# Patient Record
Sex: FEMALE | Race: WHITE | NOT HISPANIC OR LATINO | Employment: OTHER | URBAN - METROPOLITAN AREA
[De-identification: names, ages, dates, MRNs, and addresses within clinical notes are randomized per-mention and may not be internally consistent; named-entity substitution may affect disease eponyms.]

---

## 2017-01-13 ENCOUNTER — ANTI-COAG VISIT (OUTPATIENT)
Dept: CARDIOLOGY | Facility: CLINIC | Age: 82
End: 2017-01-13
Payer: MEDICARE

## 2017-01-13 DIAGNOSIS — Z79.01 LONG TERM CURRENT USE OF ANTICOAGULANT THERAPY: Primary | ICD-10-CM

## 2017-01-13 DIAGNOSIS — I48.20 ATRIAL FIBRILLATION, CHRONIC: ICD-10-CM

## 2017-01-13 LAB
CTP QC/QA: NORMAL
INR PPP: 3.1 (ref 2–3)

## 2017-01-13 PROCEDURE — 99211 OFF/OP EST MAY X REQ PHY/QHP: CPT | Mod: 25,S$GLB,,

## 2017-01-13 PROCEDURE — 85610 PROTHROMBIN TIME: CPT | Mod: QW,S$GLB,,

## 2017-01-13 NOTE — PROGRESS NOTES
Patient came to the clinic today and was experiencing SOB. Her sitter states it comes on with anxiety of being out of her house and nothing to be concerned about. This change in health shouldn't affect her warfarin. Her INR is slightly elevated today without any changes. I am maintaining her dose since she took her medication already today. If her INR remains elevated then I would consider reducing her weekly dose. I advised her and her sitter to contact us with any changes or problems.

## 2017-01-23 ENCOUNTER — ANTI-COAG VISIT (OUTPATIENT)
Dept: CARDIOLOGY | Facility: CLINIC | Age: 82
End: 2017-01-23
Payer: MEDICARE

## 2017-01-23 DIAGNOSIS — Z79.01 LONG TERM CURRENT USE OF ANTICOAGULANT THERAPY: Primary | ICD-10-CM

## 2017-01-23 DIAGNOSIS — I48.20 ATRIAL FIBRILLATION, CHRONIC: ICD-10-CM

## 2017-01-23 DIAGNOSIS — Z79.01 LONG-TERM (CURRENT) USE OF ANTICOAGULANTS: ICD-10-CM

## 2017-01-23 LAB — INR PPP: 2.6 (ref 2–3)

## 2017-01-23 PROCEDURE — 99211 OFF/OP EST MAY X REQ PHY/QHP: CPT | Mod: S$GLB,,, | Performed by: INTERNAL MEDICINE

## 2017-01-23 PROCEDURE — 85610 PROTHROMBIN TIME: CPT | Mod: QW,S$GLB,,

## 2017-01-23 NOTE — PROGRESS NOTES
Patient has minimal bruising. Her INR reduced nicely and now back within range. She will continue this dose until follow-up. I advised her and her caretaker to contact us with any changes or problems.

## 2017-02-05 DIAGNOSIS — F01.50 VASCULAR DEMENTIA WITHOUT BEHAVIORAL DISTURBANCE: Chronic | ICD-10-CM

## 2017-02-05 RX ORDER — DIGOXIN 125 UG/1
TABLET ORAL
Qty: 90 TABLET | Refills: 11 | Status: SHIPPED | OUTPATIENT
Start: 2017-02-05 | End: 2018-04-29 | Stop reason: SDUPTHER

## 2017-02-07 ENCOUNTER — LAB VISIT (OUTPATIENT)
Dept: LAB | Facility: HOSPITAL | Age: 82
End: 2017-02-07
Attending: INTERNAL MEDICINE
Payer: MEDICARE

## 2017-02-07 ENCOUNTER — OFFICE VISIT (OUTPATIENT)
Dept: CARDIOLOGY | Facility: CLINIC | Age: 82
End: 2017-02-07
Payer: MEDICARE

## 2017-02-07 VITALS
SYSTOLIC BLOOD PRESSURE: 80 MMHG | HEIGHT: 63 IN | WEIGHT: 155.63 LBS | BODY MASS INDEX: 27.57 KG/M2 | OXYGEN SATURATION: 97 % | DIASTOLIC BLOOD PRESSURE: 56 MMHG | HEART RATE: 66 BPM

## 2017-02-07 DIAGNOSIS — Z79.01 LONG TERM CURRENT USE OF ANTICOAGULANT THERAPY: ICD-10-CM

## 2017-02-07 DIAGNOSIS — F01.50 MIXED ALZHEIMER'S AND VASCULAR DEMENTIA: ICD-10-CM

## 2017-02-07 DIAGNOSIS — R53.1 GENERALIZED WEAKNESS: ICD-10-CM

## 2017-02-07 DIAGNOSIS — G30.9 MIXED ALZHEIMER'S AND VASCULAR DEMENTIA: ICD-10-CM

## 2017-02-07 DIAGNOSIS — I50.42 CHRONIC COMBINED SYSTOLIC AND DIASTOLIC CHF, NYHA CLASS 3: Primary | Chronic | ICD-10-CM

## 2017-02-07 DIAGNOSIS — I42.0 DILATED CARDIOMYOPATHY: ICD-10-CM

## 2017-02-07 DIAGNOSIS — I50.42 CHRONIC COMBINED SYSTOLIC AND DIASTOLIC CHF, NYHA CLASS 3: Chronic | ICD-10-CM

## 2017-02-07 DIAGNOSIS — I48.20 ATRIAL FIBRILLATION, CHRONIC: Chronic | ICD-10-CM

## 2017-02-07 DIAGNOSIS — F02.80 MIXED ALZHEIMER'S AND VASCULAR DEMENTIA: ICD-10-CM

## 2017-02-07 DIAGNOSIS — N18.30 CKD (CHRONIC KIDNEY DISEASE), STAGE III: ICD-10-CM

## 2017-02-07 LAB
ALBUMIN SERPL BCP-MCNC: 3.6 G/DL
ALP SERPL-CCNC: 56 U/L
ALT SERPL W/O P-5'-P-CCNC: 19 U/L
ANION GAP SERPL CALC-SCNC: 11 MMOL/L
AST SERPL-CCNC: 27 U/L
BASOPHILS # BLD AUTO: 0.02 K/UL
BASOPHILS NFR BLD: 0.2 %
BILIRUB SERPL-MCNC: 0.6 MG/DL
BUN SERPL-MCNC: 17 MG/DL
CALCIUM SERPL-MCNC: 9.6 MG/DL
CHLORIDE SERPL-SCNC: 108 MMOL/L
CHOLEST/HDLC SERPL: 4.9 {RATIO}
CO2 SERPL-SCNC: 23 MMOL/L
CREAT SERPL-MCNC: 1.2 MG/DL
DIFFERENTIAL METHOD: ABNORMAL
EOSINOPHIL # BLD AUTO: 0.1 K/UL
EOSINOPHIL NFR BLD: 1.1 %
ERYTHROCYTE [DISTWIDTH] IN BLOOD BY AUTOMATED COUNT: 13.8 %
EST. GFR  (AFRICAN AMERICAN): 49.7 ML/MIN/1.73 M^2
EST. GFR  (NON AFRICAN AMERICAN): 43.1 ML/MIN/1.73 M^2
GLUCOSE SERPL-MCNC: 97 MG/DL
HCT VFR BLD AUTO: 41.3 %
HDL/CHOLESTEROL RATIO: 20.6 %
HDLC SERPL-MCNC: 204 MG/DL
HDLC SERPL-MCNC: 42 MG/DL
HGB BLD-MCNC: 13.4 G/DL
LDLC SERPL CALC-MCNC: 102.8 MG/DL
LYMPHOCYTES # BLD AUTO: 2.5 K/UL
LYMPHOCYTES NFR BLD: 26.3 %
MCH RBC QN AUTO: 30.2 PG
MCHC RBC AUTO-ENTMCNC: 32.4 %
MCV RBC AUTO: 93 FL
MONOCYTES # BLD AUTO: 0.9 K/UL
MONOCYTES NFR BLD: 9.6 %
NEUTROPHILS # BLD AUTO: 5.9 K/UL
NEUTROPHILS NFR BLD: 62.6 %
NONHDLC SERPL-MCNC: 162 MG/DL
PLATELET # BLD AUTO: 411 K/UL
PMV BLD AUTO: 10.9 FL
POTASSIUM SERPL-SCNC: 4.5 MMOL/L
PROT SERPL-MCNC: 6.7 G/DL
RBC # BLD AUTO: 4.43 M/UL
SODIUM SERPL-SCNC: 142 MMOL/L
TRIGL SERPL-MCNC: 296 MG/DL
WBC # BLD AUTO: 9.38 K/UL

## 2017-02-07 PROCEDURE — 99999 PR PBB SHADOW E&M-EST. PATIENT-LVL III: CPT | Mod: PBBFAC,,, | Performed by: INTERNAL MEDICINE

## 2017-02-07 PROCEDURE — 3078F DIAST BP <80 MM HG: CPT | Mod: S$GLB,,, | Performed by: INTERNAL MEDICINE

## 2017-02-07 PROCEDURE — 80053 COMPREHEN METABOLIC PANEL: CPT

## 2017-02-07 PROCEDURE — 85025 COMPLETE CBC W/AUTO DIFF WBC: CPT

## 2017-02-07 PROCEDURE — 3074F SYST BP LT 130 MM HG: CPT | Mod: S$GLB,,, | Performed by: INTERNAL MEDICINE

## 2017-02-07 PROCEDURE — 1157F ADVNC CARE PLAN IN RCRD: CPT | Mod: S$GLB,,, | Performed by: INTERNAL MEDICINE

## 2017-02-07 PROCEDURE — 1160F RVW MEDS BY RX/DR IN RCRD: CPT | Mod: S$GLB,,, | Performed by: INTERNAL MEDICINE

## 2017-02-07 PROCEDURE — 1126F AMNT PAIN NOTED NONE PRSNT: CPT | Mod: S$GLB,,, | Performed by: INTERNAL MEDICINE

## 2017-02-07 PROCEDURE — 36415 COLL VENOUS BLD VENIPUNCTURE: CPT

## 2017-02-07 PROCEDURE — 99214 OFFICE O/P EST MOD 30 MIN: CPT | Mod: S$GLB,,, | Performed by: INTERNAL MEDICINE

## 2017-02-07 PROCEDURE — 99499 UNLISTED E&M SERVICE: CPT | Mod: S$PBB,,, | Performed by: INTERNAL MEDICINE

## 2017-02-07 PROCEDURE — 1159F MED LIST DOCD IN RCRD: CPT | Mod: S$GLB,,, | Performed by: INTERNAL MEDICINE

## 2017-02-07 PROCEDURE — 80061 LIPID PANEL: CPT

## 2017-02-07 RX ORDER — LOSARTAN POTASSIUM 25 MG/1
25 TABLET ORAL DAILY
Qty: 90 TABLET | Refills: 3 | Status: SHIPPED | OUTPATIENT
Start: 2017-02-07 | End: 2018-04-01 | Stop reason: SDUPTHER

## 2017-02-07 NOTE — MR AVS SNAPSHOT
[facility]  [address]  [facility] [address]  Phone: [phone]                  Cat Garcia   2017 10:30 AM   Office Visit    Description:  Female : 1935   Provider:  Sampson Kay MD   Department:  [facility]           Reason for Visit     Cardiomyopathy           Diagnoses this Visit        Comments    Chronic combined systolic and diastolic CHF, NYHA class 3                To Do List           Future Appointments        Provider Department Dept Phone    2017 12:00 PM MD [facility] [phone]    2017 10:45 AM Leandra Deng, PharmD [place] [facility] Cou[facility]in [phone]      Goals (5 Years of Data)     None      Follow-Up and Disposition     Return in about 1 year (around 2018).       These Medications        Disp Refills Start End    losartan (COZAAR) 25 MG tablet 90 tablet 3 2017     Take 1 tablet (25 mg total) by mouth once daily. - Oral    Pharmacy: Location [id number] - [place] - [address] AT [facility] #: [phone]         [facility] On Call     [facility] On Call Nurse Care Line -  Assistance  Registered nurses in the [facility] On Call Center provide clinical advisement, health education, appointment booking, and other advisory services.  Call for this free service at [phone].             Medications           Message regarding Medications     Verify the changes and/or additions to your medication regime listed below are the same as discussed with your clinician today.  If any of these changes or additions are incorrect, please notify your healthcare provider.        CHANGE how you are taking these medications     Start Taking Instead of    losartan (COZAAR) 25 MG tablet losartan (COZAAR) 50 MG tablet    Dosage:  Take 1 tablet (25 mg total) by mouth once daily. Dosage:  Take 1 tablet (50 mg total) by mouth once daily.    Reason for  "Change:  Reorder       STOP taking these medications     letrozole (FEMARA) 2.5 mg Tab Take 2.5 mg by mouth once daily.           Verify that the below list of medications is an accurate representation of the medications you are currently taking.  If none reported, the list may be blank. If incorrect, please contact your healthcare provider. Carry this list with you in case of emergency.           Current Medications     antiox#10-om3-dha-epa-lut-zeax (I-CAPS) 280-10-2 mg Cap Take by mouth 2 (two) times daily.    ascorbic acid (VITAMIN C) 1000 MG tablet Take 1,000 mg by mouth once daily.    atorvastatin (LIPITOR) 10 MG tablet TAKE 1 TABLET BY MOUTH DAILY    brimonidine 0.15 % OPTH DROP (ALPHAGAN) 0.15 % ophthalmic solution 3 (three) times daily.     calcium citrate-vitamin D3 315-200 mg (CITRACAL+D) 315-200 mg-unit per tablet Take 1 tablet by mouth 2 (two) times daily.    DIGOX 125 mcg tablet TAKE 1 TABLET BY MOUTH ONCE DAILY    donepezil (ARICEPT) 10 MG tablet Take 1 tablet (10 mg total) by mouth every evening.    econazole nitrate 1 % cream Apply topically once daily.    fish oil-omega-3 fatty acids 300-1,000 mg capsule Take 2 g by mouth once daily.    latanoprost 0.005 % ophthalmic solution once daily.     losartan (COZAAR) 25 MG tablet Take 1 tablet (25 mg total) by mouth once daily.    metoprolol succinate (TOPROL-XL) 25 MG 24 hr tablet Take 1 tablet (25 mg total) by mouth once daily.    miconazole (MICOTIN) 2 % cream Apply topically 2 (two) times daily.    warfarin (COUMADIN) 2.5 MG tablet Take 2 tablets (5mg.) by mouth every day, except 3 tablets (7.5mg) on Sunday, and Wednesday,  Or as directed by Coumadin Clinic.           Clinical Reference Information           Your Vitals Were     BP Pulse Height Weight SpO2 BMI    80/56 (BP Location: Left arm, Patient Position: Sitting, BP Method: Manual) 66 5' 3.25" (1.607 m) 70.6 kg (155 lb 10.3 oz) 97% 27.35 kg/m2      Blood Pressure          Most Recent Value    " Right Arm BP - Sitting  98/60    Left Arm BP - Sitting  80/56    BP  (!)  80/56      Allergies as of 2/7/2017     No Known Allergies      Immunizations Administered on Date of Encounter - 2/7/2017     None      Orders Placed During Today's Visit     Future Labs/Procedures Expected by Expires    CBC auto differential  2/7/2017 (Approximate) 2/7/2018    Comprehensive metabolic panel  2/7/2017 (Approximate) 2/7/2018    Lipid panel  2/7/2017 (Approximate) 2/7/2018      Language Assistance Services     ATTENTION: Language assistance services are available, free of charge. Please call [phone].      ATENCIÓN: Si habla español, tiene a gage disposición servicios gratuitos de asistencia lingüística. Llame al [phone].     CHÚ Ý: N?u b?n nói Ti?ng Vi?t, có các d?ch v? h? tr? ngôn ng? mi?n phí dành cho b?n. G?i s? [phone].         [facility] complies with applicable Federal civil rights laws and does not discriminate on the basis of race, color, national origin, age, disability, or sex.

## 2017-02-07 NOTE — PROGRESS NOTES
Subjective:    Patient ID:  Cat Garcia is a 81 y.o. female who presents for follow-up of non ischemic cardiomyopathy and chronic atrial fibrillation    HPI  The patient is a 81 year old female followed with chronic AF and non ischemic DCM [EF 15%]. She is now large home confined with a sitter in the mornings and goes out to dinner with friends every Friday evening. She has  increasing memory loss. At the start of the visit she was hyperventilating which settled down as the visit progressed . SPO2 was 97%.              Past Medical History   Diagnosis Date    Atrial fibrillation     Breast cancer      XRT    Chronic bronchitis     CKD (chronic kidney disease), stage III     Dementia      Current Outpatient Prescriptions   Medication Sig    antiox#10-om3-dha-epa-lut-zeax (I-CAPS) 280-10-2 mg Cap Take by mouth 2 (two) times daily.    ascorbic acid (VITAMIN C) 1000 MG tablet Take 1,000 mg by mouth once daily.    atorvastatin (LIPITOR) 10 MG tablet TAKE 1 TABLET BY MOUTH DAILY    brimonidine 0.15 % OPTH DROP (ALPHAGAN) 0.15 % ophthalmic solution 3 (three) times daily.     calcium citrate-vitamin D3 315-200 mg (CITRACAL+D) 315-200 mg-unit per tablet Take 1 tablet by mouth 2 (two) times daily.    DIGOX 125 mcg tablet TAKE 1 TABLET BY MOUTH ONCE DAILY    donepezil (ARICEPT) 10 MG tablet Take 1 tablet (10 mg total) by mouth every evening.    econazole nitrate 1 % cream Apply topically once daily.    fish oil-omega-3 fatty acids 300-1,000 mg capsule Take 2 g by mouth once daily.    latanoprost 0.005 % ophthalmic solution once daily.     losartan (COZAAR) 25 MG tablet Take 1 tablet (25 mg total) by mouth once daily.    metoprolol succinate (TOPROL-XL) 25 MG 24 hr tablet Take 1 tablet (25 mg total) by mouth once daily.    miconazole (MICOTIN) 2 % cream Apply topically 2 (two) times daily.    warfarin (COUMADIN) 2.5 MG tablet Take 2 tablets (5mg.) by mouth every day, except 3 tablets (7.5mg) on Sunday,  "and Wednesday,  Or as directed by Coumadin Clinic.     No current facility-administered medications for this visit.      Review of Systems   Constitution: Negative for decreased appetite, diaphoresis, fever, weakness, malaise/fatigue, weight gain and weight loss.   HENT: Negative for congestion, ear discharge, ear pain, headaches and nosebleeds.    Eyes: Negative for blurred vision, double vision and visual disturbance.   Cardiovascular: Positive for dyspnea on exertion. Negative for chest pain, claudication, cyanosis, irregular heartbeat, leg swelling, near-syncope, orthopnea, palpitations, paroxysmal nocturnal dyspnea and syncope.   Respiratory: Positive for shortness of breath. Negative for cough, hemoptysis, sleep disturbances due to breathing, snoring, sputum production and wheezing.    Endocrine: Negative for polydipsia, polyphagia and polyuria.   Hematologic/Lymphatic: Negative for adenopathy and bleeding problem. Does not bruise/bleed easily.   Skin: Negative for color change, nail changes, poor wound healing and rash.   Musculoskeletal: Negative for muscle cramps and muscle weakness.   Gastrointestinal: Negative for abdominal pain, anorexia, change in bowel habit, hematochezia, nausea and vomiting.   Genitourinary: Negative for dysuria, frequency and hematuria.   Neurological: Negative for brief paralysis, difficulty with concentration, excessive daytime sleepiness, dizziness, focal weakness, light-headedness, seizures and vertigo.   Psychiatric/Behavioral: Positive for memory loss. Negative for altered mental status and depression.   Allergic/Immunologic: Negative for persistent infections.        Objective:  Visit Vitals    BP (!) 80/56 (BP Location: Left arm, Patient Position: Sitting, BP Method: Manual)    Pulse 66    Ht 5' 3.25" (1.607 m)    Wt 70.6 kg (155 lb 10.3 oz)    SpO2 97%    BMI 27.35 kg/m2       Physical Exam   Constitutional: She is oriented to person, place, and time. She appears " well-developed and well-nourished.   HENT:   Head: Normocephalic.   Right Ear: External ear normal.   Left Ear: External ear normal.   Nose: Nose normal.   Inspection of lips, teeth and gums normal   Eyes: Conjunctivae and EOM are normal. Pupils are equal, round, and reactive to light. No scleral icterus.   Neck: Normal range of motion. No JVD present. No tracheal deviation present. No thyromegaly present.   Cardiovascular: Normal rate, regular rhythm, normal heart sounds and intact distal pulses.  Exam reveals no gallop and no friction rub.    No murmur heard.  Pulmonary/Chest: Effort normal and breath sounds normal. No respiratory distress. She has no wheezes. She has no rales. She exhibits no tenderness.   Abdominal: Soft. Bowel sounds are normal. She exhibits no distension. There is no hepatosplenomegaly. There is no tenderness. There is no guarding.   Musculoskeletal: Normal range of motion. She exhibits no edema or tenderness.   Lymphadenopathy:   Palpation of lymph nodes of neck and groin normal   Neurological: She is oriented to person, place, and time. No cranial nerve deficit. She exhibits normal muscle tone. Coordination normal.   Skin: Skin is warm and dry. No rash noted. No erythema. No pallor.   Palpation of skin normal   Psychiatric: Her behavior is normal. Judgment and thought content normal. Her mood appears anxious.         Assessment:       1. Chronic combined systolic and diastolic CHF, NYHA class 3    2. Atrial fibrillation, chronic    3. Dilated cardiomyopathy    4. CKD (chronic kidney disease), stage III    5. Long term current use of anticoagulant therapy    6. Generalized weakness    7. Mixed Alzheimer's and vascular dementia         Plan:       Cat MOCK was seen today for cardiomyopathy.    Diagnoses and all orders for this visit:    Chronic combined systolic and diastolic CHF, NYHA class 3  -     losartan (COZAAR) 25 MG tablet; Take 1 tablet (25 mg total) by mouth once daily.  -     CBC  auto differential; Future; Expected date: 2/7/17  -     Comprehensive metabolic panel; Future; Expected date: 2/7/17  -     Lipid panel; Future; Expected date: 2/7/17  -     Basic metabolic panel; Future; Expected date: 8/9/17    Atrial fibrillation, chronic    Dilated cardiomyopathy    CKD (chronic kidney disease), stage III  -     Basic metabolic panel; Future; Expected date: 8/9/17    Long term current use of anticoagulant therapy  -     CBC auto differential; Future; Expected date: 8/9/17    Generalized weakness    Mixed Alzheimer's and vascular dementia

## 2017-02-16 ENCOUNTER — OFFICE VISIT (OUTPATIENT)
Dept: INTERNAL MEDICINE | Facility: CLINIC | Age: 82
End: 2017-02-16
Payer: MEDICARE

## 2017-02-16 VITALS
HEART RATE: 68 BPM | HEIGHT: 61 IN | SYSTOLIC BLOOD PRESSURE: 95 MMHG | DIASTOLIC BLOOD PRESSURE: 62 MMHG | BODY MASS INDEX: 28.7 KG/M2 | WEIGHT: 152 LBS

## 2017-02-16 DIAGNOSIS — L60.0 INGROWN TOENAIL: Primary | ICD-10-CM

## 2017-02-16 DIAGNOSIS — L60.2 ONYCHOGRYPHOSIS: ICD-10-CM

## 2017-02-16 DIAGNOSIS — Z71.89 ADVANCED DIRECTIVES, COUNSELING/DISCUSSION: ICD-10-CM

## 2017-02-16 PROCEDURE — 1159F MED LIST DOCD IN RCRD: CPT | Mod: S$GLB,,, | Performed by: INTERNAL MEDICINE

## 2017-02-16 PROCEDURE — 1126F AMNT PAIN NOTED NONE PRSNT: CPT | Mod: S$GLB,,, | Performed by: INTERNAL MEDICINE

## 2017-02-16 PROCEDURE — 3078F DIAST BP <80 MM HG: CPT | Mod: S$GLB,,, | Performed by: INTERNAL MEDICINE

## 2017-02-16 PROCEDURE — 1160F RVW MEDS BY RX/DR IN RCRD: CPT | Mod: S$GLB,,, | Performed by: INTERNAL MEDICINE

## 2017-02-16 PROCEDURE — 99213 OFFICE O/P EST LOW 20 MIN: CPT | Mod: S$GLB,,, | Performed by: INTERNAL MEDICINE

## 2017-02-16 PROCEDURE — 99999 PR PBB SHADOW E&M-EST. PATIENT-LVL III: CPT | Mod: PBBFAC,,, | Performed by: INTERNAL MEDICINE

## 2017-02-16 PROCEDURE — 3074F SYST BP LT 130 MM HG: CPT | Mod: S$GLB,,, | Performed by: INTERNAL MEDICINE

## 2017-02-16 PROCEDURE — 1157F ADVNC CARE PLAN IN RCRD: CPT | Mod: S$GLB,,, | Performed by: INTERNAL MEDICINE

## 2017-02-16 PROCEDURE — 99499 UNLISTED E&M SERVICE: CPT | Mod: S$PBB,,, | Performed by: INTERNAL MEDICINE

## 2017-02-16 NOTE — MR AVS SNAPSHOT
[facility]  [address]  [facility] [address]  Phone: [phone]  Fax: [phone]                  Cat Garcia   2017 12:00 PM   Office Visit    Description:  Female : 1935   Provider:  Alfie Oconnell MD   Department:  [facility]           Reason for Visit     Follow-up           Diagnoses this Visit        Comments    Ingrown toenail    -  Primary     Onychogryphosis                To Do List           Future Appointments        Provider Department Dept Phone    2017 10:45 AM Leandra Deng, PharmD [place] - Coumadin [phone]      Goals (5 Years of Data)     None      Follow-Up and Disposition     Return in about 1 year (around 2018).      [facility] On Call     [facility] On Call Nurse Care Line - / Assistance  Registered nurses in the [facility] On Call Center provide clinical advisement, health education, appointment booking, and other advisory services.  Call for this free service at [phone].             Medications           Message regarding Medications     Verify the changes and/or additions to your medication regime listed below are the same as discussed with your clinician today.  If any of these changes or additions are incorrect, please notify your healthcare provider.             Verify that the below list of medications is an accurate representation of the medications you are currently taking.  If none reported, the list may be blank. If incorrect, please contact your healthcare provider. Carry this list with you in case of emergency.           Current Medications     antiox#10-om3-dha-epa-lut-zeax (I-CAPS) 280-10-2 mg Cap Take by mouth 2 (two) times daily.    ascorbic acid (VITAMIN C) 1000 MG tablet Take 1,000 mg by mouth once daily.    atorvastatin (LIPITOR) 10 MG tablet TAKE 1 TABLET BY MOUTH DAILY    brimonidine 0.15 % OPTH DROP (ALPHAGAN) 0.15 % ophthalmic solution 3 (three) times daily.     calcium  "citrate-vitamin D3 315-200 mg (CITRACAL+D) 315-200 mg-unit per tablet Take 1 tablet by mouth 2 (two) times daily.    DIGOX 125 mcg tablet TAKE 1 TABLET BY MOUTH ONCE DAILY    donepezil (ARICEPT) 10 MG tablet Take 1 tablet (10 mg total) by mouth every evening.    econazole nitrate 1 % cream Apply topically once daily.    fish oil-omega-3 fatty acids 300-1,000 mg capsule Take 2 g by mouth once daily.    latanoprost 0.005 % ophthalmic solution once daily.     losartan (COZAAR) 25 MG tablet Take 1 tablet (25 mg total) by mouth once daily.    metoprolol succinate (TOPROL-XL) 25 MG 24 hr tablet Take 1 tablet (25 mg total) by mouth once daily.    miconazole (MICOTIN) 2 % cream Apply topically 2 (two) times daily.    warfarin (COUMADIN) 2.5 MG tablet Take 2 tablets (5mg.) by mouth every day, except 3 tablets (7.5mg) on Sunday, and Wednesday,  Or as directed by Coumadin Clinic.           Clinical Reference Information           Your Vitals Were     BP Pulse Height Weight BMI    95/62 68 5' 1" (1.549 m) 68.9 kg (152 lb) 28.72 kg/m2      Blood Pressure          Most Recent Value    BP  95/62      Allergies as of 2/16/2017     No Known Allergies      Immunizations Administered on Date of Encounter - 2/16/2017     None      Orders Placed During Today's Visit      Normal Orders This Visit    Ambulatory Referral to Podiatry       Language Assistance Services     ATTENTION: Language assistance services are available, free of charge. Please call [phone].      ATENCIÓN: Si pablo vinesdemetris, tiene a gage disposición servicios gratuitos de asistencia lingüística. Llame al [phone].     [facility] Ý: N?u b?n nói Ti?ng Vi?t, có các d?ch v? h? tr? ngôn ng? mi?n phí dành cho b?n. G?i s? [phone].         [facility] complies with applicable Federal civil rights laws and does not discriminate on the basis of race, color, national origin, age, disability, or sex.        "

## 2017-02-16 NOTE — PROGRESS NOTES
Subjective:       Patient ID: Cat Garcia is a 81 y.o. female.    Chief Complaint: Follow-up    HPI Comments: Here for f/u.    Difficult to trim nails and some nail fungus, occ pain from the elongated nails.    Now has home PCA, no longer driving.    Still deconditioned even with a round of PT a few mos ago.    Review of Systems   Constitutional: Positive for fatigue. Negative for activity change, appetite change, fever and unexpected weight change.   Eyes: Negative for visual disturbance.   Respiratory: Positive for shortness of breath (stable). Negative for chest tightness and wheezing.    Cardiovascular: Negative for chest pain, palpitations and leg swelling.   Gastrointestinal: Negative for abdominal distention and abdominal pain.   Endocrine: Negative for cold intolerance, heat intolerance, polydipsia and polyuria.   Genitourinary: Negative for difficulty urinating.   Musculoskeletal: Positive for gait problem.   Skin: Negative for rash.   Neurological: Negative for tremors and syncope.        Chronic imbalance    No falls   Hematological: Negative for adenopathy. Does not bruise/bleed easily.   Psychiatric/Behavioral: Negative for dysphoric mood.       Objective:      Physical Exam   Constitutional: She is oriented to person, place, and time. She appears well-developed and well-nourished. No distress.   HENT:   Head: Normocephalic and atraumatic.   Mouth/Throat: No oropharyngeal exudate.   Eyes: Conjunctivae are normal. Pupils are equal, round, and reactive to light. No scleral icterus.   Neck: Normal range of motion. Neck supple. No thyromegaly present.   Cardiovascular: Normal rate and normal heart sounds.    Irregularly irregular       Pulmonary/Chest: Effort normal and breath sounds normal. She has no rales.   Abdominal: Soft. Bowel sounds are normal. She exhibits no distension. There is no tenderness.   Musculoskeletal: She exhibits no edema.   Lymphadenopathy:     She has no cervical adenopathy.    Neurological: She is alert and oriented to person, place, and time. She exhibits normal muscle tone.   Skin: She is not diaphoretic.   onychomycotic and onychogryphotic toenails. No paronychia   Psychiatric: She has a normal mood and affect. Her behavior is normal.       Assessment:       1. Ingrown toenail    2. Onychogryphosis        Plan:       Cat MOCK was seen today for follow-up.    Diagnoses and all orders for this visit:    Ingrown toenail  -     Ambulatory Referral to Podiatry  Onychogryphosis  -     Ambulatory Referral to Podiatry    Low BP, btr on lower dose losartan.    afib asymptomatic    Advanced planning -- son HCP and she prefers full code.    Health Maintenance       Date Due Completion Date    TETANUS VACCINE 4/22/1955 ---    DEXA SCAN 4/22/1977 ---    Lipid Panel 2/7/2022 2/7/2017      utd flu.    Return in about 1 year (around 2/16/2018).

## 2017-02-17 RX ORDER — WARFARIN 2.5 MG/1
TABLET ORAL
Qty: 210 TABLET | Refills: 2
Start: 2017-02-17 | End: 2018-05-18 | Stop reason: SDUPTHER

## 2017-02-20 ENCOUNTER — ANTI-COAG VISIT (OUTPATIENT)
Dept: CARDIOLOGY | Facility: CLINIC | Age: 82
End: 2017-02-20
Payer: MEDICARE

## 2017-02-20 DIAGNOSIS — I48.20 ATRIAL FIBRILLATION, CHRONIC: ICD-10-CM

## 2017-02-20 DIAGNOSIS — Z79.01 LONG TERM CURRENT USE OF ANTICOAGULANT THERAPY: Primary | ICD-10-CM

## 2017-02-20 LAB — INR PPP: 2.7 (ref 2–3)

## 2017-02-20 PROCEDURE — 85610 PROTHROMBIN TIME: CPT | Mod: QW,S$GLB,,

## 2017-02-20 PROCEDURE — 99211 OFF/OP EST MAY X REQ PHY/QHP: CPT | Mod: 25,S$GLB,,

## 2017-02-20 NOTE — PROGRESS NOTES
Patient had a recent change in health with CHF and placed on losartan (COZAAR) 25 MG tablet. Patient is stable on this dose. She will continue this dose until follow-up. I advised her and her sitter to contact us with any changes or problems.

## 2017-02-23 ENCOUNTER — OFFICE VISIT (OUTPATIENT)
Dept: PODIATRY | Facility: CLINIC | Age: 82
End: 2017-02-23
Payer: MEDICARE

## 2017-02-23 VITALS
DIASTOLIC BLOOD PRESSURE: 68 MMHG | BODY MASS INDEX: 28.7 KG/M2 | SYSTOLIC BLOOD PRESSURE: 119 MMHG | WEIGHT: 152 LBS | HEART RATE: 81 BPM | HEIGHT: 61 IN

## 2017-02-23 DIAGNOSIS — B35.1 DERMATOPHYTOSIS OF NAIL: ICD-10-CM

## 2017-02-23 DIAGNOSIS — M79.671 BILATERAL FOOT PAIN: Primary | ICD-10-CM

## 2017-02-23 DIAGNOSIS — M79.672 BILATERAL FOOT PAIN: Primary | ICD-10-CM

## 2017-02-23 PROCEDURE — 1159F MED LIST DOCD IN RCRD: CPT | Mod: S$GLB,,, | Performed by: PODIATRIST

## 2017-02-23 PROCEDURE — 3078F DIAST BP <80 MM HG: CPT | Mod: S$GLB,,, | Performed by: PODIATRIST

## 2017-02-23 PROCEDURE — 3074F SYST BP LT 130 MM HG: CPT | Mod: S$GLB,,, | Performed by: PODIATRIST

## 2017-02-23 PROCEDURE — 99204 OFFICE O/P NEW MOD 45 MIN: CPT | Mod: 25,S$GLB,, | Performed by: PODIATRIST

## 2017-02-23 PROCEDURE — 1160F RVW MEDS BY RX/DR IN RCRD: CPT | Mod: S$GLB,,, | Performed by: PODIATRIST

## 2017-02-23 PROCEDURE — 11721 DEBRIDE NAIL 6 OR MORE: CPT | Mod: Q9,S$GLB,, | Performed by: PODIATRIST

## 2017-02-23 PROCEDURE — 1126F AMNT PAIN NOTED NONE PRSNT: CPT | Mod: S$GLB,,, | Performed by: PODIATRIST

## 2017-02-23 PROCEDURE — 99999 PR PBB SHADOW E&M-EST. PATIENT-LVL III: CPT | Mod: PBBFAC,,, | Performed by: PODIATRIST

## 2017-02-23 PROCEDURE — 1157F ADVNC CARE PLAN IN RCRD: CPT | Mod: S$GLB,,, | Performed by: PODIATRIST

## 2017-02-23 NOTE — LETTER
February 23, 2017      Alfie Oconnell MD  [address]  [facility] [address]           [facility] [facility]  [address] [facility] [place] [address]  Phone: [phone]          Patient: Cat Garcia   MR Number: [id number]   YOB: 1935   Date of Visit: 2/23/2017       Dear Dr. Alfie Oconnell:    Thank you for referring Cat Garcia to me for evaluation. Attached you will find relevant portions of my assessment and plan of care.    If you have questions, please do not hesitate to call me. I look forward to following Cat Garcia along with you.    Sincerely,    Evelia Tidwell, DARBY    Enclosure  CC:  No Recipients    If you would like to receive this communication electronically, please contact [email or web address] or [phone] to request more information on [facility] Link access.    For providers and/or their staff who would like to refer a patient to [facility], please contact us through our one-stop-shop provider referral line, [facility] , at [phone].    If you feel you have received this communication in error or would no longer like to receive these types of communications, please e-mail [email or web address]

## 2017-02-23 NOTE — PROCEDURES
Routine Foot Care  Date/Time: 2/23/2017 11:18 AM  Performed by: BOUBACAR SMALLS  Authorized by: BOUBACAR SMALLS     Consent Done?:  Yes (Verbal)    Nail Care Type:  Debride  Location(s): All  (Left 1st Toe, Left 3rd Toe, Left 2nd Toe, Left 4th Toe, Left 5th Toe, Right 1st Toe, Right 2nd Toe, Right 3rd Toe, Right 4th Toe and Right 5th Toe)  Patient tolerance:  Patient tolerated the procedure well with no immediate complications

## 2017-02-23 NOTE — PROGRESS NOTES
CC:    toenail fungus    HPI:   Cat Garcia is a 81 y.o. female who presents to clinic with complaints of painful thick fungal toenails.  Patient states nails have been abnormal since years and gradually worsening over time. Pt has tried no treatment yet.  Unable to reach and care for her toenails.       Past Medical History   Diagnosis Date    Atrial fibrillation     Breast cancer      XRT    Chronic bronchitis     CKD (chronic kidney disease), stage III     Dementia            Current Outpatient Prescriptions on File Prior to Visit   Medication Sig Dispense Refill    antiox#10-om3-dha-epa-lut-zeax (I-CAPS) 280-10-2 mg Cap Take by mouth 2 (two) times daily.      ascorbic acid (VITAMIN C) 1000 MG tablet Take 1,000 mg by mouth once daily.      atorvastatin (LIPITOR) 10 MG tablet TAKE 1 TABLET BY MOUTH DAILY 90 tablet 11    brimonidine 0.15 % OPTH DROP (ALPHAGAN) 0.15 % ophthalmic solution 3 (three) times daily.       calcium citrate-vitamin D3 315-200 mg (CITRACAL+D) 315-200 mg-unit per tablet Take 1 tablet by mouth 2 (two) times daily.      DIGOX 125 mcg tablet TAKE 1 TABLET BY MOUTH ONCE DAILY 90 tablet 11    donepezil (ARICEPT) 10 MG tablet Take 1 tablet (10 mg total) by mouth every evening. 90 tablet 3    econazole nitrate 1 % cream Apply topically once daily. 30 g 1    fish oil-omega-3 fatty acids 300-1,000 mg capsule Take 2 g by mouth once daily.      latanoprost 0.005 % ophthalmic solution once daily.       losartan (COZAAR) 25 MG tablet Take 1 tablet (25 mg total) by mouth once daily. 90 tablet 3    metoprolol succinate (TOPROL-XL) 25 MG 24 hr tablet Take 1 tablet (25 mg total) by mouth once daily. 90 tablet 3    miconazole (MICOTIN) 2 % cream Apply topically 2 (two) times daily. 113 g 0    warfarin (COUMADIN) 2.5 MG tablet Take 2 tablets (5mg.) by mouth every day, except 3 tablets (7.5mg) on Sunday, and Thursday, Or as directed by [facility]. 210 tablet 2     No current  "facility-administered medications on file prior to visit.           ALLG:  Review of patient's allergies indicates:  No Known Allergies          ROS:    General ROS: negative for - chills, fatigue or fever  Cardiovascular ROS: no chest pain or dyspnea on exertion  Musculoskeletal ROS: negative for - joint pain or joint stiffness   Skin: Negative for rash, Positive for nail or hair changes.  no itching.       EXAM:   Vitals:    02/23/17 1052   BP: 119/68   Pulse: 81   Weight: 68.9 kg (152 lb)   Height: 5' 1" (1.549 m)        General:  alert    Vasc:  Pedal pulses present 1+;  +edema  Neuro Motor:  Light touch and Sharp/dull sensation:  intact to light touch  Derm: onychomycosis of the toenails and dystrophic nails, bilateral hallux toenails thickened with subungual debris .    No presence of pigment involving the periungual skin.   Msk:  4/5 muscle strength.   Right foot: hammertoe   Left foot: hammertoe      ASSESSMENT / PLAN:     I counseled the patient on her conditions, their implications and medical management.       Bilateral foot pain  -     Foot Care    Dermatophytosis of nail  -     Foot Care       Return in about 4 months (around 6/23/2017) for foot care, or sooner if concerned.      "

## 2017-02-23 NOTE — MR AVS SNAPSHOT
[facility]   [facility]  [place] [address]  Phone: [phone]                  Cat Garcia   2017 9:45 AM   Office Visit    Description:  Female : 1935   Provider:  Evelia Tidwell DPM   Department:  [facility]           Reason for Visit     Ingrown Toenail                To Do List           Future Appointments        Provider Department Dept Phone    3/27/2017 10:45 AM [facility]ILANADAMAD[facility]N [facility] [phone]    6/15/2017 10:00 AM Evelia Tidwell DPM [facility] [phone]      Goals (5 Years of Data)     None      [facility] On Call     [facility] On Call Nurse Care Line -  Assistance  Registered nurses in the [facility] On Call Center provide clinical advisement, health education, appointment booking, and other advisory services.  Call for this free service at [phone].             Medications           Message regarding Medications     Verify the changes and/or additions to your medication regime listed below are the same as discussed with your clinician today.  If any of these changes or additions are incorrect, please notify your healthcare provider.             Verify that the below list of medications is an accurate representation of the medications you are currently taking.  If none reported, the list may be blank. If incorrect, please contact your healthcare provider. Carry this list with you in case of emergency.           Current Medications     antiox#10-om3-dha-epa-lut-zeax (I-CAPS) 280-10-2 mg Cap Take by mouth 2 (two) times daily.    ascorbic acid (VITAMIN C) 1000 MG tablet Take 1,000 mg by mouth once daily.    atorvastatin (LIPITOR) 10 MG tablet TAKE 1 TABLET BY MOUTH DAILY    brimonidine 0.15 % OPTH DROP (ALPHAGAN) 0.15 % ophthalmic solution 3 (three) times daily.     calcium citrate-vitamin D3 315-200 mg (CITRACAL+D) 315-200 mg-unit per tablet Take 1 tablet by mouth 2 (two) times daily.    DIGOX 125 mcg tablet TAKE 1  "TABLET BY MOUTH ONCE DAILY    donepezil (ARICEPT) 10 MG tablet Take 1 tablet (10 mg total) by mouth every evening.    econazole nitrate 1 % cream Apply topically once daily.    fish oil-omega-3 fatty acids 300-1,000 mg capsule Take 2 g by mouth once daily.    latanoprost 0.005 % ophthalmic solution once daily.     losartan (COZAAR) 25 MG tablet Take 1 tablet (25 mg total) by mouth once daily.    metoprolol succinate (TOPROL-XL) 25 MG 24 hr tablet Take 1 tablet (25 mg total) by mouth once daily.    miconazole (MICOTIN) 2 % cream Apply topically 2 (two) times daily.    warfarin (COUMADIN) 2.5 MG tablet Take 2 tablets (5mg.) by mouth every day, except 3 tablets (7.5mg) on Sunday, and Thursday, Or as directed by [facility].           Clinical Reference Information           Your Vitals Were     BP Pulse Height Weight BMI    119/68 81 5' 1" (1.549 m) 68.9 kg (152 lb) 28.72 kg/m2      Blood Pressure          Most Recent Value    BP  119/68      Allergies as of 2/23/2017     No Known Allergies      Immunizations Administered on Date of Encounter - 2/23/2017     None      Language Assistance Services     ATTENTION: Language assistance services are available, free of charge. Please call [phone].      ATENCIÓN: Si pablo shaver, tiene a gage disposición servicios gratuitos de asistencia lingüística. Llame al [phone].     IZABELA Ý: N?u b?n nói Ti?ng Vi?t, có các d?ch v? h? tr? ngôn ng? mi?n phí dành cho b?n. G?i s? [phone].         [facility] complies with applicable Federal civil rights laws and does not discriminate on the basis of race, color, national origin, age, disability, or sex.        "

## 2017-03-27 ENCOUNTER — ANTI-COAG VISIT (OUTPATIENT)
Dept: CARDIOLOGY | Facility: CLINIC | Age: 82
End: 2017-03-27
Payer: MEDICARE

## 2017-03-27 DIAGNOSIS — Z79.01 LONG TERM CURRENT USE OF ANTICOAGULANT THERAPY: Primary | ICD-10-CM

## 2017-03-27 DIAGNOSIS — I48.20 ATRIAL FIBRILLATION, CHRONIC: ICD-10-CM

## 2017-03-27 LAB — INR PPP: 1.9 (ref 2–3)

## 2017-03-27 PROCEDURE — 85610 PROTHROMBIN TIME: CPT | Mod: QW,S$GLB,,

## 2017-03-27 PROCEDURE — 99211 OFF/OP EST MAY X REQ PHY/QHP: CPT | Mod: 25,S$GLB,,

## 2017-03-27 NOTE — PROGRESS NOTES
INR slightly subtherapeutic.  Patient missed a dose of warfarin 3/22; this change in dose is likely cause of low INR.  She denies other changes to affect INR.  Continue warfarin dose and repeat INR 4-5 weeks.  Patient and sitter advised to contact clinic with any changes, questions, or concerns.

## 2017-05-01 ENCOUNTER — ANTI-COAG VISIT (OUTPATIENT)
Dept: CARDIOLOGY | Facility: CLINIC | Age: 82
End: 2017-05-01
Payer: MEDICARE

## 2017-05-01 DIAGNOSIS — Z79.01 LONG TERM (CURRENT) USE OF ANTICOAGULANTS: Primary | ICD-10-CM

## 2017-05-01 LAB — INR PPP: 1.9 (ref 2–3)

## 2017-05-01 PROCEDURE — 85610 PROTHROMBIN TIME: CPT | Mod: QW,S$GLB,,

## 2017-05-01 PROCEDURE — 99211 OFF/OP EST MAY X REQ PHY/QHP: CPT | Mod: 25,S$GLB,,

## 2017-05-01 NOTE — PROGRESS NOTES
INR remains slightly subtherapeutic.  Patient's sitter reports possible increased intake of Ensure.  They deny other changes to affect INR.  Given patient's age, will not adjust warfarin dose.  Reviewed importance of consistent vit k intake for INR stability and stressed Ensure once/day.  Repeat INR 4 weeks.  Patient and sitter advised to contact clinic with any changes, questions, or concerns.

## 2017-05-29 ENCOUNTER — ANTI-COAG VISIT (OUTPATIENT)
Dept: CARDIOLOGY | Facility: CLINIC | Age: 82
End: 2017-05-29
Payer: MEDICARE

## 2017-05-29 DIAGNOSIS — I48.20 ATRIAL FIBRILLATION, CHRONIC: ICD-10-CM

## 2017-05-29 DIAGNOSIS — Z79.01 LONG TERM CURRENT USE OF ANTICOAGULANT THERAPY: Primary | ICD-10-CM

## 2017-05-29 LAB — INR PPP: 1.7 (ref 2–3)

## 2017-05-29 PROCEDURE — 85610 PROTHROMBIN TIME: CPT | Mod: QW,S$GLB,, | Performed by: INTERNAL MEDICINE

## 2017-05-29 NOTE — PROGRESS NOTES
Patient subtherapeutic and trending down from 1.9 to 1.7. Patient has already taken medication for the day. Reviewed patients diet and medications with nurse but were unable to establish a clear source for this downward trend. Nurse reports keeping count of the ensure in the fridge and believes she is consistent with x1/day. Will increase Tuesday regimen and follow up closely in two weeks.

## 2017-06-12 ENCOUNTER — ANTI-COAG VISIT (OUTPATIENT)
Dept: CARDIOLOGY | Facility: CLINIC | Age: 82
End: 2017-06-12
Payer: MEDICARE

## 2017-06-12 DIAGNOSIS — Z79.01 LONG TERM CURRENT USE OF ANTICOAGULANT THERAPY: Primary | ICD-10-CM

## 2017-06-12 DIAGNOSIS — I48.20 ATRIAL FIBRILLATION, CHRONIC: ICD-10-CM

## 2017-06-12 LAB — INR PPP: 1.5 (ref 2–3)

## 2017-06-12 PROCEDURE — 85610 PROTHROMBIN TIME: CPT | Mod: QW,S$GLB,,

## 2017-06-12 PROCEDURE — 99211 OFF/OP EST MAY X REQ PHY/QHP: CPT | Mod: 25,S$GLB,,

## 2017-06-12 NOTE — PROGRESS NOTES
INR continued to reduce despite an increase in her weekly warfarin dose. Her sitter denies missed doses or changes to warrant this INR. Her Ensure intake has remained the same. She has a few cocktails during lunch on Friday and there's no change with this. She will be monitored closer due to the reduction in INR. I am increasing her weekly dose until follow-up. I advised her and her sitter to contact us with any changes or problems.

## 2017-06-14 ENCOUNTER — OFFICE VISIT (OUTPATIENT)
Dept: NEUROLOGY | Facility: CLINIC | Age: 82
End: 2017-06-14
Payer: MEDICARE

## 2017-06-14 VITALS
DIASTOLIC BLOOD PRESSURE: 65 MMHG | BODY MASS INDEX: 28.89 KG/M2 | WEIGHT: 153 LBS | HEART RATE: 59 BPM | SYSTOLIC BLOOD PRESSURE: 112 MMHG | HEIGHT: 61 IN

## 2017-06-14 DIAGNOSIS — I48.20 ATRIAL FIBRILLATION, CHRONIC: Chronic | ICD-10-CM

## 2017-06-14 PROCEDURE — 99215 OFFICE O/P EST HI 40 MIN: CPT | Mod: S$GLB,,, | Performed by: PSYCHIATRY & NEUROLOGY

## 2017-06-14 PROCEDURE — 1159F MED LIST DOCD IN RCRD: CPT | Mod: S$GLB,,, | Performed by: PSYCHIATRY & NEUROLOGY

## 2017-06-14 PROCEDURE — 99499 UNLISTED E&M SERVICE: CPT | Mod: S$PBB,,, | Performed by: PSYCHIATRY & NEUROLOGY

## 2017-06-14 PROCEDURE — 1126F AMNT PAIN NOTED NONE PRSNT: CPT | Mod: S$GLB,,, | Performed by: PSYCHIATRY & NEUROLOGY

## 2017-06-14 PROCEDURE — 99999 PR PBB SHADOW E&M-EST. PATIENT-LVL III: CPT | Mod: PBBFAC,,, | Performed by: PSYCHIATRY & NEUROLOGY

## 2017-06-14 RX ORDER — DONEPEZIL HYDROCHLORIDE 10 MG/1
10 TABLET, FILM COATED ORAL NIGHTLY
Qty: 90 TABLET | Refills: 3 | Status: ON HOLD | OUTPATIENT
Start: 2017-06-14 | End: 2020-01-08 | Stop reason: SDUPTHER

## 2017-06-14 NOTE — PROGRESS NOTES
"Name: Cat Garcia  MRN: [id number]   CSN: [id number]      Date: 06/14/2017      Chief Complaint / Interval History:   - here with son and caretaker (Ely)  - has daily assistance, family on the weekend  - uses pill case with alarm and voice  - has walker at home, but she doesn't use it - usually uses her furniture and will soemtimes walk on her own  - no falls or near falls  - normal bowels, normal bladder    From my notes in May 2016:  Message from the email:  "I'm a little worried the carbidopa/levodopa is causing some side effects, but not improving my mother's  walking.  She is sleepier - normally she sits with me while I'm taking care of her chores on a Sunday morning.  But she excused herself saying she was tired, then fell asleep.  Also, she mentioned several bad dreams, saying she's not sure what's real.  Anyway, I'm just going to hold the carbidopa/levodopa until I hear from you because of these side effects plus not seeing any improvement in her walking.  "    History of Present Illness (HPI):  79 yo here for memory loss and weakness.  Here with her son    In Fall 2013, was still working as a .  Coworkers noted more issues with confusion on the job. Called the son.  In Dec 2015, he stopped by to say hello - has SOB and was found in rapid afib (200s), admitted to CCU and found EF 15%.  Had been getting around "decently" since then.  Heart failure is pretty well compensated now - no JVD, sleeps on 2 pillows, no pitting edema.      Has also seen Dr. Genaro Golden - previous docs thought she had asthma/COPD, but his spirometry testing and oxygenation was normal.  But still has significant issues with walking - gets out of breath after only 10 steps.    Still lives alone, though her son sees her weekly or more.  Helps out with bills and shopping.  Still driving and "hanging on" per him.      Nonmotor/Premotor ROS:  Hyposmia (HENT)?No  RBD/sleep issues (Constitutional)?No  Depression/anxiety " (Psychiatric)?No  Fatigue (Constitutional)?Yes  Constipation (GI)?No  Urinary issues ()?No  Sexual dysfunction ()?N/A  Orthostasis (Cardiovascular)?No  Leg swelling (Cardiovascular)? No  Falls (Musculoskeletal)?No  Cognitive impairment (Neurologic)?Yes  Psychoses (Psychiatric)?No  Pain/Paresthesia (Neurologic)?No  Visual changes (Eyes)?No  Moles / skin changes (Skin)?No  Stridor / SOB (Pulm)?No  Bruising (Heme)?No    Past Medical History: The patient  has a past medical history of Atrial fibrillation; Breast cancer; Chronic bronchitis; CKD (chronic kidney disease), stage III; and Dementia.    Social History: The patient  reports that she quit smoking about 35 years ago. Her smoking use included Cigarettes. She smoked 0.25 packs per day. She has never used smokeless tobacco. She reports that she does not drink alcohol or use drugs.    Family History: Their family history includes Heart disease in her father.    Allergies: Review of patient's allergies indicates no known allergies.     Meds:   Current Outpatient Prescriptions on File Prior to Visit   Medication Sig Dispense Refill    antiox#10-om3-dha-epa-lut-zeax (I-CAPS) 280-10-2 mg Cap Take by mouth 2 (two) times daily.      ascorbic acid (VITAMIN C) 1000 MG tablet Take 1,000 mg by mouth once daily.      atorvastatin (LIPITOR) 10 MG tablet TAKE 1 TABLET BY MOUTH DAILY 90 tablet 11    brimonidine 0.15 % OPTH DROP (ALPHAGAN) 0.15 % ophthalmic solution 3 (three) times daily.       calcium citrate-vitamin D3 315-200 mg (CITRACAL+D) 315-200 mg-unit per tablet Take 1 tablet by mouth 2 (two) times daily.      DIGOX 125 mcg tablet TAKE 1 TABLET BY MOUTH ONCE DAILY 90 tablet 11    donepezil (ARICEPT) 10 MG tablet Take 1 tablet (10 mg total) by mouth every evening. 90 tablet 3    fish oil-omega-3 fatty acids 300-1,000 mg capsule Take 2 g by mouth once daily.      latanoprost 0.005 % ophthalmic solution once daily.       losartan (COZAAR) 25 MG tablet Take 1  "tablet (25 mg total) by mouth once daily. 90 tablet 3    metoprolol succinate (TOPROL-XL) 25 MG 24 hr tablet Take 1 tablet (25 mg total) by mouth once daily. 90 tablet 3    miconazole (MICOTIN) 2 % cream Apply topically 2 (two) times daily. 113 g 0    warfarin (COUMADIN) 2.5 MG tablet Take 2 tablets (5mg.) by mouth every day, except 3 tablets (7.5mg) on Sunday, and Thursday, Or as directed by [facility] Clinic. 210 tablet 2    econazole nitrate 1 % cream Apply topically once daily. 30 g 1     No current facility-administered medications on file prior to visit.        Exam:  /65   Pulse (!) 59   Ht 5' 1" (1.549 m)   Wt 69.4 kg (153 lb)   BMI 28.91 kg/m²     * Specialized movement exam  No hypophonic speech.    No facial masking.   Minimal UE B cogwheel rigidity.  stable   No bradykinesia.   No tremor with rest, posture, kinesis, or intention.    No other dystonia, chorea, athetosis, myoclonus, or tics.   No motor impersistence.   Normal-based gait.   + shortened stride length - a bit more apraxic than before   ++ abnormal arm swing.     + postural instability.      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 Did not respond to ldopa trial.  2) Strokes - old  3) Memory loss.  More combo of attention and amnesia - also a bit bradyphrenic as can be seen in subcortical.    Griselda continues fo    Medical " Decision Making:  - continue aricept for memory and gait  - stable overall - will keep other meds the same.    Roman Key MD, MPH  Division of Movement and Memory Disorders  [facility]  [phone]

## 2017-06-15 ENCOUNTER — OFFICE VISIT (OUTPATIENT)
Dept: PODIATRY | Facility: CLINIC | Age: 82
End: 2017-06-15
Payer: MEDICARE

## 2017-06-15 VITALS
DIASTOLIC BLOOD PRESSURE: 65 MMHG | BODY MASS INDEX: 28.89 KG/M2 | HEIGHT: 61 IN | WEIGHT: 153 LBS | HEART RATE: 60 BPM | SYSTOLIC BLOOD PRESSURE: 112 MMHG

## 2017-06-15 DIAGNOSIS — G30.9 MIXED ALZHEIMER'S AND VASCULAR DEMENTIA: ICD-10-CM

## 2017-06-15 DIAGNOSIS — F02.80 MIXED ALZHEIMER'S AND VASCULAR DEMENTIA: ICD-10-CM

## 2017-06-15 DIAGNOSIS — M79.671 BILATERAL FOOT PAIN: Primary | ICD-10-CM

## 2017-06-15 DIAGNOSIS — M79.672 BILATERAL FOOT PAIN: Primary | ICD-10-CM

## 2017-06-15 DIAGNOSIS — B35.1 DERMATOPHYTOSIS OF NAIL: ICD-10-CM

## 2017-06-15 DIAGNOSIS — F01.50 MIXED ALZHEIMER'S AND VASCULAR DEMENTIA: ICD-10-CM

## 2017-06-15 PROCEDURE — 99499 UNLISTED E&M SERVICE: CPT | Mod: S$GLB,,, | Performed by: PODIATRIST

## 2017-06-15 PROCEDURE — 99499 UNLISTED E&M SERVICE: CPT | Mod: S$PBB,,, | Performed by: PODIATRIST

## 2017-06-15 PROCEDURE — 11721 DEBRIDE NAIL 6 OR MORE: CPT | Mod: Q9,S$GLB,, | Performed by: PODIATRIST

## 2017-06-15 PROCEDURE — 99999 PR PBB SHADOW E&M-EST. PATIENT-LVL III: CPT | Mod: PBBFAC,,, | Performed by: PODIATRIST

## 2017-06-15 NOTE — PROGRESS NOTES
CC:    toenail    HPI:   Cat Garcia is a 82 y.o. female who presents to clinic with complaints of painful thick toenails.  Patient states nails have been abnormal since years and gradually worsening over time. Pt has tried no treatment yet.  Unable to reach and care for her toenails.   history of Vascular dementia.  She is accompanied by a caregiver.     PCP:  Alfie Oconnell MD  Last PCP visit:   2/16/17        Past Medical History:   Diagnosis Date    Atrial fibrillation     Breast cancer     XRT    Chronic bronchitis     CKD (chronic kidney disease), stage III     Dementia            Current Outpatient Prescriptions on File Prior to Visit   Medication Sig Dispense Refill    antiox#10-om3-dha-epa-lut-zeax (I-CAPS) 280-10-2 mg Cap Take by mouth 2 (two) times daily.      ascorbic acid (VITAMIN C) 1000 MG tablet Take 1,000 mg by mouth once daily.      atorvastatin (LIPITOR) 10 MG tablet TAKE 1 TABLET BY MOUTH DAILY 90 tablet 11    brimonidine 0.15 % OPTH DROP (ALPHAGAN) 0.15 % ophthalmic solution 3 (three) times daily.       calcium citrate-vitamin D3 315-200 mg (CITRACAL+D) 315-200 mg-unit per tablet Take 1 tablet by mouth 2 (two) times daily.      DIGOX 125 mcg tablet TAKE 1 TABLET BY MOUTH ONCE DAILY 90 tablet 11    donepezil (ARICEPT) 10 MG tablet Take 1 tablet (10 mg total) by mouth every evening. 90 tablet 3    econazole nitrate 1 % cream Apply topically once daily. 30 g 1    fish oil-omega-3 fatty acids 300-1,000 mg capsule Take 2 g by mouth once daily.      latanoprost 0.005 % ophthalmic solution once daily.       losartan (COZAAR) 25 MG tablet Take 1 tablet (25 mg total) by mouth once daily. 90 tablet 3    metoprolol succinate (TOPROL-XL) 25 MG 24 hr tablet Take 1 tablet (25 mg total) by mouth once daily. 90 tablet 3    miconazole (MICOTIN) 2 % cream Apply topically 2 (two) times daily. 113 g 0    warfarin (COUMADIN) 2.5 MG tablet Take 2 tablets (5mg.) by mouth every day, except 3  "tablets (7.5mg) on Sunday, and Thursday, Or as directed by Coumadin Clinic. 210 tablet 2     No current facility-administered medications on file prior to visit.           ALLG:  Review of patient's allergies indicates:  No Known Allergies          ROS:    General ROS: negative for - chills, fatigue or fever  Cardiovascular ROS: no chest pain or dyspnea on exertion  Musculoskeletal ROS: negative for - joint pain or joint stiffness   Skin: Negative for rash, Positive for nail or hair changes.  no itching.       EXAM:   Vitals:    06/15/17 1008   BP: 112/65   Pulse: 60   Weight: 69.4 kg (153 lb)   Height: 5' 1" (1.549 m)        General:  alert    Vasc:  Pedal pulses present 1+;  +edema  Neuro Motor:  Light touch and Sharp/dull sensation:  intact to light touch  Derm: onychomycosis of the toenails and dystrophic nails, bilateral hallux toenails thickened with subungual debris .    No presence of pigment involving the periungual skin.   Msk:  4/5 muscle strength.   Right foot: hammertoe   Left foot: hammertoe        ASSESSMENT / PLAN:     I counseled the patient on her conditions, their implications and medical management.       Bilateral foot pain    Mixed Alzheimer's and vascular dementia    Dermatophytosis of nail    Other orders  -     Foot Care     Routine Foot Care  Date/Time: 6/15/2017 10:47 AM  Performed by: BOUBACAR SMALLS  Authorized by: BOUBACAR SMALLS     Consent Done?:  Yes (Verbal)    Nail Care Type:  Debride  Location(s): All  (Left 1st Toe, Left 3rd Toe, Left 2nd Toe, Left 4th Toe, Left 5th Toe, Right 1st Toe, Right 2nd Toe, Right 3rd Toe, Right 4th Toe and Right 5th Toe)  Patient tolerance:  Patient tolerated the procedure well with no immediate complications     With patient's permission, the toenails mentioned above were aggressively reduced and debrided using a nail nipper, removing all offending nail and debris. The patient will continue to monitor the areas daily, inspect the feet, wear protective shoe " gear when ambulatory, and moisturizer to maintain skin integrity.        Return in about 4 months (around 10/15/2017) for foot care, or sooner if concerned.

## 2017-06-20 ENCOUNTER — ANTI-COAG VISIT (OUTPATIENT)
Dept: CARDIOLOGY | Facility: CLINIC | Age: 82
End: 2017-06-20
Payer: MEDICARE

## 2017-06-20 DIAGNOSIS — Z79.01 LONG TERM CURRENT USE OF ANTICOAGULANT THERAPY: Primary | ICD-10-CM

## 2017-06-20 DIAGNOSIS — I48.20 ATRIAL FIBRILLATION, CHRONIC: ICD-10-CM

## 2017-06-20 LAB — INR PPP: 1.5 (ref 2–3)

## 2017-06-20 PROCEDURE — 85610 PROTHROMBIN TIME: CPT | Mod: QW,S$GLB,,

## 2017-06-20 PROCEDURE — 99211 OFF/OP EST MAY X REQ PHY/QHP: CPT | Mod: 25,S$GLB,,

## 2017-06-20 NOTE — PROGRESS NOTES
INR remains low, likely due to increased Ensure.  Patient's sitter reports 2-3 Ensure daily over past week.  Patient will continue with Ensure 2/day; advised to be consistent.  Increase warfarin dose and repeat INR next week.  I left a message for her son to confirm warfarin dosing from past week and requested a call back if she received a different dose than what is in the calendar.  Patient and sitter advised to contact clinic with any changes, questions, or concerns.

## 2017-06-26 RX ORDER — ATORVASTATIN CALCIUM 10 MG/1
TABLET, FILM COATED ORAL
Qty: 90 TABLET | Refills: 11 | Status: SHIPPED | OUTPATIENT
Start: 2017-06-26 | End: 2018-05-18 | Stop reason: SDUPTHER

## 2017-06-27 ENCOUNTER — ANTI-COAG VISIT (OUTPATIENT)
Dept: CARDIOLOGY | Facility: CLINIC | Age: 82
End: 2017-06-27
Payer: MEDICARE

## 2017-06-27 DIAGNOSIS — I48.20 ATRIAL FIBRILLATION, CHRONIC: ICD-10-CM

## 2017-06-27 DIAGNOSIS — Z79.01 LONG TERM CURRENT USE OF ANTICOAGULANT THERAPY: Primary | ICD-10-CM

## 2017-06-27 LAB — INR PPP: 1.8 (ref 2–3)

## 2017-06-27 PROCEDURE — 99211 OFF/OP EST MAY X REQ PHY/QHP: CPT | Mod: 25,S$GLB,,

## 2017-06-27 PROCEDURE — 85610 PROTHROMBIN TIME: CPT | Mod: QW,S$GLB,,

## 2017-06-27 NOTE — PROGRESS NOTES
INR remains subtherapeutic but improved with recent warfarin dose adjustment.  Patient's caregiver, Ely, states possibly increased Ensure over the weekend but reports consistent 2/day during the week.  She is aware of importance of consistent vit k intake.  Continue warfarin dose and repeat INR 2 weeks.  Patient and Ely advised to contact clinic with any changes, questions, or concerns.

## 2017-07-11 ENCOUNTER — ANTI-COAG VISIT (OUTPATIENT)
Dept: CARDIOLOGY | Facility: CLINIC | Age: 82
End: 2017-07-11
Payer: MEDICARE

## 2017-07-11 DIAGNOSIS — I48.20 ATRIAL FIBRILLATION, CHRONIC: ICD-10-CM

## 2017-07-11 DIAGNOSIS — Z79.01 LONG TERM CURRENT USE OF ANTICOAGULANT THERAPY: Primary | ICD-10-CM

## 2017-07-11 LAB — INR PPP: 2 (ref 2–3)

## 2017-07-11 PROCEDURE — 85610 PROTHROMBIN TIME: CPT | Mod: QW,S$GLB,,

## 2017-07-11 PROCEDURE — 99211 OFF/OP EST MAY X REQ PHY/QHP: CPT | Mod: 25,S$GLB,,

## 2017-07-11 NOTE — PROGRESS NOTES
Today was follow up for subtherapeutic INR.  INR therapeutic today.  Patient and Ely amezcua, deny changes to diet, medications, or health that would affect INR.  Patient will continue weekly warfarin regimen at this time.  Repeat INR ~ 3 weeks to assess stability.  Patient and Ely advised to contact clinic with any changes, questions, or concerns.

## 2017-08-01 ENCOUNTER — ANTI-COAG VISIT (OUTPATIENT)
Dept: CARDIOLOGY | Facility: CLINIC | Age: 82
End: 2017-08-01
Payer: MEDICARE

## 2017-08-01 DIAGNOSIS — I48.20 ATRIAL FIBRILLATION, CHRONIC: ICD-10-CM

## 2017-08-01 DIAGNOSIS — Z79.01 LONG TERM CURRENT USE OF ANTICOAGULANT THERAPY: Primary | ICD-10-CM

## 2017-08-01 LAB — INR PPP: 1.9 (ref 2–3)

## 2017-08-01 PROCEDURE — 85610 PROTHROMBIN TIME: CPT | Mod: QW,S$GLB,,

## 2017-08-01 PROCEDURE — 99211 OFF/OP EST MAY X REQ PHY/QHP: CPT | Mod: 25,S$GLB,,

## 2017-08-01 NOTE — PROGRESS NOTES
INR slightly subtherapeutic with no overt complications. Patient reports possible variation of Ensure on weekends. Otherwise no other changes in diet/Vit K intake. Will maintain current warfarin dose with no changes and follow-up in clinic ~3 weeks. Patient advised to contact clinic with any changes, questions, or concerns.

## 2017-08-25 ENCOUNTER — ANTI-COAG VISIT (OUTPATIENT)
Dept: CARDIOLOGY | Facility: CLINIC | Age: 82
End: 2017-08-25
Payer: MEDICARE

## 2017-08-25 DIAGNOSIS — I48.20 ATRIAL FIBRILLATION, CHRONIC: ICD-10-CM

## 2017-08-25 DIAGNOSIS — Z79.01 LONG TERM CURRENT USE OF ANTICOAGULANT THERAPY: Primary | ICD-10-CM

## 2017-08-25 LAB — INR PPP: 2.5 (ref 2–3)

## 2017-08-25 PROCEDURE — 85610 PROTHROMBIN TIME: CPT | Mod: QW,S$GLB,,

## 2017-08-25 NOTE — PROGRESS NOTES
INR therapeutic today with no overt complications, previously subtherapeutic at follow-up to clinic ~3.5 weeks ago. Patient denies any signs/symptoms of bleeding or bruising. Patient denies any changes in diet/vitamin K intake or medications. Caretaker and patient could not recall warfarin regimen from memory saying that her son takes care of meds based off of card received from the clinic, otherwise they deny any issues with missed or doubled warfarin doses. Caretaker expressed concern over adding avocado to diet, counseled them that it should be ok so long as consistency is maintained, and they verbalized understanding. Will maintain weekly warfarin regimen with no changes, with plans to follow-up in ~4 weeks to monitor and reassess therapy. Patient advised to contact clinic with any changes, questions, or concerns.

## 2017-09-06 ENCOUNTER — OFFICE VISIT (OUTPATIENT)
Dept: INTERNAL MEDICINE | Facility: CLINIC | Age: 82
End: 2017-09-06
Payer: MEDICARE

## 2017-09-06 DIAGNOSIS — F02.80 MIXED ALZHEIMER'S AND VASCULAR DEMENTIA: ICD-10-CM

## 2017-09-06 DIAGNOSIS — I70.0 AORTIC ATHEROSCLEROSIS: ICD-10-CM

## 2017-09-06 DIAGNOSIS — G30.9 MIXED ALZHEIMER'S AND VASCULAR DEMENTIA: ICD-10-CM

## 2017-09-06 DIAGNOSIS — I10 ESSENTIAL HYPERTENSION: Chronic | ICD-10-CM

## 2017-09-06 DIAGNOSIS — I50.42 CHRONIC COMBINED SYSTOLIC AND DIASTOLIC CHF, NYHA CLASS 3: Chronic | ICD-10-CM

## 2017-09-06 DIAGNOSIS — R53.81 DEBILITY: ICD-10-CM

## 2017-09-06 DIAGNOSIS — D75.839 THROMBOCYTOSIS: ICD-10-CM

## 2017-09-06 DIAGNOSIS — I42.0 DILATED CARDIOMYOPATHY: ICD-10-CM

## 2017-09-06 DIAGNOSIS — Z00.00 ENCOUNTER FOR PREVENTIVE HEALTH EXAMINATION: Primary | ICD-10-CM

## 2017-09-06 DIAGNOSIS — F01.50 MIXED ALZHEIMER'S AND VASCULAR DEMENTIA: ICD-10-CM

## 2017-09-06 DIAGNOSIS — L98.9 SKIN LESION: ICD-10-CM

## 2017-09-06 DIAGNOSIS — N18.30 CKD (CHRONIC KIDNEY DISEASE) STAGE 3, GFR 30-59 ML/MIN: Chronic | ICD-10-CM

## 2017-09-06 DIAGNOSIS — G21.4 VASCULAR PARKINSONISM: ICD-10-CM

## 2017-09-06 DIAGNOSIS — E78.5 DYSLIPIDEMIA: Chronic | ICD-10-CM

## 2017-09-06 DIAGNOSIS — Z79.01 LONG TERM CURRENT USE OF ANTICOAGULANT THERAPY: ICD-10-CM

## 2017-09-06 DIAGNOSIS — I48.20 ATRIAL FIBRILLATION, CHRONIC: Chronic | ICD-10-CM

## 2017-09-06 PROCEDURE — 99397 PER PM REEVAL EST PAT 65+ YR: CPT | Mod: S$GLB,,, | Performed by: NURSE PRACTITIONER

## 2017-09-06 PROCEDURE — 99999 PR PBB SHADOW E&M-EST. PATIENT-LVL V: CPT | Mod: PBBFAC,,, | Performed by: NURSE PRACTITIONER

## 2017-09-06 PROCEDURE — 99499 UNLISTED E&M SERVICE: CPT | Mod: S$PBB,,, | Performed by: NURSE PRACTITIONER

## 2017-09-06 NOTE — PATIENT INSTRUCTIONS
Counseling and Referral of Other Preventative  (Italic type indicates deductible and co-insurance are waived)    Patient Name: Cat Garcia  Today's Date: 9/6/2017      SERVICE LIMITATIONS RECOMMENDATION    Vaccines    · Pneumococcal (once after 65)    · Influenza (annually)    · Hepatitis B (if medium/high risk)    · Prevnar 13      Hepatitis B medium/high risk factors:       - End-stage renal disease       - Hemophiliacs who received Factor VII or         IX concentrates       - Clients of institutions for the mentally             retarded       - Persons who live in the same house as          a HepB carrier       - Homosexual men       - Illicit injectable drug abusers     Pneumococcal: Done, no repeat necessary     Influenza: Due sometime this flu season     Hepatitis B: N/A     Prevnar 13: Done, no repeat necessary    Mammogram (biennial age 50-74)  Annually (age 40 or over)  Hx of breast cancer, discuss with PCP and son    Pap (up to age 70 and after 70 if unknown history or abnormal study last 10 years)    N/A     The USPSTF recommends against screening for cervical cancer in women older than age 65 years who have had adequate prior screening and are not otherwise at high risk for cervical cancer.      Colorectal cancer screening (to age 75)    · Fecal occult blood test (annual)  · Flexible sigmoidoscopy (5y)  · Screening colonoscopy (10y)  · Barium enema   N/A    Diabetes self-management training (no USPSTF recommendations)  Requires referral by treating physician for patient with diabetes or renal disease. 10 hours of initial DSMT sessions of no less than 30 minutes each in a continuous 12-month period. 2 hours of follow-up DSMT in subsequent years.  N/A    Bone mass measurements (age 65 & older, biennial)  Requires diagnosis related to osteoporosis or estrogen deficiency. Biennial benefit unless patient has history of long-term glucocorticoid  Recommended to patient, discuss with PCP    Glaucoma  screening (no USPSTF recommendation)  Diabetes mellitus, family history   , age 50 or over    American, age 65 or over  Yearly eye exams recommended    Medical nutrition therapy for diabetes or renal disease (no recommended schedule)  Requires referral by treating physician for patient with diabetes or renal disease or kidney transplant within the past 3 years.  Can be provided in same year as diabetes self-management training (DSMT), and CMS recommends medical nutrition therapy take place after DSMT. Up to 3 hours for initial year and 2 hours in subsequent years.  N/A    Cardiovascular screening blood tests (every 5 years)  · Fasting lipid panel  Order as a panel if possible  Done this year, repeat every year    Diabetes screening tests (at least every 3 years, Medicare covers annually or at 6-month intervals for prediabetic patients)  · Fasting blood sugar (FBS) or glucose tolerance test (GTT)  Patient must be diagnosed with one of the following:       - Hypertension       - Dyslipidemia       - Obesity (BMI 30kg/m2)       - Previous elevated impaired FBS or GTT       ... or any two of the following:       - Overweight (BMI 25 but <30)       - Family history of diabetes       - Age 65 or older       - History of gestational diabetes or birth of baby weighing more than 9 pounds  Done this year, repeat every year    HIV screening (annually for increased risk patients)  · HIV-1 and HIV-2 by EIA, or ИВАН, rapid antibody test or oral mucosa transudate  Patients must be at increased risk for HIV infection per USPSTF guidelines or pregnant. Tests covered annually for patient at increased risk or as requested by the patient. Pregnant patients may receive up to 3 tests during pregnancy.  Risks discussed, screening is not recommended    Smoking cessation counseling (up to 8 sessions per year)  Patients must be asymptomatic of tobacco-related conditions to receive as a preventative service.   Non-smoker    Subsequent annual wellness visit  At least 12 months since last AWV  Return in one year     The following information is provided to all patients.  This information is to help you find resources for any of the problems found today that may be affecting your health:                Living healthy guide: www.[facility].[place].[facility]      Understanding Diabetes: www.diabetes.org      Eating healthy: www.cdc.gov/healthyweight      CDC home safety checklist: www.cdc.gov/steadi/patient.html      Agency on Aging: www.goea.[place].[facility]      Alcoholics anonymous (AA): www.aa.org      Physical Activity: www.radha.nih.gov/ua5fdnb      Tobacco use: www.quitwithusla.org

## 2017-09-07 VITALS
HEART RATE: 62 BPM | WEIGHT: 149.5 LBS | HEIGHT: 61 IN | BODY MASS INDEX: 28.22 KG/M2 | SYSTOLIC BLOOD PRESSURE: 98 MMHG | DIASTOLIC BLOOD PRESSURE: 64 MMHG

## 2017-09-07 PROBLEM — D75.839 THROMBOCYTOSIS: Status: ACTIVE | Noted: 2017-09-07

## 2017-09-07 PROBLEM — I70.0 AORTIC ATHEROSCLEROSIS: Status: ACTIVE | Noted: 2017-09-07

## 2017-09-07 NOTE — PROGRESS NOTES
"Cat Garcia presented for a  Medicare AWV and comprehensive Health Risk Assessment today. The following components were reviewed and updated:    · Medical history  · Family History  · Social history  · Allergies and Current Medications  · Health Risk Assessment  · Health Maintenance  · Care Team     ** See Completed Assessments for Annual Wellness Visit within the encounter summary.**       The following assessments were completed:  · Living Situation  · CAGE  · Depression Screening  · Timed Get Up and Go  · Whisper Test  · Cognitive Function Screening  · Nutrition Screening  · ADL Screening  · PAQ Screening    Vitals:    09/06/17 0941   BP: 98/64   BP Location: Right arm   Patient Position: Sitting   Pulse: 62   Weight: 67.8 kg (149 lb 7.6 oz)   Height: 5' 1" (1.549 m)     Body mass index is 28.24 kg/m².  Physical Exam   Constitutional: She is oriented to person, place, and time. She appears well-developed and well-nourished. No distress.   To visit in wheelchair with a caretaker   HENT:   Head: Normocephalic and atraumatic.   Eyes: No scleral icterus.   Neck: Normal range of motion. Neck supple.   Cardiovascular: Normal rate, normal heart sounds and intact distal pulses.  An irregularly irregular rhythm present.   Pulmonary/Chest: Effort normal and breath sounds normal. No respiratory distress.   Abdominal: She exhibits no distension.   Musculoskeletal: She exhibits no edema.   Neurological: She is alert and oriented to person, place, and time.   Memory loss noted   Skin: Skin is warm and dry. She is not diaphoretic.   Skin lesion noted for forehead         Diagnoses and health risks identified today and associated recommendations/orders:    1. Encounter for preventive health examination  Assessments completed  Preventative health recommendations reviewed  Cognitive screening and get up and go screening not performed d/t hx of dementia and debility.  Using wheel chair at today's visit.     2. Mixed " Alzheimer's and vascular dementia  Stable.   Controlled with current medical therapy  Followed by neurology and PCP.     3. Vascular parkinsonism  Stable.   Followed by neurology and PCP.     4. Atrial fibrillation, chronic  Stable.   Controlled with current medical therapy  Followed by cardiology.     5. Dilated cardiomyopathy  Stable.   Controlled with current medical therapy  Followed by cardiology.     6. Chronic combined systolic and diastolic CHF, NYHA class 3  Stable.   Controlled with current medical therapy  Followed by cardiology.     7. Thrombocytosis  Stable. Last platelet count was 411  Followed by PCP.     8. Aortic atherosclerosis  Stable. Seen on cxr from 08/04/14  Controlled with current medical therapy  Followed by PCP and cardiology.   9. Dyslipidemia  Stable.   Controlled with current medical therapy  Followed by PCP and cardiology.     10. Essential hypertension  Stable.   Controlled with current medical therapy  Followed by PCP.     11. CKD (chronic kidney disease) stage 3, GFR 30-59 ml/min  Stable.   Followed by PCP.     12. Long term current use of anticoagulant therapy  Stable.   Followed by coumadin clinic.     13. Debility  Stable.   Has a walker for use at home  Followed by PCP.     14. Skin lesion  - Ambulatory Referral to Dermatology      Provided Cat MOCK with a 5-10 year written screening schedule and personal prevention plan. Recommendations were developed using the USPSTF age appropriate recommendations. Education, counseling, and referrals were provided as needed. After Visit Summary printed and given to patient which includes a list of additional screenings\tests needed.    Return in about 5 months (around 2/6/2018) for an annual visit with your primary care provider or sooner if problems arise. .    Ioana Neves NP

## 2017-09-26 ENCOUNTER — ANTI-COAG VISIT (OUTPATIENT)
Dept: CARDIOLOGY | Facility: CLINIC | Age: 82
End: 2017-09-26
Payer: MEDICARE

## 2017-09-26 DIAGNOSIS — I48.20 ATRIAL FIBRILLATION, CHRONIC: ICD-10-CM

## 2017-09-26 DIAGNOSIS — Z79.01 LONG TERM CURRENT USE OF ANTICOAGULANT THERAPY: Primary | ICD-10-CM

## 2017-09-26 LAB — INR PPP: 2.2 (ref 2–3)

## 2017-09-26 PROCEDURE — 85610 PROTHROMBIN TIME: CPT | Mod: QW,S$GLB,,

## 2017-09-26 NOTE — PROGRESS NOTES
INR good. No changes in medications, health, or diet. No signs or symptoms of bleeding. INR stable. Maintain current dose and repeat INR in 5 weeks.

## 2017-10-02 ENCOUNTER — PATIENT MESSAGE (OUTPATIENT)
Dept: INTERNAL MEDICINE | Facility: CLINIC | Age: 82
End: 2017-10-02

## 2017-10-03 ENCOUNTER — TELEPHONE (OUTPATIENT)
Dept: INTERNAL MEDICINE | Facility: CLINIC | Age: 82
End: 2017-10-03

## 2017-10-03 ENCOUNTER — TELEPHONE (OUTPATIENT)
Dept: DERMATOLOGY | Facility: CLINIC | Age: 82
End: 2017-10-03

## 2017-10-03 NOTE — TELEPHONE ENCOUNTER
Spoke with Bria pt appt was moved up.      ----- Message from Kenn Tejeda MA sent at 10/3/2017  3:46 PM CDT -----  Ree SAMPSON Staff Cc: Tena Rubio RN  Caller: Bria Oconnell office (Today,  9:58 AM)         KIMBERLY-pt- Bria is calling to speak with the nurse pt needs to be seen asap for the lesion on her forehead pts son is concerned it has cancer in it. Can you please call Bria at ext [phone] Pt has an appt on 11-18

## 2017-10-03 NOTE — TELEPHONE ENCOUNTER
Attempted to contact pt's son no success, left voice message. Pt as able to get an earlier appt scheduled with dermatology. October 16, 2017 @ 0940 am.

## 2017-10-03 NOTE — TELEPHONE ENCOUNTER
----- Message from Ree Pisano sent at 10/3/2017  9:58 AM CDT -----  Contact: Bria Oconnell office   KIMBERLY-pt- Bria is calling to speak with the nurse pt needs to be seen asap for the lesion on her forehead pts son is concerned it has cancer in it. Can you please call Bria at ext [phone] Pt has an appt on 11-18 JC

## 2017-10-03 NOTE — TELEPHONE ENCOUNTER
I was able to speak to the patients son, Dr. Ruiz and reschedule her an appointment with Dr. Merino on 10/16 at 9:40am. He was very pleased with this.

## 2017-10-16 ENCOUNTER — INITIAL CONSULT (OUTPATIENT)
Dept: DERMATOLOGY | Facility: CLINIC | Age: 82
End: 2017-10-16
Payer: MEDICARE

## 2017-10-16 DIAGNOSIS — D48.5 NEOPLASM OF UNCERTAIN BEHAVIOR OF SKIN: Primary | ICD-10-CM

## 2017-10-16 PROCEDURE — 99999 PR PBB SHADOW E&M-EST. PATIENT-LVL III: CPT | Mod: PBBFAC,,, | Performed by: DERMATOLOGY

## 2017-10-16 PROCEDURE — 88305 TISSUE EXAM BY PATHOLOGIST: CPT | Performed by: PATHOLOGY

## 2017-10-16 PROCEDURE — 99499 UNLISTED E&M SERVICE: CPT | Mod: S$GLB,,, | Performed by: DERMATOLOGY

## 2017-10-16 PROCEDURE — 11100 PR BIOPSY OF SKIN LESION: CPT | Mod: S$GLB,,, | Performed by: DERMATOLOGY

## 2017-10-16 NOTE — PROGRESS NOTES
Subjective:       Patient ID:  Cat Garcia is a 82 y.o. female who presents for   Chief Complaint   Patient presents with    Lesion     on forehead x 6 mo tender no tx     Lesion  - Initial  Affected locations: forehead  Duration: 6 months  Signs / symptoms: tender  Relieving factors/Treatments tried: nothing    Denies personal h/o skin cancer    Past Medical History:   Diagnosis Date    Atrial fibrillation     with rapid ventricular rate    Breast cancer     XRT    Chronic bronchitis     CKD (chronic kidney disease), stage III     Dementia      Review of Systems   Constitutional: Negative for fever, chills and fatigue.   Hematologic/Lymphatic: Bruises/bleeds easily (on Coumadin).        Objective:    Physical Exam   Constitutional: She appears well-developed and well-nourished. No distress.   Neurological: She is alert and oriented to person, place, and time. She is not disoriented.   Psychiatric: She has a normal mood and affect.   Skin:   Areas Examined (abnormalities noted in diagram):   Head / Face Inspection Performed  Neck Inspection Performed                  Diagram Legend     Erythematous scaling macule/papule c/w actinic keratosis       Vascular papule c/w angioma      Pigmented verrucoid papule/plaque c/w seborrheic keratosis      Yellow umbilicated papule c/w sebaceous hyperplasia      Irregularly shaped tan macule c/w lentigo     1-2 mm smooth white papules consistent with Milia      Movable subcutaneous cyst with punctum c/w epidermal inclusion cyst      Subcutaneous movable cyst c/w pilar cyst      Firm pink to brown papule c/w dermatofibroma      Pedunculated fleshy papule(s) c/w skin tag(s)      Evenly pigmented macule c/w junctional nevus     Mildly variegated pigmented, slightly irregular-bordered macule c/w mildly atypical nevus      Flesh colored to evenly pigmented papule c/w intradermal nevus       Pink pearly papule/plaque c/w basal cell carcinoma      Erythematous  hyperkeratotic cursted plaque c/w SCC      Surgical scar with no sign of skin cancer recurrence      Open and closed comedones      Inflammatory papules and pustules      Verrucoid papule consistent consistent with wart     Erythematous eczematous patches and plaques     Dystrophic onycholytic nail with subungual debris c/w onychomycosis     Umbilicated papule    Erythematous-base heme-crusted tan verrucoid plaque consistent with inflamed seborrheic keratosis     Erythematous Silvery Scaling Plaque c/w Psoriasis     See annotation      Assessment / Plan:      Pathology Orders:      Normal Orders This Visit    Tissue Specimen To Pathology, Dermatology     Questions:    Directional Terms:  Other(comment)    Clinical information:  r/o BCC vs SCC vs other    Specific Site:  L forehead        Neoplasm of uncertain behavior of skin  -     Tissue Specimen To Pathology, Dermatology  Shave biopsy procedure note:    Shave biopsy performed after verbal consent including risk of infection, scar, recurrence, need for additional treatment of site. Area prepped with alcohol, anesthetized with approximately 1.0cc of 1% lidocaine with epinephrine. Lesional tissue shaved with razor blade. Hemostasis achieved with application of aluminum chloride followed by hyfrecation. No complications. Dressing applied. Wound care explained.           Return for pending Pathology.

## 2017-10-16 NOTE — LETTER
October 16, 2017      Ioana Neves, NP  [address]  [place] [address]           [facility]  [address]  [place] [address]  Phone: [phone]  Fax: [phone]          Patient: Cat Garcia   MR Number: [id number]   YOB: 1935   Date of Visit: 10/16/2017       Dear Ioana Neves:    Thank you for referring Cat Garcia to me for evaluation. Attached you will find relevant portions of my assessment and plan of care.    If you have questions, please do not hesitate to call me. I look forward to following Cat Garcia along with you.    Sincerely,    Ester Merino MD    Enclosure  CC:  No Recipients    If you would like to receive this communication electronically, please contact [email or web address] or [phone] to request more information on [employer] Link access.    For providers and/or their staff who would like to refer a patient to [facility], please contact us through our one-stop-shop provider referral line, [facility], at [phone].    If you feel you have received this communication in error or would no longer like to receive these types of communications, please e-mail [email or web address]

## 2017-11-07 ENCOUNTER — INITIAL CONSULT (OUTPATIENT)
Dept: DERMATOLOGY | Facility: CLINIC | Age: 82
End: 2017-11-07
Payer: MEDICARE

## 2017-11-07 ENCOUNTER — ANTI-COAG VISIT (OUTPATIENT)
Dept: CARDIOLOGY | Facility: CLINIC | Age: 82
End: 2017-11-07
Payer: MEDICARE

## 2017-11-07 VITALS
HEART RATE: 61 BPM | DIASTOLIC BLOOD PRESSURE: 56 MMHG | BODY MASS INDEX: 28.13 KG/M2 | HEIGHT: 61 IN | WEIGHT: 149 LBS | SYSTOLIC BLOOD PRESSURE: 109 MMHG

## 2017-11-07 DIAGNOSIS — C44.329 SQUAMOUS CELL CARCINOMA OF FOREHEAD: Primary | ICD-10-CM

## 2017-11-07 DIAGNOSIS — I48.20 ATRIAL FIBRILLATION, CHRONIC: ICD-10-CM

## 2017-11-07 DIAGNOSIS — Z79.01 LONG TERM CURRENT USE OF ANTICOAGULANT THERAPY: Primary | ICD-10-CM

## 2017-11-07 LAB — INR PPP: 1.9 (ref 2–3)

## 2017-11-07 PROCEDURE — 99999 PR PBB SHADOW E&M-EST. PATIENT-LVL III: CPT | Mod: PBBFAC,,, | Performed by: DERMATOLOGY

## 2017-11-07 PROCEDURE — 85610 PROTHROMBIN TIME: CPT | Mod: QW,S$GLB,,

## 2017-11-07 PROCEDURE — 99213 OFFICE O/P EST LOW 20 MIN: CPT | Mod: S$GLB,,, | Performed by: DERMATOLOGY

## 2017-11-07 PROCEDURE — 99211 OFF/OP EST MAY X REQ PHY/QHP: CPT | Mod: 25,S$GLB,,

## 2017-11-07 NOTE — PROGRESS NOTES
REFERRING MD:  Ester Merino MD    CHIEF COMPLAINT:  New patient being consulted for Mohs' surgery evaluation.    HISTORY OF PRESENT ILLNESS:  82 y.o. female presents with a 4 month(s) history of growth on the L forehead. Pt is on Coumadin and today's INR is 1.9.    Negative for scabbing.  Negative for crusting.  Negative for bleeding.  Negative for itching.    Biopsy consistent with squamous cell carcinoma.     No prior treatment.    Pacemaker: No  Defibrillator: No  Artificial joints: No  Artificial heart valves: No    PAST MEDICAL HISTORY:  Past Medical History:   Diagnosis Date    Atrial fibrillation     with rapid ventricular rate    Breast cancer     XRT    Chronic bronchitis     CKD (chronic kidney disease), stage III     Dementia     Squamous cell carcinoma        PAST SURGICAL HISTORY:  Past Surgical History:   Procedure Laterality Date    BREAST SURGERY  2011    right        SOCIAL HISTORY:  Dependencies:  former smoker    PERTINENT MEDICATIONS:  See medications list.  Coumadin and fish oil    ALLERGIES:  Patient has no known allergies.    ROS:  Skin: See HPI  Constitutional: No fatigue, fever, malaise, weight loss, or night sweats.  Cardiovascular: No chest pain, palpitations, or edema.  Respiratory: No coughing, wheezing, SOB, or sputum production.    Physical Exam   HENT:   Head:             General: Mood and affect normal. Alert and orient X3. Normal appearance.  Eyelids:  no suspicious lesions  Head/Face: Left mid forehead with a 6 x 12 mm pink pearly papule located 4 cm superiorly from the left lateral canthus and 2.5 cm medially.   Lips/Teeth/Gums:  no suspicious lesions     IMPRESSION:  Biopsy proven invasive squamous cell carcinoma, L forehead, path# [id number].    PLAN:  The diagnosis and the pathology report were discussed in detail with the patient. Treatment options were reviewed, including Mohs Micrographic Surgery, radiation, topical therapy, and standard excision.  After careful  review of patient's history and physical exam, and after discussion of treatment options, the decision was made to perform Mohs micrographic surgery.    Scheduled patient for Mohs Micrographic Surgery. Risks, benefits, and alternatives of Mohs' surgery discussed with the patient. Discussed repair options including complex closure, skin flap, skin graft and second intention healing with the patient. Pre-operative instructions provided to the patient. Okay to stay on Coumadin. Stop fish oil a week prior to procedure.

## 2017-11-07 NOTE — PROGRESS NOTES
INR a tad low but acceptable. Patient may have only gotten 5mg on a 7.5mg day last week. Additionally, she has had some avocado this week. Caretaker advised to limit avocado to only 1 per week. No other changes. Dose is normally very stable. Continue maintenance dose and Recheck INR in 4 weeks

## 2017-11-21 ENCOUNTER — PROCEDURE VISIT (OUTPATIENT)
Dept: DERMATOLOGY | Facility: CLINIC | Age: 82
End: 2017-11-21
Payer: MEDICARE

## 2017-11-21 VITALS
HEART RATE: 54 BPM | HEIGHT: 61 IN | DIASTOLIC BLOOD PRESSURE: 65 MMHG | WEIGHT: 149 LBS | SYSTOLIC BLOOD PRESSURE: 101 MMHG | BODY MASS INDEX: 28.13 KG/M2

## 2017-11-21 DIAGNOSIS — C44.329 SQUAMOUS CELL CARCINOMA OF FOREHEAD: Primary | ICD-10-CM

## 2017-11-21 PROCEDURE — 17311 MOHS 1 STAGE H/N/HF/G: CPT | Mod: S$GLB,,, | Performed by: DERMATOLOGY

## 2017-11-21 PROCEDURE — 17312 MOHS ADDL STAGE: CPT | Mod: S$GLB,,, | Performed by: DERMATOLOGY

## 2017-11-21 PROCEDURE — 13132 CMPLX RPR F/C/C/M/N/AX/G/H/F: CPT | Mod: 51,S$GLB,, | Performed by: DERMATOLOGY

## 2017-11-21 PROCEDURE — 99499 UNLISTED E&M SERVICE: CPT | Mod: S$GLB,,, | Performed by: DERMATOLOGY

## 2017-11-21 RX ORDER — OXYCODONE AND ACETAMINOPHEN 5; 325 MG/1; MG/1
1 TABLET ORAL
Qty: 20 TABLET | Refills: 0 | Status: SHIPPED | OUTPATIENT
Start: 2017-11-21 | End: 2018-01-26

## 2017-11-21 RX ORDER — CEPHALEXIN 500 MG/1
500 CAPSULE ORAL 3 TIMES DAILY
Qty: 21 CAPSULE | Refills: 0 | Status: SHIPPED | OUTPATIENT
Start: 2017-11-21 | End: 2017-11-28

## 2017-11-21 NOTE — PROGRESS NOTES
PROCEDURE: Mohs' Micrographic Surgery    INDICATION: Location in non-mask areas of face where maximum conservation of tumor-free tissue is needed. Biopsy-proven skin cancer of cosmetically and functionally important areas, including head, neck, genital, hand, foot, or areas known for having difficulty in healing, such as the lower anterior legs. Tumor with ill-defined borders.    REFERRING MD: Ester Merino MD    CASE NUMBER:     ANESTHETIC: 5 cc 0.5% Lidocaine with Epi 1:200,000 mixed 1:1 with 0.5% Bupivacaine    SURGICAL PREP: Hibiclens    SURGEON: Mian Cardoso MD    ASSISTANTS: Jaja Tidwell PA-C, Taniya Crouch MA and Natividad Mclean, Surg Tech    PREOPERATIVE DIAGNOSIS: squamous cell carcinoma    POSTOPERATIVE DIAGNOSIS: squamous cell carcinoma    PATHOLOGIC DIAGNOSIS: squamous cell carcinoma- invasive, superficial; superficial BCC    HISTOLOGY OF SPECIMENS IN FIRST STAGE:   Tumor Type: Tumor seen. Superficial squamous cell carcinoma: Proliferation of squamous cells exhibiting atypia and infiltrating within the superficial papillary dermis.  Depth of Invasion: epidermis and dermis  Perineural Invasion: No    HISTOLOGY OF SPECIMENS IN SUBSEQUENT STAGES:  Tumor Type: Tumor seen. Superficial basal cell carcinoma: Foci of basaloid cells with peripheral palisading and focal retraction artifact arising along the dermoepidermal junction and extending into the papillary dermis.   Depth of Invasion: epidermis and dermis  Perineural Invasion: No    STAGES OF MOHS' SURGERY PERFORMED: 3    TUMOR-FREE PLANE ACHIEVED: Yes    HEMOSTASIS: electrocoagulation     SPECIMENS: 4 (2 in stage A, 1 in stage B and 1 in stage C)    LOCATION: left forehead. Patient verified location.    INITIAL LESION SIZE: 0.8 x 0.8 cm    FINAL DEFECT SIZE: 1.3 x 1.5 cm    WOUND REPAIR/DISPOSITION: The patient tolerated Mohs' Micrographic Surgery for a squamous cell carcinoma very well. When the tumor was completely removed, a repair of the surgical  "defect was undertaken.      PROCEDURE: Complex Linear Repair    INDICATION: Status post Mohs' Micrographic Surgery for squamous cell carcinoma.    CASE NUMBER:     SURGEON: Mian Cardoso MD    ASSISTANTS: Jaja Tidwell PA-C and Natividad Mclean Surg Tech    ANESTHETIC: 5 cc 1% Lidocaine with Epinephrine 1:100,000    SURGICAL PREP: Hibiclens    LOCATION: left forehead    DEFECT SIZE: 1.3 x 1.5 cm    WOUND REPAIR/DISPOSITION:  After the patient's carcinoma had been completely removed with Mohs' Micrographic Surgery, a repair of the surgical defect was undertaken. The patient was returned to the operating suite where the area of left forehead was prepped, draped, and anesthetized in the usual sterile fashion. The wound was widely undermined in all directions. Then, electrocoagulation was used to obtain meticulous hemostasis. 5-0 Vicryl buried vertical mattress sutures were placed into the subcutaneous and dermal plane to close the wound and leyda the cutaneous wound edge. Bilateral dog ears were identified and were removed by a standard Burow's triangle technique. The cutaneous wound edges were closed using interrupted 5-0 Prolene suture.    The patient tolerated the procedure well.    The area was cleaned and dressed appropriately and the patient was given wound care instructions, as well as appointment for follow-up evaluation. Patient was placed on Percocet 5 prn postop pain and Keflex 500 mg TID x 7 days.    LENGTH OF REPAIR: 4.3 cm    Vitals:    11/21/17 0746 11/21/17 1127   BP: 95/65 101/65   BP Location: Left arm Left arm   Patient Position: Sitting Sitting   BP Method: Small (Automatic) Small (Automatic)   Pulse: 79 (!) 54   Weight: 67.6 kg (149 lb)    Height: 5' 1" (1.549 m)          "

## 2017-11-28 ENCOUNTER — OFFICE VISIT (OUTPATIENT)
Dept: DERMATOLOGY | Facility: CLINIC | Age: 82
End: 2017-11-28
Payer: MEDICARE

## 2017-11-28 DIAGNOSIS — Z09 POSTOP CHECK: Primary | ICD-10-CM

## 2017-11-28 PROCEDURE — 99999 PR PBB SHADOW E&M-EST. PATIENT-LVL III: CPT | Mod: PBBFAC,,, | Performed by: DERMATOLOGY

## 2017-11-28 PROCEDURE — 99024 POSTOP FOLLOW-UP VISIT: CPT | Mod: S$GLB,,, | Performed by: DERMATOLOGY

## 2017-11-28 NOTE — PROGRESS NOTES
82 y.o. female patient is here for suture removal following Mohs' surgery.    Patient reports no problems.    WOUND PE:  The L forehead sutures intact. Wound healing well. Good skin edges. No signs or symptoms of infection.    IMPRESSION:  Healing operative site from Mohs' surgery, BCC L forehead s/p Mohs with CLC, postop day #7.    PLAN:  Sutures removed today. Steri-strips applied.  Continue wound care.  Keep moist with Aquaphor.  Call if any concern arises.    RTC:  In 3-6 months with Ester Merino MD for skin check or sooner if new concern arises.

## 2017-12-05 ENCOUNTER — ANTI-COAG VISIT (OUTPATIENT)
Dept: CARDIOLOGY | Facility: CLINIC | Age: 82
End: 2017-12-05
Payer: MEDICARE

## 2017-12-05 DIAGNOSIS — I48.20 ATRIAL FIBRILLATION, CHRONIC: ICD-10-CM

## 2017-12-05 DIAGNOSIS — Z79.01 LONG TERM CURRENT USE OF ANTICOAGULANT THERAPY: Primary | ICD-10-CM

## 2017-12-05 LAB — INR PPP: 1.9 (ref 2–3)

## 2017-12-05 PROCEDURE — 85610 PROTHROMBIN TIME: CPT | Mod: QW,S$GLB,, | Performed by: PHARMACIST

## 2017-12-05 PROCEDURE — 99211 OFF/OP EST MAY X REQ PHY/QHP: CPT | Mod: 25,S$GLB,, | Performed by: PHARMACIST

## 2017-12-05 NOTE — PROGRESS NOTES
Patient reports no bleeding or bruising, no new medications and no diet changes.  The pt and her caregiver confirmed that no doses of warfarin have been missed and her diet is stable (she normally avoids greens).  As her last INR was also low, I am gently increasing her current weekly dose, without a boost.  I recorded this on her yellow card and also called and LMFCB with her son -  Joseph.  I reminded the patient to call with any problems, changes or questions before the next visit.

## 2017-12-26 ENCOUNTER — ANTI-COAG VISIT (OUTPATIENT)
Dept: CARDIOLOGY | Facility: CLINIC | Age: 82
End: 2017-12-26
Payer: MEDICARE

## 2017-12-26 DIAGNOSIS — Z79.01 LONG TERM CURRENT USE OF ANTICOAGULANT THERAPY: Primary | ICD-10-CM

## 2017-12-26 DIAGNOSIS — I48.20 ATRIAL FIBRILLATION, CHRONIC: ICD-10-CM

## 2017-12-26 LAB — INR PPP: 3.4 (ref 2–3)

## 2017-12-26 PROCEDURE — 99211 OFF/OP EST MAY X REQ PHY/QHP: CPT | Mod: 25,S$GLB,,

## 2017-12-26 PROCEDURE — 85610 PROTHROMBIN TIME: CPT | Mod: QW,S$GLB,,

## 2017-12-26 NOTE — PROGRESS NOTES
Quick follow-up for low INR and increased dose 12/5. INR elevated today. Patient with bruise on arm from use. Denies any bleeding or other changes. Will hold coumadin today and decrease weekly dose back to previous weekly dose and follow-up in 2 weeks. Advised her to call with any changes or concerns. Left message for patient's son to call back to go over new dosing instructions.

## 2017-12-27 NOTE — PROGRESS NOTES
Pt initially seen by Larissa GORDON. I have reviewed her initial findings and agree with her assessment.  Care plan made together. If INR drops too low on new dose, then will need to consider changing her tablet strength for more precise dose adjustment.

## 2018-01-12 ENCOUNTER — ANTI-COAG VISIT (OUTPATIENT)
Dept: CARDIOLOGY | Facility: CLINIC | Age: 83
End: 2018-01-12
Payer: MEDICARE

## 2018-01-12 DIAGNOSIS — I48.20 ATRIAL FIBRILLATION, CHRONIC: ICD-10-CM

## 2018-01-12 DIAGNOSIS — Z79.01 LONG TERM CURRENT USE OF ANTICOAGULANT THERAPY: Primary | ICD-10-CM

## 2018-01-12 LAB — INR PPP: 2.5 (ref 2–3)

## 2018-01-12 PROCEDURE — 85610 PROTHROMBIN TIME: CPT | Mod: QW,S$GLB,,

## 2018-01-12 NOTE — PROGRESS NOTES
Patient here for close monitoring due to recent dose change.     Patient seen by Renée GORDON. I have reviewed her initial findings and agree with her assessment.  Care plan made together.

## 2018-01-12 NOTE — PROGRESS NOTES
Quick follow up from high INR 12/26. INR in normal range today. No bleeding, bruising or other changes. Maintain weekly dose until follow up in 3 weeks. Advised patient to call with any concerns or questions.

## 2018-01-17 ENCOUNTER — PATIENT MESSAGE (OUTPATIENT)
Dept: INTERNAL MEDICINE | Facility: CLINIC | Age: 83
End: 2018-01-17

## 2018-01-26 ENCOUNTER — IMMUNIZATION (OUTPATIENT)
Dept: INTERNAL MEDICINE | Facility: CLINIC | Age: 83
End: 2018-01-26
Payer: MEDICARE

## 2018-01-26 ENCOUNTER — LAB VISIT (OUTPATIENT)
Dept: LAB | Facility: HOSPITAL | Age: 83
End: 2018-01-26
Attending: INTERNAL MEDICINE
Payer: MEDICARE

## 2018-01-26 ENCOUNTER — OFFICE VISIT (OUTPATIENT)
Dept: INTERNAL MEDICINE | Facility: CLINIC | Age: 83
End: 2018-01-26
Payer: MEDICARE

## 2018-01-26 ENCOUNTER — PATIENT MESSAGE (OUTPATIENT)
Dept: INTERNAL MEDICINE | Facility: CLINIC | Age: 83
End: 2018-01-26

## 2018-01-26 VITALS
OXYGEN SATURATION: 99 % | BODY MASS INDEX: 27.22 KG/M2 | HEART RATE: 81 BPM | SYSTOLIC BLOOD PRESSURE: 90 MMHG | HEIGHT: 61 IN | WEIGHT: 144.19 LBS | DIASTOLIC BLOOD PRESSURE: 58 MMHG

## 2018-01-26 DIAGNOSIS — R63.0 LOSS OF APPETITE: ICD-10-CM

## 2018-01-26 DIAGNOSIS — R22.0 MASS OF MANDIBLE: Primary | ICD-10-CM

## 2018-01-26 DIAGNOSIS — Z79.01 LONG TERM CURRENT USE OF ANTICOAGULANT THERAPY: ICD-10-CM

## 2018-01-26 DIAGNOSIS — I50.42 CHRONIC COMBINED SYSTOLIC AND DIASTOLIC CHF, NYHA CLASS 3: Chronic | ICD-10-CM

## 2018-01-26 DIAGNOSIS — N18.30 CKD (CHRONIC KIDNEY DISEASE), STAGE III: ICD-10-CM

## 2018-01-26 DIAGNOSIS — R63.4 WEIGHT LOSS: ICD-10-CM

## 2018-01-26 LAB
ALBUMIN SERPL BCP-MCNC: 3.8 G/DL
ALP SERPL-CCNC: 67 U/L
ALT SERPL W/O P-5'-P-CCNC: 17 U/L
ANION GAP SERPL CALC-SCNC: 9 MMOL/L
ANION GAP SERPL CALC-SCNC: 9 MMOL/L
AST SERPL-CCNC: 19 U/L
BASOPHILS # BLD AUTO: 0.03 K/UL
BASOPHILS # BLD AUTO: 0.03 K/UL
BASOPHILS NFR BLD: 0.3 %
BASOPHILS NFR BLD: 0.3 %
BILIRUB SERPL-MCNC: 0.4 MG/DL
BUN SERPL-MCNC: 20 MG/DL
BUN SERPL-MCNC: 20 MG/DL
CALCIUM SERPL-MCNC: 9.8 MG/DL
CALCIUM SERPL-MCNC: 9.8 MG/DL
CHLORIDE SERPL-SCNC: 105 MMOL/L
CHLORIDE SERPL-SCNC: 105 MMOL/L
CO2 SERPL-SCNC: 28 MMOL/L
CO2 SERPL-SCNC: 28 MMOL/L
CREAT SERPL-MCNC: 1 MG/DL
CREAT SERPL-MCNC: 1 MG/DL
DIFFERENTIAL METHOD: ABNORMAL
DIFFERENTIAL METHOD: ABNORMAL
EOSINOPHIL # BLD AUTO: 0.1 K/UL
EOSINOPHIL # BLD AUTO: 0.1 K/UL
EOSINOPHIL NFR BLD: 1.1 %
EOSINOPHIL NFR BLD: 1.1 %
ERYTHROCYTE [DISTWIDTH] IN BLOOD BY AUTOMATED COUNT: 13.3 %
ERYTHROCYTE [DISTWIDTH] IN BLOOD BY AUTOMATED COUNT: 13.3 %
EST. GFR  (AFRICAN AMERICAN): >60 ML/MIN/1.73 M^2
EST. GFR  (AFRICAN AMERICAN): >60 ML/MIN/1.73 M^2
EST. GFR  (NON AFRICAN AMERICAN): 53 ML/MIN/1.73 M^2
EST. GFR  (NON AFRICAN AMERICAN): 53 ML/MIN/1.73 M^2
GLUCOSE SERPL-MCNC: 135 MG/DL
GLUCOSE SERPL-MCNC: 135 MG/DL
HCT VFR BLD AUTO: 39.9 %
HCT VFR BLD AUTO: 39.9 %
HGB BLD-MCNC: 13 G/DL
HGB BLD-MCNC: 13 G/DL
LYMPHOCYTES # BLD AUTO: 2.2 K/UL
LYMPHOCYTES # BLD AUTO: 2.2 K/UL
LYMPHOCYTES NFR BLD: 19.1 %
LYMPHOCYTES NFR BLD: 19.1 %
MCH RBC QN AUTO: 30.9 PG
MCH RBC QN AUTO: 30.9 PG
MCHC RBC AUTO-ENTMCNC: 32.6 G/DL
MCHC RBC AUTO-ENTMCNC: 32.6 G/DL
MCV RBC AUTO: 95 FL
MCV RBC AUTO: 95 FL
MONOCYTES # BLD AUTO: 0.9 K/UL
MONOCYTES # BLD AUTO: 0.9 K/UL
MONOCYTES NFR BLD: 8.3 %
MONOCYTES NFR BLD: 8.3 %
NEUTROPHILS # BLD AUTO: 8.1 K/UL
NEUTROPHILS # BLD AUTO: 8.1 K/UL
NEUTROPHILS NFR BLD: 70.8 %
NEUTROPHILS NFR BLD: 70.8 %
PLATELET # BLD AUTO: 541 K/UL
PLATELET # BLD AUTO: 541 K/UL
PMV BLD AUTO: 11.3 FL
PMV BLD AUTO: 11.3 FL
POTASSIUM SERPL-SCNC: 4.3 MMOL/L
POTASSIUM SERPL-SCNC: 4.3 MMOL/L
PROT SERPL-MCNC: 7 G/DL
RBC # BLD AUTO: 4.21 M/UL
RBC # BLD AUTO: 4.21 M/UL
SODIUM SERPL-SCNC: 142 MMOL/L
SODIUM SERPL-SCNC: 142 MMOL/L
TSH SERPL DL<=0.005 MIU/L-ACNC: 1.92 UIU/ML
WBC # BLD AUTO: 11.38 K/UL
WBC # BLD AUTO: 11.38 K/UL

## 2018-01-26 PROCEDURE — 99999 PR PBB SHADOW E&M-EST. PATIENT-LVL III: CPT | Mod: PBBFAC,,, | Performed by: INTERNAL MEDICINE

## 2018-01-26 PROCEDURE — 85025 COMPLETE CBC W/AUTO DIFF WBC: CPT

## 2018-01-26 PROCEDURE — 36415 COLL VENOUS BLD VENIPUNCTURE: CPT

## 2018-01-26 PROCEDURE — 99214 OFFICE O/P EST MOD 30 MIN: CPT | Mod: S$GLB,,, | Performed by: INTERNAL MEDICINE

## 2018-01-26 PROCEDURE — 90662 IIV NO PRSV INCREASED AG IM: CPT | Mod: S$GLB,,, | Performed by: INTERNAL MEDICINE

## 2018-01-26 PROCEDURE — G0008 ADMIN INFLUENZA VIRUS VAC: HCPCS | Mod: S$GLB,,, | Performed by: INTERNAL MEDICINE

## 2018-01-26 PROCEDURE — 84443 ASSAY THYROID STIM HORMONE: CPT

## 2018-01-26 PROCEDURE — 80053 COMPREHEN METABOLIC PANEL: CPT

## 2018-01-26 RX ORDER — METOPROLOL SUCCINATE 50 MG/1
50 TABLET, EXTENDED RELEASE ORAL DAILY
COMMUNITY
Start: 2017-12-24 | End: 2018-05-18 | Stop reason: SDUPTHER

## 2018-01-26 NOTE — PROGRESS NOTES
Subjective:       Patient ID: Cat Garcia is a 82 y.o. female.    Chief Complaint: Follow-up; decrease appetite; and Mass (neck)    1 mon decreased appetite    L sided lump has been noticed in last 3-4 days. No hx of the same, no f/ns. No recent dental issues. No other lumps/bumps.  No diff swallowing.    Uses nutritional suppl but still has wt loss.    Mild worsenin in dementia.      Review of Systems   Constitutional: Positive for appetite change, fatigue and unexpected weight change. Negative for activity change and fever.   Eyes: Negative for visual disturbance.   Respiratory: Positive for shortness of breath (stable). Negative for chest tightness and wheezing.    Cardiovascular: Negative for chest pain, palpitations and leg swelling.   Gastrointestinal: Negative for abdominal distention and abdominal pain.   Endocrine: Negative for cold intolerance, heat intolerance, polydipsia and polyuria.   Genitourinary: Negative for difficulty urinating.   Musculoskeletal: Positive for gait problem.   Skin: Negative for rash.   Neurological: Negative for tremors and syncope.        Chronic imbalance    No falls   Hematological: Negative for adenopathy. Does not bruise/bleed easily.   Psychiatric/Behavioral: Negative for dysphoric mood.       Objective:      Physical Exam   Constitutional: She appears well-developed and well-nourished. No distress.   HENT:   Head: Normocephalic and atraumatic.   Mouth/Throat: No oropharyngeal exudate.   2-3 well circumscribed painful to touch mass along L mandible, no redness, no warmth. No parotid area mass.  No obvious abscess in the mouth   Eyes: Conjunctivae are normal. Pupils are equal, round, and reactive to light. No scleral icterus.   Neck: Normal range of motion. Neck supple. No thyromegaly present.   Cardiovascular: Normal rate and normal heart sounds.    Irregularly irregular       Pulmonary/Chest: Effort normal and breath sounds normal. She has no rales.   Abdominal: Soft.  Bowel sounds are normal. She exhibits no distension. There is no tenderness.   Musculoskeletal: She exhibits no edema.   Lymphadenopathy:     She has no cervical adenopathy.   Neurological: She is alert. She exhibits normal muscle tone.   Skin: She is not diaphoretic.   Psychiatric: She has a normal mood and affect. Her behavior is normal.       Assessment:       1. Mass of mandible    2. Weight loss    3. Loss of appetite        Plan:       Cat MOCK was seen today for follow-up, decrease appetite and mass.    Diagnoses and all orders for this visit:    Mass of mandible  -     CT Soft Tissue Neck With Contrast; Future  ddx includes submandibular gland stone, tumor, less likely abscess    Weight loss  -     TSH; Future  -     CBC auto differential; Future  -     Comprehensive metabolic panel; Future    Loss of appetite  -     TSH; Future  -     CBC auto differential; Future  -     Comprehensive metabolic panel; Future        Health Maintenance       Date Due Completion Date    TETANUS VACCINE 04/22/1955 ---    DEXA SCAN 04/22/1977 ---    Influenza Vaccine 08/01/2017 10/20/2016    Lipid Panel 02/07/2022 2/7/2017          Follow-up in about 1 month (around 2/26/2018).

## 2018-01-29 ENCOUNTER — TELEPHONE (OUTPATIENT)
Dept: INTERNAL MEDICINE | Facility: CLINIC | Age: 83
End: 2018-01-29

## 2018-01-29 NOTE — TELEPHONE ENCOUNTER
Hi, please call the patient -- her blood work showed elevated platelet count likely from inflammation in her body. The inflammation may be from the mass near her jaw on the left side.  electrolytes, kidney, liver and thyroid function were all normal. Other blood counts were normal.      We will let her know the CT scan results when she has the scan later this week.  Let me know if patient has any questions.  Thank you, Alfie Oconnell

## 2018-02-01 ENCOUNTER — TELEPHONE (OUTPATIENT)
Dept: INTERNAL MEDICINE | Facility: CLINIC | Age: 83
End: 2018-02-01

## 2018-02-01 ENCOUNTER — ANTI-COAG VISIT (OUTPATIENT)
Dept: CARDIOLOGY | Facility: CLINIC | Age: 83
End: 2018-02-01

## 2018-02-01 ENCOUNTER — HOSPITAL ENCOUNTER (OUTPATIENT)
Dept: RADIOLOGY | Facility: HOSPITAL | Age: 83
Discharge: HOME | End: 2018-02-01
Attending: INTERNAL MEDICINE
Payer: MEDICARE

## 2018-02-01 DIAGNOSIS — K11.8 SUBMANDIBULAR GLAND MASS: Primary | ICD-10-CM

## 2018-02-01 DIAGNOSIS — I48.20 ATRIAL FIBRILLATION, CHRONIC: ICD-10-CM

## 2018-02-01 DIAGNOSIS — R22.0 MASS OF MANDIBLE: ICD-10-CM

## 2018-02-01 DIAGNOSIS — Z79.01 LONG TERM CURRENT USE OF ANTICOAGULANT THERAPY: ICD-10-CM

## 2018-02-01 PROCEDURE — 70491 CT SOFT TISSUE NECK W/DYE: CPT | Mod: 26,,, | Performed by: RADIOLOGY

## 2018-02-01 PROCEDURE — 25500020 PHARM REV CODE 255: Performed by: INTERNAL MEDICINE

## 2018-02-01 PROCEDURE — 70491 CT SOFT TISSUE NECK W/DYE: CPT | Mod: TC

## 2018-02-01 RX ORDER — AMOXICILLIN AND CLAVULANATE POTASSIUM 875; 125 MG/1; MG/1
1 TABLET, FILM COATED ORAL 2 TIMES DAILY
Qty: 14 TABLET | Refills: 0 | Status: SHIPPED | OUTPATIENT
Start: 2018-02-01 | End: 2018-02-08

## 2018-02-01 RX ADMIN — IOHEXOL 75 ML: 350 INJECTION, SOLUTION INTRAVENOUS at 09:02

## 2018-02-01 NOTE — TELEPHONE ENCOUNTER
Hi, please call son Dr Ruiz and set her up to see ENT.  Thank you, Alfie Oconnell    Discussed with pt and son about submandibular mass, either infection like impacted stone vs tumor. Start augmentin, warm compresses, lemon gtts, and see ENT. She sounds fine and denies any current pain.

## 2018-02-02 NOTE — PROGRESS NOTES
Son was given lab result, verified correct dose of coumaidn, reported no changes in medicines/diet/appetite, no bleeding/bruising/swelling, Son was advised of coumadin instructions and redraw date, appointment booked

## 2018-02-06 ENCOUNTER — OFFICE VISIT (OUTPATIENT)
Dept: OTOLARYNGOLOGY | Facility: CLINIC | Age: 83
End: 2018-02-06
Payer: MEDICARE

## 2018-02-06 VITALS
BODY MASS INDEX: 26.12 KG/M2 | HEART RATE: 60 BPM | WEIGHT: 138.25 LBS | DIASTOLIC BLOOD PRESSURE: 57 MMHG | SYSTOLIC BLOOD PRESSURE: 90 MMHG

## 2018-02-06 DIAGNOSIS — K11.20 SIALOADENITIS OF SUBMANDIBULAR GLAND: Primary | ICD-10-CM

## 2018-02-06 PROCEDURE — 99999 PR PBB SHADOW E&M-EST. PATIENT-LVL III: CPT | Mod: PBBFAC,,, | Performed by: OTOLARYNGOLOGY

## 2018-02-06 PROCEDURE — 1159F MED LIST DOCD IN RCRD: CPT | Mod: S$GLB,,, | Performed by: OTOLARYNGOLOGY

## 2018-02-06 PROCEDURE — 3008F BODY MASS INDEX DOCD: CPT | Mod: S$GLB,,, | Performed by: OTOLARYNGOLOGY

## 2018-02-06 PROCEDURE — 1125F AMNT PAIN NOTED PAIN PRSNT: CPT | Mod: S$GLB,,, | Performed by: OTOLARYNGOLOGY

## 2018-02-06 PROCEDURE — 99204 OFFICE O/P NEW MOD 45 MIN: CPT | Mod: S$GLB,,, | Performed by: OTOLARYNGOLOGY

## 2018-02-06 NOTE — LETTER
February 6, 2018      Alfie Oconnell MD  [address]  [facility] [address]           [facility]  [address]  [facility] [address]  Phone: [phone]  Fax: [phone]          Patient: Cat Garcia   MR Number: [id number]   YOB: 1935   Date of Visit: 2/6/2018       Dear Dr. Alfie Oconnell:    Thank you for referring Cat Garcia to me for evaluation. Attached you will find relevant portions of my assessment and plan of care.    If you have questions, please do not hesitate to call me. I look forward to following Cat Garcia along with you.    Sincerely,    Destiney Man MD    Enclosure  CC:  No Recipients    If you would like to receive this communication electronically, please contact [email or web address] or [phone] to request more information on [employer] Link access.    For providers and/or their staff who would like to refer a patient to [facility], please contact us through our one-stop-shop provider referral line, [facility], at [phone].    If you feel you have received this communication in error or would no longer like to receive these types of communications, please e-mail [email or web address]

## 2018-02-06 NOTE — PROGRESS NOTES
Chief Complaint   Patient presents with    Consult     submandibular gland mass         82 y.o. female presents with several week history of left submandibular swelling and pain. She was started on Augmentin a few days ago. She reports a decrease in appetite,  but no difficulty with eating or swallowing. No tooth pain or fevers.      Review of Systems     Constitutional: Negative for fatigue and unexpected weight change.   HENT: per HPI.  Eyes: Negative for visual disturbance.   Respiratory: Negative for shortness of breath, hemoptysis  Cardiovascular: Negative for chest pain and palpitations.   Genitourinary: Negative for dysuria and difficulty urinating.   Musculoskeletal: Negative for decreased ROM, back pain.   Skin: Negative for rash, sunburn, itching.   Neurological: Negative for dizziness and seizures.   Hematological: Negative for adenopathy. Does not bruise/bleed easily.   Psychiatric/Behavioral: Negative for agitation. The patient is not nervous/anxious.   Endocrine: Negative for rapid weight loss/weight gain, heat/cold intolerance.     Past Medical History:   Diagnosis Date    Atrial fibrillation     with rapid ventricular rate    Breast cancer     XRT    Chronic bronchitis     CKD (chronic kidney disease), stage III     Dementia     Squamous cell carcinoma        Past Surgical History:   Procedure Laterality Date    BREAST SURGERY  2011    right       family history includes Heart disease in her father.    Pt  reports that she quit smoking about 36 years ago. Her smoking use included Cigarettes. She smoked 0.25 packs per day. She has never used smokeless tobacco. She reports that she does not drink alcohol or use drugs.    Review of patient's allergies indicates:  No Known Allergies     Physical Exam    Vitals:    02/06/18 0925   BP: (!) 90/57   Pulse: 60     Body mass index is 26.12 kg/m².    Physical Exam   Constitutional: She is oriented to person, place, and time. She appears well-developed  and well-nourished. No distress.   HENT:   Head: Normocephalic and atraumatic.       Right Ear: Hearing, tympanic membrane, external ear and ear canal normal. Tympanic membrane mobility is normal. No middle ear effusion. No decreased hearing is noted.   Left Ear: Hearing, tympanic membrane, external ear and ear canal normal. Tympanic membrane mobility is normal.  No middle ear effusion. No decreased hearing is noted.   Nose: Nose normal.   Mouth/Throat: Uvula is midline, oropharynx is clear and moist and mucous membranes are normal. No oral lesions.       Eyes: Conjunctivae, EOM and lids are normal. Pupils are equal, round, and reactive to light. Right eye exhibits no discharge. Left eye exhibits no discharge.   Neck: Trachea normal, normal range of motion and phonation normal. Neck supple. No tracheal tenderness present. No tracheal deviation, no edema and no erythema present. No thyroid mass and no thyromegaly present.   Cardiovascular: Normal heart sounds.    Pulmonary/Chest: Breath sounds normal. No stridor.   Abdominal: Soft.   Lymphadenopathy:     She has no cervical adenopathy.   Neurological: She is alert and oriented to person, place, and time.   Skin: Skin is warm and dry. No rash noted. She is not diaphoretic. No erythema. No pallor.   Psychiatric: She has a normal mood and affect.   Nursing note and vitals reviewed.    Studies reviewed:  CT neck  2/1/18:  Impression         Large heterogeneous mass within the left submandibular gland measuring axial 3.2 X 2.3 with associated enlarged adjacent lymph nodes.    Right maxillary sinus mucoid retention cyst.           Assessment     1. Sialoadenitis of submandibular gland          Plan  In summary, Ms. Garcia is an 82 year old female with left submandibular swelling and sialoadenitis. Recommend finishing current antibiotic course, increased hydration, sialogogues, warm compresses, and massage as tolerated. No evidence of stone on CT or exam today. Return to  clinic if symptoms fail to improve or worsen. All questions were answered and she is in agreement with the plan.

## 2018-02-27 ENCOUNTER — ANTI-COAG VISIT (OUTPATIENT)
Dept: CARDIOLOGY | Facility: CLINIC | Age: 83
End: 2018-02-27
Payer: MEDICARE

## 2018-02-27 DIAGNOSIS — I48.20 ATRIAL FIBRILLATION, CHRONIC: ICD-10-CM

## 2018-02-27 DIAGNOSIS — Z79.01 LONG TERM CURRENT USE OF ANTICOAGULANT THERAPY: Primary | ICD-10-CM

## 2018-02-27 LAB — INR PPP: 2 (ref 2–3)

## 2018-02-27 PROCEDURE — 85610 PROTHROMBIN TIME: CPT | Mod: QW,S$GLB,, | Performed by: PHARMACIST

## 2018-03-08 ENCOUNTER — OFFICE VISIT (OUTPATIENT)
Dept: INTERNAL MEDICINE | Facility: CLINIC | Age: 83
End: 2018-03-08
Payer: MEDICARE

## 2018-03-08 ENCOUNTER — LAB VISIT (OUTPATIENT)
Dept: LAB | Facility: HOSPITAL | Age: 83
End: 2018-03-08
Attending: INTERNAL MEDICINE
Payer: MEDICARE

## 2018-03-08 VITALS
SYSTOLIC BLOOD PRESSURE: 122 MMHG | WEIGHT: 141.75 LBS | HEIGHT: 61 IN | DIASTOLIC BLOOD PRESSURE: 78 MMHG | HEART RATE: 58 BPM | OXYGEN SATURATION: 92 % | BODY MASS INDEX: 26.76 KG/M2

## 2018-03-08 DIAGNOSIS — D75.839 THROMBOCYTOSIS: ICD-10-CM

## 2018-03-08 DIAGNOSIS — M89.9 DISORDER OF BONE AND ARTICULAR CARTILAGE: ICD-10-CM

## 2018-03-08 DIAGNOSIS — Z78.0 MENOPAUSE: ICD-10-CM

## 2018-03-08 DIAGNOSIS — M94.9 DISORDER OF BONE AND ARTICULAR CARTILAGE: ICD-10-CM

## 2018-03-08 DIAGNOSIS — R53.81 DEBILITY: ICD-10-CM

## 2018-03-08 DIAGNOSIS — D75.839 THROMBOCYTOSIS: Primary | ICD-10-CM

## 2018-03-08 LAB
BASOPHILS # BLD AUTO: 0.03 K/UL
BASOPHILS NFR BLD: 0.3 %
DIFFERENTIAL METHOD: ABNORMAL
EOSINOPHIL # BLD AUTO: 0.2 K/UL
EOSINOPHIL NFR BLD: 1.9 %
ERYTHROCYTE [DISTWIDTH] IN BLOOD BY AUTOMATED COUNT: 13.7 %
HCT VFR BLD AUTO: 40.8 %
HGB BLD-MCNC: 13.2 G/DL
LYMPHOCYTES # BLD AUTO: 2.1 K/UL
LYMPHOCYTES NFR BLD: 22.7 %
MCH RBC QN AUTO: 30.6 PG
MCHC RBC AUTO-ENTMCNC: 32.4 G/DL
MCV RBC AUTO: 95 FL
MONOCYTES # BLD AUTO: 0.8 K/UL
MONOCYTES NFR BLD: 8.4 %
NEUTROPHILS # BLD AUTO: 6 K/UL
NEUTROPHILS NFR BLD: 66.5 %
PLATELET # BLD AUTO: 519 K/UL
PMV BLD AUTO: 10.7 FL
RBC # BLD AUTO: 4.31 M/UL
WBC # BLD AUTO: 9.07 K/UL

## 2018-03-08 PROCEDURE — 3078F DIAST BP <80 MM HG: CPT | Mod: S$GLB,,, | Performed by: INTERNAL MEDICINE

## 2018-03-08 PROCEDURE — 99999 PR PBB SHADOW E&M-EST. PATIENT-LVL III: CPT | Mod: PBBFAC,,, | Performed by: INTERNAL MEDICINE

## 2018-03-08 PROCEDURE — 36415 COLL VENOUS BLD VENIPUNCTURE: CPT

## 2018-03-08 PROCEDURE — 99214 OFFICE O/P EST MOD 30 MIN: CPT | Mod: S$GLB,,, | Performed by: INTERNAL MEDICINE

## 2018-03-08 PROCEDURE — 3074F SYST BP LT 130 MM HG: CPT | Mod: S$GLB,,, | Performed by: INTERNAL MEDICINE

## 2018-03-08 PROCEDURE — 99499 UNLISTED E&M SERVICE: CPT | Mod: S$GLB,,, | Performed by: INTERNAL MEDICINE

## 2018-03-08 PROCEDURE — 85025 COMPLETE CBC W/AUTO DIFF WBC: CPT

## 2018-03-08 NOTE — PATIENT INSTRUCTIONS
Jayden Coppola, I tried calling.    The salivary gland is better, I do not think your mom needs any further tests for now for that unless the pain returns.    Her weight loss has not worsened. I agree with the ensure. I hope some more physical activity will help improve her appetite.    We are checking her cbc since the last platelet count was high.    She is due for a bone density test that we are scheduling as well.    Let me know if you have any questions.  Alfie Oconnell

## 2018-03-08 NOTE — PROGRESS NOTES
Subjective:       Patient ID: Cat Garcia is a 82 y.o. female.    Chief Complaint: Follow-up    Patient is here for followup for chronic conditions.    Still bump L salivary gland, not bothering her like it was. No f/c/ns.    Appetite is still low. Aide giving her ensure.    [id number], son Dr Coppola.      Review of Systems   Constitutional: Positive for fatigue. Negative for activity change, appetite change, fever and unexpected weight change.   Eyes: Negative for visual disturbance.   Respiratory: Positive for shortness of breath (stable). Negative for chest tightness and wheezing.    Cardiovascular: Negative for chest pain, palpitations and leg swelling.   Gastrointestinal: Negative for abdominal distention and abdominal pain.   Endocrine: Negative for cold intolerance, heat intolerance, polydipsia and polyuria.   Genitourinary: Negative for difficulty urinating.   Musculoskeletal: Positive for gait problem.   Skin: Negative for rash.   Neurological: Negative for tremors and syncope.        Chronic imbalance    No falls   Hematological: Negative for adenopathy. Does not bruise/bleed easily.   Psychiatric/Behavioral: Negative for dysphoric mood.       Objective:      Physical Exam   Constitutional: She is oriented to person, place, and time. She appears well-developed and well-nourished. No distress.   HENT:   Head: Normocephalic and atraumatic.   Mouth/Throat: No oropharyngeal exudate.   Eyes: Conjunctivae are normal. Pupils are equal, round, and reactive to light. No scleral icterus.   Neck: Normal range of motion. Neck supple. No thyromegaly present.   Still nodular R submandibular gland, not tender, it is mobile. No redness or warmth. Decrease in size, now ~2cm   Cardiovascular: Normal rate and normal heart sounds.    Irregularly irregular       Pulmonary/Chest: Effort normal and breath sounds normal. She has no rales.   Abdominal: Soft. Bowel sounds are normal. She exhibits no distension. There is no  tenderness.   Musculoskeletal: She exhibits no edema.   Lymphadenopathy:     She has no cervical adenopathy.   Neurological: She is alert and oriented to person, place, and time. She exhibits normal muscle tone.   Skin: She is not diaphoretic.   Psychiatric: She has a normal mood and affect. Her behavior is normal.       Assessment:       1. Thrombocytosis    2. Menopause    3. Disorder of bone and articular cartilage        Plan:            Cat MOCK was seen today for follow-up.    Diagnoses and all orders for this visit:    Thrombocytosis  -     CBC auto differential; Future  Likely was aria from sialoadenitis, which is much better.      Patient Instructions   Jayden Coppola, I tried calling.    The salivary gland is better, I do not think your mom needs any further tests for now for that unless the pain returns.    Her weight loss has not worsened. I agree with the ensure. I hope some more physical activity will help improve her appetite.    We are checking her cbc since the last platelet count was high.    She is due for a bone density test that we are scheduling as well.    Let me know if you have any questions.  Alfie Oconnell      CHF symptoms stable, euvolemic.    afib good rate control and on coumadin.    Menopause  -     DXA Bone Density Spine And Hip; Future  Disorder of bone and articular cartilage  -     DXA Bone Density Spine And Hip; Future        Health Maintenance       Date Due Completion Date    TETANUS VACCINE 04/22/1955 ---    DEXA SCAN 04/22/1977 ---    Lipid Panel 02/07/2022 2/7/2017          Follow-up in about 6 months (around 9/8/2018).

## 2018-03-12 ENCOUNTER — APPOINTMENT (OUTPATIENT)
Dept: RADIOLOGY | Facility: CLINIC | Age: 83
End: 2018-03-12
Attending: INTERNAL MEDICINE
Payer: MEDICARE

## 2018-03-12 DIAGNOSIS — M89.9 DISORDER OF BONE AND ARTICULAR CARTILAGE: ICD-10-CM

## 2018-03-12 DIAGNOSIS — M94.9 DISORDER OF BONE AND ARTICULAR CARTILAGE: ICD-10-CM

## 2018-03-12 DIAGNOSIS — Z78.0 MENOPAUSE: ICD-10-CM

## 2018-03-12 PROCEDURE — 77080 DXA BONE DENSITY AXIAL: CPT | Mod: 26,,, | Performed by: INTERNAL MEDICINE

## 2018-03-12 PROCEDURE — 77080 DXA BONE DENSITY AXIAL: CPT | Mod: TC,PO

## 2018-03-19 NOTE — PROGRESS NOTES
Patient, Cat Garcia (MRN #[id number]), presented with a recent Ejection Fraction less than 45% consistent with the definition of cardiomyopathy (ICD10- I42.8).    EF   Date Value Ref Range Status   07/02/2014 15       The patient's cardiomyopathy was monitored, evaluated, addressed and/or treated. This addendum to the medical record is made on 03/19/2018.

## 2018-03-22 ENCOUNTER — TELEPHONE (OUTPATIENT)
Dept: INTERNAL MEDICINE | Facility: CLINIC | Age: 83
End: 2018-03-22

## 2018-03-22 DIAGNOSIS — R79.89 ELEVATED PLATELET COUNT: Primary | ICD-10-CM

## 2018-03-22 RX ORDER — VIT C/E/ZN/COPPR/LUTEIN/ZEAXAN 250MG-90MG
1000 CAPSULE ORAL DAILY
Refills: 0 | COMMUNITY
Start: 2018-03-22 | End: 2018-05-18

## 2018-04-01 DIAGNOSIS — I50.42 CHRONIC COMBINED SYSTOLIC AND DIASTOLIC CHF, NYHA CLASS 3: Chronic | ICD-10-CM

## 2018-04-03 ENCOUNTER — ANTI-COAG VISIT (OUTPATIENT)
Dept: CARDIOLOGY | Facility: CLINIC | Age: 83
End: 2018-04-03
Payer: MEDICARE

## 2018-04-03 DIAGNOSIS — Z79.01 LONG TERM CURRENT USE OF ANTICOAGULANT THERAPY: Primary | ICD-10-CM

## 2018-04-03 DIAGNOSIS — I48.20 ATRIAL FIBRILLATION, CHRONIC: ICD-10-CM

## 2018-04-03 LAB — INR PPP: 2.5 (ref 2–3)

## 2018-04-03 PROCEDURE — 99211 OFF/OP EST MAY X REQ PHY/QHP: CPT | Mod: 25,S$GLB,, | Performed by: PHARMACIST

## 2018-04-03 PROCEDURE — 85610 PROTHROMBIN TIME: CPT | Mod: QW,S$GLB,, | Performed by: PHARMACIST

## 2018-04-03 RX ORDER — LOSARTAN POTASSIUM 25 MG/1
TABLET ORAL
Qty: 90 TABLET | Refills: 0 | Status: SHIPPED | OUTPATIENT
Start: 2018-04-03 | End: 2018-05-18 | Stop reason: SDUPTHER

## 2018-04-03 NOTE — PROGRESS NOTES
Patient reports no bleeding or bruising and no new medications.  She reports that she has been having a decreased appetite lately, so she has compensated with increasing her Ensure to three times daily.  I reminded the patient to call with any problems, changes or questions before the next visit.  I have reviewed the student's initial findings and agree with their assessment.  I have personally spoken with and assessed the patient in clinic to devise care plan.

## 2018-04-04 ENCOUNTER — INITIAL CONSULT (OUTPATIENT)
Dept: HEMATOLOGY/ONCOLOGY | Facility: CLINIC | Age: 83
End: 2018-04-04
Payer: MEDICARE

## 2018-04-04 ENCOUNTER — LAB VISIT (OUTPATIENT)
Dept: LAB | Facility: HOSPITAL | Age: 83
End: 2018-04-04
Attending: INTERNAL MEDICINE
Payer: MEDICARE

## 2018-04-04 VITALS
HEART RATE: 62 BPM | SYSTOLIC BLOOD PRESSURE: 80 MMHG | TEMPERATURE: 98 F | DIASTOLIC BLOOD PRESSURE: 48 MMHG | HEIGHT: 64 IN

## 2018-04-04 DIAGNOSIS — R53.1 GENERALIZED WEAKNESS: ICD-10-CM

## 2018-04-04 DIAGNOSIS — D75.839 THROMBOCYTOSIS: Primary | ICD-10-CM

## 2018-04-04 DIAGNOSIS — I48.20 ATRIAL FIBRILLATION, CHRONIC: Chronic | ICD-10-CM

## 2018-04-04 DIAGNOSIS — D75.839 THROMBOCYTOSIS: ICD-10-CM

## 2018-04-04 DIAGNOSIS — Z79.01 LONG TERM CURRENT USE OF ANTICOAGULANT THERAPY: ICD-10-CM

## 2018-04-04 DIAGNOSIS — G21.4 VASCULAR PARKINSONISM: ICD-10-CM

## 2018-04-04 LAB
ALBUMIN SERPL BCP-MCNC: 3.5 G/DL
ALP SERPL-CCNC: 53 U/L
ALT SERPL W/O P-5'-P-CCNC: 16 U/L
ANION GAP SERPL CALC-SCNC: 8 MMOL/L
AST SERPL-CCNC: 22 U/L
BASOPHILS # BLD AUTO: 0.05 K/UL
BASOPHILS NFR BLD: 0.6 %
BILIRUB SERPL-MCNC: 0.5 MG/DL
BUN SERPL-MCNC: 14 MG/DL
CALCIUM SERPL-MCNC: 9.7 MG/DL
CHLORIDE SERPL-SCNC: 109 MMOL/L
CO2 SERPL-SCNC: 26 MMOL/L
CREAT SERPL-MCNC: 1.1 MG/DL
DIFFERENTIAL METHOD: ABNORMAL
EOSINOPHIL # BLD AUTO: 0.2 K/UL
EOSINOPHIL NFR BLD: 2.2 %
ERYTHROCYTE [DISTWIDTH] IN BLOOD BY AUTOMATED COUNT: 12.9 %
ERYTHROCYTE [SEDIMENTATION RATE] IN BLOOD BY WESTERGREN METHOD: 8 MM/HR
EST. GFR  (AFRICAN AMERICAN): 54.8 ML/MIN/1.73 M^2
EST. GFR  (NON AFRICAN AMERICAN): 47.5 ML/MIN/1.73 M^2
FERRITIN SERPL-MCNC: 107 NG/ML
GLUCOSE SERPL-MCNC: 115 MG/DL
HCT VFR BLD AUTO: 41.6 %
HGB BLD-MCNC: 13.1 G/DL
IMM GRANULOCYTES # BLD AUTO: 0.03 K/UL
IMM GRANULOCYTES NFR BLD AUTO: 0.4 %
IRON SERPL-MCNC: 72 UG/DL
LDH SERPL L TO P-CCNC: 180 U/L
LYMPHOCYTES # BLD AUTO: 2.1 K/UL
LYMPHOCYTES NFR BLD: 24.7 %
MCH RBC QN AUTO: 30.4 PG
MCHC RBC AUTO-ENTMCNC: 31.5 G/DL
MCV RBC AUTO: 97 FL
MONOCYTES # BLD AUTO: 0.7 K/UL
MONOCYTES NFR BLD: 8.2 %
NEUTROPHILS # BLD AUTO: 5.3 K/UL
NEUTROPHILS NFR BLD: 63.9 %
NRBC BLD-RTO: 0 /100 WBC
PLATELET # BLD AUTO: 533 K/UL
PMV BLD AUTO: 10.5 FL
POTASSIUM SERPL-SCNC: 4.4 MMOL/L
PROT SERPL-MCNC: 6.3 G/DL
RBC # BLD AUTO: 4.31 M/UL
SATURATED IRON: 23 %
SODIUM SERPL-SCNC: 143 MMOL/L
TOTAL IRON BINDING CAPACITY: 317 UG/DL
TRANSFERRIN SERPL-MCNC: 214 MG/DL
WBC # BLD AUTO: 8.3 K/UL

## 2018-04-04 PROCEDURE — 3078F DIAST BP <80 MM HG: CPT | Mod: CPTII,S$GLB,, | Performed by: INTERNAL MEDICINE

## 2018-04-04 PROCEDURE — 85651 RBC SED RATE NONAUTOMATED: CPT

## 2018-04-04 PROCEDURE — 81219 CALR GENE COM VARIANTS: CPT

## 2018-04-04 PROCEDURE — 99999 PR PBB SHADOW E&M-EST. PATIENT-LVL III: CPT | Mod: PBBFAC,,, | Performed by: INTERNAL MEDICINE

## 2018-04-04 PROCEDURE — 85025 COMPLETE CBC W/AUTO DIFF WBC: CPT

## 2018-04-04 PROCEDURE — 83540 ASSAY OF IRON: CPT

## 2018-04-04 PROCEDURE — 82728 ASSAY OF FERRITIN: CPT

## 2018-04-04 PROCEDURE — 80053 COMPREHEN METABOLIC PANEL: CPT

## 2018-04-04 PROCEDURE — 83615 LACTATE (LD) (LDH) ENZYME: CPT

## 2018-04-04 PROCEDURE — 81270 JAK2 GENE: CPT

## 2018-04-04 PROCEDURE — 3074F SYST BP LT 130 MM HG: CPT | Mod: CPTII,S$GLB,, | Performed by: INTERNAL MEDICINE

## 2018-04-04 PROCEDURE — 99205 OFFICE O/P NEW HI 60 MIN: CPT | Mod: S$GLB,,, | Performed by: INTERNAL MEDICINE

## 2018-04-04 NOTE — LETTER
April 9, 2018      Alfie Oconnell MD  [address]  [facility] [address]           [facility]  [address]  [facility] [address]  Phone: [phone]          Patient: Cat Garcia   MR Number: [id number]   YOB: 1935   Date of Visit: 4/4/2018       Dear Dr. Alfie Oconnell:    Thank you for referring Cat Garcia to me for evaluation. Attached you will find relevant portions of my assessment and plan of care.    If you have questions, please do not hesitate to call me. I look forward to following Cat Garcia along with you.    Sincerely,    Renée Gannon MD    Enclosure  CC:  No Recipients    If you would like to receive this communication electronically, please contact [email or web address] or [phone] to request more information on [employer] Link access.    For providers and/or their staff who would like to refer a patient to [facility], please contact us through our one-stop-shop provider referral line, [facility] , at [phone].    If you feel you have received this communication in error or would no longer like to receive these types of communications, please e-mail [email or web address]

## 2018-04-04 NOTE — PROGRESS NOTES
Hematology and Medical Oncology   New Patient Consult     04/04/2018  Referred by:  Dr. Alfie Oconnell    Primary Oncologic Diagnosis: Thrombocytosis    History of Present Ilness:   Cat Quintero) is a pleasant 82 y.o.female with a past medical history of a.fib, breast cancer, CKD stage III, and dementia who presents today to discuss her elevated platelet count. She was unsure of why she was in the hematology office.    On chart review Mrs. Garcia's platelets were in the mid 300's range from 6514-6502. In February 2017 her platelets crossed the 400k ismael and have been slowly rising to > 500k in January 2018.    She specifically denies blurry vision, palmar erythema, or hepatosplenomegaly known side effects of elevated platlet count. While she expressers no complaints today on further questioning she has a poor appetite, but is drinking adequate liquids.      PAST MEDICAL HISTORY:   Past Medical History:   Diagnosis Date    Atrial fibrillation     with rapid ventricular rate    Breast cancer     XRT    Chronic bronchitis     CKD (chronic kidney disease), stage III     Dementia     Squamous cell carcinoma        PAST SURGICAL HISTORY:   Past Surgical History:   Procedure Laterality Date    BREAST SURGERY  2011    right       PAST SOCIAL HISTORY:   reports that she quit smoking about 36 years ago. Her smoking use included Cigarettes. She smoked 0.25 packs per day. She has never used smokeless tobacco. She reports that she does not drink alcohol or use drugs.    FAMILY HISTORY:  Family History   Problem Relation Age of Onset    Heart disease Father     Melanoma Neg Hx        CURRENT MEDICATIONS:   Current Outpatient Prescriptions   Medication Sig    antiox#10-om3-dha-epa-lut-zeax (I-CAPS) 280-10-2 mg Cap Take by mouth 2 (two) times daily.    ascorbic acid (VITAMIN C) 1000 MG tablet Take 1,000 mg by mouth once daily.    atorvastatin (LIPITOR) 10 MG tablet TAKE 1 TABLET BY MOUTH DAILY     brimonidine 0.15 % OPTH DROP (ALPHAGAN) 0.15 % ophthalmic solution 3 (three) times daily.     calcium citrate-vitamin D3 315-200 mg (CITRACAL+D) 315-200 mg-unit per tablet Take 1 tablet by mouth 2 (two) times daily.    cholecalciferol, vitamin D3, 1,000 unit capsule Take 1 capsule (1,000 Units total) by mouth once daily.    DIGOX 125 mcg tablet TAKE 1 TABLET BY MOUTH ONCE DAILY    donepezil (ARICEPT) 10 MG tablet Take 1 tablet (10 mg total) by mouth every evening.    fish oil-omega-3 fatty acids 300-1,000 mg capsule Take 2 g by mouth once daily.    latanoprost 0.005 % ophthalmic solution once daily.     losartan (COZAAR) 25 MG tablet TAKE 1 TABLET(25 MG) BY MOUTH EVERY DAY    metoprolol succinate (TOPROL-XL) 50 MG 24 hr tablet Take 50 mg by mouth once daily.     warfarin (COUMADIN) 2.5 MG tablet Take 2 tablets (5mg.) by mouth every day, except 3 tablets (7.5mg) on Sunday, and Thursday, Or as directed by Coumadin Clinic.     No current facility-administered medications for this visit.      ALLERGIES:   Review of patient's allergies indicates:  No Known Allergies      Review of Systems:     Review of Systems   Constitutional: Positive for appetite change and fatigue. Negative for chills, diaphoresis, fever and unexpected weight change.   HENT:   Negative for hearing loss, mouth sores, nosebleeds, sore throat, trouble swallowing and voice change.    Eyes: Negative for eye problems and icterus.   Respiratory: Negative for chest tightness, cough, hemoptysis, shortness of breath and wheezing.    Cardiovascular: Negative for chest pain, leg swelling and palpitations.   Gastrointestinal: Negative for abdominal distention, abdominal pain, blood in stool, diarrhea, nausea and vomiting.   Endocrine: Negative for hot flashes.   Genitourinary: Negative for bladder incontinence, difficulty urinating, dysuria and hematuria.    Musculoskeletal: Negative for arthralgias, back pain, flank pain, gait problem, myalgias, neck  pain and neck stiffness.   Skin: Negative for itching, rash and wound.   Neurological: Negative for dizziness, extremity weakness, gait problem, headaches, numbness, seizures and speech difficulty.   Hematological: Negative for adenopathy. Does not bruise/bleed easily.   Psychiatric/Behavioral: Negative for confusion, depression and sleep disturbance. The patient is not nervous/anxious.         Physical Exam:     Vitals:    04/04/18 1103   BP: (!) 80/48   Pulse: 62   Temp: 97.9 °F (36.6 °C)       Physical Exam   Constitutional: She is oriented to person, place, and time. She appears well-developed and well-nourished. No distress.   HENT:   Head: Normocephalic and atraumatic.   Mouth/Throat: Oropharynx is clear and moist. No oropharyngeal exudate.   Eyes: Conjunctivae and EOM are normal. Pupils are equal, round, and reactive to light.   Neck: Normal range of motion. Neck supple. No JVD present. No tracheal deviation present. No thyromegaly present.   Cardiovascular: Normal rate and normal heart sounds.  Exam reveals no friction rub.    No murmur heard.  Irregular rhythm    Pulmonary/Chest: Effort normal and breath sounds normal. No stridor. No respiratory distress. She has no wheezes. She has no rales. She exhibits no tenderness.   Abdominal: Soft. Bowel sounds are normal. She exhibits no distension. There is no tenderness. There is no rebound and no guarding.   Musculoskeletal: Normal range of motion. She exhibits no edema, tenderness or deformity.   Lymphadenopathy:     She has no axillary adenopathy.   Neurological: She is alert and oriented to person, place, and time. She displays normal reflexes. No cranial nerve deficit or sensory deficit. She exhibits normal muscle tone. Coordination normal.   Skin: Skin is warm and dry. Capillary refill takes less than 2 seconds. No rash noted. She is not diaphoretic. No erythema. No pallor.   Psychiatric: She has a normal mood and affect. Her behavior is normal. Judgment and  thought content normal.       ECOG Performance Status: (foot note - ECOG PS provided by Eastern Cooperative Oncology Group) 2 - Symptomatic, <50% confined to bed    Labs:   Lab Results   Component Value Date    WBC 8.30 04/04/2018    HGB 13.1 04/04/2018    HCT 41.6 04/04/2018     (H) 04/04/2018    CHOL 204 (H) 02/07/2017    TRIG 296 (H) 02/07/2017    HDL 42 02/07/2017    ALT 16 04/04/2018    AST 22 04/04/2018     04/04/2018    K 4.4 04/04/2018     04/04/2018    CREATININE 1.1 04/04/2018    BUN 14 04/04/2018    CO2 26 04/04/2018    TSH 1.922 01/26/2018    INR 2.5 04/03/2018     Iron: 72  TIBC: 317  Transferrin: 214  Ferritin: 107  LDH: 180  Sed Rate:8    Imaging: Previous imaging has been reviewed     Assessment and Plan:     Ms. Garcia is a pleasant 82 year old female who presents to clinic today to discuss her elevated platelet count    Thrombocytosis  --suspicious for Essential Thrombocytosis  --I have ordered Todd-2, CALR, MPL for diagnostic purposes  --We discussed the fact that 90% of ET patients have one of the above mutations  --If this is proven ET, Given that her age >60 and significant symptoms if mutation positive I will treat with hydrea 500mg with frequent labs. With q2 week labs and 1 month follow up with me    Hypotension  --Appears to be close to baseline given     60 minutes were spent face to face with the patient and her  Care giver to discuss the lab value, treatment options. I have provided the patient with an opportunity to ask questions and have all questions answered to his satisfaction.       Awaiting lab results to determine if follow up is necessary, but knows to call in the interim if symptoms change or should a problem arise.        Renée Gannon MD  Hematology and Medical Oncology  Bone Marrow Transplant  [facility]

## 2018-04-10 LAB
MPNR  FINAL DIAGNOSIS: NORMAL
MPNR  SPECIMEN TYPE: NORMAL
MPNR RESULT: NORMAL

## 2018-04-29 DIAGNOSIS — F01.50 VASCULAR DEMENTIA WITHOUT BEHAVIORAL DISTURBANCE: Chronic | ICD-10-CM

## 2018-04-29 RX ORDER — DIGOXIN 125 UG/1
TABLET ORAL
Qty: 90 TABLET | Refills: 0 | Status: SHIPPED | OUTPATIENT
Start: 2018-04-29 | End: 2018-05-18 | Stop reason: SDUPTHER

## 2018-05-08 ENCOUNTER — ANTI-COAG VISIT (OUTPATIENT)
Dept: CARDIOLOGY | Facility: CLINIC | Age: 83
End: 2018-05-08
Payer: MEDICARE

## 2018-05-08 DIAGNOSIS — Z79.01 LONG TERM CURRENT USE OF ANTICOAGULANT THERAPY: Primary | ICD-10-CM

## 2018-05-08 DIAGNOSIS — I48.20 ATRIAL FIBRILLATION, CHRONIC: ICD-10-CM

## 2018-05-08 LAB — INR PPP: 2.2 (ref 2–3)

## 2018-05-08 PROCEDURE — 85610 PROTHROMBIN TIME: CPT | Mod: QW,S$GLB,, | Performed by: INTERNAL MEDICINE

## 2018-05-08 PROCEDURE — 99211 OFF/OP EST MAY X REQ PHY/QHP: CPT | Mod: 25,S$GLB,ICN, | Performed by: PHARMACIST

## 2018-05-08 NOTE — PROGRESS NOTES
INR within normal range today. Patient with bruises on body from use, denies any bleeding or changes. Patient is stable on this dose. She will continue this dose until follow-up in 5 weeks. I advised her and her sitter to contact us with any changes or problems.

## 2018-05-08 NOTE — PROGRESS NOTES
Pt seen by Larissa GORDON. I have reviewed her documentation and agree with her assessment and plan.

## 2018-05-18 ENCOUNTER — OFFICE VISIT (OUTPATIENT)
Dept: CARDIOLOGY | Facility: CLINIC | Age: 83
End: 2018-05-18
Payer: MEDICARE

## 2018-05-18 VITALS
DIASTOLIC BLOOD PRESSURE: 56 MMHG | SYSTOLIC BLOOD PRESSURE: 108 MMHG | HEART RATE: 58 BPM | WEIGHT: 137.81 LBS | BODY MASS INDEX: 24.42 KG/M2 | HEIGHT: 63 IN

## 2018-05-18 DIAGNOSIS — Z79.01 LONG TERM CURRENT USE OF ANTICOAGULANT THERAPY: ICD-10-CM

## 2018-05-18 DIAGNOSIS — I48.20 ATRIAL FIBRILLATION, CHRONIC: ICD-10-CM

## 2018-05-18 DIAGNOSIS — N18.30 CKD (CHRONIC KIDNEY DISEASE), STAGE III: ICD-10-CM

## 2018-05-18 DIAGNOSIS — I10 ESSENTIAL HYPERTENSION: ICD-10-CM

## 2018-05-18 DIAGNOSIS — F01.50 VASCULAR DEMENTIA WITHOUT BEHAVIORAL DISTURBANCE: Chronic | ICD-10-CM

## 2018-05-18 DIAGNOSIS — I50.42 CHRONIC COMBINED SYSTOLIC AND DIASTOLIC CHF, NYHA CLASS 3: ICD-10-CM

## 2018-05-18 DIAGNOSIS — I42.0 DILATED CARDIOMYOPATHY: Primary | ICD-10-CM

## 2018-05-18 PROCEDURE — 3078F DIAST BP <80 MM HG: CPT | Mod: CPTII,S$GLB,, | Performed by: INTERNAL MEDICINE

## 2018-05-18 PROCEDURE — 99999 PR PBB SHADOW E&M-EST. PATIENT-LVL III: CPT | Mod: PBBFAC,,, | Performed by: INTERNAL MEDICINE

## 2018-05-18 PROCEDURE — 3074F SYST BP LT 130 MM HG: CPT | Mod: CPTII,S$GLB,, | Performed by: INTERNAL MEDICINE

## 2018-05-18 PROCEDURE — 99214 OFFICE O/P EST MOD 30 MIN: CPT | Mod: S$GLB,,, | Performed by: INTERNAL MEDICINE

## 2018-05-18 RX ORDER — WARFARIN 2.5 MG/1
TABLET ORAL
Qty: 210 TABLET | Refills: 2 | Status: ON HOLD
Start: 2018-05-18 | End: 2020-01-08 | Stop reason: SDUPTHER

## 2018-05-18 RX ORDER — LOSARTAN POTASSIUM 25 MG/1
TABLET ORAL
Qty: 90 TABLET | Refills: 0 | Status: ON HOLD | OUTPATIENT
Start: 2018-05-18 | End: 2020-01-07

## 2018-05-18 RX ORDER — ATORVASTATIN CALCIUM 10 MG/1
10 TABLET, FILM COATED ORAL DAILY
Qty: 90 TABLET | Refills: 11 | Status: SHIPPED | OUTPATIENT
Start: 2018-05-18 | End: 2018-10-01

## 2018-05-18 RX ORDER — METOPROLOL SUCCINATE 50 MG/1
50 TABLET, EXTENDED RELEASE ORAL DAILY
Qty: 90 TABLET | Refills: 3 | Status: SHIPPED | OUTPATIENT
Start: 2018-05-18 | End: 2018-09-27

## 2018-05-18 RX ORDER — DIGOXIN 125 MCG
0.12 TABLET ORAL DAILY
Qty: 90 TABLET | Refills: 0 | Status: ON HOLD | OUTPATIENT
Start: 2018-05-18 | End: 2020-01-08 | Stop reason: SDUPTHER

## 2018-05-18 NOTE — PROGRESS NOTES
Subjective:    Patient ID:  Cat Garcia is a 83 y.o. female who presents for follow-up of non ischemic cardiomyopathy and atrial fibrillation    HPI     The patient is a 83 year old female followed with chronic AF and non ischemic DCM [EF 15%].  Lab Results   Component Value Date     04/04/2018    K 4.4 04/04/2018     04/04/2018    CO2 26 04/04/2018    BUN 14 04/04/2018    CREATININE 1.1 04/04/2018     (H) 04/04/2018    MG 1.9 12/28/2013    AST 22 04/04/2018    ALT 16 04/04/2018    ALBUMIN 3.5 04/04/2018    PROT 6.3 04/04/2018    BILITOT 0.5 04/04/2018    WBC 8.30 04/04/2018    HGB 13.1 04/04/2018    HCT 41.6 04/04/2018    MCV 97 04/04/2018     (H) 04/04/2018    INR 2.2 05/08/2018    INR 2.2 (H) 02/01/2018    INR 2.5 11/14/2016    TSH 1.922 01/26/2018         Lab Results   Component Value Date    CHOL 204 (H) 02/07/2017    HDL 42 02/07/2017    TRIG 296 (H) 02/07/2017       Lab Results   Component Value Date    LDLCALC 102.8 02/07/2017       Past Medical History:   Diagnosis Date    Atrial fibrillation     with rapid ventricular rate    Breast cancer     XRT    Chronic bronchitis     CKD (chronic kidney disease), stage III     Dementia     Squamous cell carcinoma        Current Outpatient Prescriptions:     antiox#10-om3-dha-epa-lut-zeax (I-CAPS) 280-10-2 mg Cap, Take by mouth 2 (two) times daily., Disp: , Rfl:     ascorbic acid (VITAMIN C) 1000 MG tablet, Take 1,000 mg by mouth once daily., Disp: , Rfl:     atorvastatin (LIPITOR) 10 MG tablet, TAKE 1 TABLET BY MOUTH DAILY, Disp: 90 tablet, Rfl: 11    brimonidine 0.15 % OPTH DROP (ALPHAGAN) 0.15 % ophthalmic solution, 3 (three) times daily. , Disp: , Rfl:     calcium citrate-vitamin D3 315-200 mg (CITRACAL+D) 315-200 mg-unit per tablet, Take 1 tablet by mouth 2 (two) times daily., Disp: , Rfl:     cholecalciferol, vitamin D3, 1,000 unit capsule, Take 1 capsule (1,000 Units total) by mouth once daily., Disp: , Rfl: 0     DIGOX 125 mcg tablet, TAKE 1 TABLET BY MOUTH EVERY DAY, Disp: 90 tablet, Rfl: 0    donepezil (ARICEPT) 10 MG tablet, Take 1 tablet (10 mg total) by mouth every evening., Disp: 90 tablet, Rfl: 3    fish oil-omega-3 fatty acids 300-1,000 mg capsule, Take 2 g by mouth once daily., Disp: , Rfl:     latanoprost 0.005 % ophthalmic solution, once daily. , Disp: , Rfl:     losartan (COZAAR) 25 MG tablet, TAKE 1 TABLET(25 MG) BY MOUTH EVERY DAY, Disp: 90 tablet, Rfl: 0    metoprolol succinate (TOPROL-XL) 50 MG 24 hr tablet, Take 50 mg by mouth once daily. , Disp: , Rfl:     warfarin (COUMADIN) 2.5 MG tablet, Take 2 tablets (5mg.) by mouth every day, except 3 tablets (7.5mg) on Sunday, and Thursday, Or as directed by [facility]., Disp: 210 tablet, Rfl: 2        Review of Systems   Constitution: Positive for decreased appetite and weight loss. Negative for diaphoresis, fever, weakness, malaise/fatigue and weight gain.   HENT: Negative for congestion, ear discharge, ear pain and nosebleeds.    Eyes: Negative for blurred vision, double vision and visual disturbance.   Cardiovascular: Positive for dyspnea on exertion. Negative for chest pain, claudication, cyanosis, irregular heartbeat, leg swelling, near-syncope, orthopnea, palpitations, paroxysmal nocturnal dyspnea and syncope.   Respiratory: Negative for cough, hemoptysis, shortness of breath, sleep disturbances due to breathing, snoring, sputum production and wheezing.    Endocrine: Negative for polydipsia, polyphagia and polyuria.   Hematologic/Lymphatic: Negative for adenopathy and bleeding problem. Does not bruise/bleed easily.   Skin: Negative for color change, nail changes, poor wound healing and rash.   Musculoskeletal: Negative for muscle cramps and muscle weakness.   Gastrointestinal: Negative for abdominal pain, anorexia, change in bowel habit, hematochezia, nausea and vomiting.   Genitourinary: Negative for dysuria, frequency and hematuria.    Neurological: Negative for brief paralysis, difficulty with concentration, excessive daytime sleepiness, dizziness, focal weakness, headaches, light-headedness, seizures and vertigo.   Psychiatric/Behavioral: Negative for altered mental status and depression.   Allergic/Immunologic: Negative for persistent infections.        Objective:There were no vitals taken for this visit.          Physical Exam   Cardiovascular: An irregularly irregular rhythm present.   Pulses:       Dorsalis pedis pulses are 0 on the right side, and 0 on the left side.        Posterior tibial pulses are 0 on the right side, and 0 on the left side.         Assessment:       No diagnosis found.     Plan:       There are no diagnoses linked to this encounter.

## 2018-06-12 ENCOUNTER — ANTI-COAG VISIT (OUTPATIENT)
Dept: CARDIOLOGY | Facility: CLINIC | Age: 83
End: 2018-06-12
Payer: MEDICARE

## 2018-06-12 DIAGNOSIS — Z79.01 LONG TERM CURRENT USE OF ANTICOAGULANT THERAPY: Primary | ICD-10-CM

## 2018-06-12 DIAGNOSIS — I48.20 ATRIAL FIBRILLATION, CHRONIC: ICD-10-CM

## 2018-06-12 LAB — INR PPP: 3.3 (ref 2–3)

## 2018-06-12 PROCEDURE — 99211 OFF/OP EST MAY X REQ PHY/QHP: CPT | Mod: 25,S$GLB,,

## 2018-06-12 PROCEDURE — 85610 PROTHROMBIN TIME: CPT | Mod: QW,S$GLB,,

## 2018-06-12 NOTE — PROGRESS NOTES
INR elevated today for no apparent reason. Patient has bruises from use, denies any bleeding or changes. She already took her dose today so she will eat a serving of greens. Will hold coumadin dose tomorrow and then resume until follow-up next week. Advised her to call with any changes or concerns.

## 2018-06-19 ENCOUNTER — ANTI-COAG VISIT (OUTPATIENT)
Dept: CARDIOLOGY | Facility: CLINIC | Age: 83
End: 2018-06-19
Payer: MEDICARE

## 2018-06-19 DIAGNOSIS — Z79.01 LONG TERM CURRENT USE OF ANTICOAGULANT THERAPY: Primary | ICD-10-CM

## 2018-06-19 DIAGNOSIS — I48.20 ATRIAL FIBRILLATION, CHRONIC: ICD-10-CM

## 2018-06-19 LAB — INR PPP: 2.6 (ref 2–3)

## 2018-06-19 PROCEDURE — 85610 PROTHROMBIN TIME: CPT | Mod: QW,S$GLB,, | Performed by: INTERNAL MEDICINE

## 2018-06-19 NOTE — PROGRESS NOTES
Quick follow-up for elevated INR 6/12. INR within normal range today. Patient with bruises from use, denies any bleeding or changes. Will decrease weekly dose to be closer to the dose she received this past week with her hold and follow-up in 2 weeks. Advised her and her sitter to call with any changes or concerns.

## 2018-06-20 NOTE — PROGRESS NOTES
Initial recommendation was to follow up next week but patient refused. Pt seen by Larissa GORDON. I have reviewed her initial findings and agree with her assessment and plan

## 2018-06-25 NOTE — PROGRESS NOTES
Son -Abdon called requesting to speak with pharmD, questioning dose given at last lab drawn -6/19 since INR was withing the range, Son's call back # [phone]

## 2018-07-03 ENCOUNTER — ANTI-COAG VISIT (OUTPATIENT)
Dept: CARDIOLOGY | Facility: CLINIC | Age: 83
End: 2018-07-03
Payer: MEDICARE

## 2018-07-03 DIAGNOSIS — Z79.01 LONG TERM CURRENT USE OF ANTICOAGULANT THERAPY: Primary | ICD-10-CM

## 2018-07-03 DIAGNOSIS — I48.20 ATRIAL FIBRILLATION, CHRONIC: ICD-10-CM

## 2018-07-03 LAB — INR PPP: 2.2 (ref 2–3)

## 2018-07-03 PROCEDURE — 85610 PROTHROMBIN TIME: CPT | Mod: QW,S$GLB,,

## 2018-07-03 NOTE — PROGRESS NOTES
Quick follow-up for decreased dose 5/19. INR within normal range today. Patient with bruises from use, denies any bleeding or changes. Will maintain weekly dose until follow-up in 3 weeks. Advised her to call with any changes or concerns.

## 2018-07-03 NOTE — PROGRESS NOTES
Patient seen by Larissa GORDON. I have reviewed her initial findings and agree with her assessment.

## 2018-07-24 ENCOUNTER — ANTI-COAG VISIT (OUTPATIENT)
Dept: CARDIOLOGY | Facility: CLINIC | Age: 83
End: 2018-07-24
Payer: MEDICARE

## 2018-07-24 DIAGNOSIS — I48.20 ATRIAL FIBRILLATION, CHRONIC: ICD-10-CM

## 2018-07-24 DIAGNOSIS — Z79.01 LONG TERM CURRENT USE OF ANTICOAGULANT THERAPY: Primary | ICD-10-CM

## 2018-07-24 LAB — INR PPP: 3.1 (ref 2–3)

## 2018-07-24 PROCEDURE — 85610 PROTHROMBIN TIME: CPT | Mod: QW,S$GLB,, | Performed by: INTERNAL MEDICINE

## 2018-07-24 NOTE — PROGRESS NOTES
INR slightly elevated today for no apparent reason. Patient has bruises on body from use, denies any bleeding or changes. Will give reduced dose today and then resume weekly dose until follow-up in 2 weeks. Advised her and her sitter to call with any changes or concerns. ELDER SLOAN assisted with dosing.

## 2018-08-07 ENCOUNTER — ANTI-COAG VISIT (OUTPATIENT)
Dept: CARDIOLOGY | Facility: CLINIC | Age: 83
End: 2018-08-07
Payer: MEDICARE

## 2018-08-07 DIAGNOSIS — Z79.01 LONG TERM CURRENT USE OF ANTICOAGULANT THERAPY: Primary | ICD-10-CM

## 2018-08-07 DIAGNOSIS — I48.20 ATRIAL FIBRILLATION, CHRONIC: ICD-10-CM

## 2018-08-07 LAB — INR PPP: 2.1 (ref 2–3)

## 2018-08-07 NOTE — PROGRESS NOTES
Quick follow-up for slightly elevated INR 7/24. INR within normal range today. Patient with bruises from use, denies any bleeding or changes. Will maintain weekly dose until follow-up in 3 weeks. Advised her and her sitter to call with any changes or concerns.

## 2018-08-28 ENCOUNTER — ANTI-COAG VISIT (OUTPATIENT)
Dept: CARDIOLOGY | Facility: CLINIC | Age: 83
End: 2018-08-28
Payer: MEDICARE

## 2018-08-28 DIAGNOSIS — I48.20 ATRIAL FIBRILLATION, CHRONIC: ICD-10-CM

## 2018-08-28 DIAGNOSIS — Z79.01 LONG TERM CURRENT USE OF ANTICOAGULANT THERAPY: Primary | ICD-10-CM

## 2018-08-28 LAB — INR PPP: 1.9 (ref 2–3)

## 2018-08-28 PROCEDURE — 99211 OFF/OP EST MAY X REQ PHY/QHP: CPT | Mod: 25,S$GLB,ICN, | Performed by: INTERNAL MEDICINE

## 2018-08-28 PROCEDURE — 85610 PROTHROMBIN TIME: CPT | Mod: QW,S$GLB,, | Performed by: INTERNAL MEDICINE

## 2018-08-28 NOTE — PROGRESS NOTES
INR slightly low today for no apparent reason. Patient has bruises from use, denies any bleeding or changes. Will maintain weekly dose (no boost, she is elderly) and follow-up in 2 weeks. Advised her to call with any changes or concerns.

## 2018-09-11 ENCOUNTER — ANTI-COAG VISIT (OUTPATIENT)
Dept: CARDIOLOGY | Facility: CLINIC | Age: 83
End: 2018-09-11
Payer: MEDICARE

## 2018-09-11 DIAGNOSIS — I48.20 ATRIAL FIBRILLATION, CHRONIC: ICD-10-CM

## 2018-09-11 DIAGNOSIS — Z79.01 LONG TERM CURRENT USE OF ANTICOAGULANT THERAPY: Primary | ICD-10-CM

## 2018-09-11 LAB — INR PPP: 1.7 (ref 2–3)

## 2018-09-11 PROCEDURE — 99211 OFF/OP EST MAY X REQ PHY/QHP: CPT | Mod: S$PBB,25,, | Performed by: INTERNAL MEDICINE

## 2018-09-11 PROCEDURE — 85610 PROTHROMBIN TIME: CPT | Mod: PBBFAC,PO

## 2018-09-11 NOTE — PROGRESS NOTES
Quick follow-up for slightly low INR 8/28. INR still low today. Patient missed a dose 9/6, reminded her to please call with missed doses. She has bruises from use, denies any bleeding or other changes. Will boost dose today and then resume weekly dose until follow-up in 2 weeks. Advised her to call with any changes or concerns. GAUTAM Mccann D assisted with dosing.

## 2018-09-25 ENCOUNTER — ANTI-COAG VISIT (OUTPATIENT)
Dept: CARDIOLOGY | Facility: CLINIC | Age: 83
End: 2018-09-25
Payer: MEDICARE

## 2018-09-25 DIAGNOSIS — I48.20 ATRIAL FIBRILLATION, CHRONIC: ICD-10-CM

## 2018-09-25 DIAGNOSIS — Z79.01 LONG TERM CURRENT USE OF ANTICOAGULANT THERAPY: Primary | ICD-10-CM

## 2018-09-25 LAB — INR PPP: 2.3 (ref 2–3)

## 2018-09-25 PROCEDURE — 85610 PROTHROMBIN TIME: CPT | Mod: PBBFAC,PO

## 2018-09-25 PROCEDURE — 99211 OFF/OP EST MAY X REQ PHY/QHP: CPT | Mod: S$PBB,25,,

## 2018-09-25 NOTE — PROGRESS NOTES
Quick follow-up for low INR 9/11. INR within normal range today. Patient with bruises on body from use. Denies any bleeding or changes. Will maintain weekly dose until follow-up in 3 weeks. Advised her to call with any changes or concerns.

## 2018-09-27 ENCOUNTER — LAB VISIT (OUTPATIENT)
Dept: LAB | Facility: HOSPITAL | Age: 83
End: 2018-09-27
Attending: INTERNAL MEDICINE
Payer: MEDICARE

## 2018-09-27 ENCOUNTER — OFFICE VISIT (OUTPATIENT)
Dept: INTERNAL MEDICINE | Facility: CLINIC | Age: 83
End: 2018-09-27
Payer: MEDICARE

## 2018-09-27 VITALS
WEIGHT: 133.38 LBS | HEIGHT: 64 IN | SYSTOLIC BLOOD PRESSURE: 90 MMHG | OXYGEN SATURATION: 99 % | BODY MASS INDEX: 22.77 KG/M2 | DIASTOLIC BLOOD PRESSURE: 60 MMHG | HEART RATE: 48 BPM

## 2018-09-27 DIAGNOSIS — D75.839 THROMBOCYTOSIS: ICD-10-CM

## 2018-09-27 DIAGNOSIS — I70.0 AORTIC ATHEROSCLEROSIS: ICD-10-CM

## 2018-09-27 DIAGNOSIS — I48.20 ATRIAL FIBRILLATION, CHRONIC: Chronic | ICD-10-CM

## 2018-09-27 DIAGNOSIS — R63.4 WEIGHT LOSS: ICD-10-CM

## 2018-09-27 DIAGNOSIS — R63.4 WEIGHT LOSS: Primary | ICD-10-CM

## 2018-09-27 LAB
ANION GAP SERPL CALC-SCNC: 7 MMOL/L
BASOPHILS # BLD AUTO: 0.03 K/UL
BASOPHILS NFR BLD: 0.3 %
BUN SERPL-MCNC: 17 MG/DL
CALCIUM SERPL-MCNC: 9.9 MG/DL
CHLORIDE SERPL-SCNC: 111 MMOL/L
CO2 SERPL-SCNC: 26 MMOL/L
CREAT SERPL-MCNC: 1 MG/DL
DIFFERENTIAL METHOD: ABNORMAL
EOSINOPHIL # BLD AUTO: 0.2 K/UL
EOSINOPHIL NFR BLD: 1.8 %
ERYTHROCYTE [DISTWIDTH] IN BLOOD BY AUTOMATED COUNT: 13.4 %
EST. GFR  (AFRICAN AMERICAN): >60 ML/MIN/1.73 M^2
EST. GFR  (NON AFRICAN AMERICAN): 53 ML/MIN/1.73 M^2
GLUCOSE SERPL-MCNC: 99 MG/DL
HCT VFR BLD AUTO: 40.8 %
HGB BLD-MCNC: 13.4 G/DL
LYMPHOCYTES # BLD AUTO: 2.4 K/UL
LYMPHOCYTES NFR BLD: 25.1 %
MCH RBC QN AUTO: 31.3 PG
MCHC RBC AUTO-ENTMCNC: 32.8 G/DL
MCV RBC AUTO: 95 FL
MONOCYTES # BLD AUTO: 0.8 K/UL
MONOCYTES NFR BLD: 8 %
NEUTROPHILS # BLD AUTO: 6.3 K/UL
NEUTROPHILS NFR BLD: 64.6 %
PLATELET # BLD AUTO: 574 K/UL
PMV BLD AUTO: 10.6 FL
POTASSIUM SERPL-SCNC: 5.3 MMOL/L
RBC # BLD AUTO: 4.28 M/UL
SODIUM SERPL-SCNC: 144 MMOL/L
TSH SERPL DL<=0.005 MIU/L-ACNC: 2.07 UIU/ML
WBC # BLD AUTO: 9.7 K/UL

## 2018-09-27 PROCEDURE — 3078F DIAST BP <80 MM HG: CPT | Mod: CPTII,,, | Performed by: INTERNAL MEDICINE

## 2018-09-27 PROCEDURE — 1101F PT FALLS ASSESS-DOCD LE1/YR: CPT | Mod: CPTII,,, | Performed by: INTERNAL MEDICINE

## 2018-09-27 PROCEDURE — 99999 PR PBB SHADOW E&M-EST. PATIENT-LVL III: CPT | Mod: PBBFAC,,, | Performed by: INTERNAL MEDICINE

## 2018-09-27 PROCEDURE — 99214 OFFICE O/P EST MOD 30 MIN: CPT | Mod: S$PBB,,, | Performed by: INTERNAL MEDICINE

## 2018-09-27 PROCEDURE — 99213 OFFICE O/P EST LOW 20 MIN: CPT | Mod: PBBFAC | Performed by: INTERNAL MEDICINE

## 2018-09-27 PROCEDURE — 85025 COMPLETE CBC W/AUTO DIFF WBC: CPT

## 2018-09-27 PROCEDURE — 80048 BASIC METABOLIC PNL TOTAL CA: CPT

## 2018-09-27 PROCEDURE — 84443 ASSAY THYROID STIM HORMONE: CPT

## 2018-09-27 PROCEDURE — 3074F SYST BP LT 130 MM HG: CPT | Mod: CPTII,,, | Performed by: INTERNAL MEDICINE

## 2018-09-27 PROCEDURE — 99499 UNLISTED E&M SERVICE: CPT | Mod: S$GLB,,, | Performed by: INTERNAL MEDICINE

## 2018-09-27 PROCEDURE — 36415 COLL VENOUS BLD VENIPUNCTURE: CPT

## 2018-09-27 RX ORDER — METOPROLOL SUCCINATE 25 MG/1
25 TABLET, EXTENDED RELEASE ORAL DAILY
Qty: 90 TABLET | Refills: 11 | Status: SHIPPED | OUTPATIENT
Start: 2018-09-27 | End: 2019-12-23

## 2018-09-27 NOTE — PROGRESS NOTES
Subjective:       Patient ID: Cat Garcia is a 83 y.o. female.    Chief Complaint: Annual Exam    Patient is here for followup for chronic conditions.    Not eating well, poor appetite, wt has continued to drop.    Not drinking 3 ensures per day, loses appetite. Even not drinking all her morning coffee. Will drink v8 juice. Will eat some cottage cheese w/fruit.    Her and caregiver deny that she feels depressed.    No f/c/ns.    Denies any syncope or pre-syncope or new dyspnea.    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      Review of Systems   Constitutional: Positive for appetite change, fatigue and unexpected weight change. Negative for activity change and fever.   HENT: Negative for trouble swallowing.    Eyes: Negative for visual disturbance.   Respiratory: Positive for shortness of breath (stable). Negative for chest tightness and wheezing.    Cardiovascular: Negative for chest pain, palpitations and leg swelling.   Gastrointestinal: Negative for abdominal distention, abdominal pain and constipation.   Endocrine: Negative for cold intolerance, heat intolerance, polydipsia and polyuria.   Genitourinary: Negative for difficulty urinating.   Musculoskeletal: Positive for gait problem.   Skin: Negative for rash.   Neurological: Negative for tremors and syncope.        Chronic imbalance    No falls   Hematological: Negative for adenopathy. Does not bruise/bleed easily.   Psychiatric/Behavioral: Negative for dysphoric mood.       Objective:      Physical Exam   Constitutional: She appears well-developed and well-nourished. No distress.   HENT:   Head: Normocephalic and atraumatic.   Mouth/Throat: No oropharyngeal exudate.   Eyes: Conjunctivae are normal. Pupils are equal, round, and reactive to light. No scleral icterus.   Neck: Normal range of motion. Neck supple. No thyromegaly present.   No neck mass palpated today   Cardiovascular: Normal heart sounds.   Irregularly irregular, slow       Pulmonary/Chest:  Effort normal and breath sounds normal. She has no rales.   Abdominal: Soft. Bowel sounds are normal. She exhibits no distension. There is no tenderness.   Musculoskeletal: She exhibits no edema.   Lymphadenopathy:     She has no cervical adenopathy.   Neurological: She is alert. She exhibits normal muscle tone.   Oriented to place, to me (her doctor), not date   Skin: She is not diaphoretic.   Psychiatric: She has a normal mood and affect. Her behavior is normal.       Assessment:       1. Weight loss    2. Thrombocytosis    3. Atrial fibrillation, chronic        Plan:       Cat MOCK was seen today for annual exam.    Diagnoses and all orders for this visit:    Weight loss  -     CBC auto differential; Future  -     TSH; Future  Not sure why continued wt loss, dementia could be related, continue to encourage eating from aide.    Thrombocytosis  Will touch base with hematologist    Atrial fibrillation, chronic  -     metoprolol succinate (TOPROL-XL) 25 MG 24 hr tablet; Take 1 tablet (25 mg total) by mouth once daily.  HR too low.  Will ask son to check her HR to ensure improved. At this time bradycardia seems asymptomatic.  -     Basic metabolic panel; Future    Aortic athero -- on statin    Health Maintenance       Date Due Completion Date    TETANUS VACCINE 04/22/1953 ---    Influenza Vaccine 08/01/2018 1/26/2018    DEXA SCAN 03/12/2021 3/12/2018    Lipid Panel 02/07/2022 2/7/2017      Flu vax please      Follow-up in about 6 months (around 3/27/2019).    Future Appointments   Date Time Provider Department Center   10/16/2018 10:00 AM [facility]

## 2018-09-27 NOTE — Clinical Note
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 She is on coumadin for afib, do you recommend Aspirin as well?I hope you are doing well,Alfie

## 2018-10-01 RX ORDER — ATORVASTATIN CALCIUM 10 MG/1
TABLET, FILM COATED ORAL
Qty: 90 TABLET | Refills: 11 | Status: ON HOLD | OUTPATIENT
Start: 2018-10-01 | End: 2020-01-07

## 2018-10-04 ENCOUNTER — TELEPHONE (OUTPATIENT)
Dept: INTERNAL MEDICINE | Facility: CLINIC | Age: 83
End: 2018-10-04

## 2018-10-04 NOTE — TELEPHONE ENCOUNTER
"Hi, please call her son Dr. Ruiz --  Her blood work showed a still high platelet count. I recommend that they call the hematology office, Dr. Renée Gannon to set up a followup appointment.    The kidney function was stable and thyroid level stable as well.    Also, I sent the following message to him on her [facility] account --    Let me know if Dr Ruiz has any questions.  Thank you, Alfie Oconnell      "Hi Dr. Ruiz, I saw your mother today.   I know that she has continued to lose weight. I do not have a clear cause, other than worsening dementia taking away her appetite. It seems like her aide is really working to encourage her to eat and to take the ensures as well.   I have recheck her platelet count.   Her heart rate was low, 48, I have lowered her toprol from 50mg to 25, I sent in the new prescription. Will you please check her heart rate in a few days and email me the number.   Today we gave her the flu vaccine.   I hope you are doing well.   Let me know if you have any questions.   Alfie Oconnell "    "

## 2018-10-05 NOTE — TELEPHONE ENCOUNTER
Spoke to  and advised. Spelled the hematologist's name for him, he will call to make the appointment.

## 2018-10-09 ENCOUNTER — OFFICE VISIT (OUTPATIENT)
Dept: HEMATOLOGY/ONCOLOGY | Facility: CLINIC | Age: 83
End: 2018-10-09
Payer: MEDICARE

## 2018-10-09 VITALS
RESPIRATION RATE: 20 BRPM | HEART RATE: 37 BPM | BODY MASS INDEX: 23.01 KG/M2 | WEIGHT: 134.06 LBS | TEMPERATURE: 98 F | SYSTOLIC BLOOD PRESSURE: 110 MMHG | DIASTOLIC BLOOD PRESSURE: 57 MMHG

## 2018-10-09 DIAGNOSIS — D47.3 ESSENTIAL THROMBOCYTOSIS: Primary | ICD-10-CM

## 2018-10-09 DIAGNOSIS — I50.42 CHRONIC COMBINED SYSTOLIC AND DIASTOLIC CHF, NYHA CLASS 3: Chronic | ICD-10-CM

## 2018-10-09 DIAGNOSIS — G30.9 MIXED ALZHEIMER'S AND VASCULAR DEMENTIA: ICD-10-CM

## 2018-10-09 DIAGNOSIS — F02.80 MIXED ALZHEIMER'S AND VASCULAR DEMENTIA: ICD-10-CM

## 2018-10-09 DIAGNOSIS — I70.0 AORTIC ATHEROSCLEROSIS: ICD-10-CM

## 2018-10-09 DIAGNOSIS — D47.3 ESSENTIAL THROMBOCYTHEMIA: ICD-10-CM

## 2018-10-09 DIAGNOSIS — F01.50 MIXED ALZHEIMER'S AND VASCULAR DEMENTIA: ICD-10-CM

## 2018-10-09 PROCEDURE — 99999 PR PBB SHADOW E&M-EST. PATIENT-LVL III: CPT | Mod: PBBFAC,,, | Performed by: INTERNAL MEDICINE

## 2018-10-09 PROCEDURE — 3074F SYST BP LT 130 MM HG: CPT | Mod: CPTII,,, | Performed by: INTERNAL MEDICINE

## 2018-10-09 PROCEDURE — 99215 OFFICE O/P EST HI 40 MIN: CPT | Mod: S$PBB,,, | Performed by: INTERNAL MEDICINE

## 2018-10-09 PROCEDURE — 99499 UNLISTED E&M SERVICE: CPT | Mod: S$GLB,,, | Performed by: INTERNAL MEDICINE

## 2018-10-09 PROCEDURE — 99213 OFFICE O/P EST LOW 20 MIN: CPT | Mod: PBBFAC | Performed by: INTERNAL MEDICINE

## 2018-10-09 PROCEDURE — 1101F PT FALLS ASSESS-DOCD LE1/YR: CPT | Mod: CPTII,,, | Performed by: INTERNAL MEDICINE

## 2018-10-09 PROCEDURE — 3078F DIAST BP <80 MM HG: CPT | Mod: CPTII,,, | Performed by: INTERNAL MEDICINE

## 2018-10-09 RX ORDER — HYDROXYUREA 500 MG/1
500 CAPSULE ORAL DAILY
Qty: 30 CAPSULE | Refills: 6 | Status: SHIPPED | OUTPATIENT
Start: 2018-10-09 | End: 2019-03-06 | Stop reason: SDUPTHER

## 2018-10-09 NOTE — PROGRESS NOTES
Hematology and Medical Oncology   Follow Up     10/09/2018    Primary Oncologic Diagnosis: Essential Thrombocytosis    History of Present Ilness:   Cat Quintero) is a pleasant 83 y.o.female with a past medical history of a.fib, breast cancer, CKD stage III, and dementia who presents today to discuss her elevated platelet count. She was unsure of why she was in the hematology office.    On chart review Mrs. Garcia's platelets were in the mid 300's range from 3470-2358. In February 2017 her platelets crossed the 400k ismael and have been slowly rising to > 500k in January 2018.    She specifically denies blurry vision, palmar erythema, or hepatosplenomegaly known side effects of elevated platlet count. While she expressers no complaints today on further questioning she has a poor appetite, but is drinking adequate liquids.    Interval History: Ms. Garcia has no specific complaints. She is doing well overall.      PAST MEDICAL HISTORY:   Past Medical History:   Diagnosis Date    Atrial fibrillation     with rapid ventricular rate    Breast cancer     XRT    Chronic bronchitis     CKD (chronic kidney disease), stage III     Dementia     Squamous cell carcinoma        PAST SURGICAL HISTORY:   Past Surgical History:   Procedure Laterality Date    BREAST SURGERY  2011    right    CATHETERIZATION, HEART, LEFT Left 12/18/2013    Performed by Elliot Alston MD at [facility] CATH [facility]       PAST SOCIAL HISTORY:   reports that she quit smoking about 36 years ago. Her smoking use included cigarettes. She smoked 0.25 packs per day. she has never used smokeless tobacco. She reports that she does not drink alcohol or use drugs.    FAMILY HISTORY:  Family History   Problem Relation Age of Onset    Heart disease Father     Melanoma Neg Hx        CURRENT MEDICATIONS:   Current Outpatient Medications   Medication Sig    antiox#10-om3-dha-epa-lut-zeax (I-CAPS) 280-10-2 mg Cap Take by mouth 2 (two) times daily.     ascorbic acid (VITAMIN C) 1000 MG tablet Take 1,000 mg by mouth once daily.    atorvastatin (LIPITOR) 10 MG tablet TAKE 1 TABLET BY MOUTH DAILY    brimonidine 0.15 % OPTH DROP (ALPHAGAN) 0.15 % ophthalmic solution 3 (three) times daily.     calcium carb/vit D3/minerals (CALCIUM-VITAMIN D ORAL) Take by mouth once daily. 1000mg Vitamin D     digoxin (DIGOX) 125 mcg tablet Take 1 tablet (0.125 mg total) by mouth once daily.    donepezil (ARICEPT) 10 MG tablet Take 1 tablet (10 mg total) by mouth every evening.    fish oil-omega-3 fatty acids 300-1,000 mg capsule Take 2 g by mouth once daily.    influenza (FLUZONE HIGH-DOSE) 180 mcg/0.5 mL vaccine Inject 0.5 mLs into the muscle.    latanoprost 0.005 % ophthalmic solution once daily.     losartan (COZAAR) 25 MG tablet TAKE 1 TABLET(25 MG) BY MOUTH EVERY DAY    metoprolol succinate (TOPROL-XL) 25 MG 24 hr tablet Take 1 tablet (25 mg total) by mouth once daily.    warfarin (COUMADIN) 2.5 MG tablet Take 2 tablets (5mg.) by mouth every day, except 3 tablets (7.5mg) on Sunday, and Thursday, Or as directed by [facility].     No current facility-administered medications for this visit.      ALLERGIES:   Review of patient's allergies indicates:  No Known Allergies      Review of Systems:     Review of Systems   Constitutional: Positive for appetite change and fatigue. Negative for chills, diaphoresis, fever and unexpected weight change.   HENT:   Negative for hearing loss, mouth sores, nosebleeds, sore throat, trouble swallowing and voice change.    Eyes: Negative for eye problems and icterus.   Respiratory: Negative for chest tightness, cough, hemoptysis, shortness of breath and wheezing.    Cardiovascular: Negative for chest pain, leg swelling and palpitations.   Gastrointestinal: Negative for abdominal distention, abdominal pain, blood in stool, diarrhea, nausea and vomiting.   Endocrine: Negative for hot flashes.   Genitourinary: Negative for bladder  incontinence, difficulty urinating, dysuria and hematuria.    Musculoskeletal: Negative for arthralgias, back pain, flank pain, gait problem, myalgias, neck pain and neck stiffness.   Skin: Negative for itching, rash and wound.   Neurological: Negative for dizziness, extremity weakness, gait problem, headaches, numbness, seizures and speech difficulty.   Hematological: Negative for adenopathy. Does not bruise/bleed easily.   Psychiatric/Behavioral: Negative for confusion, depression and sleep disturbance. The patient is not nervous/anxious.         Physical Exam:     Vitals:    10/09/18 0811   BP: (!) 110/57   Pulse: (!) 37   Resp: 20   Temp: 97.5 °F (36.4 °C)       Physical Exam   Constitutional: She is oriented to person, place, and time. She appears well-developed and well-nourished. No distress.   HENT:   Head: Normocephalic and atraumatic.   Mouth/Throat: Oropharynx is clear and moist. No oropharyngeal exudate.   Eyes: Conjunctivae and EOM are normal. Pupils are equal, round, and reactive to light.   Neck: Normal range of motion. Neck supple. No JVD present. No tracheal deviation present. No thyromegaly present.   Cardiovascular: Normal rate and normal heart sounds. Exam reveals no friction rub.   No murmur heard.  Irregular rhythm    Pulmonary/Chest: Effort normal and breath sounds normal. No stridor. No respiratory distress. She has no wheezes. She has no rales. She exhibits no tenderness.   Abdominal: Soft. Bowel sounds are normal. She exhibits no distension. There is no tenderness. There is no rebound and no guarding.   Musculoskeletal: Normal range of motion. She exhibits no edema, tenderness or deformity.   Lymphadenopathy:     She has no axillary adenopathy.   Neurological: She is alert and oriented to person, place, and time. She displays normal reflexes. No cranial nerve deficit or sensory deficit. She exhibits normal muscle tone. Coordination normal.   Skin: Skin is warm and dry. Capillary refill  takes less than 2 seconds. No rash noted. She is not diaphoretic. No erythema. No pallor.   Psychiatric: She has a normal mood and affect. Her behavior is normal. Judgment and thought content normal.       ECOG Performance Status: (foot note - ECOG PS provided by Eastern Cooperative Oncology Group) 2 - Symptomatic, <50% confined to bed    Labs:   Lab Results   Component Value Date    WBC 9.70 09/27/2018    HGB 13.4 09/27/2018    HCT 40.8 09/27/2018     (H) 09/27/2018    CHOL 204 (H) 02/07/2017    TRIG 296 (H) 02/07/2017    HDL 42 02/07/2017    ALT 16 04/04/2018    AST 22 04/04/2018     09/27/2018    K 5.3 (H) 09/27/2018     (H) 09/27/2018    CREATININE 1.0 09/27/2018    BUN 17 09/27/2018    CO2 26 09/27/2018    TSH 2.070 09/27/2018    INR 2.3 09/25/2018     Iron: 72  TIBC: 317  Transferrin: 214  Ferritin: 107  LDH: 180  Sed Rate:8    Imaging: Previous imaging has been reviewed     Assessment and Plan:     Ms. Garcia is a pleasant 83 year old female who presents to clinic today to discuss her elevated platelet count    Essential Thrombocytosis  --CALR exon 9 detected  --CALR mutation is identified in approximately   49-88% of JAK2 and MPL-wild type essential thrombocythemia   --We discussed the fact that 90% of ET patients have one of the above mutations  --Given that her age >60 and significant symptoms if mutation positive I will treat with hydrea 500mg with frequent labs. With q2 week labs and 2 month follow up with me    Hypotension  --Appears to be close to baseline given     30 minutes were spent face to face with the patient and her  Care giver to discuss the lab value, treatment options. I have provided the patient with an opportunity to ask questions and have all questions answered to her satisfaction.       She will follow up with me in 2 months, but knows to call in the interim if symptoms change or should a problem arise.        Renée Gannon MD  Hematology and Medical  Oncology  B[facility] Transplant  [facility]

## 2018-10-16 ENCOUNTER — ANTI-COAG VISIT (OUTPATIENT)
Dept: CARDIOLOGY | Facility: CLINIC | Age: 83
End: 2018-10-16
Payer: MEDICARE

## 2018-10-16 DIAGNOSIS — I48.20 ATRIAL FIBRILLATION, CHRONIC: ICD-10-CM

## 2018-10-16 DIAGNOSIS — Z79.01 LONG TERM CURRENT USE OF ANTICOAGULANT THERAPY: Primary | ICD-10-CM

## 2018-10-16 LAB — INR PPP: 2.1 (ref 2–3)

## 2018-10-16 PROCEDURE — 99211 OFF/OP EST MAY X REQ PHY/QHP: CPT | Mod: S$PBB,25,,

## 2018-10-16 PROCEDURE — 85610 PROTHROMBIN TIME: CPT | Mod: PBBFAC,PO

## 2018-10-22 ENCOUNTER — LAB VISIT (OUTPATIENT)
Dept: LAB | Facility: HOSPITAL | Age: 83
End: 2018-10-22
Attending: INTERNAL MEDICINE
Payer: MEDICARE

## 2018-10-22 DIAGNOSIS — D47.3 ESSENTIAL THROMBOCYTOSIS: ICD-10-CM

## 2018-10-22 LAB
BASOPHILS # BLD AUTO: 0.05 K/UL
BASOPHILS NFR BLD: 0.6 %
DIFFERENTIAL METHOD: ABNORMAL
EOSINOPHIL # BLD AUTO: 0.1 K/UL
EOSINOPHIL NFR BLD: 1 %
ERYTHROCYTE [DISTWIDTH] IN BLOOD BY AUTOMATED COUNT: 13.5 %
HCT VFR BLD AUTO: 42.6 %
HGB BLD-MCNC: 13.6 G/DL
IMM GRANULOCYTES # BLD AUTO: 0.02 K/UL
IMM GRANULOCYTES NFR BLD AUTO: 0.2 %
LYMPHOCYTES # BLD AUTO: 2.2 K/UL
LYMPHOCYTES NFR BLD: 24.9 %
MCH RBC QN AUTO: 31.3 PG
MCHC RBC AUTO-ENTMCNC: 31.9 G/DL
MCV RBC AUTO: 98 FL
MONOCYTES # BLD AUTO: 0.7 K/UL
MONOCYTES NFR BLD: 7.6 %
NEUTROPHILS # BLD AUTO: 5.7 K/UL
NEUTROPHILS NFR BLD: 65.7 %
NRBC BLD-RTO: 0 /100 WBC
PLATELET # BLD AUTO: 564 K/UL
PMV BLD AUTO: 11.2 FL
RBC # BLD AUTO: 4.34 M/UL
WBC # BLD AUTO: 8.69 K/UL

## 2018-10-22 PROCEDURE — 85025 COMPLETE CBC W/AUTO DIFF WBC: CPT

## 2018-10-22 PROCEDURE — 36415 COLL VENOUS BLD VENIPUNCTURE: CPT | Mod: PO

## 2018-11-13 ENCOUNTER — ANTI-COAG VISIT (OUTPATIENT)
Dept: CARDIOLOGY | Facility: CLINIC | Age: 83
End: 2018-11-13
Payer: MEDICARE

## 2018-11-13 DIAGNOSIS — Z79.01 LONG TERM CURRENT USE OF ANTICOAGULANT THERAPY: Primary | ICD-10-CM

## 2018-11-13 DIAGNOSIS — I48.20 ATRIAL FIBRILLATION, CHRONIC: ICD-10-CM

## 2018-11-13 LAB — INR PPP: 2.4 (ref 2–3)

## 2018-11-13 PROCEDURE — 85610 PROTHROMBIN TIME: CPT | Mod: QW,S$GLB,, | Performed by: INTERNAL MEDICINE

## 2018-11-13 PROCEDURE — 99211 OFF/OP EST MAY X REQ PHY/QHP: CPT | Mod: 25,S$GLB,, | Performed by: INTERNAL MEDICINE

## 2018-11-19 ENCOUNTER — LAB VISIT (OUTPATIENT)
Dept: LAB | Facility: HOSPITAL | Age: 83
End: 2018-11-19
Attending: INTERNAL MEDICINE
Payer: MEDICARE

## 2018-11-19 DIAGNOSIS — D47.3 ESSENTIAL THROMBOCYTOSIS: ICD-10-CM

## 2018-11-19 LAB
BASOPHILS # BLD AUTO: 0.06 K/UL
BASOPHILS NFR BLD: 0.6 %
DIFFERENTIAL METHOD: ABNORMAL
EOSINOPHIL # BLD AUTO: 0.1 K/UL
EOSINOPHIL NFR BLD: 0.8 %
ERYTHROCYTE [DISTWIDTH] IN BLOOD BY AUTOMATED COUNT: 15.4 %
HCT VFR BLD AUTO: 42.2 %
HGB BLD-MCNC: 13.9 G/DL
IMM GRANULOCYTES # BLD AUTO: 0.02 K/UL
IMM GRANULOCYTES NFR BLD AUTO: 0.2 %
LYMPHOCYTES # BLD AUTO: 2.1 K/UL
LYMPHOCYTES NFR BLD: 21.7 %
MCH RBC QN AUTO: 31.9 PG
MCHC RBC AUTO-ENTMCNC: 32.9 G/DL
MCV RBC AUTO: 97 FL
MONOCYTES # BLD AUTO: 0.8 K/UL
MONOCYTES NFR BLD: 8.2 %
NEUTROPHILS # BLD AUTO: 6.7 K/UL
NEUTROPHILS NFR BLD: 68.5 %
NRBC BLD-RTO: 0 /100 WBC
PLATELET # BLD AUTO: 569 K/UL
PMV BLD AUTO: 10.8 FL
RBC # BLD AUTO: 4.36 M/UL
WBC # BLD AUTO: 9.78 K/UL

## 2018-11-19 PROCEDURE — 85025 COMPLETE CBC W/AUTO DIFF WBC: CPT

## 2018-11-19 PROCEDURE — 36415 COLL VENOUS BLD VENIPUNCTURE: CPT | Mod: PO

## 2018-12-03 ENCOUNTER — LAB VISIT (OUTPATIENT)
Dept: LAB | Facility: HOSPITAL | Age: 83
End: 2018-12-03
Attending: INTERNAL MEDICINE
Payer: MEDICARE

## 2018-12-03 DIAGNOSIS — D47.3 ESSENTIAL THROMBOCYTOSIS: ICD-10-CM

## 2018-12-03 LAB
BASOPHILS # BLD AUTO: 0.04 K/UL
BASOPHILS NFR BLD: 0.5 %
DIFFERENTIAL METHOD: ABNORMAL
EOSINOPHIL # BLD AUTO: 0.1 K/UL
EOSINOPHIL NFR BLD: 0.8 %
ERYTHROCYTE [DISTWIDTH] IN BLOOD BY AUTOMATED COUNT: 15.7 %
HCT VFR BLD AUTO: 40.9 %
HGB BLD-MCNC: 13.4 G/DL
IMM GRANULOCYTES # BLD AUTO: 0.02 K/UL
IMM GRANULOCYTES NFR BLD AUTO: 0.2 %
LYMPHOCYTES # BLD AUTO: 2 K/UL
LYMPHOCYTES NFR BLD: 24.8 %
MCH RBC QN AUTO: 32.1 PG
MCHC RBC AUTO-ENTMCNC: 32.8 G/DL
MCV RBC AUTO: 98 FL
MONOCYTES # BLD AUTO: 0.7 K/UL
MONOCYTES NFR BLD: 8.7 %
NEUTROPHILS # BLD AUTO: 5.4 K/UL
NEUTROPHILS NFR BLD: 65 %
NRBC BLD-RTO: 0 /100 WBC
PLATELET # BLD AUTO: 533 K/UL
PMV BLD AUTO: 10.7 FL
RBC # BLD AUTO: 4.17 M/UL
WBC # BLD AUTO: 8.24 K/UL

## 2018-12-03 PROCEDURE — 85025 COMPLETE CBC W/AUTO DIFF WBC: CPT

## 2018-12-03 PROCEDURE — 36415 COLL VENOUS BLD VENIPUNCTURE: CPT | Mod: PO

## 2018-12-10 ENCOUNTER — ANTI-COAG VISIT (OUTPATIENT)
Dept: CARDIOLOGY | Facility: CLINIC | Age: 83
End: 2018-12-10

## 2018-12-10 ENCOUNTER — LAB VISIT (OUTPATIENT)
Dept: LAB | Facility: HOSPITAL | Age: 83
End: 2018-12-10
Attending: INTERNAL MEDICINE
Payer: MEDICARE

## 2018-12-10 ENCOUNTER — OFFICE VISIT (OUTPATIENT)
Dept: HEMATOLOGY/ONCOLOGY | Facility: CLINIC | Age: 83
End: 2018-12-10
Payer: MEDICARE

## 2018-12-10 VITALS
HEART RATE: 48 BPM | WEIGHT: 146.63 LBS | BODY MASS INDEX: 27.68 KG/M2 | TEMPERATURE: 98 F | DIASTOLIC BLOOD PRESSURE: 52 MMHG | SYSTOLIC BLOOD PRESSURE: 108 MMHG | HEIGHT: 61 IN

## 2018-12-10 DIAGNOSIS — I48.20 ATRIAL FIBRILLATION, CHRONIC: ICD-10-CM

## 2018-12-10 DIAGNOSIS — Z79.01 LONG TERM CURRENT USE OF ANTICOAGULANT THERAPY: ICD-10-CM

## 2018-12-10 DIAGNOSIS — G30.9 MIXED ALZHEIMER'S AND VASCULAR DEMENTIA: ICD-10-CM

## 2018-12-10 DIAGNOSIS — D47.3 ESSENTIAL THROMBOCYTOSIS: ICD-10-CM

## 2018-12-10 DIAGNOSIS — R53.1 GENERALIZED WEAKNESS: ICD-10-CM

## 2018-12-10 DIAGNOSIS — D47.3 ESSENTIAL THROMBOCYTHEMIA: Primary | ICD-10-CM

## 2018-12-10 DIAGNOSIS — I70.0 AORTIC ATHEROSCLEROSIS: ICD-10-CM

## 2018-12-10 DIAGNOSIS — F01.50 MIXED ALZHEIMER'S AND VASCULAR DEMENTIA: ICD-10-CM

## 2018-12-10 DIAGNOSIS — F02.80 MIXED ALZHEIMER'S AND VASCULAR DEMENTIA: ICD-10-CM

## 2018-12-10 LAB
BASOPHILS # BLD AUTO: 0.05 K/UL
BASOPHILS NFR BLD: 0.6 %
DIFFERENTIAL METHOD: ABNORMAL
EOSINOPHIL # BLD AUTO: 0.1 K/UL
EOSINOPHIL NFR BLD: 1.2 %
ERYTHROCYTE [DISTWIDTH] IN BLOOD BY AUTOMATED COUNT: 15.7 %
HCT VFR BLD AUTO: 39.9 %
HGB BLD-MCNC: 12.6 G/DL
IMM GRANULOCYTES # BLD AUTO: 0.02 K/UL
IMM GRANULOCYTES NFR BLD AUTO: 0.3 %
INR PPP: 1.8
LYMPHOCYTES # BLD AUTO: 2.2 K/UL
LYMPHOCYTES NFR BLD: 28.2 %
MCH RBC QN AUTO: 31.8 PG
MCHC RBC AUTO-ENTMCNC: 31.6 G/DL
MCV RBC AUTO: 101 FL
MONOCYTES # BLD AUTO: 0.7 K/UL
MONOCYTES NFR BLD: 9 %
NEUTROPHILS # BLD AUTO: 4.7 K/UL
NEUTROPHILS NFR BLD: 60.7 %
NRBC BLD-RTO: 0 /100 WBC
PLATELET # BLD AUTO: 499 K/UL
PMV BLD AUTO: 10.5 FL
PROTHROMBIN TIME: 17.4 SEC
RBC # BLD AUTO: 3.96 M/UL
WBC # BLD AUTO: 7.7 K/UL

## 2018-12-10 PROCEDURE — 85025 COMPLETE CBC W/AUTO DIFF WBC: CPT

## 2018-12-10 PROCEDURE — 3078F DIAST BP <80 MM HG: CPT | Mod: CPTII,S$GLB,, | Performed by: INTERNAL MEDICINE

## 2018-12-10 PROCEDURE — 36415 COLL VENOUS BLD VENIPUNCTURE: CPT

## 2018-12-10 PROCEDURE — 99215 OFFICE O/P EST HI 40 MIN: CPT | Mod: S$GLB,,, | Performed by: INTERNAL MEDICINE

## 2018-12-10 PROCEDURE — 3074F SYST BP LT 130 MM HG: CPT | Mod: CPTII,S$GLB,, | Performed by: INTERNAL MEDICINE

## 2018-12-10 PROCEDURE — 99999 PR PBB SHADOW E&M-EST. PATIENT-LVL III: CPT | Mod: PBBFAC,,, | Performed by: INTERNAL MEDICINE

## 2018-12-10 PROCEDURE — 85610 PROTHROMBIN TIME: CPT

## 2018-12-10 PROCEDURE — 1101F PT FALLS ASSESS-DOCD LE1/YR: CPT | Mod: CPTII,S$GLB,, | Performed by: INTERNAL MEDICINE

## 2018-12-10 NOTE — PROGRESS NOTES
Hematology and Medical Oncology   Follow Up     12/10/2018    Primary Oncologic Diagnosis: Essential Thrombocytosis    History of Present Ilness:   Cat Quintero) is a pleasant 83 y.o.female with a past medical history of a.fib, breast cancer, CKD stage III, and dementia who presents today to discuss her elevated platelet count. She was unsure of why she was in the hematology office.    On chart review Mrs. Garcia's platelets were in the mid 300's range from 6301-3517. In February 2017 her platelets crossed the 400k ismael and have been slowly rising to > 500k in January 2018.    She specifically denies blurry vision, palmar erythema, or hepatosplenomegaly known side effects of elevated platlet count. While she expressers no complaints today on further questioning she has a poor appetite, but is drinking adequate liquids.    Interval History: Ms. Garcia has no specific complaints. She is doing well overall and taking the hydrea BID without issue. She is pleased to hear that her platelet count is falling.      PAST MEDICAL HISTORY:   Past Medical History:   Diagnosis Date    Atrial fibrillation     with rapid ventricular rate    Breast cancer     XRT    Chronic bronchitis     CKD (chronic kidney disease), stage III     Dementia     Squamous cell carcinoma        PAST SURGICAL HISTORY:   Past Surgical History:   Procedure Laterality Date    BREAST SURGERY  2011    right    CATHETERIZATION, HEART, LEFT Left 12/18/2013    Performed by Elliot Alston MD at [facility] CATH LAB       PAST SOCIAL HISTORY:   reports that she quit smoking about 36 years ago. Her smoking use included cigarettes. She smoked 0.25 packs per day. she has never used smokeless tobacco. She reports that she does not drink alcohol or use drugs.    FAMILY HISTORY:  Family History   Problem Relation Age of Onset    Heart disease Father     Melanoma Neg Hx        CURRENT MEDICATIONS:   Current Outpatient Medications   Medication Sig     antiox#10-om3-dha-epa-lut-zeax (I-CAPS) 280-10-2 mg Cap Take by mouth 2 (two) times daily.    ascorbic acid (VITAMIN C) 1000 MG tablet Take 1,000 mg by mouth once daily.    atorvastatin (LIPITOR) 10 MG tablet TAKE 1 TABLET BY MOUTH DAILY    brimonidine 0.15 % OPTH DROP (ALPHAGAN) 0.15 % ophthalmic solution 3 (three) times daily.     calcium carb/vit D3/minerals (CALCIUM-VITAMIN D ORAL) Take by mouth once daily. 1000mg Vitamin D     digoxin (DIGOX) 125 mcg tablet Take 1 tablet (0.125 mg total) by mouth once daily.    donepezil (ARICEPT) 10 MG tablet Take 1 tablet (10 mg total) by mouth every evening.    fish oil-omega-3 fatty acids 300-1,000 mg capsule Take 2 g by mouth once daily.    hydroxyurea (HYDREA) 500 mg Cap Take 1 capsule (500 mg total) by mouth once daily.    influenza (FLUZONE HIGH-DOSE) 180 mcg/0.5 mL vaccine Inject 0.5 mLs into the muscle.    latanoprost 0.005 % ophthalmic solution once daily.     losartan (COZAAR) 25 MG tablet TAKE 1 TABLET(25 MG) BY MOUTH EVERY DAY    metoprolol succinate (TOPROL-XL) 25 MG 24 hr tablet Take 1 tablet (25 mg total) by mouth once daily.    warfarin (COUMADIN) 2.5 MG tablet Take 2 tablets (5mg.) by mouth every day, except 3 tablets (7.5mg) on Sunday, and Thursday, Or as directed by Coumadin Clinic.     No current facility-administered medications for this visit.      ALLERGIES:   Review of patient's allergies indicates:  No Known Allergies      Review of Systems:     Review of Systems   Constitutional: Positive for appetite change and fatigue. Negative for chills, diaphoresis, fever and unexpected weight change.   HENT:   Negative for hearing loss, mouth sores, nosebleeds, sore throat, trouble swallowing and voice change.    Eyes: Negative for eye problems and icterus.   Respiratory: Negative for chest tightness, cough, hemoptysis, shortness of breath and wheezing.    Cardiovascular: Negative for chest pain, leg swelling and palpitations.   Gastrointestinal:  Negative for abdominal distention, abdominal pain, blood in stool, diarrhea, nausea and vomiting.   Endocrine: Negative for hot flashes.   Genitourinary: Negative for bladder incontinence, difficulty urinating, dysuria and hematuria.    Musculoskeletal: Negative for arthralgias, back pain, flank pain, gait problem, myalgias, neck pain and neck stiffness.   Skin: Negative for itching, rash and wound.   Neurological: Negative for dizziness, extremity weakness, gait problem, headaches, numbness, seizures and speech difficulty.   Hematological: Negative for adenopathy. Does not bruise/bleed easily.   Psychiatric/Behavioral: Negative for confusion, depression and sleep disturbance. The patient is not nervous/anxious.         Physical Exam:     Vitals:    12/10/18 0843   BP: (!) 108/52   Pulse: (!) 48   Temp: 97.5 °F (36.4 °C)       Physical Exam   Constitutional: She is oriented to person, place, and time. She appears well-developed and well-nourished. No distress.   HENT:   Head: Normocephalic and atraumatic.   Mouth/Throat: Oropharynx is clear and moist. No oropharyngeal exudate.   Eyes: Conjunctivae and EOM are normal. Pupils are equal, round, and reactive to light.   Neck: Normal range of motion. Neck supple. No JVD present. No tracheal deviation present. No thyromegaly present.   Cardiovascular: Normal rate and normal heart sounds. Exam reveals no friction rub.   No murmur heard.  Irregular rhythm    Pulmonary/Chest: Effort normal and breath sounds normal. No stridor. No respiratory distress. She has no wheezes. She has no rales. She exhibits no tenderness.   Abdominal: Soft. Bowel sounds are normal. She exhibits no distension. There is no tenderness. There is no rebound and no guarding.   Musculoskeletal: Normal range of motion. She exhibits no edema, tenderness or deformity.   Lymphadenopathy:     She has no axillary adenopathy.   Neurological: She is alert and oriented to person, place, and time. She displays  normal reflexes. No cranial nerve deficit or sensory deficit. She exhibits normal muscle tone. Coordination normal.   Skin: Skin is warm and dry. Capillary refill takes less than 2 seconds. No rash noted. She is not diaphoretic. No erythema. No pallor.   Psychiatric: She has a normal mood and affect. Her behavior is normal. Judgment and thought content normal.       ECOG Performance Status: (foot note - ECOG PS provided by Eastern Cooperative Oncology Group) 2 - Symptomatic, <50% confined to bed    Labs:   Lab Results   Component Value Date    WBC 7.70 12/10/2018    HGB 12.6 12/10/2018    HCT 39.9 12/10/2018     (H) 12/10/2018    CHOL 204 (H) 02/07/2017    TRIG 296 (H) 02/07/2017    HDL 42 02/07/2017    ALT 16 04/04/2018    AST 22 04/04/2018     09/27/2018    K 5.3 (H) 09/27/2018     (H) 09/27/2018    CREATININE 1.0 09/27/2018    BUN 17 09/27/2018    CO2 26 09/27/2018    TSH 2.070 09/27/2018    INR 1.8 (H) 12/10/2018     Iron: 72  TIBC: 317  Transferrin: 214  Ferritin: 107  LDH: 180  Sed Rate:8    Imaging: Previous imaging has been reviewed     Assessment and Plan:     Ms. Garcia is a pleasant 83 year old female who presents to clinic today to discuss her elevated platelet count    Essential Thrombocytosis  --CALR exon 9 detected  --CALR mutation is identified in approximately   49-88% of JAK2 and MPL-wild type essential thrombocythemia   --We discussed the fact that 90% of ET patients have one of the above mutations  --Given that her age >60 and significant symptoms if mutation positive I will treat with hydrea 500mg with frequent labs.  --Responding nicely to hydrea, will continue on Hydrea 500mg    Hypotension  --Appears to be close to baseline given     30 minutes were spent face to face with the patient and her  Care giver to discuss the lab value, treatment options. I have provided the patient with an opportunity to ask questions and have all questions answered to her satisfaction.        She will follow up with me in 2 months, but knows to call in the interim if symptoms change or should a problem arise.        Renée Gannon MD  Hematology and Medical Oncology  Bone Marrow Transplant  [facility]

## 2018-12-21 NOTE — PROGRESS NOTES
Patient missed 12/21/18 appointment and rescheduled for 12/24/18 at [facility].

## 2018-12-26 ENCOUNTER — ANTI-COAG VISIT (OUTPATIENT)
Dept: CARDIOLOGY | Facility: CLINIC | Age: 83
End: 2018-12-26

## 2018-12-26 ENCOUNTER — LAB VISIT (OUTPATIENT)
Dept: LAB | Facility: HOSPITAL | Age: 83
End: 2018-12-26
Attending: INTERNAL MEDICINE
Payer: MEDICARE

## 2018-12-26 DIAGNOSIS — Z79.01 LONG TERM CURRENT USE OF ANTICOAGULANT THERAPY: ICD-10-CM

## 2018-12-26 DIAGNOSIS — I48.20 ATRIAL FIBRILLATION, CHRONIC: ICD-10-CM

## 2018-12-26 LAB
INR PPP: 1.5
PROTHROMBIN TIME: 14.6 SEC

## 2018-12-26 PROCEDURE — 85610 PROTHROMBIN TIME: CPT

## 2018-12-26 PROCEDURE — 36415 COLL VENOUS BLD VENIPUNCTURE: CPT | Mod: PO

## 2018-12-27 NOTE — PROGRESS NOTES
INR drawn yesterday remains low. Patient's son reports a missed dose a couple days ago but no other changes. Last INR was low without explanation so will boost today and increase weekly dose.

## 2019-01-02 NOTE — PROGRESS NOTES
Son called to reschedule 1/08 [facility] -coumadin clinic appointment to [place] [facility], it was rescheduled to 1/04/19

## 2019-01-04 ENCOUNTER — ANTI-COAG VISIT (OUTPATIENT)
Dept: CARDIOLOGY | Facility: CLINIC | Age: 84
End: 2019-01-04
Payer: MEDICARE

## 2019-01-04 DIAGNOSIS — I48.20 ATRIAL FIBRILLATION, CHRONIC: ICD-10-CM

## 2019-01-04 DIAGNOSIS — Z79.01 LONG TERM CURRENT USE OF ANTICOAGULANT THERAPY: Primary | ICD-10-CM

## 2019-01-04 LAB — INR PPP: 2.3 (ref 2–3)

## 2019-01-04 PROCEDURE — 99211 PR OFFICE/OUTPT VISIT, EST, LEVL I: ICD-10-PCS | Mod: 25,S$GLB,,

## 2019-01-04 PROCEDURE — 99211 OFF/OP EST MAY X REQ PHY/QHP: CPT | Mod: 25,S$GLB,,

## 2019-01-04 PROCEDURE — 85610 POCT INR: ICD-10-PCS | Mod: QW,S$GLB,,

## 2019-01-04 PROCEDURE — 85610 PROTHROMBIN TIME: CPT | Mod: QW,S$GLB,,

## 2019-01-04 NOTE — PROGRESS NOTES
INR good despite missing a 7.5mg dose 12/29. No other changes. No signs or symptoms of bleeding. Dose was recently increased but I fear that if she continues on higher dose her INR will increase too much. Additionally, prior to the last 2 INRs, in which 1 was after confirmed missed dose, she was stable on a slightly lower dose. So based on weekly trend of dose and history, will decrease back to previous stable dose. Continue close monitoring.

## 2019-01-06 DIAGNOSIS — I50.42 CHRONIC COMBINED SYSTOLIC AND DIASTOLIC CHF, NYHA CLASS 3: Chronic | ICD-10-CM

## 2019-01-07 RX ORDER — LOSARTAN POTASSIUM 25 MG/1
TABLET ORAL
Qty: 90 TABLET | Refills: 0 | Status: SHIPPED | OUTPATIENT
Start: 2019-01-07 | End: 2019-12-30 | Stop reason: SDUPTHER

## 2019-01-15 ENCOUNTER — LAB VISIT (OUTPATIENT)
Dept: LAB | Facility: HOSPITAL | Age: 84
End: 2019-01-15
Attending: INTERNAL MEDICINE
Payer: MEDICARE

## 2019-01-15 ENCOUNTER — ANTI-COAG VISIT (OUTPATIENT)
Dept: CARDIOLOGY | Facility: CLINIC | Age: 84
End: 2019-01-15

## 2019-01-15 DIAGNOSIS — I48.20 ATRIAL FIBRILLATION, CHRONIC: ICD-10-CM

## 2019-01-15 DIAGNOSIS — Z79.01 LONG TERM CURRENT USE OF ANTICOAGULANT THERAPY: ICD-10-CM

## 2019-01-15 LAB
INR PPP: 2.2
PROTHROMBIN TIME: 21.6 SEC

## 2019-01-15 PROCEDURE — 36415 COLL VENOUS BLD VENIPUNCTURE: CPT | Mod: PO

## 2019-01-15 PROCEDURE — 85610 PROTHROMBIN TIME: CPT

## 2019-02-22 ENCOUNTER — LAB VISIT (OUTPATIENT)
Dept: LAB | Facility: HOSPITAL | Age: 84
End: 2019-02-22
Attending: INTERNAL MEDICINE
Payer: MEDICARE

## 2019-02-22 DIAGNOSIS — Z79.01 LONG TERM CURRENT USE OF ANTICOAGULANT THERAPY: ICD-10-CM

## 2019-02-22 DIAGNOSIS — I48.20 ATRIAL FIBRILLATION, CHRONIC: ICD-10-CM

## 2019-02-22 LAB
INR PPP: 3.1
PROTHROMBIN TIME: 30.5 SEC

## 2019-02-22 PROCEDURE — 36415 COLL VENOUS BLD VENIPUNCTURE: CPT | Mod: PO

## 2019-02-22 PROCEDURE — 85610 PROTHROMBIN TIME: CPT

## 2019-02-25 ENCOUNTER — ANTI-COAG VISIT (OUTPATIENT)
Dept: CARDIOLOGY | Facility: CLINIC | Age: 84
End: 2019-02-25

## 2019-02-25 DIAGNOSIS — I48.20 ATRIAL FIBRILLATION, CHRONIC: ICD-10-CM

## 2019-02-25 DIAGNOSIS — Z79.01 LONG TERM CURRENT USE OF ANTICOAGULANT THERAPY: ICD-10-CM

## 2019-03-04 ENCOUNTER — LAB VISIT (OUTPATIENT)
Dept: LAB | Facility: HOSPITAL | Age: 84
End: 2019-03-04
Attending: INTERNAL MEDICINE
Payer: MEDICARE

## 2019-03-04 ENCOUNTER — OFFICE VISIT (OUTPATIENT)
Dept: HEMATOLOGY/ONCOLOGY | Facility: CLINIC | Age: 84
End: 2019-03-04
Payer: MEDICARE

## 2019-03-04 VITALS
SYSTOLIC BLOOD PRESSURE: 102 MMHG | HEIGHT: 61 IN | DIASTOLIC BLOOD PRESSURE: 58 MMHG | WEIGHT: 127.88 LBS | TEMPERATURE: 98 F | BODY MASS INDEX: 24.15 KG/M2 | HEART RATE: 89 BPM

## 2019-03-04 DIAGNOSIS — I48.20 ATRIAL FIBRILLATION, CHRONIC: ICD-10-CM

## 2019-03-04 DIAGNOSIS — D47.3 ESSENTIAL THROMBOCYTHEMIA: Primary | ICD-10-CM

## 2019-03-04 DIAGNOSIS — D47.3 ESSENTIAL THROMBOCYTOSIS: ICD-10-CM

## 2019-03-04 DIAGNOSIS — Z79.01 LONG TERM CURRENT USE OF ANTICOAGULANT THERAPY: ICD-10-CM

## 2019-03-04 LAB
INR PPP: 1.5
PROTHROMBIN TIME: 14.6 SEC

## 2019-03-04 PROCEDURE — 99999 PR PBB SHADOW E&M-EST. PATIENT-LVL III: ICD-10-PCS | Mod: PBBFAC,,, | Performed by: INTERNAL MEDICINE

## 2019-03-04 PROCEDURE — 1101F PR PT FALLS ASSESS DOC 0-1 FALLS W/OUT INJ PAST YR: ICD-10-PCS | Mod: CPTII,S$GLB,, | Performed by: INTERNAL MEDICINE

## 2019-03-04 PROCEDURE — 3078F DIAST BP <80 MM HG: CPT | Mod: CPTII,S$GLB,, | Performed by: INTERNAL MEDICINE

## 2019-03-04 PROCEDURE — 99999 PR PBB SHADOW E&M-EST. PATIENT-LVL III: CPT | Mod: PBBFAC,,, | Performed by: INTERNAL MEDICINE

## 2019-03-04 PROCEDURE — 36415 COLL VENOUS BLD VENIPUNCTURE: CPT

## 2019-03-04 PROCEDURE — 3074F PR MOST RECENT SYSTOLIC BLOOD PRESSURE < 130 MM HG: ICD-10-PCS | Mod: CPTII,S$GLB,, | Performed by: INTERNAL MEDICINE

## 2019-03-04 PROCEDURE — 99215 OFFICE O/P EST HI 40 MIN: CPT | Mod: S$GLB,,, | Performed by: INTERNAL MEDICINE

## 2019-03-04 PROCEDURE — 1101F PT FALLS ASSESS-DOCD LE1/YR: CPT | Mod: CPTII,S$GLB,, | Performed by: INTERNAL MEDICINE

## 2019-03-04 PROCEDURE — 3074F SYST BP LT 130 MM HG: CPT | Mod: CPTII,S$GLB,, | Performed by: INTERNAL MEDICINE

## 2019-03-04 PROCEDURE — 85610 PROTHROMBIN TIME: CPT

## 2019-03-04 PROCEDURE — 3078F PR MOST RECENT DIASTOLIC BLOOD PRESSURE < 80 MM HG: ICD-10-PCS | Mod: CPTII,S$GLB,, | Performed by: INTERNAL MEDICINE

## 2019-03-04 PROCEDURE — 99499 UNLISTED E&M SERVICE: CPT | Mod: S$GLB,,, | Performed by: INTERNAL MEDICINE

## 2019-03-04 PROCEDURE — 99499 RISK ADDL DX/OHS AUDIT: ICD-10-PCS | Mod: S$GLB,,, | Performed by: INTERNAL MEDICINE

## 2019-03-04 PROCEDURE — 99215 PR OFFICE/OUTPT VISIT, EST, LEVL V, 40-54 MIN: ICD-10-PCS | Mod: S$GLB,,, | Performed by: INTERNAL MEDICINE

## 2019-03-04 NOTE — PROGRESS NOTES
Hematology and Medical Oncology   Follow Up     03/04/2019    Primary Oncologic Diagnosis: Essential Thrombocytosis    History of Present Ilness:   Cat Quintero) is a pleasant 83 y.o.female with a past medical history of a.fib, breast cancer, CKD stage III, and dementia who presents today to discuss her elevated platelet count. She was unsure of why she was in the hematology office.    On chart review Mrs. Garcia's platelets were in the mid 300's range from 2349-8676. In February 2017 her platelets crossed the 400k ismael and have been slowly rising to > 500k in January 2018.    She specifically denies blurry vision, palmar erythema, or hepatosplenomegaly known side effects of elevated platlet count. While she expressers no complaints today on further questioning she has a poor appetite, but is drinking adequate liquids.    Interval History: Ms. Garcia has no specific complaints. She is doing well overall and taking the hydrea BID without issue [per report, but she doesn't explicitly know].       PAST MEDICAL HISTORY:   Past Medical History:   Diagnosis Date    Atrial fibrillation     with rapid ventricular rate    Breast cancer     XRT    Chronic bronchitis     CKD (chronic kidney disease), stage III     Dementia     Squamous cell carcinoma        PAST SURGICAL HISTORY:   Past Surgical History:   Procedure Laterality Date    BREAST SURGERY  2011    right    CATHETERIZATION, HEART, LEFT Left 12/18/2013    Performed by Elliot Alston MD at [facility] CATH LAB       PAST SOCIAL HISTORY:   reports that she quit smoking about 37 years ago. Her smoking use included cigarettes. She smoked 0.25 packs per day. she has never used smokeless tobacco. She reports that she does not drink alcohol or use drugs.    FAMILY HISTORY:  Family History   Problem Relation Age of Onset    Heart disease Father     Melanoma Neg Hx        CURRENT MEDICATIONS:   Current Outpatient Medications   Medication Sig     antiox#10-om3-dha-epa-lut-zeax (I-CAPS) 280-10-2 mg Cap Take by mouth 2 (two) times daily.    ascorbic acid (VITAMIN C) 1000 MG tablet Take 1,000 mg by mouth once daily.    atorvastatin (LIPITOR) 10 MG tablet TAKE 1 TABLET BY MOUTH DAILY    brimonidine 0.15 % OPTH DROP (ALPHAGAN) 0.15 % ophthalmic solution 3 (three) times daily.     calcium carb/vit D3/minerals (CALCIUM-VITAMIN D ORAL) Take by mouth once daily. 1000mg Vitamin D     digoxin (DIGOX) 125 mcg tablet Take 1 tablet (0.125 mg total) by mouth once daily.    donepezil (ARICEPT) 10 MG tablet Take 1 tablet (10 mg total) by mouth every evening.    fish oil-omega-3 fatty acids 300-1,000 mg capsule Take 2 g by mouth once daily.    hydroxyurea (HYDREA) 500 mg Cap Take 1 capsule (500 mg total) by mouth once daily.    influenza (FLUZONE HIGH-DOSE) 180 mcg/0.5 mL vaccine Inject 0.5 mLs into the muscle.    latanoprost 0.005 % ophthalmic solution once daily.     losartan (COZAAR) 25 MG tablet TAKE 1 TABLET(25 MG) BY MOUTH EVERY DAY    losartan (COZAAR) 25 MG tablet TAKE 1 TABLET(25 MG) BY MOUTH EVERY DAY    metoprolol succinate (TOPROL-XL) 25 MG 24 hr tablet Take 1 tablet (25 mg total) by mouth once daily.    warfarin (COUMADIN) 2.5 MG tablet Take 2 tablets (5mg.) by mouth every day, except 3 tablets (7.5mg) on Sunday, and Thursday, Or as directed by Coumadin Clinic.     No current facility-administered medications for this visit.      ALLERGIES:   Review of patient's allergies indicates:  No Known Allergies      Review of Systems:     Review of Systems   Constitutional: Positive for appetite change and fatigue. Negative for chills, diaphoresis, fever and unexpected weight change.   HENT:   Negative for hearing loss, mouth sores, nosebleeds, sore throat, trouble swallowing and voice change.    Eyes: Negative for eye problems and icterus.   Respiratory: Negative for chest tightness, cough, hemoptysis, shortness of breath and wheezing.    Cardiovascular:  Negative for chest pain, leg swelling and palpitations.   Gastrointestinal: Negative for abdominal distention, abdominal pain, blood in stool, diarrhea, nausea and vomiting.   Endocrine: Negative for hot flashes.   Genitourinary: Negative for bladder incontinence, difficulty urinating, dysuria and hematuria.    Musculoskeletal: Negative for arthralgias, back pain, flank pain, gait problem, myalgias, neck pain and neck stiffness.   Skin: Negative for itching, rash and wound.   Neurological: Negative for dizziness, extremity weakness, gait problem, headaches, numbness, seizures and speech difficulty.   Hematological: Negative for adenopathy. Does not bruise/bleed easily.   Psychiatric/Behavioral: Negative for confusion, depression and sleep disturbance. The patient is not nervous/anxious.         Physical Exam:     Vitals:    03/04/19 0928   BP: (!) 102/58   Pulse: 89   Temp: 97.8 °F (36.6 °C)       Physical Exam   Constitutional: She is oriented to person, place, and time. She appears well-developed and well-nourished. No distress.   HENT:   Head: Normocephalic and atraumatic.   Mouth/Throat: Oropharynx is clear and moist. No oropharyngeal exudate.   Eyes: Conjunctivae and EOM are normal. Pupils are equal, round, and reactive to light.   Neck: Normal range of motion. Neck supple. No JVD present. No tracheal deviation present. No thyromegaly present.   Cardiovascular: Normal rate and normal heart sounds. Exam reveals no friction rub.   No murmur heard.  Irregular rhythm    Pulmonary/Chest: Effort normal and breath sounds normal. No stridor. No respiratory distress. She has no wheezes. She has no rales. She exhibits no tenderness.   Abdominal: Soft. Bowel sounds are normal. She exhibits no distension. There is no tenderness. There is no rebound and no guarding.   Musculoskeletal: Normal range of motion. She exhibits no edema, tenderness or deformity.   Lymphadenopathy:     She has no axillary adenopathy.    Neurological: She is alert and oriented to person, place, and time. She displays normal reflexes. No cranial nerve deficit or sensory deficit. She exhibits normal muscle tone. Coordination normal.   Skin: Skin is warm and dry. Capillary refill takes less than 2 seconds. No rash noted. She is not diaphoretic. No erythema. No pallor.   Psychiatric: She has a normal mood and affect. Her behavior is normal. Judgment and thought content normal.       ECOG Performance Status: (foot note - ECOG PS provided by Eastern Cooperative Oncology Group) 2 - Symptomatic, <50% confined to bed    Labs:   Lab Results   Component Value Date    WBC 10.41 03/04/2019    HGB 14.0 03/04/2019    HCT 42.5 03/04/2019     (H) 03/04/2019    CHOL 204 (H) 02/07/2017    TRIG 296 (H) 02/07/2017    HDL 42 02/07/2017    ALT 16 04/04/2018    AST 22 04/04/2018     09/27/2018    K 5.3 (H) 09/27/2018     (H) 09/27/2018    CREATININE 1.0 09/27/2018    BUN 17 09/27/2018    CO2 26 09/27/2018    TSH 2.070 09/27/2018    INR 1.5 (H) 03/04/2019       Imaging: Previous imaging has been reviewed     Assessment and Plan:     Ms. Garcia is a pleasant 83 year old female who presents to clinic today to discuss her elevated platelet count    Essential Thrombocytosis  --CALR exon 9 detected  --CALR mutation is identified in approximately   49-88% of JAK2 and MPL-wild type essential thrombocythemia   --We discussed the fact that 90% of ET patients have one of the above mutations  --Given that her age >60 and significant symptoms if mutation positive I will treat with hydrea 500mg with frequent labs.  --There has been an up tick in the platelet count and due to dementia the patient doesn't know if she is taking the Hydrea 500mg    Hypotension  --Appears to be close to baseline given     30 minutes were spent face to face with the patient and her  Care giver to discuss the lab value, treatment options. I have provided the patient with an opportunity to  ask questions and have all questions answered to her satisfaction.       She will follow up with me in 2 months, but knows to call in the interim if symptoms change or should a problem arise.        Renée Gannon MD  Hematology and Medical Oncology  Bone Marrow Transplant  [facility]

## 2019-03-06 RX ORDER — HYDROXYUREA 500 MG/1
500 CAPSULE ORAL DAILY
Qty: 30 CAPSULE | Refills: 6 | Status: SHIPPED | OUTPATIENT
Start: 2019-03-06 | End: 2019-05-13 | Stop reason: SDUPTHER

## 2019-03-07 ENCOUNTER — OFFICE VISIT (OUTPATIENT)
Dept: INTERNAL MEDICINE | Facility: CLINIC | Age: 84
End: 2019-03-07
Payer: MEDICARE

## 2019-03-07 VITALS
HEIGHT: 61 IN | SYSTOLIC BLOOD PRESSURE: 84 MMHG | WEIGHT: 122.81 LBS | DIASTOLIC BLOOD PRESSURE: 60 MMHG | BODY MASS INDEX: 23.19 KG/M2 | OXYGEN SATURATION: 99 % | HEART RATE: 63 BPM

## 2019-03-07 DIAGNOSIS — N18.30 CKD (CHRONIC KIDNEY DISEASE), STAGE III: Chronic | ICD-10-CM

## 2019-03-07 DIAGNOSIS — F02.80 MIXED ALZHEIMER'S AND VASCULAR DEMENTIA: ICD-10-CM

## 2019-03-07 DIAGNOSIS — F01.50 MIXED ALZHEIMER'S AND VASCULAR DEMENTIA: ICD-10-CM

## 2019-03-07 DIAGNOSIS — I48.20 ATRIAL FIBRILLATION, CHRONIC: Chronic | ICD-10-CM

## 2019-03-07 DIAGNOSIS — I50.42 CHRONIC COMBINED SYSTOLIC AND DIASTOLIC CHF, NYHA CLASS 3: Chronic | ICD-10-CM

## 2019-03-07 DIAGNOSIS — G30.9 MIXED ALZHEIMER'S AND VASCULAR DEMENTIA: ICD-10-CM

## 2019-03-07 DIAGNOSIS — I70.0 AORTIC ATHEROSCLEROSIS: Primary | ICD-10-CM

## 2019-03-07 DIAGNOSIS — G21.4 VASCULAR PARKINSONISM: ICD-10-CM

## 2019-03-07 PROCEDURE — 1101F PT FALLS ASSESS-DOCD LE1/YR: CPT | Mod: CPTII,S$GLB,, | Performed by: INTERNAL MEDICINE

## 2019-03-07 PROCEDURE — 99999 PR PBB SHADOW E&M-EST. PATIENT-LVL III: CPT | Mod: PBBFAC,,, | Performed by: INTERNAL MEDICINE

## 2019-03-07 PROCEDURE — 99499 UNLISTED E&M SERVICE: CPT | Mod: S$GLB,,, | Performed by: INTERNAL MEDICINE

## 2019-03-07 PROCEDURE — 99499 RISK ADDL DX/OHS AUDIT: ICD-10-PCS | Mod: S$GLB,,, | Performed by: INTERNAL MEDICINE

## 2019-03-07 PROCEDURE — 3074F SYST BP LT 130 MM HG: CPT | Mod: CPTII,S$GLB,, | Performed by: INTERNAL MEDICINE

## 2019-03-07 PROCEDURE — 99214 PR OFFICE/OUTPT VISIT, EST, LEVL IV, 30-39 MIN: ICD-10-PCS | Mod: S$GLB,,, | Performed by: INTERNAL MEDICINE

## 2019-03-07 PROCEDURE — 3078F DIAST BP <80 MM HG: CPT | Mod: CPTII,S$GLB,, | Performed by: INTERNAL MEDICINE

## 2019-03-07 PROCEDURE — 99214 OFFICE O/P EST MOD 30 MIN: CPT | Mod: S$GLB,,, | Performed by: INTERNAL MEDICINE

## 2019-03-07 PROCEDURE — 1101F PR PT FALLS ASSESS DOC 0-1 FALLS W/OUT INJ PAST YR: ICD-10-PCS | Mod: CPTII,S$GLB,, | Performed by: INTERNAL MEDICINE

## 2019-03-07 PROCEDURE — 99999 PR PBB SHADOW E&M-EST. PATIENT-LVL III: ICD-10-PCS | Mod: PBBFAC,,, | Performed by: INTERNAL MEDICINE

## 2019-03-07 PROCEDURE — 3074F PR MOST RECENT SYSTOLIC BLOOD PRESSURE < 130 MM HG: ICD-10-PCS | Mod: CPTII,S$GLB,, | Performed by: INTERNAL MEDICINE

## 2019-03-07 PROCEDURE — 3078F PR MOST RECENT DIASTOLIC BLOOD PRESSURE < 80 MM HG: ICD-10-PCS | Mod: CPTII,S$GLB,, | Performed by: INTERNAL MEDICINE

## 2019-03-07 NOTE — PROGRESS NOTES
Subjective:       Patient ID: Cat Garcia is a 83 y.o. female.    Chief Complaint: Follow-up    Patient is here for followup for chronic conditions.    Poor appetite, only bits of food. She does use boost though a few times per day.    No falls. Furniture walks, goes slow. Has walker but does not use it.    Taking hydrea and other meds as rxed.    Denies feeling down or depressed.      Review of Systems   Constitutional: Positive for appetite change, fatigue and unexpected weight change. Negative for activity change and fever.   HENT: Negative for trouble swallowing.    Eyes: Negative for visual disturbance.   Respiratory: Positive for shortness of breath (stable). Negative for chest tightness and wheezing.    Cardiovascular: Negative for chest pain, palpitations and leg swelling.   Gastrointestinal: Negative for abdominal distention, abdominal pain and constipation.   Endocrine: Negative for cold intolerance, heat intolerance, polydipsia and polyuria.   Genitourinary: Negative for difficulty urinating.   Musculoskeletal: Positive for gait problem.   Skin: Negative for rash.   Neurological: Negative for tremors and syncope.        Chronic imbalance    No falls   Hematological: Negative for adenopathy. Does not bruise/bleed easily.   Psychiatric/Behavioral: Negative for dysphoric mood.       Objective:      Physical Exam   Constitutional: She appears well-developed and well-nourished. No distress.   HENT:   Head: Normocephalic and atraumatic.   Mouth/Throat: No oropharyngeal exudate.   Eyes: Conjunctivae are normal. Pupils are equal, round, and reactive to light. No scleral icterus.   Neck: Normal range of motion. Neck supple. No thyromegaly present.   No neck mass palpated today   Cardiovascular: Normal rate and normal heart sounds.   Irregularly irregular       Pulmonary/Chest: Effort normal and breath sounds normal. She has no rales.   Abdominal: Soft. Bowel sounds are normal. She exhibits no distension.  There is no tenderness.   Musculoskeletal: She exhibits no edema.   Lymphadenopathy:     She has no cervical adenopathy.   Neurological: She is alert. She exhibits normal muscle tone.   Oriented to place, to me (her doctor), not date   Skin: She is not diaphoretic.   Psychiatric: She has a normal mood and affect. Her behavior is normal.       Assessment:       1. Aortic atherosclerosis    2. Atrial fibrillation, chronic    3. Chronic combined systolic and diastolic CHF, NYHA class 3    4. CKD (chronic kidney disease), stage III    5. Mixed Alzheimer's and vascular dementia    6. Vascular parkinsonism        Plan:       Here for f/u.    Cat MOCK was seen today for follow-up.    Diagnoses and all orders for this visit:    Aortic atherosclerosis  On statin    Atrial fibrillation, chronic  On coumadin, normal rate    Chronic combined systolic and diastolic CHF, NYHA class 3  Has been stable w/o volume overload issues    CKD (chronic kidney disease), stage III  Next time recheck, has been steady    Mixed Alzheimer's and vascular dementia  Now causing decreased appetite and wt loss.  Son HCP, 2 yrs ago full code, will discuss feeding with son/next time. I emailed her son.    Vascular parkinsonism        Health Maintenance       Date Due Completion Date    TETANUS VACCINE 04/22/1953 ---    DEXA SCAN 03/12/2021 3/12/2018    Lipid Panel 02/07/2022 2/7/2017          Follow-up in about 6 months (around 9/7/2019).    Future Appointments   Date Time Provider Department Center   3/15/2019  9:45 AM [facility]

## 2019-03-07 NOTE — PROGRESS NOTES
Ely/Caregiver called today 3/07/19 to inform us that patient missed taking (Coumadin 5mg  )2/22/19 and on 3/3/19 (7.5mg) Please advise.

## 2019-03-07 NOTE — PROGRESS NOTES
INR was drawn 3/4 outside of clinic and was low. Caregiver reports missed doses per note. Boost dose 3/7 and keep visit as planned 3/15

## 2019-03-07 NOTE — PROGRESS NOTES
Caregiver advised to have patient take (Warfarin 10mg) then resume. Ensure/ She do not take anymore, she now drink (Boost/3 times daily). Caregiver verbaliz understanding.

## 2019-03-11 NOTE — PROGRESS NOTES
Caregiver called 03/11/19 to inform us that patient missed taking (WArfarin 7.5mg) on 03/10/19. Please advise.

## 2019-03-15 ENCOUNTER — ANTI-COAG VISIT (OUTPATIENT)
Dept: CARDIOLOGY | Facility: CLINIC | Age: 84
End: 2019-03-15
Payer: MEDICARE

## 2019-03-15 DIAGNOSIS — I48.20 ATRIAL FIBRILLATION, CHRONIC: ICD-10-CM

## 2019-03-15 DIAGNOSIS — Z79.01 LONG TERM CURRENT USE OF ANTICOAGULANT THERAPY: Primary | ICD-10-CM

## 2019-03-15 LAB — INR PPP: 2.1 (ref 2–3)

## 2019-03-15 PROCEDURE — 85610 POCT INR: ICD-10-PCS | Mod: QW,S$GLB,, | Performed by: INTERNAL MEDICINE

## 2019-03-15 PROCEDURE — 99211 PR OFFICE/OUTPT VISIT, EST, LEVL I: ICD-10-PCS | Mod: 25,S$GLB,, | Performed by: INTERNAL MEDICINE

## 2019-03-15 PROCEDURE — 99211 OFF/OP EST MAY X REQ PHY/QHP: CPT | Mod: 25,S$GLB,, | Performed by: INTERNAL MEDICINE

## 2019-03-15 PROCEDURE — 85610 PROTHROMBIN TIME: CPT | Mod: QW,S$GLB,, | Performed by: INTERNAL MEDICINE

## 2019-03-15 NOTE — PROGRESS NOTES
INR good. Patient has missed doses on Sundays. Caregiver reports this should improve. We have bolused a few doses due to missed doses to make up for them. INR ok today. She is drinking Boost now instead of Ensure. Goal is to have 3/day but mostly has only been having 2/day. No other changes. No signs or symptoms of bleeding. We will resume maintenance dose. Recheck INR in 2-3 weeks

## 2019-04-02 ENCOUNTER — ANTI-COAG VISIT (OUTPATIENT)
Dept: CARDIOLOGY | Facility: CLINIC | Age: 84
End: 2019-04-02
Payer: MEDICARE

## 2019-04-02 DIAGNOSIS — I48.20 ATRIAL FIBRILLATION, CHRONIC: ICD-10-CM

## 2019-04-02 DIAGNOSIS — Z79.01 LONG TERM CURRENT USE OF ANTICOAGULANT THERAPY: Primary | ICD-10-CM

## 2019-04-02 LAB — INR PPP: 2.7 (ref 2–3)

## 2019-04-02 PROCEDURE — 85610 PROTHROMBIN TIME: CPT | Mod: QW,S$GLB,,

## 2019-04-02 PROCEDURE — 99211 PR OFFICE/OUTPT VISIT, EST, LEVL I: ICD-10-PCS | Mod: 25,S$GLB,, | Performed by: INTERNAL MEDICINE

## 2019-04-02 PROCEDURE — 99211 OFF/OP EST MAY X REQ PHY/QHP: CPT | Mod: 25,S$GLB,, | Performed by: INTERNAL MEDICINE

## 2019-04-02 PROCEDURE — 85610 POCT INR: ICD-10-PCS | Mod: QW,S$GLB,,

## 2019-04-02 NOTE — PROGRESS NOTES
INR good. Patient on maintenance dose. No new changes. Caregiver reports always a struggle to drink her Boosts as planned. Encouraged patient to keep up with her Boost drinks and reminded they need to be consistent. No signs or symptoms of bleeding. Maintain dose. Recheck INR in 4 weeks

## 2019-04-08 NOTE — PROGRESS NOTES
Ely ([facility]) called to report that the Patient missed her coumadin dose 4/06/19, wants to know what to take now, next INR is due 4/30, call back # [phone]

## 2019-04-08 NOTE — PROGRESS NOTES
Ely advised to have patient take (Warfarin) 7.5 mg daily except Wed/Fri/Sat) Drink (Boost 2-3) time daily. Ely verbalized understanding.

## 2019-04-08 NOTE — PROGRESS NOTES
Left message for Ely (care giver) to call back, Needs to be advised, No answer at Son's ext. #, need to be advised

## 2019-04-22 DIAGNOSIS — D47.3 ESSENTIAL THROMBOCYTHEMIA: Primary | ICD-10-CM

## 2019-04-22 NOTE — PROGRESS NOTES
Take 7.5mg tonight (4/22) then resume current dose. keep INR as planned 4/30. Will plan a new dose next week to allow for missed doses on the weekend. This has been a recurrent issue

## 2019-04-22 NOTE — PROGRESS NOTES
Caregiver Ely called to report that the Patient missed her coumadin dose this past Saturday, wants to know if she should increase today, Ely's call back # [phone]

## 2019-04-30 ENCOUNTER — ANTI-COAG VISIT (OUTPATIENT)
Dept: CARDIOLOGY | Facility: CLINIC | Age: 84
End: 2019-04-30
Payer: MEDICARE

## 2019-04-30 DIAGNOSIS — I48.20 ATRIAL FIBRILLATION, CHRONIC: ICD-10-CM

## 2019-04-30 DIAGNOSIS — Z79.01 LONG TERM CURRENT USE OF ANTICOAGULANT THERAPY: Primary | ICD-10-CM

## 2019-04-30 LAB — INR PPP: 1.6 (ref 2–3)

## 2019-04-30 PROCEDURE — 85610 PROTHROMBIN TIME: CPT | Mod: QW,S$GLB,, | Performed by: INTERNAL MEDICINE

## 2019-04-30 PROCEDURE — 93793 PR ANTICOAGULANT MGMT FOR PT TAKING WARFARIN: ICD-10-PCS | Mod: S$GLB,,,

## 2019-04-30 PROCEDURE — 93793 ANTICOAG MGMT PT WARFARIN: CPT | Mod: S$GLB,,,

## 2019-04-30 PROCEDURE — 85610 POCT INR: ICD-10-PCS | Mod: QW,S$GLB,, | Performed by: INTERNAL MEDICINE

## 2019-04-30 NOTE — PROGRESS NOTES
INR very low today. Caregiver denies any new changes in diet. Denies any missed doses in the past week. She reports son is planning to have weekend assistance, so missing doses on weekends should improve. I suspect today's INR is low from missing multiple doses, even though not recent, since nothing else has changed. We will boost dose today then resume maintenance plan. She has labs 5/9 so will link in an INR for close follow up

## 2019-05-09 ENCOUNTER — ANTI-COAG VISIT (OUTPATIENT)
Dept: CARDIOLOGY | Facility: CLINIC | Age: 84
End: 2019-05-09
Payer: MEDICARE

## 2019-05-09 ENCOUNTER — LAB VISIT (OUTPATIENT)
Dept: LAB | Facility: HOSPITAL | Age: 84
End: 2019-05-09
Payer: MEDICARE

## 2019-05-09 ENCOUNTER — OFFICE VISIT (OUTPATIENT)
Dept: HEMATOLOGY/ONCOLOGY | Facility: CLINIC | Age: 84
End: 2019-05-09
Payer: MEDICARE

## 2019-05-09 VITALS
RESPIRATION RATE: 16 BRPM | OXYGEN SATURATION: 100 % | HEART RATE: 55 BPM | HEIGHT: 61 IN | TEMPERATURE: 98 F | BODY MASS INDEX: 24.6 KG/M2 | WEIGHT: 130.31 LBS | SYSTOLIC BLOOD PRESSURE: 100 MMHG | DIASTOLIC BLOOD PRESSURE: 49 MMHG

## 2019-05-09 DIAGNOSIS — F01.50 MIXED ALZHEIMER'S AND VASCULAR DEMENTIA: ICD-10-CM

## 2019-05-09 DIAGNOSIS — D47.3 ESSENTIAL THROMBOCYTOSIS: ICD-10-CM

## 2019-05-09 DIAGNOSIS — Z79.01 LONG TERM CURRENT USE OF ANTICOAGULANT THERAPY: ICD-10-CM

## 2019-05-09 DIAGNOSIS — D47.3 ESSENTIAL THROMBOCYTHEMIA: Primary | ICD-10-CM

## 2019-05-09 DIAGNOSIS — I48.20 ATRIAL FIBRILLATION, CHRONIC: ICD-10-CM

## 2019-05-09 DIAGNOSIS — F02.80 MIXED ALZHEIMER'S AND VASCULAR DEMENTIA: ICD-10-CM

## 2019-05-09 DIAGNOSIS — N18.30 CKD (CHRONIC KIDNEY DISEASE), STAGE III: Chronic | ICD-10-CM

## 2019-05-09 DIAGNOSIS — D47.3 ESSENTIAL THROMBOCYTHEMIA: ICD-10-CM

## 2019-05-09 DIAGNOSIS — I48.20 ATRIAL FIBRILLATION, CHRONIC: Chronic | ICD-10-CM

## 2019-05-09 DIAGNOSIS — G30.9 MIXED ALZHEIMER'S AND VASCULAR DEMENTIA: ICD-10-CM

## 2019-05-09 LAB
ALBUMIN SERPL BCP-MCNC: 3.4 G/DL (ref 3.5–5.2)
ALP SERPL-CCNC: 59 U/L (ref 55–135)
ALT SERPL W/O P-5'-P-CCNC: 16 U/L (ref 10–44)
ANION GAP SERPL CALC-SCNC: 9 MMOL/L (ref 8–16)
AST SERPL-CCNC: 19 U/L (ref 10–40)
BASOPHILS # BLD AUTO: 0.06 K/UL (ref 0–0.2)
BASOPHILS NFR BLD: 0.6 % (ref 0–1.9)
BILIRUB SERPL-MCNC: 0.5 MG/DL (ref 0.1–1)
BUN SERPL-MCNC: 20 MG/DL (ref 8–23)
CALCIUM SERPL-MCNC: 10 MG/DL (ref 8.7–10.5)
CHLORIDE SERPL-SCNC: 108 MMOL/L (ref 95–110)
CO2 SERPL-SCNC: 27 MMOL/L (ref 23–29)
CREAT SERPL-MCNC: 0.8 MG/DL (ref 0.5–1.4)
DIFFERENTIAL METHOD: ABNORMAL
EOSINOPHIL # BLD AUTO: 0.1 K/UL (ref 0–0.5)
EOSINOPHIL NFR BLD: 1.4 % (ref 0–8)
ERYTHROCYTE [DISTWIDTH] IN BLOOD BY AUTOMATED COUNT: 12.7 % (ref 11.5–14.5)
EST. GFR  (AFRICAN AMERICAN): >60 ML/MIN/1.73 M^2
EST. GFR  (NON AFRICAN AMERICAN): >60 ML/MIN/1.73 M^2
GLUCOSE SERPL-MCNC: 109 MG/DL (ref 70–110)
HCT VFR BLD AUTO: 39.7 % (ref 37–48.5)
HGB BLD-MCNC: 12.8 G/DL (ref 12–16)
IMM GRANULOCYTES # BLD AUTO: 0.05 K/UL (ref 0–0.04)
IMM GRANULOCYTES NFR BLD AUTO: 0.5 % (ref 0–0.5)
INR PPP: 1.9 (ref 0.8–1.2)
LYMPHOCYTES # BLD AUTO: 1.9 K/UL (ref 1–4.8)
LYMPHOCYTES NFR BLD: 20.5 % (ref 18–48)
MCH RBC QN AUTO: 34.2 PG (ref 27–31)
MCHC RBC AUTO-ENTMCNC: 32.2 G/DL (ref 32–36)
MCV RBC AUTO: 106 FL (ref 82–98)
MONOCYTES # BLD AUTO: 0.8 K/UL (ref 0.3–1)
MONOCYTES NFR BLD: 8.3 % (ref 4–15)
NEUTROPHILS # BLD AUTO: 6.4 K/UL (ref 1.8–7.7)
NEUTROPHILS NFR BLD: 68.7 % (ref 38–73)
NRBC BLD-RTO: 0 /100 WBC
PLATELET # BLD AUTO: 605 K/UL (ref 150–350)
PMV BLD AUTO: 11.2 FL (ref 9.2–12.9)
POTASSIUM SERPL-SCNC: 4.3 MMOL/L (ref 3.5–5.1)
PROT SERPL-MCNC: 6.2 G/DL (ref 6–8.4)
PROTHROMBIN TIME: 18.8 SEC (ref 9–12.5)
RBC # BLD AUTO: 3.74 M/UL (ref 4–5.4)
SODIUM SERPL-SCNC: 144 MMOL/L (ref 136–145)
WBC # BLD AUTO: 9.37 K/UL (ref 3.9–12.7)

## 2019-05-09 PROCEDURE — 85025 COMPLETE CBC W/AUTO DIFF WBC: CPT

## 2019-05-09 PROCEDURE — 1101F PR PT FALLS ASSESS DOC 0-1 FALLS W/OUT INJ PAST YR: ICD-10-PCS | Mod: CPTII,S$GLB,, | Performed by: INTERNAL MEDICINE

## 2019-05-09 PROCEDURE — 3078F PR MOST RECENT DIASTOLIC BLOOD PRESSURE < 80 MM HG: ICD-10-PCS | Mod: CPTII,S$GLB,, | Performed by: INTERNAL MEDICINE

## 2019-05-09 PROCEDURE — 36415 COLL VENOUS BLD VENIPUNCTURE: CPT

## 2019-05-09 PROCEDURE — 93793 ANTICOAG MGMT PT WARFARIN: CPT | Mod: S$GLB,,,

## 2019-05-09 PROCEDURE — 3074F PR MOST RECENT SYSTOLIC BLOOD PRESSURE < 130 MM HG: ICD-10-PCS | Mod: CPTII,S$GLB,, | Performed by: INTERNAL MEDICINE

## 2019-05-09 PROCEDURE — 99215 PR OFFICE/OUTPT VISIT, EST, LEVL V, 40-54 MIN: ICD-10-PCS | Mod: S$GLB,,, | Performed by: INTERNAL MEDICINE

## 2019-05-09 PROCEDURE — 99499 UNLISTED E&M SERVICE: CPT | Mod: S$GLB,,, | Performed by: INTERNAL MEDICINE

## 2019-05-09 PROCEDURE — 99499 RISK ADDL DX/OHS AUDIT: ICD-10-PCS | Mod: S$GLB,,, | Performed by: INTERNAL MEDICINE

## 2019-05-09 PROCEDURE — 3078F DIAST BP <80 MM HG: CPT | Mod: CPTII,S$GLB,, | Performed by: INTERNAL MEDICINE

## 2019-05-09 PROCEDURE — 85610 PROTHROMBIN TIME: CPT

## 2019-05-09 PROCEDURE — 99999 PR PBB SHADOW E&M-EST. PATIENT-LVL IV: ICD-10-PCS | Mod: PBBFAC,,, | Performed by: INTERNAL MEDICINE

## 2019-05-09 PROCEDURE — 99215 OFFICE O/P EST HI 40 MIN: CPT | Mod: S$GLB,,, | Performed by: INTERNAL MEDICINE

## 2019-05-09 PROCEDURE — 93793 PR ANTICOAGULANT MGMT FOR PT TAKING WARFARIN: ICD-10-PCS | Mod: S$GLB,,,

## 2019-05-09 PROCEDURE — 99999 PR PBB SHADOW E&M-EST. PATIENT-LVL IV: CPT | Mod: PBBFAC,,, | Performed by: INTERNAL MEDICINE

## 2019-05-09 PROCEDURE — 1101F PT FALLS ASSESS-DOCD LE1/YR: CPT | Mod: CPTII,S$GLB,, | Performed by: INTERNAL MEDICINE

## 2019-05-09 PROCEDURE — 80053 COMPREHEN METABOLIC PANEL: CPT

## 2019-05-09 PROCEDURE — 3074F SYST BP LT 130 MM HG: CPT | Mod: CPTII,S$GLB,, | Performed by: INTERNAL MEDICINE

## 2019-05-09 NOTE — PROGRESS NOTES
Hematology and Medical Oncology   Follow Up     05/09/2019    Primary Oncologic Diagnosis: Essential Thrombocytosis    History of Present Ilness:   Cat Quintero) is a pleasant 84 y.o.female with a past medical history of a.fib, breast cancer, CKD stage III, and dementia who presents today to discuss her elevated platelet count. She was unsure of why she was in the hematology office.    On chart review Mrs. Garcia's platelets were in the mid 300's range from 3721-9117. In February 2017 her platelets crossed the 400k ismael and have been slowly rising to > 500k in January 2018.    She specifically denies blurry vision, palmar erythema, or hepatosplenomegaly known side effects of elevated platlet count. While she expressers no complaints today on further questioning she has a poor appetite, but is drinking adequate liquids.    Interval History: Ms. Garcia has no specific complaints. She is doing well overall and taking the hydrea BID without issue [per report of care giver, but the patient doesn't explicitly know]. She has little memory recall, but is able to answer simple yes/no questions.       PAST MEDICAL HISTORY:   Past Medical History:   Diagnosis Date    Atrial fibrillation     with rapid ventricular rate    Breast cancer     XRT    Chronic bronchitis     CKD (chronic kidney disease), stage III     Dementia     Squamous cell carcinoma        PAST SURGICAL HISTORY:   Past Surgical History:   Procedure Laterality Date    BREAST SURGERY  2011    right    CATHETERIZATION, HEART, LEFT Left 12/18/2013    Performed by Elliot Alston MD at [facility]       PAST SOCIAL HISTORY:   reports that she quit smoking about 37 years ago. Her smoking use included cigarettes. She smoked 0.25 packs per day. She has never used smokeless tobacco. She reports that she does not drink alcohol or use drugs.    FAMILY HISTORY:  Family History   Problem Relation Age of Onset    Heart disease Father     Melanoma  Neg Hx        CURRENT MEDICATIONS:   Current Outpatient Medications   Medication Sig    antiox#10-om3-dha-epa-lut-zeax (I-CAPS) 280-10-2 mg Cap Take by mouth 2 (two) times daily.    ascorbic acid (VITAMIN C) 1000 MG tablet Take 1,000 mg by mouth once daily.    atorvastatin (LIPITOR) 10 MG tablet TAKE 1 TABLET BY MOUTH DAILY    brimonidine 0.15 % OPTH DROP (ALPHAGAN) 0.15 % ophthalmic solution 3 (three) times daily.     calcium carb/vit D3/minerals (CALCIUM-VITAMIN D ORAL) Take by mouth once daily. 1000mg Vitamin D     digoxin (DIGOX) 125 mcg tablet Take 1 tablet (0.125 mg total) by mouth once daily.    donepezil (ARICEPT) 10 MG tablet Take 1 tablet (10 mg total) by mouth every evening.    fish oil-omega-3 fatty acids 300-1,000 mg capsule Take 2 g by mouth once daily.    hydroxyurea (HYDREA) 500 mg Cap Take 1 capsule (500 mg total) by mouth once daily.    influenza (FLUZONE HIGH-DOSE) 180 mcg/0.5 mL vaccine Inject 0.5 mLs into the muscle.    latanoprost 0.005 % ophthalmic solution once daily.     losartan (COZAAR) 25 MG tablet TAKE 1 TABLET(25 MG) BY MOUTH EVERY DAY    losartan (COZAAR) 25 MG tablet TAKE 1 TABLET(25 MG) BY MOUTH EVERY DAY    metoprolol succinate (TOPROL-XL) 25 MG 24 hr tablet Take 1 tablet (25 mg total) by mouth once daily.    warfarin (COUMADIN) 2.5 MG tablet Take 2 tablets (5mg.) by mouth every day, except 3 tablets (7.5mg) on Sunday, and Thursday, Or as directed by [facility].     No current facility-administered medications for this visit.      ALLERGIES:   Review of patient's allergies indicates:  No Known Allergies      Review of Systems:     Review of Systems   Constitutional: Positive for appetite change and fatigue. Negative for chills, diaphoresis, fever and unexpected weight change.   HENT:   Negative for hearing loss, mouth sores, nosebleeds, sore throat, trouble swallowing and voice change.    Eyes: Negative for eye problems and icterus.   Respiratory: Negative for  chest tightness, cough, hemoptysis, shortness of breath and wheezing.    Cardiovascular: Negative for chest pain, leg swelling and palpitations.   Gastrointestinal: Negative for abdominal distention, abdominal pain, blood in stool, diarrhea, nausea and vomiting.   Endocrine: Negative for hot flashes.   Genitourinary: Negative for bladder incontinence, difficulty urinating, dysuria and hematuria.    Musculoskeletal: Negative for arthralgias, back pain, flank pain, gait problem, myalgias, neck pain and neck stiffness.   Skin: Negative for itching, rash and wound.   Neurological: Negative for dizziness, extremity weakness, gait problem, headaches, numbness, seizures and speech difficulty.   Hematological: Negative for adenopathy. Does not bruise/bleed easily.   Psychiatric/Behavioral: Negative for confusion, depression and sleep disturbance. The patient is not nervous/anxious.         Physical Exam:     Vitals:    05/09/19 1011   BP: (!) 100/49   Pulse: (!) 55   Resp: 16   Temp: 98.1 °F (36.7 °C)       Physical Exam   Constitutional: She is oriented to person, place, and time. She appears well-developed and well-nourished. No distress.   HENT:   Head: Normocephalic and atraumatic.   Mouth/Throat: Oropharynx is clear and moist. No oropharyngeal exudate.   Eyes: Pupils are equal, round, and reactive to light. Conjunctivae and EOM are normal.   Neck: Normal range of motion. Neck supple. No JVD present. No tracheal deviation present. No thyromegaly present.   Cardiovascular: Normal rate and normal heart sounds. Exam reveals no friction rub.   No murmur heard.  Irregular rhythm    Pulmonary/Chest: Effort normal and breath sounds normal. No stridor. No respiratory distress. She has no wheezes. She has no rales. She exhibits no tenderness.   Abdominal: Soft. Bowel sounds are normal. She exhibits no distension. There is no tenderness. There is no rebound and no guarding.   Musculoskeletal: Normal range of motion. She exhibits  no edema, tenderness or deformity.   Lymphadenopathy:     She has no axillary adenopathy.   Neurological: She is alert and oriented to person, place, and time. She displays normal reflexes. No cranial nerve deficit or sensory deficit. She exhibits normal muscle tone. Coordination normal.   Skin: Skin is warm and dry. Capillary refill takes less than 2 seconds. No rash noted. She is not diaphoretic. No erythema. No pallor.   Psychiatric: She has a normal mood and affect. Her behavior is normal. Judgment and thought content normal.       ECOG Performance Status: (foot note - ECOG PS provided by Eastern Cooperative Oncology Group) 2 - Symptomatic, <50% confined to bed    Labs:   Lab Results   Component Value Date    WBC 9.37 05/09/2019    HGB 12.8 05/09/2019    HCT 39.7 05/09/2019     (H) 05/09/2019    CHOL 204 (H) 02/07/2017    TRIG 296 (H) 02/07/2017    HDL 42 02/07/2017    ALT 16 05/09/2019    AST 19 05/09/2019     05/09/2019    K 4.3 05/09/2019     05/09/2019    CREATININE 0.8 05/09/2019    BUN 20 05/09/2019    CO2 27 05/09/2019    TSH 2.070 09/27/2018    INR 1.9 (H) 05/09/2019       Imaging: Previous imaging has been reviewed     Assessment and Plan:     Ms. Garcia is a pleasant 84 year old female who presents to clinic today to discuss her elevated platelet count    Essential Thrombocytosis  --CALR exon 9 detected  --CALR mutation is identified in approximately   49-88% of JAK2 and MPL-wild type essential thrombocythemia   --We discussed the fact that 90% of ET patients have one of the above mutations  --Given that her age >60 and significant symptoms if mutation positive I will treat with hydrea 500mg with frequent labs.  --There has been an up tick in the platelet count and due to dementia the patient doesn't know if she is taking the Hydrea 500mg daily or 1000mg. I will reach out to her son.    Hypotension  --Appears to be close to baseline given     30 minutes were spent face to face with  the patient and her  Care giver to discuss the lab value, treatment options. I have provided the patient with an opportunity to ask questions and have all questions answered to her satisfaction.       She will follow up with me in 2 months, but knows to call in the interim if symptoms change or should a problem arise.        Renée Gannon MD  Hematology and Medical Oncology  [facility]  [facility]

## 2019-05-13 ENCOUNTER — PATIENT MESSAGE (OUTPATIENT)
Dept: HEMATOLOGY/ONCOLOGY | Facility: CLINIC | Age: 84
End: 2019-05-13

## 2019-05-13 RX ORDER — HYDROXYUREA 500 MG/1
1000 CAPSULE ORAL DAILY
Qty: 60 CAPSULE | Refills: 6 | Status: SHIPPED | OUTPATIENT
Start: 2019-05-13 | End: 2019-12-07 | Stop reason: SDUPTHER

## 2019-05-20 ENCOUNTER — LAB VISIT (OUTPATIENT)
Dept: LAB | Facility: HOSPITAL | Age: 84
End: 2019-05-20
Attending: INTERNAL MEDICINE
Payer: MEDICARE

## 2019-05-20 DIAGNOSIS — Z79.01 LONG TERM CURRENT USE OF ANTICOAGULANT THERAPY: ICD-10-CM

## 2019-05-20 DIAGNOSIS — I48.20 ATRIAL FIBRILLATION, CHRONIC: ICD-10-CM

## 2019-05-20 LAB
INR PPP: 1.7 (ref 0.8–1.2)
PROTHROMBIN TIME: 17 SEC (ref 9–12.5)

## 2019-05-20 PROCEDURE — 85610 PROTHROMBIN TIME: CPT

## 2019-05-20 PROCEDURE — 36415 COLL VENOUS BLD VENIPUNCTURE: CPT | Mod: PO

## 2019-05-20 NOTE — PROGRESS NOTES
Care taker (Ely Dhillon) called to report that the Patient missed her coumadin dose Saturday -5/18

## 2019-05-21 ENCOUNTER — ANTI-COAG VISIT (OUTPATIENT)
Dept: CARDIOLOGY | Facility: CLINIC | Age: 84
End: 2019-05-21
Payer: MEDICARE

## 2019-05-21 DIAGNOSIS — I48.20 ATRIAL FIBRILLATION, CHRONIC: ICD-10-CM

## 2019-05-21 DIAGNOSIS — Z79.01 LONG TERM CURRENT USE OF ANTICOAGULANT THERAPY: ICD-10-CM

## 2019-05-21 PROCEDURE — 93793 PR ANTICOAGULANT MGMT FOR PT TAKING WARFARIN: ICD-10-PCS | Mod: S$GLB,,,

## 2019-05-21 PROCEDURE — 93793 ANTICOAG MGMT PT WARFARIN: CPT | Mod: S$GLB,,,

## 2019-05-21 NOTE — PROGRESS NOTES
INR low due to missed dose over the weekend. This has been a recurrent issue. I will adjust dose to plan no coumadin on the weekends and higher dosing during the week. The weekly dose totals her maintenance dose in the past.     Update: spoke with patients son who reports new caregiver service will now be seeing patient on weekends and night times. He does not expect to have problems with her missing doses on the weekends. Therefore, we will boost dose today and resume current dose plan. Recheck INR in 2 weeks

## 2019-06-03 ENCOUNTER — ANTI-COAG VISIT (OUTPATIENT)
Dept: CARDIOLOGY | Facility: CLINIC | Age: 84
End: 2019-06-03
Payer: MEDICARE

## 2019-06-03 DIAGNOSIS — I48.20 ATRIAL FIBRILLATION, CHRONIC: ICD-10-CM

## 2019-06-03 DIAGNOSIS — Z79.01 LONG TERM CURRENT USE OF ANTICOAGULANT THERAPY: Primary | ICD-10-CM

## 2019-06-03 LAB — INR PPP: 1.2 (ref 2–3)

## 2019-06-03 PROCEDURE — 85610 PROTHROMBIN TIME: CPT | Mod: QW,S$GLB,, | Performed by: INTERNAL MEDICINE

## 2019-06-03 PROCEDURE — 85610 POCT INR: ICD-10-PCS | Mod: QW,S$GLB,, | Performed by: INTERNAL MEDICINE

## 2019-06-03 PROCEDURE — 93793 ANTICOAG MGMT PT WARFARIN: CPT | Mod: S$GLB,,,

## 2019-06-03 PROCEDURE — 93793 PR ANTICOAGULANT MGMT FOR PT TAKING WARFARIN: ICD-10-PCS | Mod: S$GLB,,,

## 2019-06-03 NOTE — PROGRESS NOTES
INR below goal range today. Patient recently missed two doses. Will boost and resume and repeat INR in 2 weeks. Caregiver states patient will now also have weekend visits which will hopefully avoid missed doses. I advised her to contact us with any changes or problems.

## 2019-06-13 ENCOUNTER — TELEPHONE (OUTPATIENT)
Dept: CARDIOLOGY | Facility: CLINIC | Age: 84
End: 2019-06-13

## 2019-06-13 DIAGNOSIS — R00.2 PALPITATIONS: Primary | ICD-10-CM

## 2019-06-14 ENCOUNTER — OFFICE VISIT (OUTPATIENT)
Dept: CARDIOLOGY | Facility: CLINIC | Age: 84
End: 2019-06-14
Payer: MEDICARE

## 2019-06-14 VITALS
SYSTOLIC BLOOD PRESSURE: 112 MMHG | HEIGHT: 63 IN | BODY MASS INDEX: 23.28 KG/M2 | WEIGHT: 131.38 LBS | DIASTOLIC BLOOD PRESSURE: 52 MMHG | HEART RATE: 76 BPM

## 2019-06-14 DIAGNOSIS — Z79.01 LONG TERM CURRENT USE OF ANTICOAGULANT: ICD-10-CM

## 2019-06-14 DIAGNOSIS — E78.5 DYSLIPIDEMIA: Chronic | ICD-10-CM

## 2019-06-14 DIAGNOSIS — F01.50 MIXED ALZHEIMER'S AND VASCULAR DEMENTIA: ICD-10-CM

## 2019-06-14 DIAGNOSIS — G30.9 MIXED ALZHEIMER'S AND VASCULAR DEMENTIA: ICD-10-CM

## 2019-06-14 DIAGNOSIS — I42.0 DILATED CARDIOMYOPATHY: Primary | ICD-10-CM

## 2019-06-14 DIAGNOSIS — I48.20 ATRIAL FIBRILLATION, CHRONIC: Chronic | ICD-10-CM

## 2019-06-14 DIAGNOSIS — F02.80 MIXED ALZHEIMER'S AND VASCULAR DEMENTIA: ICD-10-CM

## 2019-06-14 DIAGNOSIS — I10 ESSENTIAL HYPERTENSION: Chronic | ICD-10-CM

## 2019-06-14 DIAGNOSIS — Z91.81 AT HIGH RISK FOR FALLS: ICD-10-CM

## 2019-06-14 DIAGNOSIS — G21.4 VASCULAR PARKINSONISM: ICD-10-CM

## 2019-06-14 DIAGNOSIS — I70.0 AORTIC ATHEROSCLEROSIS: ICD-10-CM

## 2019-06-14 PROCEDURE — 99214 PR OFFICE/OUTPT VISIT, EST, LEVL IV, 30-39 MIN: ICD-10-PCS | Mod: S$GLB,,, | Performed by: INTERNAL MEDICINE

## 2019-06-14 PROCEDURE — 99999 PR PBB SHADOW E&M-EST. PATIENT-LVL III: CPT | Mod: PBBFAC,,, | Performed by: INTERNAL MEDICINE

## 2019-06-14 PROCEDURE — 3078F DIAST BP <80 MM HG: CPT | Mod: CPTII,S$GLB,, | Performed by: INTERNAL MEDICINE

## 2019-06-14 PROCEDURE — 3074F PR MOST RECENT SYSTOLIC BLOOD PRESSURE < 130 MM HG: ICD-10-PCS | Mod: CPTII,S$GLB,, | Performed by: INTERNAL MEDICINE

## 2019-06-14 PROCEDURE — 99999 PR PBB SHADOW E&M-EST. PATIENT-LVL III: ICD-10-PCS | Mod: PBBFAC,,, | Performed by: INTERNAL MEDICINE

## 2019-06-14 PROCEDURE — 1101F PR PT FALLS ASSESS DOC 0-1 FALLS W/OUT INJ PAST YR: ICD-10-PCS | Mod: CPTII,S$GLB,, | Performed by: INTERNAL MEDICINE

## 2019-06-14 PROCEDURE — 99214 OFFICE O/P EST MOD 30 MIN: CPT | Mod: S$GLB,,, | Performed by: INTERNAL MEDICINE

## 2019-06-14 PROCEDURE — 99499 RISK ADDL DX/OHS AUDIT: ICD-10-PCS | Mod: S$GLB,,, | Performed by: INTERNAL MEDICINE

## 2019-06-14 PROCEDURE — 3078F PR MOST RECENT DIASTOLIC BLOOD PRESSURE < 80 MM HG: ICD-10-PCS | Mod: CPTII,S$GLB,, | Performed by: INTERNAL MEDICINE

## 2019-06-14 PROCEDURE — 3074F SYST BP LT 130 MM HG: CPT | Mod: CPTII,S$GLB,, | Performed by: INTERNAL MEDICINE

## 2019-06-14 PROCEDURE — 99499 UNLISTED E&M SERVICE: CPT | Mod: S$GLB,,, | Performed by: INTERNAL MEDICINE

## 2019-06-14 PROCEDURE — 1101F PT FALLS ASSESS-DOCD LE1/YR: CPT | Mod: CPTII,S$GLB,, | Performed by: INTERNAL MEDICINE

## 2019-06-14 NOTE — PROGRESS NOTES
Subjective:    Patient ID:  Cat Garcia is a 84 y.o. female who presents for follow-up of chronic atrial fibrillation and HFpEF    HPI   The patient is a 84 year of female followed with non ischemic cardiomyopathy and chronic atrial fibrillation. She is confusional at times and has memory loss. She has very  limited activity. She denies SOB and has no edema  CONCLUSIONS     1 - Severely depressed left ventricular systolic function (EF 15-20%).     2 - Biatrial enlargement.     3 - Moderately to severely depressed right ventricular systolic function .     4 - Mild mitral regurgitation.     5 - Trivial tricuspid regurgitation.     6 - Trivial pericardial effusion.         This document has been electronically    SIGNED BY: Bria Bermudez MD On: 07/02/2014 15:41  Lab Results   Component Value Date     05/09/2019    K 4.3 05/09/2019     05/09/2019    CO2 27 05/09/2019    BUN 20 05/09/2019    CREATININE 0.8 05/09/2019     05/09/2019    MG 1.9 12/28/2013    AST 19 05/09/2019    ALT 16 05/09/2019    ALBUMIN 3.4 (L) 05/09/2019    PROT 6.2 05/09/2019    BILITOT 0.5 05/09/2019    WBC 9.37 05/09/2019    HGB 12.8 05/09/2019    HCT 39.7 05/09/2019     (H) 05/09/2019     (H) 05/09/2019    INR 1.2 06/03/2019    INR 1.7 (H) 05/20/2019    INR 2.5 11/14/2016    TSH 2.070 09/27/2018         Lab Results   Component Value Date    CHOL 204 (H) 02/07/2017    HDL 42 02/07/2017    TRIG 296 (H) 02/07/2017       Lab Results   Component Value Date    LDLCALC 102.8 02/07/2017       Past Medical History:   Diagnosis Date    Atrial fibrillation     with rapid ventricular rate    Breast cancer     XRT    Chronic bronchitis     CKD (chronic kidney disease), stage III     Dementia     Squamous cell carcinoma        Current Outpatient Medications:     antiox#10-om3-dha-epa-lut-zeax (I-CAPS) 280-10-2 mg Cap, Take by mouth 2 (two) times daily., Disp: , Rfl:     ascorbic acid (VITAMIN C) 1000 MG tablet,  Take 1,000 mg by mouth once daily., Disp: , Rfl:     atorvastatin (LIPITOR) 10 MG tablet, TAKE 1 TABLET BY MOUTH DAILY, Disp: 90 tablet, Rfl: 11    brimonidine 0.15 % OPTH DROP (ALPHAGAN) 0.15 % ophthalmic solution, 3 (three) times daily. , Disp: , Rfl:     calcium carb/vit D3/minerals (CALCIUM-VITAMIN D ORAL), Take by mouth once daily. 1000mg Vitamin D , Disp: , Rfl:     digoxin (DIGOX) 125 mcg tablet, Take 1 tablet (0.125 mg total) by mouth once daily., Disp: 90 tablet, Rfl: 0    donepezil (ARICEPT) 10 MG tablet, Take 1 tablet (10 mg total) by mouth every evening., Disp: 90 tablet, Rfl: 3    fish oil-omega-3 fatty acids 300-1,000 mg capsule, Take 2 g by mouth once daily., Disp: , Rfl:     hydroxyurea (HYDREA) 500 mg Cap, Take 2 capsules (1,000 mg total) by mouth once daily., Disp: 60 capsule, Rfl: 6    latanoprost 0.005 % ophthalmic solution, once daily. , Disp: , Rfl:     losartan (COZAAR) 25 MG tablet, TAKE 1 TABLET(25 MG) BY MOUTH EVERY DAY, Disp: 90 tablet, Rfl: 0    metoprolol succinate (TOPROL-XL) 25 MG 24 hr tablet, Take 1 tablet (25 mg total) by mouth once daily., Disp: 90 tablet, Rfl: 11    warfarin (COUMADIN) 2.5 MG tablet, Take 2 tablets (5mg.) by mouth every day, except 3 tablets (7.5mg) on Sunday, and Thursday, Or as directed by [facility]., Disp: 210 tablet, Rfl: 2    influenza (FLUZONE HIGH-DOSE) 180 mcg/0.5 mL vaccine, Inject 0.5 mLs into the muscle., Disp: 0.5 mL, Rfl: 0    losartan (COZAAR) 25 MG tablet, TAKE 1 TABLET(25 MG) BY MOUTH EVERY DAY, Disp: 90 tablet, Rfl: 0          Review of Systems   Constitution: Negative for decreased appetite, diaphoresis, fever, malaise/fatigue, weight gain and weight loss.   HENT: Negative for congestion, ear discharge, ear pain and nosebleeds.    Eyes: Negative for blurred vision, double vision and visual disturbance.   Cardiovascular: Negative for chest pain, claudication, cyanosis, dyspnea on exertion, irregular heartbeat, leg swelling,  "near-syncope, orthopnea, palpitations, paroxysmal nocturnal dyspnea and syncope.   Respiratory: Negative for cough, hemoptysis, shortness of breath, sleep disturbances due to breathing, snoring, sputum production and wheezing.    Endocrine: Negative for polydipsia, polyphagia and polyuria.   Hematologic/Lymphatic: Negative for adenopathy and bleeding problem. Does not bruise/bleed easily.   Skin: Negative for color change, nail changes, poor wound healing and rash.   Musculoskeletal: Negative for muscle cramps and muscle weakness.   Gastrointestinal: Negative for abdominal pain, anorexia, change in bowel habit, hematochezia, nausea and vomiting.   Genitourinary: Negative for dysuria, frequency and hematuria.   Neurological: Positive for disturbances in coordination. Negative for brief paralysis, difficulty with concentration, excessive daytime sleepiness, dizziness, focal weakness, headaches, light-headedness, seizures, vertigo and weakness.   Psychiatric/Behavioral: Positive for memory loss. Negative for altered mental status and depression.   Allergic/Immunologic: Negative for persistent infections.        Objective:BP (!) 112/52 (BP Location: Left arm, Patient Position: Sitting, BP Method: Large (Automatic))   Pulse 76   Ht 5' 3.25" (1.607 m)   Wt 59.6 kg (131 lb 6.3 oz)   BMI 23.09 kg/m²             Physical Exam   Constitutional: She is oriented to person, place, and time. She appears well-developed and well-nourished.   HENT:   Head: Normocephalic.   Right Ear: External ear normal.   Left Ear: External ear normal.   Nose: Nose normal.   Inspection of lips, teeth and gums normal   Eyes: Pupils are equal, round, and reactive to light. Conjunctivae and EOM are normal. No scleral icterus.   Neck: Normal range of motion. No JVD present. No tracheal deviation present. No thyromegaly present.   Cardiovascular: Normal rate, regular rhythm, normal heart sounds and intact distal pulses. Exam reveals no gallop and no " friction rub.   No murmur heard.  Pulmonary/Chest: Effort normal and breath sounds normal. No respiratory distress. She has no wheezes. She has no rales. She exhibits no tenderness.   Abdominal: Soft. Bowel sounds are normal. She exhibits no distension. There is no hepatosplenomegaly. There is no tenderness. There is no guarding.   Musculoskeletal: Normal range of motion. She exhibits no edema or tenderness.   Lymphadenopathy:   Palpation of lymph nodes of neck and groin normal   Neurological: She is oriented to person, place, and time. No cranial nerve deficit. She exhibits normal muscle tone. Coordination normal.   Skin: Skin is warm and dry. No rash noted. No erythema. No pallor.   Palpation of skin normal   Psychiatric: She has a normal mood and affect. Her behavior is normal. Judgment and thought content normal.         Assessment:       1. Dilated cardiomyopathy    2. Atrial fibrillation, chronic    3. Aortic atherosclerosis    4. Dyslipidemia    5. Essential hypertension    6. Mixed Alzheimer's and vascular dementia    7. Vascular parkinsonism    8. At high risk for falls    9. Long term current use of anticoagulant         Plan:       Cat MOCK was seen today for cardiomyopathy.    Diagnoses and all orders for this visit:    Dilated cardiomyopathy  -     Basic metabolic panel; Future; Expected date: 06/13/2020    Atrial fibrillation, chronic    Aortic atherosclerosis    Dyslipidemia    Essential hypertension  -     Basic metabolic panel; Future; Expected date: 06/13/2020    Mixed Alzheimer's and vascular dementia    Vascular parkinsonism    At high risk for falls  Fall precautions discussed  Long term current use of anticoagulant  -     CBC auto differential; Future; Expected date: 06/13/2020

## 2019-06-17 ENCOUNTER — ANTI-COAG VISIT (OUTPATIENT)
Dept: CARDIOLOGY | Facility: CLINIC | Age: 84
End: 2019-06-17
Payer: MEDICARE

## 2019-06-17 DIAGNOSIS — I48.20 ATRIAL FIBRILLATION, CHRONIC: ICD-10-CM

## 2019-06-17 DIAGNOSIS — Z79.01 LONG TERM CURRENT USE OF ANTICOAGULANT THERAPY: Primary | ICD-10-CM

## 2019-06-17 LAB — INR PPP: 3.2 (ref 2–3)

## 2019-06-17 PROCEDURE — 85610 PROTHROMBIN TIME: CPT | Mod: QW,S$GLB,, | Performed by: INTERNAL MEDICINE

## 2019-06-17 PROCEDURE — 85610 POCT INR: ICD-10-PCS | Mod: QW,S$GLB,, | Performed by: INTERNAL MEDICINE

## 2019-06-17 PROCEDURE — 93793 PR ANTICOAGULANT MGMT FOR PT TAKING WARFARIN: ICD-10-PCS | Mod: S$GLB,,,

## 2019-06-17 PROCEDURE — 93793 ANTICOAG MGMT PT WARFARIN: CPT | Mod: S$GLB,,,

## 2019-06-17 NOTE — PROGRESS NOTES
Quick follow-up for low INR. INR now slightly above goal. Patient and caretaker deny any changes to account for high level. She has already taken her dose today - will decrease weekly dose slightly and f/u INR in 2 weeks. Patient/caregiver advised to call with any changes/concerns.

## 2019-07-01 ENCOUNTER — ANTI-COAG VISIT (OUTPATIENT)
Dept: CARDIOLOGY | Facility: CLINIC | Age: 84
End: 2019-07-01
Payer: MEDICARE

## 2019-07-01 DIAGNOSIS — Z79.01 LONG TERM CURRENT USE OF ANTICOAGULANT THERAPY: Primary | ICD-10-CM

## 2019-07-01 DIAGNOSIS — I48.20 ATRIAL FIBRILLATION, CHRONIC: ICD-10-CM

## 2019-07-01 LAB — INR PPP: 1.5 (ref 2–3)

## 2019-07-01 PROCEDURE — 93793 PR ANTICOAGULANT MGMT FOR PT TAKING WARFARIN: ICD-10-PCS | Mod: S$GLB,,, | Performed by: PHARMACIST

## 2019-07-01 PROCEDURE — 93793 ANTICOAG MGMT PT WARFARIN: CPT | Mod: S$GLB,,, | Performed by: PHARMACIST

## 2019-07-01 PROCEDURE — 85610 PROTHROMBIN TIME: CPT | Mod: QW,S$GLB,, | Performed by: INTERNAL MEDICINE

## 2019-07-01 PROCEDURE — 85610 POCT INR: ICD-10-PCS | Mod: QW,S$GLB,, | Performed by: INTERNAL MEDICINE

## 2019-07-01 NOTE — PROGRESS NOTES
Patient  Reports having missed her coumadin doses on Saturday and Sunday.  Otherwise, Patient denies any changes in diet, medications, or health that would effect warfarin therapy.  INR not at goal. Medications, chart, and patient findings reviewed. See calendar for adjustments to dose and follow up plan.

## 2019-07-16 ENCOUNTER — ANTI-COAG VISIT (OUTPATIENT)
Dept: CARDIOLOGY | Facility: CLINIC | Age: 84
End: 2019-07-16
Payer: MEDICARE

## 2019-07-16 DIAGNOSIS — Z79.01 LONG TERM CURRENT USE OF ANTICOAGULANT THERAPY: Primary | ICD-10-CM

## 2019-07-16 DIAGNOSIS — I48.20 ATRIAL FIBRILLATION, CHRONIC: ICD-10-CM

## 2019-07-16 LAB — INR PPP: 2 (ref 2–3)

## 2019-07-16 PROCEDURE — 85610 POCT INR: ICD-10-PCS | Mod: QW,S$GLB,, | Performed by: INTERNAL MEDICINE

## 2019-07-16 PROCEDURE — 85610 PROTHROMBIN TIME: CPT | Mod: QW,S$GLB,, | Performed by: INTERNAL MEDICINE

## 2019-07-16 PROCEDURE — 93793 PR ANTICOAGULANT MGMT FOR PT TAKING WARFARIN: ICD-10-PCS | Mod: S$GLB,,,

## 2019-07-16 PROCEDURE — 93793 ANTICOAG MGMT PT WARFARIN: CPT | Mod: S$GLB,,,
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
## 2019-07-30 ENCOUNTER — ANTI-COAG VISIT (OUTPATIENT)
Dept: CARDIOLOGY | Facility: CLINIC | Age: 84
End: 2019-07-30
Payer: MEDICARE

## 2019-07-30 DIAGNOSIS — Z79.01 LONG TERM CURRENT USE OF ANTICOAGULANT THERAPY: Primary | ICD-10-CM

## 2019-07-30 DIAGNOSIS — I48.20 ATRIAL FIBRILLATION, CHRONIC: ICD-10-CM

## 2019-07-30 LAB — INR PPP: 3 (ref 2–3)

## 2019-07-30 PROCEDURE — 93793 PR ANTICOAGULANT MGMT FOR PT TAKING WARFARIN: ICD-10-PCS | Mod: S$GLB,,,

## 2019-07-30 PROCEDURE — 93793 ANTICOAG MGMT PT WARFARIN: CPT | Mod: S$GLB,,,

## 2019-07-30 PROCEDURE — 85610 POCT INR: ICD-10-PCS | Mod: QW,S$GLB,, | Performed by: INTERNAL MEDICINE

## 2019-07-30 PROCEDURE — 85610 PROTHROMBIN TIME: CPT | Mod: QW,S$GLB,, | Performed by: INTERNAL MEDICINE

## 2019-07-30 NOTE — PROGRESS NOTES
INR good. It's on upper end. Caretaker reports appetite declined. No missed doses this time. No other changes. No signs or symptoms of bleeding. Continue maintenance dose and Recheck INR in 3 weeks

## 2019-08-20 ENCOUNTER — ANTI-COAG VISIT (OUTPATIENT)
Dept: CARDIOLOGY | Facility: CLINIC | Age: 84
End: 2019-08-20
Payer: MEDICARE

## 2019-08-20 DIAGNOSIS — Z79.01 LONG TERM CURRENT USE OF ANTICOAGULANT THERAPY: Primary | ICD-10-CM

## 2019-08-20 DIAGNOSIS — I48.20 ATRIAL FIBRILLATION, CHRONIC: ICD-10-CM

## 2019-08-20 LAB — INR PPP: 3.1 (ref 2–3)

## 2019-08-20 PROCEDURE — 85610 POCT INR: ICD-10-PCS | Mod: QW,S$GLB,, | Performed by: INTERNAL MEDICINE

## 2019-08-20 PROCEDURE — 85610 PROTHROMBIN TIME: CPT | Mod: QW,S$GLB,, | Performed by: INTERNAL MEDICINE

## 2019-08-20 PROCEDURE — 93793 ANTICOAG MGMT PT WARFARIN: CPT | Mod: S$GLB,,,

## 2019-08-20 PROCEDURE — 93793 PR ANTICOAGULANT MGMT FOR PT TAKING WARFARIN: ICD-10-PCS | Mod: S$GLB,,,

## 2019-08-20 NOTE — PROGRESS NOTES
INR a little high at 3.1. Last INR 3.0. Caretaker denies changes. No signs or symptoms of bleeding. Based on INR trend and considering age/bleed risk, we will decrease dose for now. Recheck INR in 2 weeks

## 2019-09-03 ENCOUNTER — ANTI-COAG VISIT (OUTPATIENT)
Dept: CARDIOLOGY | Facility: CLINIC | Age: 84
End: 2019-09-03
Payer: MEDICARE

## 2019-09-03 DIAGNOSIS — I48.20 ATRIAL FIBRILLATION, CHRONIC: ICD-10-CM

## 2019-09-03 DIAGNOSIS — Z79.01 LONG TERM CURRENT USE OF ANTICOAGULANT THERAPY: Primary | ICD-10-CM

## 2019-09-03 LAB — INR PPP: 3.3 (ref 2–3)

## 2019-09-03 PROCEDURE — 85610 PROTHROMBIN TIME: CPT | Mod: QW,S$GLB,, | Performed by: INTERNAL MEDICINE

## 2019-09-03 PROCEDURE — 93793 PR ANTICOAGULANT MGMT FOR PT TAKING WARFARIN: ICD-10-PCS | Mod: S$GLB,,,

## 2019-09-03 PROCEDURE — 85610 POCT INR: ICD-10-PCS | Mod: QW,S$GLB,, | Performed by: INTERNAL MEDICINE

## 2019-09-03 PROCEDURE — 93793 ANTICOAG MGMT PT WARFARIN: CPT | Mod: S$GLB,,,

## 2019-09-03 NOTE — PROGRESS NOTES
INR a little high today. Dose was lowered last visit. Caretaker confirmed dose. She reports less Ensure/Boosts over the weekends. She is no longer getting help on the weekends but has not missed any doses. Dose has not needed to be lower. We will have her hold a dose today then try this dose again. Recheck INR in 2 weeks

## 2019-09-13 ENCOUNTER — ANTI-COAG VISIT (OUTPATIENT)
Dept: CARDIOLOGY | Facility: CLINIC | Age: 84
End: 2019-09-13
Payer: MEDICARE

## 2019-09-13 DIAGNOSIS — Z79.01 LONG TERM CURRENT USE OF ANTICOAGULANT THERAPY: Primary | ICD-10-CM

## 2019-09-13 DIAGNOSIS — I48.20 ATRIAL FIBRILLATION, CHRONIC: ICD-10-CM

## 2019-09-13 LAB — INR PPP: 2.6 (ref 2–3)

## 2019-09-13 PROCEDURE — 85610 POCT INR: ICD-10-PCS | Mod: QW,S$GLB,, | Performed by: INTERNAL MEDICINE

## 2019-09-13 PROCEDURE — 85610 PROTHROMBIN TIME: CPT | Mod: QW,S$GLB,, | Performed by: INTERNAL MEDICINE

## 2019-09-13 PROCEDURE — 93793 PR ANTICOAGULANT MGMT FOR PT TAKING WARFARIN: ICD-10-PCS | Mod: S$GLB,,,

## 2019-09-13 PROCEDURE — 93793 ANTICOAG MGMT PT WARFARIN: CPT | Mod: S$GLB,,,

## 2019-09-13 NOTE — PROGRESS NOTES
INR good. Caregiver denies changes. No signs or symptoms of bleeding. Maintain current dose. Recheck INR in 2 weeks

## 2019-10-01 ENCOUNTER — ANTI-COAG VISIT (OUTPATIENT)
Dept: CARDIOLOGY | Facility: CLINIC | Age: 84
End: 2019-10-01
Payer: MEDICARE

## 2019-10-01 DIAGNOSIS — Z79.01 LONG TERM CURRENT USE OF ANTICOAGULANT THERAPY: Primary | ICD-10-CM

## 2019-10-01 DIAGNOSIS — I48.20 ATRIAL FIBRILLATION, CHRONIC: ICD-10-CM

## 2019-10-01 LAB — INR PPP: 2.4 (ref 2–3)

## 2019-10-01 PROCEDURE — 93793 PR ANTICOAGULANT MGMT FOR PT TAKING WARFARIN: ICD-10-PCS | Mod: S$GLB,,,

## 2019-10-01 PROCEDURE — 85610 PROTHROMBIN TIME: CPT | Mod: QW,S$GLB,, | Performed by: INTERNAL MEDICINE

## 2019-10-01 PROCEDURE — 85610 POCT INR: ICD-10-PCS | Mod: QW,S$GLB,, | Performed by: INTERNAL MEDICINE

## 2019-10-01 PROCEDURE — 93793 ANTICOAG MGMT PT WARFARIN: CPT | Mod: S$GLB,,,

## 2019-10-01 NOTE — PROGRESS NOTES
INR good. Caregiver reports Ensure has been 3 per day Mon-Thu, 2 on Friday and not sure what she gets on weekends. No other changes. No signs or symptoms of bleeding. Reminded them to be consistent with Ensure week to week. We will continue on current dose. Recheck INR in 3 weeks

## 2019-10-22 ENCOUNTER — ANTI-COAG VISIT (OUTPATIENT)
Dept: CARDIOLOGY | Facility: CLINIC | Age: 84
End: 2019-10-22
Payer: MEDICARE

## 2019-10-22 DIAGNOSIS — Z79.01 LONG TERM CURRENT USE OF ANTICOAGULANT THERAPY: Primary | ICD-10-CM

## 2019-10-22 DIAGNOSIS — I48.20 ATRIAL FIBRILLATION, CHRONIC: ICD-10-CM

## 2019-10-22 LAB — INR PPP: 2.3 (ref 2–3)

## 2019-10-22 PROCEDURE — 85610 POCT INR: ICD-10-PCS | Mod: QW,S$GLB,, | Performed by: INTERNAL MEDICINE

## 2019-10-22 PROCEDURE — 93793 PR ANTICOAGULANT MGMT FOR PT TAKING WARFARIN: ICD-10-PCS | Mod: S$GLB,,,

## 2019-10-22 PROCEDURE — 93793 ANTICOAG MGMT PT WARFARIN: CPT | Mod: S$GLB,,,

## 2019-10-22 PROCEDURE — 85610 PROTHROMBIN TIME: CPT | Mod: QW,S$GLB,, | Performed by: INTERNAL MEDICINE

## 2019-10-22 NOTE — PROGRESS NOTES
INR good. Per caregiver, Boost drinks have been stable. Confirmed dose and compliance. No changes in medications, health, or diet. No signs or symptoms of bleeding. INR stable. Maintain current dose and repeat INR in 4 weeks.

## 2019-11-19 ENCOUNTER — ANTI-COAG VISIT (OUTPATIENT)
Dept: CARDIOLOGY | Facility: CLINIC | Age: 84
End: 2019-11-19
Payer: MEDICARE

## 2019-11-19 DIAGNOSIS — Z79.01 LONG TERM CURRENT USE OF ANTICOAGULANT THERAPY: Primary | ICD-10-CM

## 2019-11-19 DIAGNOSIS — I48.20 ATRIAL FIBRILLATION, CHRONIC: ICD-10-CM

## 2019-11-19 LAB — INR PPP: 2.5 (ref 2–3)

## 2019-11-19 PROCEDURE — 85610 POCT INR: ICD-10-PCS | Mod: QW,S$GLB,, | Performed by: INTERNAL MEDICINE

## 2019-11-19 PROCEDURE — 93793 ANTICOAG MGMT PT WARFARIN: CPT | Mod: S$GLB,,,

## 2019-11-19 PROCEDURE — 93793 PR ANTICOAGULANT MGMT FOR PT TAKING WARFARIN: ICD-10-PCS | Mod: S$GLB,,,

## 2019-11-19 PROCEDURE — 85610 PROTHROMBIN TIME: CPT | Mod: QW,S$GLB,, | Performed by: INTERNAL MEDICINE

## 2019-12-07 DIAGNOSIS — D47.3 ESSENTIAL THROMBOCYTOSIS: ICD-10-CM

## 2019-12-09 RX ORDER — HYDROXYUREA 500 MG/1
CAPSULE ORAL
Qty: 30 CAPSULE | Refills: 0 | Status: SHIPPED | OUTPATIENT
Start: 2019-12-09 | End: 2024-03-18 | Stop reason: SDUPTHER

## 2019-12-17 ENCOUNTER — ANTI-COAG VISIT (OUTPATIENT)
Dept: CARDIOLOGY | Facility: CLINIC | Age: 84
End: 2019-12-17
Payer: MEDICARE

## 2019-12-17 DIAGNOSIS — Z79.01 LONG TERM CURRENT USE OF ANTICOAGULANT THERAPY: Primary | ICD-10-CM

## 2019-12-17 DIAGNOSIS — I48.20 ATRIAL FIBRILLATION, CHRONIC: ICD-10-CM

## 2019-12-17 LAB — INR PPP: 2.3 (ref 2–3)

## 2019-12-17 PROCEDURE — 85610 PROTHROMBIN TIME: CPT | Mod: QW,S$GLB,, | Performed by: INTERNAL MEDICINE

## 2019-12-17 PROCEDURE — 93793 ANTICOAG MGMT PT WARFARIN: CPT | Mod: S$GLB,,,

## 2019-12-17 PROCEDURE — 85610 POCT INR: ICD-10-PCS | Mod: QW,S$GLB,, | Performed by: INTERNAL MEDICINE

## 2019-12-17 PROCEDURE — 93793 PR ANTICOAGULANT MGMT FOR PT TAKING WARFARIN: ICD-10-PCS | Mod: S$GLB,,,

## 2019-12-17 NOTE — PROGRESS NOTES
INR good. No changes in medications, health, or diet. No signs or symptoms of bleeding. INR stable. Maintain current dose and repeat INR in 4 weeks. Caregiver reports it getting more difficult to get patient out. They are interested in home testing. Will forward patient info to Melisa to start the process.

## 2019-12-17 NOTE — PROGRESS NOTES
Patient's name given to me to see if patient can get a meter.  I spoke to patients son Dr Abdon Ruiz about the terms and agreement for the home INR meter program which requires a contact to be signed.  He states he would like to consider patient being transitioned to meter. He would like to discuss this with hired caretaker and he will call me back with an answer if he wants patient to be processed for home INR meter/sign contract.    19  Patient's son Dr Ruiz did not call me back.  I called him and declined/refused the home INR meter terms and agreement and does not want a home INR meter for this patient.      20  Patient lives in memory care department at assisted living so son has to accept home INR meter T&A and sign contract to get process for a home INR meter and the dependent on PH approval.  I left a voice message and sent a message on [facility] to discuss again with [facility] Brian Fish patient's son.  I left voice message with Vicky at [facility] for ALEXIS Kingsley RN to return a call to me as she faxed to the [facility] a POF that is invalid to process r/t PH as well as not within meter protocol for reasons stated previously in this note.  I spoke with patient's son Abdon Ruiz about the T&A for a home INR meter with the [facility] and he again declined/refused home INR meter for the patient.  I explained to him the issues with HH attempting to get a meter for the patient r/t fax received.  He states the order for HH is fixing to , he was given invalid home INR meter information by [facility],  and he will look into getting patient transitioned to another anticoagulant medication by her physician.  Chart routed to pharmacist to review.

## 2019-12-22 DIAGNOSIS — I48.20 ATRIAL FIBRILLATION, CHRONIC: Chronic | ICD-10-CM

## 2019-12-23 RX ORDER — METOPROLOL SUCCINATE 25 MG/1
TABLET, EXTENDED RELEASE ORAL
Qty: 90 TABLET | Refills: 0 | Status: ON HOLD | OUTPATIENT
Start: 2019-12-23 | End: 2020-01-07

## 2019-12-30 ENCOUNTER — HOSPITAL ENCOUNTER (INPATIENT)
Facility: HOSPITAL | Age: 84
LOS: 9 days | Discharge: NURSING, REHAB OR LONG-TERM CARE | DRG: 690 | End: 2020-01-08
Attending: EMERGENCY MEDICINE | Admitting: INTERNAL MEDICINE
Payer: MEDICARE

## 2019-12-30 DIAGNOSIS — I48.20 ATRIAL FIBRILLATION, CHRONIC: Chronic | ICD-10-CM
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
## 2019-12-30 DIAGNOSIS — I10 ESSENTIAL HYPERTENSION: Chronic | ICD-10-CM

## 2019-12-30 DIAGNOSIS — F01.50 VASCULAR DEMENTIA WITHOUT BEHAVIORAL DISTURBANCE: Chronic | ICD-10-CM

## 2019-12-30 DIAGNOSIS — N30.00 ACUTE CYSTITIS WITHOUT HEMATURIA: Primary | ICD-10-CM

## 2019-12-30 DIAGNOSIS — F02.80 MIXED ALZHEIMER'S AND VASCULAR DEMENTIA: ICD-10-CM

## 2019-12-30 DIAGNOSIS — Z79.01 LONG TERM CURRENT USE OF ANTICOAGULANT THERAPY: ICD-10-CM

## 2019-12-30 DIAGNOSIS — R00.1 BRADYCARDIA: ICD-10-CM

## 2019-12-30 DIAGNOSIS — G30.9 MIXED ALZHEIMER'S AND VASCULAR DEMENTIA: ICD-10-CM

## 2019-12-30 DIAGNOSIS — N18.30 CKD (CHRONIC KIDNEY DISEASE), STAGE III: Chronic | ICD-10-CM

## 2019-12-30 DIAGNOSIS — D47.3 ESSENTIAL THROMBOCYTOSIS: ICD-10-CM

## 2019-12-30 DIAGNOSIS — M54.50 ACUTE LEFT-SIDED LOW BACK PAIN WITHOUT SCIATICA: ICD-10-CM

## 2019-12-30 DIAGNOSIS — F01.50 MIXED ALZHEIMER'S AND VASCULAR DEMENTIA: ICD-10-CM

## 2019-12-30 PROBLEM — E83.39 HYPOPHOSPHATEMIA: Status: ACTIVE | Noted: 2019-12-30

## 2019-12-30 PROBLEM — D72.829 LEUKOCYTOSIS: Status: ACTIVE | Noted: 2019-12-30

## 2019-12-30 LAB
ALBUMIN SERPL BCP-MCNC: 3.5 G/DL (ref 3.5–5.2)
ALP SERPL-CCNC: 62 U/L (ref 55–135)
ALT SERPL W/O P-5'-P-CCNC: 16 U/L (ref 10–44)
ANION GAP SERPL CALC-SCNC: 8 MMOL/L (ref 8–16)
AST SERPL-CCNC: 22 U/L (ref 10–40)
BACTERIA #/AREA URNS AUTO: ABNORMAL /HPF
BASOPHILS # BLD AUTO: 0.05 K/UL (ref 0–0.2)
BASOPHILS NFR BLD: 0.2 % (ref 0–1.9)
BILIRUB SERPL-MCNC: 1 MG/DL (ref 0.1–1)
BILIRUB UR QL STRIP: NEGATIVE
BNP SERPL-MCNC: 208 PG/ML (ref 0–99)
BUN SERPL-MCNC: 19 MG/DL (ref 8–23)
CALCIUM SERPL-MCNC: 9.7 MG/DL (ref 8.7–10.5)
CHLORIDE SERPL-SCNC: 107 MMOL/L (ref 95–110)
CLARITY UR REFRACT.AUTO: ABNORMAL
CO2 SERPL-SCNC: 23 MMOL/L (ref 23–29)
COLOR UR AUTO: ABNORMAL
CREAT SERPL-MCNC: 1.1 MG/DL (ref 0.5–1.4)
DIFFERENTIAL METHOD: ABNORMAL
EOSINOPHIL # BLD AUTO: 0 K/UL (ref 0–0.5)
EOSINOPHIL NFR BLD: 0.2 % (ref 0–8)
ERYTHROCYTE [DISTWIDTH] IN BLOOD BY AUTOMATED COUNT: 12.9 % (ref 11.5–14.5)
EST. GFR  (AFRICAN AMERICAN): 54 ML/MIN/1.73 M^2
EST. GFR  (NON AFRICAN AMERICAN): 46.9 ML/MIN/1.73 M^2
GLUCOSE SERPL-MCNC: 145 MG/DL (ref 70–110)
GLUCOSE UR QL STRIP: NEGATIVE
HCT VFR BLD AUTO: 41.3 % (ref 37–48.5)
HGB BLD-MCNC: 12.9 G/DL (ref 12–16)
HGB UR QL STRIP: NEGATIVE
HYALINE CASTS UR QL AUTO: 1 /LPF
IMM GRANULOCYTES # BLD AUTO: 0.1 K/UL (ref 0–0.04)
IMM GRANULOCYTES NFR BLD AUTO: 0.5 % (ref 0–0.5)
INR PPP: 2 (ref 0.8–1.2)
KETONES UR QL STRIP: NEGATIVE
LACTATE SERPL-SCNC: 1.9 MMOL/L (ref 0.5–2.2)
LEUKOCYTE ESTERASE UR QL STRIP: ABNORMAL
LYMPHOCYTES # BLD AUTO: 0.8 K/UL (ref 1–4.8)
LYMPHOCYTES NFR BLD: 3.8 % (ref 18–48)
MAGNESIUM SERPL-MCNC: 2.2 MG/DL (ref 1.6–2.6)
MCH RBC QN AUTO: 34.6 PG (ref 27–31)
MCHC RBC AUTO-ENTMCNC: 31.2 G/DL (ref 32–36)
MCV RBC AUTO: 111 FL (ref 82–98)
MICROSCOPIC COMMENT: ABNORMAL
MONOCYTES # BLD AUTO: 1.4 K/UL (ref 0.3–1)
MONOCYTES NFR BLD: 6.7 % (ref 4–15)
NEUTROPHILS # BLD AUTO: 17.9 K/UL (ref 1.8–7.7)
NEUTROPHILS NFR BLD: 88.6 % (ref 38–73)
NITRITE UR QL STRIP: NEGATIVE
NRBC BLD-RTO: 0 /100 WBC
PH UR STRIP: 5 [PH] (ref 5–8)
PHOSPHATE SERPL-MCNC: 2.1 MG/DL (ref 2.7–4.5)
PLATELET # BLD AUTO: 717 K/UL (ref 150–350)
PMV BLD AUTO: 10.6 FL (ref 9.2–12.9)
POTASSIUM SERPL-SCNC: 3.8 MMOL/L (ref 3.5–5.1)
PROT SERPL-MCNC: 6.6 G/DL (ref 6–8.4)
PROT UR QL STRIP: NEGATIVE
PROTHROMBIN TIME: 18.9 SEC (ref 9–12.5)
RBC # BLD AUTO: 3.73 M/UL (ref 4–5.4)
RBC #/AREA URNS AUTO: 0 /HPF (ref 0–4)
SODIUM SERPL-SCNC: 138 MMOL/L (ref 136–145)
SP GR UR STRIP: 1.02 (ref 1–1.03)
TROPONIN I SERPL DL<=0.01 NG/ML-MCNC: 0.01 NG/ML (ref 0–0.03)
TSH SERPL DL<=0.005 MIU/L-ACNC: 1.51 UIU/ML (ref 0.4–4)
URN SPEC COLLECT METH UR: ABNORMAL
WBC # BLD AUTO: 20.25 K/UL (ref 3.9–12.7)
WBC #/AREA URNS AUTO: 19 /HPF (ref 0–5)

## 2019-12-30 PROCEDURE — 96376 TX/PRO/DX INJ SAME DRUG ADON: CPT

## 2019-12-30 PROCEDURE — 94640 AIRWAY INHALATION TREATMENT: CPT

## 2019-12-30 PROCEDURE — G0378 HOSPITAL OBSERVATION PER HR: HCPCS

## 2019-12-30 PROCEDURE — 85025 COMPLETE CBC W/AUTO DIFF WBC: CPT

## 2019-12-30 PROCEDURE — 25000242 PHARM REV CODE 250 ALT 637 W/ HCPCS: Performed by: EMERGENCY MEDICINE

## 2019-12-30 PROCEDURE — 84443 ASSAY THYROID STIM HORMONE: CPT

## 2019-12-30 PROCEDURE — 85610 PROTHROMBIN TIME: CPT

## 2019-12-30 PROCEDURE — 25000003 PHARM REV CODE 250: Performed by: PHYSICIAN ASSISTANT

## 2019-12-30 PROCEDURE — 96361 HYDRATE IV INFUSION ADD-ON: CPT

## 2019-12-30 PROCEDURE — 99285 EMERGENCY DEPT VISIT HI MDM: CPT | Mod: 25

## 2019-12-30 PROCEDURE — 87077 CULTURE AEROBIC IDENTIFY: CPT

## 2019-12-30 PROCEDURE — 83880 ASSAY OF NATRIURETIC PEPTIDE: CPT

## 2019-12-30 PROCEDURE — 87086 URINE CULTURE/COLONY COUNT: CPT

## 2019-12-30 PROCEDURE — 99220 PR INITIAL OBSERVATION CARE,LEVL III: CPT | Mod: ,,, | Performed by: PHYSICIAN ASSISTANT

## 2019-12-30 PROCEDURE — 80053 COMPREHEN METABOLIC PANEL: CPT

## 2019-12-30 PROCEDURE — 87040 BLOOD CULTURE FOR BACTERIA: CPT

## 2019-12-30 PROCEDURE — 99220 PR INITIAL OBSERVATION CARE,LEVL III: ICD-10-PCS | Mod: ,,, | Performed by: PHYSICIAN ASSISTANT

## 2019-12-30 PROCEDURE — 93010 ELECTROCARDIOGRAM REPORT: CPT | Mod: ,,, | Performed by: INTERNAL MEDICINE

## 2019-12-30 PROCEDURE — 63600175 PHARM REV CODE 636 W HCPCS: Performed by: EMERGENCY MEDICINE

## 2019-12-30 PROCEDURE — 83605 ASSAY OF LACTIC ACID: CPT

## 2019-12-30 PROCEDURE — 83735 ASSAY OF MAGNESIUM: CPT

## 2019-12-30 PROCEDURE — A4216 STERILE WATER/SALINE, 10 ML: HCPCS | Performed by: PHYSICIAN ASSISTANT

## 2019-12-30 PROCEDURE — 81001 URINALYSIS AUTO W/SCOPE: CPT

## 2019-12-30 PROCEDURE — 87088 URINE BACTERIA CULTURE: CPT

## 2019-12-30 PROCEDURE — 99285 PR EMERGENCY DEPT VISIT,LEVEL V: ICD-10-PCS | Mod: ,,, | Performed by: EMERGENCY MEDICINE

## 2019-12-30 PROCEDURE — 11000001 HC ACUTE MED/SURG PRIVATE ROOM

## 2019-12-30 PROCEDURE — 99285 EMERGENCY DEPT VISIT HI MDM: CPT | Mod: ,,, | Performed by: EMERGENCY MEDICINE

## 2019-12-30 PROCEDURE — 63600175 PHARM REV CODE 636 W HCPCS: Performed by: INTERNAL MEDICINE

## 2019-12-30 PROCEDURE — 93005 ELECTROCARDIOGRAM TRACING: CPT

## 2019-12-30 PROCEDURE — 87186 SC STD MICRODIL/AGAR DIL: CPT

## 2019-12-30 PROCEDURE — 94761 N-INVAS EAR/PLS OXIMETRY MLT: CPT

## 2019-12-30 PROCEDURE — 84100 ASSAY OF PHOSPHORUS: CPT

## 2019-12-30 PROCEDURE — 96374 THER/PROPH/DIAG INJ IV PUSH: CPT

## 2019-12-30 PROCEDURE — 93010 EKG 12-LEAD: ICD-10-PCS | Mod: ,,, | Performed by: INTERNAL MEDICINE

## 2019-12-30 PROCEDURE — 84484 ASSAY OF TROPONIN QUANT: CPT

## 2019-12-30 RX ORDER — HYDROXYUREA 500 MG/1
500 CAPSULE ORAL DAILY
Status: DISCONTINUED | OUTPATIENT
Start: 2019-12-31 | End: 2020-01-08 | Stop reason: HOSPADM

## 2019-12-30 RX ORDER — WARFARIN SODIUM 5 MG/1
5 TABLET ORAL
Status: DISCONTINUED | OUTPATIENT
Start: 2019-12-31 | End: 2020-01-02

## 2019-12-30 RX ORDER — SODIUM CHLORIDE 0.9 % (FLUSH) 0.9 %
3 SYRINGE (ML) INJECTION EVERY 8 HOURS
Status: DISCONTINUED | OUTPATIENT
Start: 2019-12-30 | End: 2020-01-08 | Stop reason: HOSPADM

## 2019-12-30 RX ORDER — IBUPROFEN 200 MG
24 TABLET ORAL
Status: DISCONTINUED | OUTPATIENT
Start: 2019-12-30 | End: 2020-01-08 | Stop reason: HOSPADM

## 2019-12-30 RX ORDER — ATORVASTATIN CALCIUM 10 MG/1
10 TABLET, FILM COATED ORAL DAILY
Status: DISCONTINUED | OUTPATIENT
Start: 2019-12-31 | End: 2020-01-08 | Stop reason: HOSPADM

## 2019-12-30 RX ORDER — IBUPROFEN 200 MG
16 TABLET ORAL
Status: DISCONTINUED | OUTPATIENT
Start: 2019-12-30 | End: 2020-01-08 | Stop reason: HOSPADM

## 2019-12-30 RX ORDER — WARFARIN SODIUM 5 MG/1
5 TABLET ORAL
Status: DISCONTINUED | OUTPATIENT
Start: 2020-01-05 | End: 2020-01-08 | Stop reason: HOSPADM

## 2019-12-30 RX ORDER — WARFARIN SODIUM 5 MG/1
5 TABLET ORAL
Status: DISCONTINUED | OUTPATIENT
Start: 2020-01-01 | End: 2020-01-08 | Stop reason: HOSPADM

## 2019-12-30 RX ORDER — GLUCAGON 1 MG
1 KIT INJECTION
Status: DISCONTINUED | OUTPATIENT
Start: 2019-12-30 | End: 2020-01-08 | Stop reason: HOSPADM

## 2019-12-30 RX ORDER — SODIUM,POTASSIUM PHOSPHATES 280-250MG
2 POWDER IN PACKET (EA) ORAL EVERY 4 HOURS
Status: COMPLETED | OUTPATIENT
Start: 2019-12-30 | End: 2019-12-30

## 2019-12-30 RX ORDER — LATANOPROST 50 UG/ML
1 SOLUTION/ DROPS OPHTHALMIC DAILY
Status: DISCONTINUED | OUTPATIENT
Start: 2019-12-31 | End: 2020-01-08 | Stop reason: HOSPADM

## 2019-12-30 RX ORDER — DONEPEZIL HYDROCHLORIDE 10 MG/1
10 TABLET, FILM COATED ORAL NIGHTLY
Status: DISCONTINUED | OUTPATIENT
Start: 2019-12-30 | End: 2020-01-08 | Stop reason: HOSPADM

## 2019-12-30 RX ORDER — ONDANSETRON 8 MG/1
8 TABLET, ORALLY DISINTEGRATING ORAL EVERY 8 HOURS PRN
Status: DISCONTINUED | OUTPATIENT
Start: 2019-12-30 | End: 2020-01-08 | Stop reason: HOSPADM

## 2019-12-30 RX ORDER — ONDANSETRON 2 MG/ML
4 INJECTION INTRAMUSCULAR; INTRAVENOUS EVERY 8 HOURS PRN
Status: DISCONTINUED | OUTPATIENT
Start: 2019-12-30 | End: 2020-01-08 | Stop reason: HOSPADM

## 2019-12-30 RX ORDER — IPRATROPIUM BROMIDE AND ALBUTEROL SULFATE 2.5; .5 MG/3ML; MG/3ML
3 SOLUTION RESPIRATORY (INHALATION)
Status: COMPLETED | OUTPATIENT
Start: 2019-12-30 | End: 2019-12-30

## 2019-12-30 RX ORDER — BISACODYL 10 MG
10 SUPPOSITORY, RECTAL RECTAL DAILY PRN
Status: DISCONTINUED | OUTPATIENT
Start: 2019-12-30 | End: 2020-01-08 | Stop reason: HOSPADM

## 2019-12-30 RX ORDER — DIGOXIN 125 MCG
0.12 TABLET ORAL DAILY
Status: DISCONTINUED | OUTPATIENT
Start: 2019-12-31 | End: 2020-01-08 | Stop reason: HOSPADM

## 2019-12-30 RX ORDER — ASCORBIC ACID 500 MG
1000 TABLET ORAL DAILY
Status: DISCONTINUED | OUTPATIENT
Start: 2019-12-31 | End: 2020-01-08 | Stop reason: HOSPADM

## 2019-12-30 RX ORDER — METOPROLOL SUCCINATE 25 MG/1
25 TABLET, EXTENDED RELEASE ORAL DAILY
Status: DISCONTINUED | OUTPATIENT
Start: 2019-12-31 | End: 2020-01-04

## 2019-12-30 RX ORDER — BRIMONIDINE TARTRATE 1.5 MG/ML
1 SOLUTION/ DROPS OPHTHALMIC 3 TIMES DAILY
Status: DISCONTINUED | OUTPATIENT
Start: 2019-12-30 | End: 2020-01-08 | Stop reason: HOSPADM

## 2019-12-30 RX ORDER — WARFARIN SODIUM 5 MG/1
5 TABLET ORAL
Status: DISCONTINUED | OUTPATIENT
Start: 2019-12-30 | End: 2019-12-30

## 2019-12-30 RX ORDER — IPRATROPIUM BROMIDE AND ALBUTEROL SULFATE 2.5; .5 MG/3ML; MG/3ML
3 SOLUTION RESPIRATORY (INHALATION) EVERY 4 HOURS PRN
Status: DISCONTINUED | OUTPATIENT
Start: 2019-12-30 | End: 2020-01-08 | Stop reason: HOSPADM

## 2019-12-30 RX ORDER — ACETAMINOPHEN 325 MG/1
650 TABLET ORAL EVERY 6 HOURS PRN
Status: DISCONTINUED | OUTPATIENT
Start: 2019-12-30 | End: 2020-01-07

## 2019-12-30 RX ADMIN — BRIMONIDINE TARTRATE 1 DROP: 1.5 SOLUTION OPHTHALMIC at 08:12

## 2019-12-30 RX ADMIN — POTASSIUM & SODIUM PHOSPHATES POWDER PACK 280-160-250 MG 2 PACKET: 280-160-250 PACK at 11:12

## 2019-12-30 RX ADMIN — PIPERACILLIN AND TAZOBACTAM 4.5 G: 4; .5 INJECTION, POWDER, LYOPHILIZED, FOR SOLUTION INTRAVENOUS; PARENTERAL at 02:12

## 2019-12-30 RX ADMIN — PIPERACILLIN AND TAZOBACTAM 4.5 G: 4; .5 INJECTION, POWDER, FOR SOLUTION INTRAVENOUS at 11:12

## 2019-12-30 RX ADMIN — SODIUM CHLORIDE, POTASSIUM CHLORIDE, SODIUM LACTATE AND CALCIUM CHLORIDE 1000 ML: 600; 310; 30; 20 INJECTION, SOLUTION INTRAVENOUS at 11:12

## 2019-12-30 RX ADMIN — IPRATROPIUM BROMIDE AND ALBUTEROL SULFATE 3 ML: .5; 3 SOLUTION RESPIRATORY (INHALATION) at 11:12

## 2019-12-30 RX ADMIN — POTASSIUM & SODIUM PHOSPHATES POWDER PACK 280-160-250 MG 2 PACKET: 280-160-250 PACK at 07:12

## 2019-12-30 RX ADMIN — Medication 3 ML: at 09:12

## 2019-12-30 RX ADMIN — DONEPEZIL HYDROCHLORIDE 10 MG: 10 TABLET ORAL at 08:12

## 2019-12-30 NOTE — SUBJECTIVE & OBJECTIVE
Past Medical History:   Diagnosis Date    Atrial fibrillation     with rapid ventricular rate    Breast cancer     XRT    Chronic bronchitis     CKD (chronic kidney disease), stage III     Dementia     Squamous cell carcinoma        Past Surgical History:   Procedure Laterality Date    BREAST SURGERY  2011    right       Review of patient's allergies indicates:  No Known Allergies    No current facility-administered medications on file prior to encounter.      Current Outpatient Medications on File Prior to Encounter   Medication Sig    antiox#10-om3-dha-epa-lut-zeax (I-CAPS) 280-10-2 mg Cap Take by mouth 2 (two) times daily.    ascorbic acid (VITAMIN C) 1000 MG tablet Take 1,000 mg by mouth once daily.    atorvastatin (LIPITOR) 10 MG tablet TAKE 1 TABLET BY MOUTH DAILY    brimonidine 0.15 % OPTH DROP (ALPHAGAN) 0.15 % ophthalmic solution 3 (three) times daily.     calcium carb/vit D3/minerals (CALCIUM-VITAMIN D ORAL) Take by mouth once daily. 1000mg Vitamin D     digoxin (DIGOX) 125 mcg tablet Take 1 tablet (0.125 mg total) by mouth once daily.    donepezil (ARICEPT) 10 MG tablet Take 1 tablet (10 mg total) by mouth every evening.    fish oil-omega-3 fatty acids 300-1,000 mg capsule Take 2 g by mouth once daily.    hydroxyurea (HYDREA) 500 mg Cap TAKE ONE CAPSULE BY MOUTH DAILY    influenza (FLUZONE HIGH-DOSE) 180 mcg/0.5 mL vaccine Inject 0.5 mLs into the muscle.    latanoprost 0.005 % ophthalmic solution once daily.     losartan (COZAAR) 25 MG tablet TAKE 1 TABLET(25 MG) BY MOUTH EVERY DAY    metoprolol succinate (TOPROL-XL) 25 MG 24 hr tablet TAKE 1 TABLET(25 MG) BY MOUTH EVERY DAY    warfarin (COUMADIN) 2.5 MG tablet Take 2 tablets (5mg.) by mouth every day, except 3 tablets (7.5mg) on Sunday, and Thursday, Or as directed by [facility].    [DISCONTINUED] losartan (COZAAR) 25 MG tablet TAKE 1 TABLET(25 MG) BY MOUTH EVERY DAY     Family History     Problem Relation (Age of Onset)     Heart disease Father        Tobacco Use    Smoking status: Former Smoker     Packs/day: 0.25     Types: Cigarettes     Last attempt to quit: 1982     Years since quittin.0    Smokeless tobacco: Never Used    Tobacco comment: pt was social smoker   Substance and Sexual Activity    Alcohol use: No     Alcohol/week: 0.0 standard drinks    Drug use: No    Sexual activity: Never     Review of Systems   Constitutional: Positive for appetite change (chronic poor appetite). Negative for chills, diaphoresis, fatigue and fever.   HENT: Negative for congestion, hearing loss, sore throat and trouble swallowing.    Respiratory: Negative for cough, shortness of breath and wheezing.    Cardiovascular: Negative for chest pain, palpitations and leg swelling.   Gastrointestinal: Negative for abdominal distention, abdominal pain, diarrhea, nausea and vomiting.   Genitourinary: Negative for difficulty urinating, dysuria, flank pain and hematuria.   Musculoskeletal: Positive for gait problem. Negative for back pain, myalgias and neck pain.   Skin: Negative for rash and wound.   Neurological: Negative for dizziness, seizures, syncope, speech difficulty, weakness, numbness and headaches.   Psychiatric/Behavioral: Positive for confusion (chronic dementia). Negative for agitation and dysphoric mood. The patient is not nervous/anxious.      Objective:     Vital Signs (Most Recent):  Temp: 98.3 °F (36.8 °C) (19 1634)  Pulse: (!) 48 (19 1634)  Resp: 15 (19 1634)  BP: (!) 111/57 (19 1634)  SpO2: (!) 94 % (19 1634) Vital Signs (24h Range):  Temp:  [98.3 °F (36.8 °C)-98.6 °F (37 °C)] 98.3 °F (36.8 °C)  Pulse:  [48-80] 48  Resp:  [14-28] 15  SpO2:  [93 %-96 %] 94 %  BP: ()/(51-57) 111/57     Weight: 63.5 kg (140 lb)  Body mass index is 24.03 kg/m².    Physical Exam   Constitutional: She is oriented to person, place, and time. She appears well-developed. No distress.   Frail elderly female in NAD    HENT:   Head: Normocephalic and atraumatic.   Eyes: Conjunctivae and EOM are normal. Right eye exhibits no discharge. Left eye exhibits no discharge. No scleral icterus.   Neck: Normal range of motion. Neck supple. No tracheal deviation present.   Cardiovascular: Normal rate, normal heart sounds and intact distal pulses. An irregularly irregular rhythm present.   Pulmonary/Chest: Effort normal and breath sounds normal. No respiratory distress. She has no wheezes.   Abdominal: Soft. Bowel sounds are normal. She exhibits no distension. There is no tenderness.   No CVA tenderness   Musculoskeletal: Normal range of motion. She exhibits no edema, tenderness or deformity.   Neurological: She is alert and oriented to person, place, and time. No cranial nerve deficit.   Skin: Skin is warm and dry. She is not diaphoretic. No erythema.   Psychiatric: She has a normal mood and affect. Her behavior is normal.   Oriented to self, son, and place         CRANIAL NERVES     CN III, IV, VI   Extraocular motions are normal.        Significant Labs: All pertinent labs within the past 24 hours have been reviewed.    Significant Imaging: I have reviewed all pertinent imaging results/findings within the past 24 hours.

## 2019-12-30 NOTE — HPI
84 year old female with a PMHx of HTN, HLD, chronic afib on coumadin, HFrEF 15%, and dementia presenting to the ER with son at bedside for fall at home. Patient lives alone with sitter and family support. HPI provided by son as patient unable to recall details of event due to dementia. Patient had a fall yesterday afternoon. Son saw fall on video camera and arrived at patient's home in minutes. Son assisted her off the ground and patient was able to ambulate to bed with assistance. This morning, son reports patient was having increased pain and unable to ambulate 2/2 pain; therefore, was brought to ER for evaluation. At the time of my exam, patient has no complaints; she denies pain. Son reports chronic poor appetite, drinks Boost with meals at home. She denies CP, SOB, abdominal pain, N/V/D, fever/chills, dysuria/frequency, headache, palpitations, and focal weakness.       HDS on admission, afebrile. Labs significant for WBC 20.25. LA 1.9. UA with 19 WBC and many bacteria. Received Zosyn in the ER. EKG nonischemic. Troponin WNL, . Xray of sacrum and coccyx without fracture or dislocation. L hip xray with no acute fracture. CTH shows no acute hemorrhage or traumatic injury; ventriculomegaly noted.

## 2019-12-30 NOTE — ASSESSMENT & PLAN NOTE
- Normo/hypotensive on admit  - Received 1L bolus in the ER  - Held losartan  - Continue to monitor

## 2019-12-30 NOTE — ASSESSMENT & PLAN NOTE
Leukocytosis  Fall  - HDS on admission, afebrile   - Labs significant for WBC 20.25  - LA 1.9  - Received Zosyn in the ER; will continue for now given hx of MDR ESBL  - Follow up urine cultures  - Follow up blood cultures  - Xray of sacrum and coccyx without fracture or dislocation  - L hip xray with no acute fracture   - CTH shows no acute hemorrhage or traumatic injury; ventriculomegaly noted  - PT/OT consulted  - Telemetry

## 2019-12-30 NOTE — ED TRIAGE NOTES
Cat EMILI Garcia, a 84 y.o. female presents to the ED s/p fall. Patient verbalizes that she fell onto her buttocks and is now having some low back pain, pain to the buttocks and some left abdominal region pain.    Patient denies chest pain, shortness of breath, nausea and vomiting         Triage note:  Chief Complaint   Patient presents with    Fall     fall last night now having lower back butt pain      Review of patient's allergies indicates:  No Known Allergies  Past Medical History:   Diagnosis Date    Atrial fibrillation     with rapid ventricular rate    Breast cancer     XRT    Chronic bronchitis     CKD (chronic kidney disease), stage III     Dementia     Squamous cell carcinoma

## 2019-12-30 NOTE — ED NOTES
Patient identifiers verified and correct for Cat Garcia.    LOC: The patient is awake, alert and aware of environment with an appropriate affect, the patient is oriented x 4 and speaking appropriately.    APPEARANCE: Patient resting comfortably and in no acute distress, patient is clean and well groomed, patient's clothing is properly fastened.    SKIN: The skin is warm and dry, color consistent with ethnicity, patient has normal skin turgor and moist mucus membranes, skin intact, no breakdown or bruising noted.    MUSCULOSKELETAL: Patient moving all extremities spontaneously, no obvious swelling or deformities noted.    RESPIRATORY: Airway is open and patent, respirations are spontaneous, patient has a normal effort and rate, no accessory muscle use noted, right lung audible wheeze     CARDIAC: Patient has a normal rate and regular rhythm, no periphreal edema noted, capillary refill < 3 seconds.    ABDOMEN: left abdomen tender to palpation, no distention noted, normoactive bowel sounds present in all four quadrants.    NEUROLOGIC: PERRLA, 3 mm bilaterally, eyes open spontaneously, behavior appropriate to situation, follows commands, facial expression symmetrical, bilateral hand grasp equal and even, purposeful motor response noted, normal sensation in all extremities when touched with a finger.

## 2019-12-30 NOTE — PLAN OF CARE
POC reviewed with pt. Verbalized understanding although cognitive deficit present. NADN; VSS; pt denies complaint at this time. No acute event/fall this shift. Call light in reach, bed in lowest position. Safety precautions maintained.

## 2019-12-30 NOTE — H&P
[facility] Hospital Medicine  History & Physical    Patient Name: Cat Garcia  MRN: [id number]  Admission Date: 12/30/2019  Attending Physician: Trey Horvath MD   Primary Care Provider: Alfie Oconnell MD    [facility] Medicine Team: [facility] HOSP MED E Renée Menezes PA-C     Patient information was obtained from patient, relative(s), past medical records and ER records.     Subjective:     Principal Problem:Acute cystitis without hematuria    Chief Complaint:   Chief Complaint   Patient presents with    Fall     fall last night now having lower back butt pain         HPI: 84 year old female with a PMHx of HTN, HLD, chronic afib on coumadin, HFrEF 15%, and dementia presenting to the ER with son at bedside for fall at home. Patient lives alone with sitter and family support. HPI provided by son as patient unable to recall details of event due to dementia. Patient had a fall yesterday afternoon. Son saw fall on video camera and arrived at patient's home in minutes. Son assisted her off the ground and patient was able to ambulate to bed with assistance. This morning, son reports patient was having increased pain and unable to ambulate 2/2 pain; therefore, was brought to ER for evaluation. At the time of my exam, patient has no complaints; she denies pain. Son reports chronic poor appetite, drinks Boost with meals at home. She denies CP, SOB, abdominal pain, N/V/D, fever/chills, dysuria/frequency, headache, palpitations, and focal weakness.       HDS on admission, afebrile. Labs significant for WBC 20.25. LA 1.9. UA with 19 WBC and many bacteria. Received Zosyn in the ER. EKG nonischemic. Troponin WNL, . Xray of sacrum and coccyx without fracture or dislocation. L hip xray with no acute fracture. CTH shows no acute hemorrhage or traumatic injury; ventriculomegaly noted.    Past Medical History:   Diagnosis Date    Atrial fibrillation     with rapid ventricular rate    Breast cancer     XRT     Chronic bronchitis     CKD (chronic kidney disease), stage III     Dementia     Squamous cell carcinoma        Past Surgical History:   Procedure Laterality Date    BREAST SURGERY  2011    right       Review of patient's allergies indicates:  No Known Allergies    No current facility-administered medications on file prior to encounter.      Current Outpatient Medications on File Prior to Encounter   Medication Sig    antiox#10-om3-dha-epa-lut-zeax (I-CAPS) 280-10-2 mg Cap Take by mouth 2 (two) times daily.    ascorbic acid (VITAMIN C) 1000 MG tablet Take 1,000 mg by mouth once daily.    atorvastatin (LIPITOR) 10 MG tablet TAKE 1 TABLET BY MOUTH DAILY    brimonidine 0.15 % OPTH DROP (ALPHAGAN) 0.15 % ophthalmic solution 3 (three) times daily.     calcium carb/vit D3/minerals (CALCIUM-VITAMIN D ORAL) Take by mouth once daily. 1000mg Vitamin D     digoxin (DIGOX) 125 mcg tablet Take 1 tablet (0.125 mg total) by mouth once daily.    donepezil (ARICEPT) 10 MG tablet Take 1 tablet (10 mg total) by mouth every evening.    fish oil-omega-3 fatty acids 300-1,000 mg capsule Take 2 g by mouth once daily.    hydroxyurea (HYDREA) 500 mg Cap TAKE ONE CAPSULE BY MOUTH DAILY    influenza (FLUZONE HIGH-DOSE) 180 mcg/0.5 mL vaccine Inject 0.5 mLs into the muscle.    latanoprost 0.005 % ophthalmic solution once daily.     losartan (COZAAR) 25 MG tablet TAKE 1 TABLET(25 MG) BY MOUTH EVERY DAY    metoprolol succinate (TOPROL-XL) 25 MG 24 hr tablet TAKE 1 TABLET(25 MG) BY MOUTH EVERY DAY    warfarin (COUMADIN) 2.5 MG tablet Take 2 tablets (5mg.) by mouth every day, except 3 tablets (7.5mg) on Sunday, and Thursday, Or as directed by [facility].    [DISCONTINUED] losartan (COZAAR) 25 MG tablet TAKE 1 TABLET(25 MG) BY MOUTH EVERY DAY     Family History     Problem Relation (Age of Onset)    Heart disease Father        Tobacco Use    Smoking status: Former Smoker     Packs/day: 0.25     Types: Cigarettes     Last  attempt to quit: 1982     Years since quittin.0    Smokeless tobacco: Never Used    Tobacco comment: pt was social smoker   Substance and Sexual Activity    Alcohol use: No     Alcohol/week: 0.0 standard drinks    Drug use: No    Sexual activity: Never     Review of Systems   Constitutional: Positive for appetite change (chronic poor appetite). Negative for chills, diaphoresis, fatigue and fever.   HENT: Negative for congestion, hearing loss, sore throat and trouble swallowing.    Respiratory: Negative for cough, shortness of breath and wheezing.    Cardiovascular: Negative for chest pain, palpitations and leg swelling.   Gastrointestinal: Negative for abdominal distention, abdominal pain, diarrhea, nausea and vomiting.   Genitourinary: Negative for difficulty urinating, dysuria, flank pain and hematuria.   Musculoskeletal: Positive for gait problem. Negative for back pain, myalgias and neck pain.   Skin: Negative for rash and wound.   Neurological: Negative for dizziness, seizures, syncope, speech difficulty, weakness, numbness and headaches.   Psychiatric/Behavioral: Positive for confusion (chronic dementia). Negative for agitation and dysphoric mood. The patient is not nervous/anxious.      Objective:     Vital Signs (Most Recent):  Temp: 98.3 °F (36.8 °C) (19 1634)  Pulse: (!) 48 (19 1634)  Resp: 15 (19 1634)  BP: (!) 111/57 (19 1634)  SpO2: (!) 94 % (19 1634) Vital Signs (24h Range):  Temp:  [98.3 °F (36.8 °C)-98.6 °F (37 °C)] 98.3 °F (36.8 °C)  Pulse:  [48-80] 48  Resp:  [14-28] 15  SpO2:  [93 %-96 %] 94 %  BP: ()/(51-57) 111/57     Weight: 63.5 kg (140 lb)  Body mass index is 24.03 kg/m².    Physical Exam   Constitutional: She is oriented to person, place, and time. She appears well-developed. No distress.   Frail elderly female in NAD   HENT:   Head: Normocephalic and atraumatic.   Eyes: Conjunctivae and EOM are normal. Right eye exhibits no discharge. Left  eye exhibits no discharge. No scleral icterus.   Neck: Normal range of motion. Neck supple. No tracheal deviation present.   Cardiovascular: Normal rate, normal heart sounds and intact distal pulses. An irregularly irregular rhythm present.   Pulmonary/Chest: Effort normal and breath sounds normal. No respiratory distress. She has no wheezes.   Abdominal: Soft. Bowel sounds are normal. She exhibits no distension. There is no tenderness.   No CVA tenderness   Musculoskeletal: Normal range of motion. She exhibits no edema, tenderness or deformity.   Neurological: She is alert and oriented to person, place, and time. No cranial nerve deficit.   Skin: Skin is warm and dry. She is not diaphoretic. No erythema.   Psychiatric: She has a normal mood and affect. Her behavior is normal.   Oriented to self, son, and place         CRANIAL NERVES     CN III, IV, VI   Extraocular motions are normal.        Significant Labs: All pertinent labs within the past 24 hours have been reviewed.    Significant Imaging: I have reviewed all pertinent imaging results/findings within the past 24 hours.    Assessment/Plan:     * Acute cystitis without hematuria  Leukocytosis  Fall  - HDS on admission, afebrile   - Labs significant for WBC 20.25  - LA 1.9  - Received Zosyn in the ER; will continue for now given hx of MDR ESBL  - Follow up urine cultures  - Follow up blood cultures  - Xray of sacrum and coccyx without fracture or dislocation  - L hip xray with no acute fracture   - CTH shows no acute hemorrhage or traumatic injury; ventriculomegaly noted  - PT/OT consulted  - Telemetry    Essential hypertension  - Normo/hypotensive on admit  - Received 1L bolus in the ER  - Held losartan  - Continue to monitor    CKD (chronic kidney disease), stage III  - Chronic and stable  - Continue to monitor    Atrial fibrillation, chronic  Long term use of anticoagulation  - Rate controlled on admit  - INR 2.0   - Continue home Toprol XL and coumadin  -  PT/INR daily    Hypophosphatemia  - Phos 2.1, replaced PO  - Trend daily    Chronic combined systolic and diastolic CHF, NYHA class 3  HFrEF 15%  - Euvolemic on exam  - EKG without changes  - Troponin negative,   - Cardiac diet  - Strict I/Os, daily weights    Dyslipidemia  - Continue home lipitor    Mixed Alzheimer's and vascular dementia  - Oriented to self and son; unable to report events of fall yesterday  - Continue home aricept qhs  - Son looking into assisted living facilities      VTE Risk Mitigation (From admission, onward)         Ordered     warfarin (COUMADIN) tablet 5 mg  Every Sunday 12/30/19 1530     warfarin tablet 7.5 mg  Every Friday 12/30/19 1530     warfarin (COUMADIN) tablet 5 mg  Every Wednesday 12/30/19 1530     warfarin (COUMADIN) tablet 5 mg  Every Tues, Thurs, Sat      12/30/19 1530     warfarin tablet 7.5 mg  Every Monday 12/30/19 1530     Place PAUL hose  Until discontinued      12/30/19 1458     Place sequential compression device  Until discontinued      12/30/19 1458     Reason for No Pharmacological VTE Prophylaxis  Once     Question:  Reasons:  Answer:  Already adequately anticoagulated on oral Anticoagulants    12/30/19 1458     IP VTE HIGH RISK PATIENT  Once      12/30/19 1458                   Renée Menezes PA-C  Department of Hospital Medicine   [facility]

## 2019-12-30 NOTE — ASSESSMENT & PLAN NOTE
HFrEF 15%  - Euvolemic on exam  - EKG without changes  - Troponin negative,   - Cardiac diet  - Strict I/Os, daily weights

## 2019-12-30 NOTE — ED PROVIDER NOTES
Encounter Date: 2019       History     Chief Complaint   Patient presents with    Fall     fall last night now having lower back butt pain      84-year-old woman with comorbidities of mild dementia, cardiomyopathy with EF less than 20%, chronic atrial fibrillation on anticoagulation presents to the emergency department for evaluation of injury sustained in a fall yesterday afternoon.  The accompanying son reports the patient complained of left hip pain but was able to tolerate mild weight-bearing yesterday, but today is unable to ambulate without significant assistance.  At the time of my exam, the patient describes mild left-sided low back and hip pain without headache, chest pain, or additional shortness of breath.        Review of patient's allergies indicates:  No Known Allergies  Past Medical History:   Diagnosis Date    Atrial fibrillation     with rapid ventricular rate    Breast cancer     XRT    Chronic bronchitis     CKD (chronic kidney disease), stage III     Dementia     Squamous cell carcinoma      Past Surgical History:   Procedure Laterality Date    BREAST SURGERY      right     Family History   Problem Relation Age of Onset    Heart disease Father     Melanoma Neg Hx      Social History     Tobacco Use    Smoking status: Former Smoker     Packs/day: 0.25     Types: Cigarettes     Last attempt to quit: 1982     Years since quittin.0    Smokeless tobacco: Never Used    Tobacco comment: pt was social smoker   Substance Use Topics    Alcohol use: No     Alcohol/week: 0.0 standard drinks    Drug use: No     Review of Systems   Constitutional: Positive for fatigue. Negative for chills and fever.   HENT: Negative for nosebleeds and trouble swallowing.    Respiratory: Negative for chest tightness and shortness of breath.    Cardiovascular: Negative for chest pain and palpitations.   Gastrointestinal: Negative for nausea and vomiting.   Genitourinary: Negative for dysuria and  hematuria.   Musculoskeletal: Positive for arthralgias and gait problem.   Skin: Negative for rash and wound.   Neurological: Negative for seizures and speech difficulty.   Hematological: Bruises/bleeds easily.   Psychiatric/Behavioral: Positive for confusion (Chronic dementia). Negative for agitation.       Physical Exam     Initial Vitals [12/30/19 1054]   BP Pulse Resp Temp SpO2   (!) 91/53 80 18 98.6 °F (37 °C) 95 %      MAP       --         Physical Exam    Vitals reviewed.  Constitutional:   84-year-old  woman, no acute distress noted, mildly anxious   HENT:   Head: Normocephalic and atraumatic.   Dentition intact without evidence of acute intraoral injury   Eyes: EOM are normal. Pupils are equal, round, and reactive to light.   Neck: No tracheal deviation present.   Cardiovascular:   Irregularly irregular, rate controlled, mild bradycardia   Pulmonary/Chest: No stridor.   Mild to moderate expiratory wheeze noted to right upper as well as left upper and left lower lung zones without respiratory distress or accessory muscle use   Abdominal: Soft. She exhibits no distension. There is no tenderness. There is no rebound.   Mild left flank tenderness to palpation noted   Musculoskeletal:   There is mild palpation tenderness of the lower lumbar paraspinous musculature without midline tenderness or deformity   Neurological:   Patient is oriented to person and place   Skin: Skin is warm and dry.   Psychiatric:   Mildly anxious, cooperative with exam         ED Course   Procedures  Labs Reviewed   CBC W/ AUTO DIFFERENTIAL - Abnormal; Notable for the following components:       Result Value    WBC 20.25 (*)     RBC 3.73 (*)     Mean Corpuscular Volume 111 (*)     Mean Corpuscular Hemoglobin 34.6 (*)     Mean Corpuscular Hemoglobin Conc 31.2 (*)     Platelets 717 (*)     Gran # (ANC) 17.9 (*)     Immature Grans (Abs) 0.10 (*)     Lymph # 0.8 (*)     Mono # 1.4 (*)     Gran% 88.6 (*)     Lymph% 3.8 (*)     All  other components within normal limits   COMPREHENSIVE METABOLIC PANEL - Abnormal; Notable for the following components:    Glucose 145 (*)     eGFR if  54.0 (*)     eGFR if non  46.9 (*)     All other components within normal limits   PROTIME-INR - Abnormal; Notable for the following components:    Prothrombin Time 18.9 (*)     INR 2.0 (*)     All other components within normal limits   B-TYPE NATRIURETIC PEPTIDE - Abnormal; Notable for the following components:     (*)     All other components within normal limits   PHOSPHORUS - Abnormal; Notable for the following components:    Phosphorus 2.1 (*)     All other components within normal limits   CULTURE, BLOOD   CULTURE, BLOOD   TROPONIN I   MAGNESIUM   LACTIC ACID, PLASMA   URINALYSIS, REFLEX TO URINE CULTURE     EKG Readings: (Independently Interpreted)   Initial Reading: No STEMI. Rhythm: Atrial Fibrillation. Heart Rate: 55. Ectopy: No Ectopy. ST Segments: Normal ST Segments. Axis: Normal.     ECG Results          EKG 12-lead (In process)  Result time 12/30/19 12:03:20    In process by Interface, Lab In [facility] (12/30/19 12:03:20)                 Narrative:    Test Reason : W19.XXXA,Y92.009,    Vent. Rate : 055 BPM     Atrial Rate : 053 BPM     P-R Int : 000 ms          QRS Dur : 072 ms      QT Int : 414 ms       P-R-T Axes : 000 -07 165 degrees     QTc Int : 396 ms    Age and gender specific analysis  Atrial fibrillation with slow ventricular response  Nonspecific ST and T wave abnormality , probably digitalis effect  Abnormal ECG  When compared with ECG of 10-FEB-2016 09:30,  Nonspecific T wave abnormality, improved in Lateral leads    Referred By: [email or web address]   SELF           Confirmed By:                             Imaging Results          X-Ray Chest PA And Lateral (Final result)  Result time 12/30/19 13:34:09    Final result by Amber Nelson MD (12/30/19 13:34:09)                 Impression:      Please see  above.      Electronically signed by: Amber Nelson MD  Date:    12/30/2019  Time:    13:34             Narrative:    EXAMINATION:  XR CHEST PA AND LATERAL    CLINICAL HISTORY:  Unspecified fall, initial encounter    TECHNIQUE:  PA and lateral views of the chest were performed.    COMPARISON:  08/04/2014.    FINDINGS:  Soft tissues of the patient's arms project over lateral view obscuring some detail of the retrosternal airspace and mediastinal margins.    Numerous EKG leads overlie the image obscuring detail of the lateral aspect of the right lower lung zone    Mediastinal structures are midline.  Cardiac silhouette is stable.  Calcific plaque noted at the level of the arch.    I detect no pneumonia, pulmonary contusion or other focal pulmonary disease and no pleural fluid, pneumothorax, pneumomediastinum, pneumoperitoneum.    I detect no significant osseous abnormality in this patient status post fall, noting that chest radiographs are performed with dedicated chest technique at the expense of bone detail.  If clinical concern for thoracic injury persists, CT would provide further information.                               X-Ray Hip 2 View Left (Final result)  Result time 12/30/19 13:31:37    Final result by Elliot Tang MD (12/30/19 13:31:37)                 Impression:      No acute fracture      Electronically signed by: Elliot Tang MD  Date:    12/30/2019  Time:    13:31             Narrative:    EXAMINATION:  XR HIP 2 VIEW LEFT    CLINICAL HISTORY:  Unspecified fall, initial encounter    TECHNIQUE:  AP view of the pelvis and frog leg lateral view of the left hip were performed.    COMPARISON:  None    FINDINGS:  X-ray of the pelvis and left hip show no fracture or dislocation.  Phleboliths are seen in the pelvis.  Degenerative disc disease at L4-5 with vacuum phenomenon.  Vascular calcifications are noted from atherosclerosis in the pelvis and upper legs.                               X-Ray Sacrum  And Coccyx (Final result)  Result time 12/30/19 13:33:34    Final result by Elliot Tang MD (12/30/19 13:33:34)                 Impression:      No convincing evidence of fracture or dislocation.  Degenerative changes      Electronically signed by: Elliot Tang MD  Date:    12/30/2019  Time:    13:33             Narrative:    EXAMINATION:  XR SACRUM AND COCCYX    CLINICAL HISTORY:  Unspecified fall, initial encounter    TECHNIQUE:  Two or three views of the sacrum and coccyx were performed.    COMPARISON:  None    FINDINGS:  X-ray of the sacrum and coccyx show degenerative changes but no convincing evidence of fracture or alignment abnormality.  Severe degenerative disc disease is present in the lower lumbar spine at L4-5, L5-S1 and L3-4.                               CT Head Without Contrast (Final result)  Result time 12/30/19 12:12:28    Final result by Parmjit Hakns MD (12/30/19 12:12:28)                 Impression:      No evidence of recent hemorrhage or other acute traumatic injury.    Ventriculomegaly out of proportion to the degree of sulcal enlargement.  While this could reflect central predominant cerebral volume loss, the configuration does raise the possibility of normal pressure hydrocephalus.  Clinical correlation advised.    Mild to moderate chronic microvascular ischemic disease.      Electronically signed by: Parmjit Hanks MD  Date:    12/30/2019  Time:    12:12             Narrative:    EXAMINATION:  CT HEAD WITHOUT CONTRAST    CLINICAL HISTORY:  Head trauma, ataxia;    TECHNIQUE:  Low dose axial CT images obtained throughout the head without the use of intravenous contrast.  Axial, sagittal and coronal reconstructions were performed.    COMPARISON:  MRI 10/16/2014    FINDINGS:  Intracranial compartment:    Ventricles remain prominent size with a 3rd ventricle diameter measuring up to 1.2 cm.  Ventricular enlargement out of proportion of degree of sulcal enlargement with some crowding  of the sulci near the vertex.  Expansion of the sylvian fissures.    Patchy hypoattenuation in the supratentorial white matter suggesting mild to moderate chronic microvascular ischemic disease.  No parenchymal mass, hemorrhage, edema or major vascular distribution infarct.    No extra-axial blood or fluid collections.    Skull/extracranial contents (limited evaluation):    No fracture. Mastoid air cells and paranasal sinuses are essentially clear.                                 Medical Decision Making:   History:   Old Medical Records: I decided to obtain old medical records.  Old Records Summarized: records from clinic visits.  Differential Diagnosis:   Fall at home, multiple contusions, left hip fracture, ICH, lethal arrhythmia, electrolyte derangement, a KI  Clinical Tests:   Lab Tests: Ordered and Reviewed  Radiological Study: Ordered and Reviewed  Medical Tests: Reviewed and Ordered  Other:   I have discussed this case with another health care provider.              Attending Attestation:             Attending ED Notes:   Emergency department evaluation today does reveal evidence of a significant leukocytosis with granulocytosis in this patient presenting after a fall.  Plain films do not reveal evidence of a shandra or displaced fracture, but do identify presence of a degenerative intervertebral discs of the sacrum on plain film.  CT scan obtained of the brain does not reveal evidence of intracranial hemorrhage or mass effect, and chest x-ray does not reveal evidence of consolidation.  Urinalysis does reveal findings of mild to moderate pyuria for which cultures and IV Rocephin have been administered.  I do not feel that this patient is able to return home and care for herself as she is incapable and or unwilling to bear weight and ambulate unassisted.  Findings and concerns have been discussed with internal medicine on call, and she will be placed in observation under the care in fair condition for further  therapy and management.                    Clinical Impression:       ICD-10-CM ICD-9-CM   1. Acute cystitis without hematuria N30.00 595.0   2. Fall at home, initial encounter W19.XXXA E888.9    Y92.009 E849.0   3. Acute left-sided low back pain without sciatica M54.5 724.2         Disposition:   Disposition: Placed in Observation  Condition: Fair  IM                     Wilfred Pizano MD  12/30/19 3593

## 2019-12-30 NOTE — PLAN OF CARE
12/30/19 1614   Post-Acute Status   Post-Acute Authorization Other   Other Status No Post-Acute Service Needs     SW is following this Pt for DC planning needs. There are no identified needs at this time.      Ree Greenfield LMSW  [facility]  [id number]

## 2019-12-30 NOTE — ASSESSMENT & PLAN NOTE
- Oriented to self and son; unable to report events of fall yesterday  - Continue home aricept qhs  - Son looking into assisted living facilities

## 2019-12-30 NOTE — ASSESSMENT & PLAN NOTE
Long term use of anticoagulation  - Rate controlled on admit  - INR 2.0   - Continue home Toprol XL and coumadin  - PT/INR daily

## 2019-12-31 LAB
ANION GAP SERPL CALC-SCNC: 9 MMOL/L (ref 8–16)
BASOPHILS # BLD AUTO: 0.05 K/UL (ref 0–0.2)
BASOPHILS NFR BLD: 0.3 % (ref 0–1.9)
BUN SERPL-MCNC: 16 MG/DL (ref 8–23)
CALCIUM SERPL-MCNC: 9.2 MG/DL (ref 8.7–10.5)
CHLORIDE SERPL-SCNC: 108 MMOL/L (ref 95–110)
CO2 SERPL-SCNC: 25 MMOL/L (ref 23–29)
CREAT SERPL-MCNC: 1 MG/DL (ref 0.5–1.4)
DIFFERENTIAL METHOD: ABNORMAL
EOSINOPHIL # BLD AUTO: 0.2 K/UL (ref 0–0.5)
EOSINOPHIL NFR BLD: 1 % (ref 0–8)
ERYTHROCYTE [DISTWIDTH] IN BLOOD BY AUTOMATED COUNT: 13.2 % (ref 11.5–14.5)
EST. GFR  (AFRICAN AMERICAN): >60 ML/MIN/1.73 M^2
EST. GFR  (NON AFRICAN AMERICAN): 52.6 ML/MIN/1.73 M^2
GLUCOSE SERPL-MCNC: 94 MG/DL (ref 70–110)
HCT VFR BLD AUTO: 37.5 % (ref 37–48.5)
HGB BLD-MCNC: 11.7 G/DL (ref 12–16)
IMM GRANULOCYTES # BLD AUTO: 0.09 K/UL (ref 0–0.04)
IMM GRANULOCYTES NFR BLD AUTO: 0.5 % (ref 0–0.5)
INR PPP: 2.3 (ref 0.8–1.2)
LYMPHOCYTES # BLD AUTO: 1.3 K/UL (ref 1–4.8)
LYMPHOCYTES NFR BLD: 7.9 % (ref 18–48)
MAGNESIUM SERPL-MCNC: 2 MG/DL (ref 1.6–2.6)
MCH RBC QN AUTO: 34.7 PG (ref 27–31)
MCHC RBC AUTO-ENTMCNC: 31.2 G/DL (ref 32–36)
MCV RBC AUTO: 111 FL (ref 82–98)
MONOCYTES # BLD AUTO: 1.5 K/UL (ref 0.3–1)
MONOCYTES NFR BLD: 8.9 % (ref 4–15)
NEUTROPHILS # BLD AUTO: 13.5 K/UL (ref 1.8–7.7)
NEUTROPHILS NFR BLD: 81.4 % (ref 38–73)
NRBC BLD-RTO: 0 /100 WBC
PHOSPHATE SERPL-MCNC: 3 MG/DL (ref 2.7–4.5)
PLATELET # BLD AUTO: 646 K/UL (ref 150–350)
PMV BLD AUTO: 10.7 FL (ref 9.2–12.9)
POTASSIUM SERPL-SCNC: 3.7 MMOL/L (ref 3.5–5.1)
PROTHROMBIN TIME: 21.5 SEC (ref 9–12.5)
RBC # BLD AUTO: 3.37 M/UL (ref 4–5.4)
SODIUM SERPL-SCNC: 142 MMOL/L (ref 136–145)
WBC # BLD AUTO: 16.56 K/UL (ref 3.9–12.7)

## 2019-12-31 PROCEDURE — 25000003 PHARM REV CODE 250: Performed by: PHYSICIAN ASSISTANT

## 2019-12-31 PROCEDURE — 99233 SBSQ HOSP IP/OBS HIGH 50: CPT | Mod: ,,, | Performed by: PHYSICIAN ASSISTANT

## 2019-12-31 PROCEDURE — 84100 ASSAY OF PHOSPHORUS: CPT

## 2019-12-31 PROCEDURE — 85610 PROTHROMBIN TIME: CPT

## 2019-12-31 PROCEDURE — 97165 OT EVAL LOW COMPLEX 30 MIN: CPT

## 2019-12-31 PROCEDURE — 97116 GAIT TRAINING THERAPY: CPT

## 2019-12-31 PROCEDURE — 85025 COMPLETE CBC W/AUTO DIFF WBC: CPT

## 2019-12-31 PROCEDURE — 63600175 PHARM REV CODE 636 W HCPCS: Performed by: INTERNAL MEDICINE

## 2019-12-31 PROCEDURE — 97530 THERAPEUTIC ACTIVITIES: CPT

## 2019-12-31 PROCEDURE — A4216 STERILE WATER/SALINE, 10 ML: HCPCS | Performed by: PHYSICIAN ASSISTANT

## 2019-12-31 PROCEDURE — 99900035 HC TECH TIME PER 15 MIN (STAT)

## 2019-12-31 PROCEDURE — 99233 PR SUBSEQUENT HOSPITAL CARE,LEVL III: ICD-10-PCS | Mod: ,,, | Performed by: PHYSICIAN ASSISTANT

## 2019-12-31 PROCEDURE — 83735 ASSAY OF MAGNESIUM: CPT

## 2019-12-31 PROCEDURE — 80048 BASIC METABOLIC PNL TOTAL CA: CPT

## 2019-12-31 PROCEDURE — 97161 PT EVAL LOW COMPLEX 20 MIN: CPT

## 2019-12-31 PROCEDURE — 94761 N-INVAS EAR/PLS OXIMETRY MLT: CPT

## 2019-12-31 PROCEDURE — 96376 TX/PRO/DX INJ SAME DRUG ADON: CPT

## 2019-12-31 PROCEDURE — 11000001 HC ACUTE MED/SURG PRIVATE ROOM

## 2019-12-31 PROCEDURE — 36415 COLL VENOUS BLD VENIPUNCTURE: CPT

## 2019-12-31 RX ADMIN — WARFARIN SODIUM 5 MG: 5 TABLET ORAL at 05:12

## 2019-12-31 RX ADMIN — Medication 3 ML: at 08:12

## 2019-12-31 RX ADMIN — ATORVASTATIN CALCIUM 10 MG: 10 TABLET, FILM COATED ORAL at 09:12

## 2019-12-31 RX ADMIN — HYDROXYUREA 500 MG: 500 CAPSULE ORAL at 09:12

## 2019-12-31 RX ADMIN — PIPERACILLIN AND TAZOBACTAM 4.5 G: 4; .5 INJECTION, POWDER, FOR SOLUTION INTRAVENOUS at 10:12

## 2019-12-31 RX ADMIN — LATANOPROST 1 DROP: 50 SOLUTION OPHTHALMIC at 09:12

## 2019-12-31 RX ADMIN — PIPERACILLIN AND TAZOBACTAM 4.5 G: 4; .5 INJECTION, POWDER, FOR SOLUTION INTRAVENOUS at 06:12

## 2019-12-31 RX ADMIN — BRIMONIDINE TARTRATE 1 DROP: 1.5 SOLUTION OPHTHALMIC at 02:12

## 2019-12-31 RX ADMIN — Medication 3 ML: at 06:12

## 2019-12-31 RX ADMIN — BRIMONIDINE TARTRATE 1 DROP: 1.5 SOLUTION OPHTHALMIC at 09:12

## 2019-12-31 RX ADMIN — ACETAMINOPHEN 650 MG: 325 TABLET ORAL at 09:12

## 2019-12-31 RX ADMIN — BRIMONIDINE TARTRATE 1 DROP: 1.5 SOLUTION OPHTHALMIC at 08:12

## 2019-12-31 RX ADMIN — Medication 3 ML: at 02:12

## 2019-12-31 RX ADMIN — PIPERACILLIN AND TAZOBACTAM 4.5 G: 4; .5 INJECTION, POWDER, FOR SOLUTION INTRAVENOUS at 02:12

## 2019-12-31 RX ADMIN — DONEPEZIL HYDROCHLORIDE 10 MG: 10 TABLET ORAL at 08:12

## 2019-12-31 NOTE — ASSESSMENT & PLAN NOTE
Leukocytosis  Fall  - HDS on admission, afebrile   - Labs significant for WBC 20.25>11.7  - LA 1.9  - Received Zosyn in the ER; will continue for now given hx of MDR ESBL  - Follow up urine cultures  - Follow up blood cultures NGTD  - Xray of sacrum and coccyx without fracture or dislocation  - L hip xray with no acute fracture   - CTH shows no acute hemorrhage or traumatic injury; ventriculomegaly noted  - PT/OT consulted, recommending SNF, CM aware  - Telemetry

## 2019-12-31 NOTE — PLAN OF CARE
"Cat Garcia is a 84 y.o. female admitted to [facility] on 2019 for Acute cystitis without hematuria, fall at home with L hip x-rays (-). Cat Garcia tolerated evaluation fair today. She is pleasant during session but some obvious dementia is present. Perseverates on lower back and L hip pain, asking for assistance before even attempting mobility. Ultimately requires max assist for bed mobility but able to stand to RW with minimal assist. Ambulates 12 ft to/from bathroom with rolling walker and minimal assist for walker management, frequently states "help" when up and moving, appears to be out of fear (son states this is common for patient). I feel she has good potential for mobility improvement, c/o significant soreness at lower back and buttocks today, once this dissipates mobility should improve. Discussed PT role, POC, goals and recommendations (Skilled Nursing Facility) with son; verbalized understanding. Cat Garcia would benefit from acute PT services to promote mobility during this admission and improve return to PLOF.    Problem: Physical Therapy Goal  Goal: Physical Therapy Goal  Description  Goals to be met by: 20     Patient will increase functional independence with mobility by performin. Supine to sit with Stand-by Assistance - Not met  2. Sit to supine with Stand-by Assistance - Not met  3. Sit to stand transfer with Stand-by Assistance with RW - Not met  4. Bed to chair transfer with Stand-by Assistance using Rolling Walker - Not met  5. Gait  x 75 feet with Contact Guard Assistance using Rolling Walker - Not met    Outcome: Ongoing, Progressing    Alvarez Ovalle, PT  2019  "

## 2019-12-31 NOTE — HOSPITAL COURSE
84 y.o. who was admitted to hospital medicine s/p mechanical fall. Hospital course complicated by UTI. Initially started on Zosyn due to hx of MDR however, culture grew pan sensitive E. Coli. Patient switched to Augmentin to complete a full course. It was recommended by PTOT that patient discharge to SNF. The patients son was in agreement and opted to admit the patient into an assisted living facility with PTOT services. The patient's INR fluctuated throughout her stay. The inpatient pharmacist was consulted for assistance. Suspect antibiotics affecting INR. Home health services were ordered for further monitoring of the INR at discharge. Patient discharged in the care of her son, in stable condition.

## 2019-12-31 NOTE — PT/OT/SLP EVAL
"Physical Therapy  Evaluation and Treatment    Cat Garcia   [id number]    Time Tracking:     PT Received On: 12/31/19   PT Start Time: 0906   PT Stop Time: 0936   PT Total Time (min): 30 min    Billable Minutes: Evaluation 20 and Gait Training 10      Recommendations:     Discharge recommendations: Skilled Nursing Facility     Equipment recommendations: Bedside Commode    Barriers to Discharge: Decreased caregiver support (has caregiver support during day but none in evening/night for now)    Patient Information:     Recent Surgery: * No surgery found *    Diagnosis: Acute cystitis without hematuria    Length of Stay: 1 day    General Precautions: Standard, fall  Orthopedic Precautions: None; x-rays of L hip (-)    Assessment:     Cat Garcia is a 84 y.o. female admitted to [facility] on 12/30/2019 for Acute cystitis without hematuria. Cat Garcia tolerated evaluation fair today. She is pleasant during session but some obvious dementia is present. Perseverates lower back and L hip pain, asking for assistance before even attempting mobility. Ultimately requires max assist for bed mobility but able to stand to RW with minimal assist. Ambulates 12 ft to/from bathroom with rolling walker and minimal assist for walker management, frequently states "help" when up and moving, appears to be out of fear (son states this is common for patient). I feel she has good potention for mobility improvement, c/o significant soreness at lower back and buttocks today, once this dissipates mobility should improve. Discussed PT role, POC, goals and recommendations (Skilled Nursing Facility) with son; verbalized understanding. Cat Garcia would benefit from acute PT services to promote mobility during this admission and improve return to PLOF.    Problem List: weakness, decreased endurance, impaired self-care skills, impaired mobility, decreased sitting or standing balance, gait instability, decreased cognition and pain    Rehab " Prognosis: Fair; patient would benefit from acute skilled PT services to address these deficits and reach maximum level of function.    Plan:     Patient to be seen 3 x/week to address the above listed problems via gait training, therapeutic activities, therapeutic exercises, neuromuscular re-education    Plan of Care Expires: 01/30/20  Plan of Care reviewed with: son    Subjective:     Communicated with RN prior to evaluation, appropriate to see for evaluation.    Pt found supine in bed (HOB elevated) with bed alarm on, son present upon PT entry to room. Son agreeable to evaluation.    Does this patient have any cultural, spiritual, Scientologist conflicts given the current situation? Patient has no barriers to learning. Patient verbalizes understanding of his/her program and goals and demonstrates them correctly. No cultural, spiritual, or educational needs identified.    Past Medical History:   Diagnosis Date    Atrial fibrillation     with rapid ventricular rate    Breast cancer     XRT    Chronic bronchitis     CKD (chronic kidney disease), stage III     Dementia     Squamous cell carcinoma        Past Surgical History:   Procedure Laterality Date    BREAST SURGERY  2011    right     Living Environment:  Pt is a poor historian so son is available to give all history today. Patient lives alone (no family) with consistent caregivers during the day but none available at night. Lives in a 1  with 0 TATI, tub/shower available for bathing, has bath bench.    PLOF:  Prior to admission, patient was ambulating within home but holding onto furniture and walls, typically refusing using her rolling walker. Needs assistance for dressing, gets on/off toilet without assistance, uses tub bench for bathing but gets assistance for washing. Not driving.    DME:  Patient owns or has access to the following DME: Rolling Walker, Transfer Tub Bench and Wheelchair    Upon discharge, patient will have assistance from caregivers  "during day, son at times as well. No assistance currently available at night.    Objective:     Patient found with: bed alarm, peripheral IV    Pain:  Pain Rating 1: unable to numerically rate pain, c/o pain to lower back and L buttocks  Pain Rating Post-Intervention 1: resting comfortably in bed    Cognitive Exam:  Patient is oriented to Person and Place ([facility]). Not oriented to time and/or situation which son states is baseline for patient.  Patient follows ~80% of single-step commands.    Sensation:   Unable to formally assess 2* cognition    Lower Extremity Range of Motion:  Right Lower Extremity: WFL  Left Lower Extremity: WFL    Lower Extremity Strength:  Right Lower Extremity: grossly 3/5 via MMT at EOB  Left Lower Extremity: grossly 3/5 via MMT at EOB    Functional Mobility:    · Bed Mobility:  · Supine to Sitting: max (A)  · Sitting to Supine: max (A)    · Transfers:  · Sit to Stand: min (A) from EOB with RW x 1 trial  · Stand to Sit: min (A) to EOB with RW x 1 trial    · Toilet Transfer: min (A) on/off toilet with R grab bar, L hand on RW x 1 trial    · Gait:  · 12 feet x 2 trials (to/from bathroom) with rolling walker with minimal assist primarily walker management. If therapist rolls/advances walker then she responds by stepping appropriately, otherwise she is content standing. She does perseverate on the phrase "help" while standing and walking which son states is a "nervous tic" for the patient.    · Assist level: Min Assist  · Device: Rolling walker    · Balance:  · Static Sit: Stand-By Assist at EOB    · Static Stand: Contact-Guard Assist with Rolling walker    Additional Therapeutic Activity/Exercises:     1. Assisted into bathroom as patient had stated needing to use restroom. However, upon assisting up to side of bed it was obvious that she had already urinated some on blue pad in bed. Assisted her to toilet in bathroom regardless, she was unable to void anymore on toilet despite feeling " as if she did need to urinate. Assisted back to bed at end of session with alarm back on.    2. Discussed PT role, POC, goals and recommendations with son; verbalized understanding.    3. Whiteboard was updated.    AM-PAC 6 CLICK MOBILITY  Turning over in bed (including adjusting bedclothes, sheets and blankets)?: 2  Sitting down on and standing up from a chair with arms (e.g., wheelchair, bedside commode, etc.): 3  Moving from lying on back to sitting on the side of the bed?: 2  Moving to and from a bed to a chair (including a wheelchair)?: 3  Need to walk in hospital room?: 3  Climbing 3-5 steps with a railing?: 1  Basic Mobility Total Score: 14    Patient was left supine in bed (HOB elevated) with all lines intact, call button in reach, RN notified and son present. Left C&D rolling walker in room for patient to use with staff.    Clinical Decision Making for Evaluation Complexity:  1. Body System(s) Examination: 1-2  2. Clinical Presentation: Evolving  3. Evaluation Complexity: Low    GOALS:   Multidisciplinary Problems     Physical Therapy Goals        Problem: Physical Therapy Goal    Goal Priority Disciplines Outcome Goal Variances Interventions   Physical Therapy Goal     PT, PT/OT      Description:  Goals to be met by: 20     Patient will increase functional independence with mobility by performin. Supine to sit with Stand-by Assistance - Not met  2. Sit to supine with Stand-by Assistance - Not met  3. Sit to stand transfer with Stand-by Assistance with RW - Not met  4. Bed to chair transfer with Stand-by Assistance using Rolling Walker - Not met  5. Gait  x 75 feet with Contact Guard Assistance using Rolling Walker - Not met                   Alvarez Ovalle, PT  2019

## 2019-12-31 NOTE — PLAN OF CARE
Antibiotic therapy in progress for UTI. Call bell in reach. Patient near nurse station for close monitoring.   Tele monitor in use. Patient bradycardic (37-55bpm).  No acute distress noted.   Incontinent care provided. Skin intact.

## 2019-12-31 NOTE — SUBJECTIVE & OBJECTIVE
Interval History: No reported events overnight. Patient seen at bedside this AM resting in [facility]. Son at bedside. Extensively discussed plan of care. WBC improving on AM labs. Continue Zosyn for now.     Review of Systems  Objective:     Vital Signs (Most Recent):  Temp: 97.6 °F (36.4 °C) (12/31/19 0724)  Pulse: (!) 45 (12/31/19 1127)  Resp: 17 (12/31/19 0724)  BP: (!) 115/54 (12/31/19 0724)  SpO2: 95 % (12/31/19 0724) Vital Signs (24h Range):  Temp:  [97.6 °F (36.4 °C)-99.1 °F (37.3 °C)] 97.6 °F (36.4 °C)  Pulse:  [41-70] 45  Resp:  [14-28] 17  SpO2:  [93 %-96 %] 95 %  BP: (103-117)/(51-60) 115/54     Weight: 57.6 kg (126 lb 15.8 oz)  Body mass index is 21.8 kg/m².    Intake/Output Summary (Last 24 hours) at 12/31/2019 1153  Last data filed at 12/31/2019 1000  Gross per 24 hour   Intake 1560 ml   Output 400 ml   Net 1160 ml      Physical Exam   Constitutional: She appears well-developed. No distress.   Frail elderly female in NAD   HENT:   Head: Normocephalic and atraumatic.   Eyes: EOM are normal. Right eye exhibits no discharge. Left eye exhibits no discharge.   Neck: Normal range of motion. Neck supple.   Cardiovascular: Normal rate and normal heart sounds. An irregularly irregular rhythm present.   Pulmonary/Chest: Effort normal. No respiratory distress. She has no wheezes.   Abdominal: Soft. She exhibits no distension. There is no tenderness.   No CVA tenderness   Musculoskeletal: Normal range of motion. She exhibits no edema or tenderness.   Neurological: She is alert.   Oriented to name, place, son at bedside; unsure of year; strength equal in BLE and BUE, follows commands   Skin: Skin is warm and dry. She is not diaphoretic. No erythema.   Psychiatric: She has a normal mood and affect. Her behavior is normal.   Oriented to self, son, and place       Significant Labs: All pertinent labs within the past 24 hours have been reviewed.    Significant Imaging: I have reviewed all pertinent imaging results/findings  within the past 24 hours.

## 2019-12-31 NOTE — PLAN OF CARE
12/31/19 1114   Discharge Assessment   Assessment Type Discharge Planning Assessment   Confirmed/corrected address and phone number on facesheet? Yes   Assessment information obtained from? Caregiver;Medical Record  (Abdon hyatt)   Expected Length of Stay (days) 3   Communicated expected length of stay with patient/caregiver yes   Prior to hospitilization cognitive status: Not Oriented to Time   Prior to hospitalization functional status: Needs Assistance   Current cognitive status: Not Oriented to Time   Current Functional Status: Needs Assistance   Lives With alone  (with sitters)   Able to Return to Prior Arrangements yes   Is patient able to care for self after discharge? No   Who are your caregiver(s) and their phone number(s)? Abdon hyatt [phone]   Patient's perception of discharge disposition skilled nursing facility   Readmission Within the Last 30 Days no previous admission in last 30 days   Patient currently being followed by outpatient case management? Unable to determine (comments)   Patient currently receives any other outside agency services? No   Equipment Currently Used at Home walker, rolling;bath bench;wheelchair   Do you have any problems affording any of your prescribed medications? No   Is the patient taking medications as prescribed? yes   Does the patient have transportation home? Yes   Transportation Anticipated family or friend will provide   Dialysis Name and Scheduled days n/a   Does the patient receive services at the [facility] Clinic? Yes  ([facility])   Discharge Plan A Skilled Nursing Facility   Discharge Plan B Home Health;Home with family   DME Needed Upon Discharge  none   Patient/Family in Agreement with Plan yes

## 2019-12-31 NOTE — PT/OT/SLP EVAL
Occupational Therapy   Evaluation    Name: Cat Garcia  MRN: [id number]  Admitting Diagnosis:  Acute cystitis without hematuria      Recommendations:     Discharge Recommendations: nursing facility, skilled  Discharge Equipment Recommendations:  bedside commode  Barriers to discharge:  Decreased caregiver support    Assessment:     Cat Garcia is a 84 y.o. female with a medical diagnosis of Acute cystitis without hematuria. Pt. currently demonstrates decreased (I) with ADLs, functional mobility & t/fs as well as decreased overall strength, ROM, endurance and balance. Pt with AMS but following commands well although demonstrates increased anxiety when walking. Pt would benefit from skilled OT services as well as SNF placement to address these deficits and to facilitate improving (I) with daily tasks.  Performance deficits affecting function: weakness, gait instability, impaired endurance, impaired balance, decreased safety awareness, impaired self care skills, impaired cognition, impaired functional mobilty, decreased coordination.      Rehab Prognosis: Good; patient would benefit from acute skilled OT services to address these deficits and reach maximum level of function.       Plan:     Patient to be seen 3 x/week to address the above listed problems via self-care/home management, therapeutic activities, therapeutic exercises  · Plan of Care Expires: 01/30/20  · Plan of Care Reviewed with: patient, son    Subjective     Occupational Profile:  Pt is a poor historian so son is available to give all history today. Patient lives alone (no family) with consistent caregivers during the day but none available at night. Lives in a 1 SH with 0 TATI, tub/shower available for bathing, has bath bench.     PLOF:  Prior to admission, patient was getting assistance with dressing/bathing and ambulating within home but holding onto furniture and walls, typically refusing using her rolling walker.   Equipment Used at Home:   walker, rolling, shower chair, wheelchair  Assistance upon Discharge: Caregiver, son    Pain/Comfort:  · Pain Rating 1: 0/10    Patients cultural, spiritual, Jewish conflicts given the current situation:      Objective:     Communicated with: rn prior to session.  Patient found supine with peripheral IV upon OT entry to room.    General Precautions: Standard, fall   Orthopedic Precautions:    Braces:       Occupational Performance:    Bed Mobility:    · Patient completed Scooting/Bridging with maximal assistance  · Patient completed Supine to Sit with maximal assistance  · Patient completed Sit to Supine with maximal assistance    Functional Mobility/Transfers:  · Patient completed Sit <> Stand Transfer with minimum assistance  with  rolling walker   · Patient completed Toilet Transfer Step Transfer technique with minimum assistance with  rolling walker  · Functional Mobility: 12 feet x 2 trials (to/from bathroom) with rolling walker with minimal assist primarily walker management.    Activities of Daily Living:  · Lower Body Dressing: total assistance     Cognitive/Visual Perceptual:  Cognitive/Psychosocial Skills:     -       Oriented to: Person, Place and Situation   -       Safety awareness/insight to disability: impaired     Physical Exam:  BUE AROM/MMT: WFL    AMPAC 6 Click ADL:  AMPAC Total Score: 16    Treatment & Education:  UE ROM/MMT  Bed mobility training / assessment  Functional mobility assessment  Sit/standing balance assessment  Educated on importance of sitting OOB in bedside chair to promote increased strength, endurance & breathing.  Discussed OT POC / Post-acute plan  Education:    Patient left supine with all lines intact and call button in reach    GOALS:   Multidisciplinary Problems     Occupational Therapy Goals        Problem: Occupational Therapy Goal    Goal Priority Disciplines Outcome Interventions   Occupational Therapy Goal     OT, PT/OT Ongoing, Progressing    Description:  Goals  to be met by: 1/7/20    Patient will increase functional independence with ADLs by performing:    UE Dressing with Stand-by Assistance.  Grooming while standing at sink with Stand-by Assistance.  Toileting from toilet with Contact Guard Assistance for hygiene and clothing management.   Supine to sit with Minimal Assistance.  Toilet transfer to toilet with Contact Guard Assistance.                      History:     Past Medical History:   Diagnosis Date    Atrial fibrillation     with rapid ventricular rate    Breast cancer     XRT    Chronic bronchitis     CKD (chronic kidney disease), stage III     Dementia     Squamous cell carcinoma        Past Surgical History:   Procedure Laterality Date    BREAST SURGERY  2011    right       Time Tracking:     OT Date of Treatment: 12/31/19  OT Start Time: 0909  OT Stop Time: 0936  OT Total Time (min): 27 min    Billable Minutes:Evaluation 17  Therapeutic Activity 10    SKYLAR Vieira  12/31/2019

## 2020-01-01 LAB
ANION GAP SERPL CALC-SCNC: 6 MMOL/L (ref 8–16)
BACTERIA UR CULT: ABNORMAL
BASOPHILS # BLD AUTO: 0.05 K/UL (ref 0–0.2)
BASOPHILS NFR BLD: 0.5 % (ref 0–1.9)
BUN SERPL-MCNC: 19 MG/DL (ref 8–23)
CALCIUM SERPL-MCNC: 9 MG/DL (ref 8.7–10.5)
CHLORIDE SERPL-SCNC: 110 MMOL/L (ref 95–110)
CO2 SERPL-SCNC: 27 MMOL/L (ref 23–29)
CREAT SERPL-MCNC: 1.2 MG/DL (ref 0.5–1.4)
DIFFERENTIAL METHOD: ABNORMAL
EOSINOPHIL # BLD AUTO: 0.2 K/UL (ref 0–0.5)
EOSINOPHIL NFR BLD: 1.9 % (ref 0–8)
ERYTHROCYTE [DISTWIDTH] IN BLOOD BY AUTOMATED COUNT: 13.1 % (ref 11.5–14.5)
EST. GFR  (AFRICAN AMERICAN): 48.6 ML/MIN/1.73 M^2
EST. GFR  (NON AFRICAN AMERICAN): 42.2 ML/MIN/1.73 M^2
GLUCOSE SERPL-MCNC: 90 MG/DL (ref 70–110)
HCT VFR BLD AUTO: 37.5 % (ref 37–48.5)
HGB BLD-MCNC: 12.2 G/DL (ref 12–16)
IMM GRANULOCYTES # BLD AUTO: 0.05 K/UL (ref 0–0.04)
IMM GRANULOCYTES NFR BLD AUTO: 0.5 % (ref 0–0.5)
INR PPP: 2.6 (ref 0.8–1.2)
LYMPHOCYTES # BLD AUTO: 1.9 K/UL (ref 1–4.8)
LYMPHOCYTES NFR BLD: 17.9 % (ref 18–48)
MAGNESIUM SERPL-MCNC: 2.1 MG/DL (ref 1.6–2.6)
MCH RBC QN AUTO: 35.8 PG (ref 27–31)
MCHC RBC AUTO-ENTMCNC: 32.5 G/DL (ref 32–36)
MCV RBC AUTO: 110 FL (ref 82–98)
MONOCYTES # BLD AUTO: 1.1 K/UL (ref 0.3–1)
MONOCYTES NFR BLD: 10.4 % (ref 4–15)
NEUTROPHILS # BLD AUTO: 7.3 K/UL (ref 1.8–7.7)
NEUTROPHILS NFR BLD: 68.8 % (ref 38–73)
NRBC BLD-RTO: 0 /100 WBC
PHOSPHATE SERPL-MCNC: 3.2 MG/DL (ref 2.7–4.5)
PLATELET # BLD AUTO: 633 K/UL (ref 150–350)
PMV BLD AUTO: 10.2 FL (ref 9.2–12.9)
POTASSIUM SERPL-SCNC: 4.2 MMOL/L (ref 3.5–5.1)
PROTHROMBIN TIME: 24.5 SEC (ref 9–12.5)
RBC # BLD AUTO: 3.41 M/UL (ref 4–5.4)
SODIUM SERPL-SCNC: 143 MMOL/L (ref 136–145)
WBC # BLD AUTO: 10.61 K/UL (ref 3.9–12.7)

## 2020-01-01 PROCEDURE — 36415 COLL VENOUS BLD VENIPUNCTURE: CPT

## 2020-01-01 PROCEDURE — 25000003 PHARM REV CODE 250: Performed by: PHYSICIAN ASSISTANT

## 2020-01-01 PROCEDURE — 63600175 PHARM REV CODE 636 W HCPCS: Performed by: PHYSICIAN ASSISTANT

## 2020-01-01 PROCEDURE — 11000001 HC ACUTE MED/SURG PRIVATE ROOM

## 2020-01-01 PROCEDURE — 94799 UNLISTED PULMONARY SVC/PX: CPT

## 2020-01-01 PROCEDURE — 84100 ASSAY OF PHOSPHORUS: CPT

## 2020-01-01 PROCEDURE — 83735 ASSAY OF MAGNESIUM: CPT

## 2020-01-01 PROCEDURE — 99233 PR SUBSEQUENT HOSPITAL CARE,LEVL III: ICD-10-PCS | Mod: ,,, | Performed by: PHYSICIAN ASSISTANT

## 2020-01-01 PROCEDURE — A4216 STERILE WATER/SALINE, 10 ML: HCPCS | Performed by: PHYSICIAN ASSISTANT

## 2020-01-01 PROCEDURE — 63600175 PHARM REV CODE 636 W HCPCS: Performed by: INTERNAL MEDICINE

## 2020-01-01 PROCEDURE — 99233 SBSQ HOSP IP/OBS HIGH 50: CPT | Mod: ,,, | Performed by: PHYSICIAN ASSISTANT

## 2020-01-01 PROCEDURE — 97802 MEDICAL NUTRITION INDIV IN: CPT

## 2020-01-01 PROCEDURE — 94761 N-INVAS EAR/PLS OXIMETRY MLT: CPT

## 2020-01-01 PROCEDURE — 85610 PROTHROMBIN TIME: CPT

## 2020-01-01 PROCEDURE — 85025 COMPLETE CBC W/AUTO DIFF WBC: CPT

## 2020-01-01 PROCEDURE — 80048 BASIC METABOLIC PNL TOTAL CA: CPT

## 2020-01-01 RX ORDER — SODIUM CHLORIDE 9 MG/ML
INJECTION, SOLUTION INTRAVENOUS CONTINUOUS
Status: ACTIVE | OUTPATIENT
Start: 2020-01-01 | End: 2020-01-01

## 2020-01-01 RX ADMIN — LATANOPROST 1 DROP: 50 SOLUTION OPHTHALMIC at 08:01

## 2020-01-01 RX ADMIN — SODIUM CHLORIDE: 0.9 INJECTION, SOLUTION INTRAVENOUS at 11:01

## 2020-01-01 RX ADMIN — PIPERACILLIN AND TAZOBACTAM 4.5 G: 4; .5 INJECTION, POWDER, FOR SOLUTION INTRAVENOUS at 06:01

## 2020-01-01 RX ADMIN — DIGOXIN 0.12 MG: 125 TABLET ORAL at 08:01

## 2020-01-01 RX ADMIN — BRIMONIDINE TARTRATE 1 DROP: 1.5 SOLUTION OPHTHALMIC at 08:01

## 2020-01-01 RX ADMIN — BRIMONIDINE TARTRATE 1 DROP: 1.5 SOLUTION OPHTHALMIC at 04:01

## 2020-01-01 RX ADMIN — OXYCODONE HYDROCHLORIDE AND ACETAMINOPHEN 1000 MG: 500 TABLET ORAL at 08:01

## 2020-01-01 RX ADMIN — PIPERACILLIN AND TAZOBACTAM 4.5 G: 4; .5 INJECTION, POWDER, FOR SOLUTION INTRAVENOUS at 09:01

## 2020-01-01 RX ADMIN — WARFARIN SODIUM 5 MG: 5 TABLET ORAL at 07:01

## 2020-01-01 RX ADMIN — Medication 3 ML: at 06:01

## 2020-01-01 RX ADMIN — ATORVASTATIN CALCIUM 10 MG: 10 TABLET, FILM COATED ORAL at 08:01

## 2020-01-01 RX ADMIN — Medication 3 ML: at 02:01

## 2020-01-01 RX ADMIN — METOPROLOL SUCCINATE 25 MG: 25 TABLET, EXTENDED RELEASE ORAL at 09:01

## 2020-01-01 RX ADMIN — HYDROXYUREA 500 MG: 500 CAPSULE ORAL at 08:01

## 2020-01-01 RX ADMIN — DONEPEZIL HYDROCHLORIDE 10 MG: 10 TABLET ORAL at 08:01

## 2020-01-01 RX ADMIN — ACETAMINOPHEN 650 MG: 325 TABLET ORAL at 07:01

## 2020-01-01 RX ADMIN — Medication 3 ML: at 10:01

## 2020-01-01 RX ADMIN — PIPERACILLIN AND TAZOBACTAM 4.5 G: 4; .5 INJECTION, POWDER, FOR SOLUTION INTRAVENOUS at 02:01

## 2020-01-01 RX ADMIN — ACETAMINOPHEN 650 MG: 325 TABLET ORAL at 08:01

## 2020-01-01 NOTE — CONSULTS
"  [facility]  Adult Nutrition  Consult Note    SUMMARY     Recommendations    1. Continue Cardiac diet with Boost Plus TID. 2. Provide full assistance with meals and supplements.  Goals: 1. Pt to meet % EEN and EPN by RD follow up.  Nutrition Goal Status: new  Communication of RD Recs: (POC)    Reason for Assessment    Reason For Assessment: consult  Diagnosis: other (see comments)(acute cystitis)  Relevant Medical History: dementia, HTN, HLD, Afib  General Information Comments: Consult for warfarin education; pt has been on warfarin for years and has dementia. Per son, pt with dementia had wt loss last year but has remained stable this year with help of daily aide. Pt takes 2-3 Boost VHC daily along with meals provided by aide (intake is very low). Pt requires full assistance eating, RN aware. Completed NFPE today: pt with some mild age-related sarcopenia but otherwise is nourished.  Nutrition Discharge Planning: Pt to continue current meal regimen with daily high calorie nutrition supplements.    Nutrition Risk Screen    Nutrition Risk Screen: no indicators present    Nutrition/Diet History    Patient Reported Diet/Restrictions/Preferences: general  Spiritual, Cultural Beliefs, Mu-ism Practices, Values that Affect Care: no  Supplemental Drinks or Food Habits: (Boost VHC 2-3 cans/day)    Anthropometrics    Temp: 98.3 °F (36.8 °C)  Height Method: Stated  Height: 5' 4" (162.6 cm)  Height (inches): 64 in  Weight Method: Bed Scale  Weight: 57.6 kg (126 lb 15.8 oz)  Weight (lb): 126.99 lb  Ideal Body Weight (IBW), Female: 120 lb  % Ideal Body Weight, Female (lb): 107.48 %  BMI (Calculated): 21.8       Lab/Procedures/Meds    Pertinent Labs Reviewed: reviewed  Pertinent Labs Comments: ---  Pertinent Medications Reviewed: reviewed  Pertinent Medications Comments: statin, warfarin      Estimated/Assessed Needs    Weight Used For Calorie Calculations: 57.6 kg (126 lb 15.8 oz)  Energy Calorie " Requirements (kcal): 1440 kcal  Energy Need Method: Kcal/kg  Protein Requirements: 63-75g/day  Weight Used For Protein Calculations: 57.6 kg (126 lb 15.8 oz)        RDA Method (mL): 1440         Nutrition Prescription Ordered    Current Diet Order: Cardiac diet  Nutrition Order Comments: 1500ml fluid restriction  Oral Nutrition Supplement: Boost Plus TID    Evaluation of Received Nutrient/Fluid Intake    Comments: LBM 12/31  % Intake of Estimated Energy Needs: 0 - 25 %  % Meal Intake: 0 - 25 %    Nutrition Risk    Level of Risk/Frequency of Follow-up: low     Assessment and Plan    Nutrition Problem  Inadequate oral intake    Related to (etiology):   dementia    Signs and Symptoms (as evidenced by):   Pt with UTI and disorientation yesterday, pt requires full assistance with meals and supplements     Interventions(treatment strategy):  Collaboration of care with providers.  Commercial Beverage: Boost Plus TID    Nutrition Diagnosis Status:   New       Monitor and Evaluation    Food and Nutrient Intake: energy intake, food and beverage intake  Food and Nutrient Adminstration: diet order  Anthropometric Measurements: weight, weight change  Biochemical Data, Medical Tests and Procedures: electrolyte and renal panel, gastrointestinal profile, glucose/endocrine profile, inflammatory profile, lipid profile  Nutrition-Focused Physical Findings: overall appearance, extremities, muscles and bones, head and eyes, skin     Malnutrition Assessment                 Orbital Region (Subcutaneous Fat Loss): mild depletion  Upper Arm Region (Subcutaneous Fat Loss): well nourished  Thoracic and Lumbar Region: well nourished   Judaism Region (Muscle Loss): mild depletion  Clavicle Bone Region (Muscle Loss): well nourished  Clavicle and Acromion Bone Region (Muscle Loss): well nourished  Scapular Bone Region (Muscle Loss): well nourished  Dorsal Hand (Muscle Loss): mild depletion  Patellar Region (Muscle Loss): well nourished  Anterior  Thigh Region (Muscle Loss): well nourished  Posterior Calf Region (Muscle Loss): well nourished                 Nutrition Follow-Up    RD Follow-up?: Yes

## 2020-01-01 NOTE — PLAN OF CARE
Problem: Oral Intake Inadequate  Goal: Improved Oral Intake  Outcome: Ongoing, Progressing   Recommendations     1. Continue Cardiac diet with Boost Plus TID. 2. Provide full assistance with meals and supplements.  Goals: 1. Pt to meet % EEN and EPN by RD follow up.  Nutrition Goal Status: new  Communication of RD Recs: (POC)

## 2020-01-01 NOTE — PROGRESS NOTES
[facility] Hospital Medicine  Progress Note    Patient Name: Cat Garcia  MRN: [id number]  Patient Class: IP- Inpatient   Admission Date: 12/30/2019  Length of Stay: 1 days  Attending Physician: Nora Renner MD  Primary Care Provider: Alfie Oconnell MD    [facility] Medicine Team: [facility] Glenis Miller PA-C    Subjective:     Principal Problem:Acute cystitis without hematuria        HPI:  84 year old female with a PMHx of HTN, HLD, chronic afib on coumadin, HFrEF 15%, and dementia presenting to the ER with son at bedside for fall at home. Patient lives alone with sitter and family support. HPI provided by son as patient unable to recall details of event due to dementia. Patient had a fall yesterday afternoon. Son saw fall on video camera and arrived at patient's home in minutes. Son assisted her off the ground and patient was able to ambulate to bed with assistance. This morning, son reports patient was having increased pain and unable to ambulate 2/2 pain; therefore, was brought to ER for evaluation. At the time of my exam, patient has no complaints; she denies pain. Son reports chronic poor appetite, drinks Boost with meals at home. She denies CP, SOB, abdominal pain, N/V/D, fever/chills, dysuria/frequency, headache, palpitations, and focal weakness.       HDS on admission, afebrile. Labs significant for WBC 20.25. LA 1.9. UA with 19 WBC and many bacteria. Received Zosyn in the ER. EKG nonischemic. Troponin WNL, . Xray of sacrum and coccyx without fracture or dislocation. L hip xray with no acute fracture. CTH shows no acute hemorrhage or traumatic injury; ventriculomegaly noted.    Overview/Hospital Course:  Patient admitted for fall at home. Afebrile, with leukocytosis 20k on admission. Found to have UTI, continuing Zosyn for now (hx MDR UTI). Follow UCx ( E. Coli, sensitivities pending). Leukocytosis, mentation improving with Zosyn. PTOT consulted, recommending SNF.  Son in agreement, CM aware.     Interval History: No reported events overnight. Pt seen at bedside this AM, more alert and conversational compared to yesterday. WBC improving with Zosyn. Son at bedside, discussed plan of care. Will give cautious amount of IVF due to concern for decreased PO intake/rising Cr in setting of CHF rEF. Dietary consult to assess caloric needs    Review of Systems   Unable to perform ROS: Dementia     Objective:     Vital Signs (Most Recent):  Temp: 98.3 °F (36.8 °C) (01/01/20 1107)  Pulse: (!) 41 (01/01/20 1117)  Resp: 18 (01/01/20 1107)  BP: (!) 111/56 (01/01/20 1107)  SpO2: 97 % (01/01/20 1107) Vital Signs (24h Range):  Temp:  [97.6 °F (36.4 °C)-98.4 °F (36.9 °C)] 98.3 °F (36.8 °C)  Pulse:  [41-62] 41  Resp:  [15-18] 18  SpO2:  [92 %-97 %] 97 %  BP: ()/(52-59) 111/56     Weight: 57.6 kg (126 lb 15.8 oz)  Body mass index is 21.8 kg/m².    Intake/Output Summary (Last 24 hours) at 1/1/2020 1118  Last data filed at 12/31/2019 2257  Gross per 24 hour   Intake 100 ml   Output --   Net 100 ml      Physical Exam   Constitutional: She appears well-developed. No distress.   Frail elderly female in NAD   HENT:   Head: Normocephalic and atraumatic.   Eyes: EOM are normal. Right eye exhibits no discharge. Left eye exhibits no discharge.   Neck: Normal range of motion. Neck supple.   Cardiovascular: Normal rate and normal heart sounds. An irregularly irregular rhythm present.   Pulmonary/Chest: Effort normal. No respiratory distress.   Abdominal: Soft. Bowel sounds are normal. She exhibits no distension. There is no tenderness.   Musculoskeletal: She exhibits no edema or tenderness.   Neurological: She is alert.   Oriented to name, place, son at bedside; unsure of year; strength equal in BLE and BUE, follows commands   Skin: Skin is warm and dry.   Psychiatric: She has a normal mood and affect. Her behavior is normal.   Oriented to self, son, and place       Significant Labs: All pertinent labs  within the past 24 hours have been reviewed.    Significant Imaging: I have reviewed all pertinent imaging results/findings within the past 24 hours.      Assessment/Plan:      * Acute cystitis without hematuria  Leukocytosis  Fall  - HDS on admission, afebrile   - Labs significant for WBC 20.25>16.7>10  - LA 1.9  - Received Zosyn in the ER; will continue for now given hx of MDR ESBL  - Follow up urine cultures ( E. Coli, sensitivities pending)  - Follow up blood cultures NGTD  - Xray of sacrum and coccyx without fracture or dislocation  - L hip xray with no acute fracture   - CTH shows no acute hemorrhage or traumatic injury; ventriculomegaly noted  - PT/OT consulted, recommending SNF, CM aware  - Telemetry    Essential hypertension  - Normo/hypotensive on admit  - Received 1L bolus in the ER  - Held losartan  - Continue to monitor    Essential thrombocytosis  Follows with heme onc  Continue hydroxyurea  MCV 110s  Check folate in AM      CKD (chronic kidney disease), stage III  - Chronic and stable  - Continue to monitor    Atrial fibrillation, chronic  Long term use of anticoagulation  - Rate controlled on admit  - INR 2.0   - Continue home Toprol XL and coumadin  - PT/INR daily    Hypophosphatemia  - Phos 2.1, replaced PO  - Trend daily    Chronic combined systolic and diastolic CHF, NYHA class 3  HFrEF 15%  - Euvolemic on exam  - EKG without changes  - Troponin negative,   - Cardiac diet  - Strict I/Os, daily weights    Dyslipidemia  - Continue home lipitor    Mixed Alzheimer's and vascular dementia  - Oriented to self and son; unable to report events of fall yesterday  - Continue home aricept qhs  - Son looking into assisted living facilities      VTE Risk Mitigation (From admission, onward)         Ordered     warfarin (COUMADIN) tablet 5 mg  Every Sunday 12/30/19 1530     warfarin tablet 7.5 mg  Every Friday 12/30/19 1530     warfarin (COUMADIN) tablet 5 mg  Every Wednesday 12/30/19 1530      warfarin (COUMADIN) tablet 5 mg  Every Tues, Thurs, Sat      12/30/19 1530     warfarin tablet 7.5 mg  Every Monday 12/30/19 1530     Place PAUL hose  Until discontinued      12/30/19 1458     Place sequential compression device  Until discontinued      12/30/19 1458     Reason for No Pharmacological VTE Prophylaxis  Once     Question:  Reasons:  Answer:  Already adequately anticoagulated on oral Anticoagulants    12/30/19 1458     IP VTE HIGH RISK PATIENT  Once      12/30/19 1458                  Discussed with staff    Glenis Miller PA-C  Department of Hospital Medicine   [facility]

## 2020-01-01 NOTE — ASSESSMENT & PLAN NOTE
Leukocytosis  Fall  - HDS on admission, afebrile   - Labs significant for WBC 20.25>16.7>10  - LA 1.9  - Received Zosyn in the ER; will continue for now given hx of MDR ESBL  - Follow up urine cultures ( E. Coli, sensitivities pending)  - Follow up blood cultures NGTD  - Xray of sacrum and coccyx without fracture or dislocation  - L hip xray with no acute fracture   - CTH shows no acute hemorrhage or traumatic injury; ventriculomegaly noted  - PT/OT consulted, recommending SNF, CM aware  - Telemetry

## 2020-01-01 NOTE — PLAN OF CARE
Pt remains free from falls and injuries. POC reveiwed with pt and son verbalized understanding with reinforcement. Pain managed with prn tylenol as ordered. IV ABX infusing as ordered. No acute distress noted.

## 2020-01-01 NOTE — SUBJECTIVE & OBJECTIVE
Interval History: No reported events overnight. Pt seen at bedside this AM, more alert and conversational compared to yesterday. WBC improving with Zosyn. Son at bedside, discussed plan of care. Will give cautious amount of IVF due to concern for decreased PO intake/rising Cr in setting of CHF rEF. Dietary consult to assess caloric needs    Review of Systems   Unable to perform ROS: Dementia     Objective:     Vital Signs (Most Recent):  Temp: 98.3 °F (36.8 °C) (01/01/20 1107)  Pulse: (!) 41 (01/01/20 1117)  Resp: 18 (01/01/20 1107)  BP: (!) 111/56 (01/01/20 1107)  SpO2: 97 % (01/01/20 1107) Vital Signs (24h Range):  Temp:  [97.6 °F (36.4 °C)-98.4 °F (36.9 °C)] 98.3 °F (36.8 °C)  Pulse:  [41-62] 41  Resp:  [15-18] 18  SpO2:  [92 %-97 %] 97 %  BP: ()/(52-59) 111/56     Weight: 57.6 kg (126 lb 15.8 oz)  Body mass index is 21.8 kg/m².    Intake/Output Summary (Last 24 hours) at 1/1/2020 1118  Last data filed at 12/31/2019 2257  Gross per 24 hour   Intake 100 ml   Output --   Net 100 ml      Physical Exam   Constitutional: She appears well-developed. No distress.   Frail elderly female in NAD   HENT:   Head: Normocephalic and atraumatic.   Eyes: EOM are normal. Right eye exhibits no discharge. Left eye exhibits no discharge.   Neck: Normal range of motion. Neck supple.   Cardiovascular: Normal rate and normal heart sounds. An irregularly irregular rhythm present.   Pulmonary/Chest: Effort normal. No respiratory distress.   Abdominal: Soft. Bowel sounds are normal. She exhibits no distension. There is no tenderness.   Musculoskeletal: She exhibits no edema or tenderness.   Neurological: She is alert.   Oriented to name, place, son at bedside; unsure of year; strength equal in BLE and BUE, follows commands   Skin: Skin is warm and dry.   Psychiatric: She has a normal mood and affect. Her behavior is normal.   Oriented to self, son, and place       Significant Labs: All pertinent labs within the past 24 hours have  been reviewed.    Significant Imaging: I have reviewed all pertinent imaging results/findings within the past 24 hours.

## 2020-01-02 LAB
ANION GAP SERPL CALC-SCNC: 8 MMOL/L (ref 8–16)
BASOPHILS # BLD AUTO: 0.06 K/UL (ref 0–0.2)
BASOPHILS NFR BLD: 0.6 % (ref 0–1.9)
BUN SERPL-MCNC: 16 MG/DL (ref 8–23)
CALCIUM SERPL-MCNC: 8.4 MG/DL (ref 8.7–10.5)
CHLORIDE SERPL-SCNC: 111 MMOL/L (ref 95–110)
CO2 SERPL-SCNC: 23 MMOL/L (ref 23–29)
CREAT SERPL-MCNC: 0.9 MG/DL (ref 0.5–1.4)
DIFFERENTIAL METHOD: ABNORMAL
EOSINOPHIL # BLD AUTO: 0.2 K/UL (ref 0–0.5)
EOSINOPHIL NFR BLD: 2.4 % (ref 0–8)
ERYTHROCYTE [DISTWIDTH] IN BLOOD BY AUTOMATED COUNT: 12.7 % (ref 11.5–14.5)
EST. GFR  (AFRICAN AMERICAN): >60 ML/MIN/1.73 M^2
EST. GFR  (NON AFRICAN AMERICAN): 59.7 ML/MIN/1.73 M^2
FOLATE SERPL-MCNC: 17.7 NG/ML (ref 4–24)
GLUCOSE SERPL-MCNC: 87 MG/DL (ref 70–110)
HCT VFR BLD AUTO: 36 % (ref 37–48.5)
HGB BLD-MCNC: 11.3 G/DL (ref 12–16)
IMM GRANULOCYTES # BLD AUTO: 0.04 K/UL (ref 0–0.04)
IMM GRANULOCYTES NFR BLD AUTO: 0.4 % (ref 0–0.5)
INR PPP: 2.9 (ref 0.8–1.2)
LYMPHOCYTES # BLD AUTO: 1.9 K/UL (ref 1–4.8)
LYMPHOCYTES NFR BLD: 20.4 % (ref 18–48)
MAGNESIUM SERPL-MCNC: 2 MG/DL (ref 1.6–2.6)
MCH RBC QN AUTO: 34.3 PG (ref 27–31)
MCHC RBC AUTO-ENTMCNC: 31.4 G/DL (ref 32–36)
MCV RBC AUTO: 109 FL (ref 82–98)
MONOCYTES # BLD AUTO: 0.8 K/UL (ref 0.3–1)
MONOCYTES NFR BLD: 8.7 % (ref 4–15)
NEUTROPHILS # BLD AUTO: 6.4 K/UL (ref 1.8–7.7)
NEUTROPHILS NFR BLD: 67.5 % (ref 38–73)
NRBC BLD-RTO: 0 /100 WBC
PHOSPHATE SERPL-MCNC: 2.6 MG/DL (ref 2.7–4.5)
PLATELET # BLD AUTO: 664 K/UL (ref 150–350)
PMV BLD AUTO: 10.4 FL (ref 9.2–12.9)
POTASSIUM SERPL-SCNC: 3.5 MMOL/L (ref 3.5–5.1)
PROTHROMBIN TIME: 27.1 SEC (ref 9–12.5)
RBC # BLD AUTO: 3.29 M/UL (ref 4–5.4)
SODIUM SERPL-SCNC: 142 MMOL/L (ref 136–145)
WBC # BLD AUTO: 9.51 K/UL (ref 3.9–12.7)

## 2020-01-02 PROCEDURE — 99232 PR SUBSEQUENT HOSPITAL CARE,LEVL II: ICD-10-PCS | Mod: ,,, | Performed by: PHYSICIAN ASSISTANT

## 2020-01-02 PROCEDURE — 99232 SBSQ HOSP IP/OBS MODERATE 35: CPT | Mod: ,,, | Performed by: PHYSICIAN ASSISTANT

## 2020-01-02 PROCEDURE — 36415 COLL VENOUS BLD VENIPUNCTURE: CPT

## 2020-01-02 PROCEDURE — 97116 GAIT TRAINING THERAPY: CPT

## 2020-01-02 PROCEDURE — 86580 TB INTRADERMAL TEST: CPT | Performed by: PHYSICIAN ASSISTANT

## 2020-01-02 PROCEDURE — A4216 STERILE WATER/SALINE, 10 ML: HCPCS | Performed by: PHYSICIAN ASSISTANT

## 2020-01-02 PROCEDURE — 97530 THERAPEUTIC ACTIVITIES: CPT

## 2020-01-02 PROCEDURE — 82746 ASSAY OF FOLIC ACID SERUM: CPT

## 2020-01-02 PROCEDURE — 85025 COMPLETE CBC W/AUTO DIFF WBC: CPT

## 2020-01-02 PROCEDURE — 63600175 PHARM REV CODE 636 W HCPCS: Performed by: INTERNAL MEDICINE

## 2020-01-02 PROCEDURE — 97535 SELF CARE MNGMENT TRAINING: CPT

## 2020-01-02 PROCEDURE — 83735 ASSAY OF MAGNESIUM: CPT

## 2020-01-02 PROCEDURE — 80048 BASIC METABOLIC PNL TOTAL CA: CPT

## 2020-01-02 PROCEDURE — 25000003 PHARM REV CODE 250: Performed by: PHYSICIAN ASSISTANT

## 2020-01-02 PROCEDURE — 85610 PROTHROMBIN TIME: CPT

## 2020-01-02 PROCEDURE — 11000001 HC ACUTE MED/SURG PRIVATE ROOM

## 2020-01-02 PROCEDURE — 84100 ASSAY OF PHOSPHORUS: CPT

## 2020-01-02 PROCEDURE — 30200315 PPD INTRADERMAL TEST REV CODE 302: Performed by: PHYSICIAN ASSISTANT

## 2020-01-02 RX ORDER — AMOXICILLIN AND CLAVULANATE POTASSIUM 875; 125 MG/1; MG/1
1 TABLET, FILM COATED ORAL EVERY 12 HOURS
Qty: 16 TABLET | Refills: 0
Start: 2020-01-02 | End: 2020-01-08

## 2020-01-02 RX ORDER — WARFARIN SODIUM 5 MG/1
5 TABLET ORAL
Status: DISCONTINUED | OUTPATIENT
Start: 2020-01-04 | End: 2020-01-07

## 2020-01-02 RX ORDER — AMOXICILLIN AND CLAVULANATE POTASSIUM 875; 125 MG/1; MG/1
1 TABLET, FILM COATED ORAL EVERY 12 HOURS
Qty: 16 TABLET | Refills: 0 | Status: SHIPPED | OUTPATIENT
Start: 2020-01-02 | End: 2020-01-02

## 2020-01-02 RX ORDER — AMOXICILLIN AND CLAVULANATE POTASSIUM 875; 125 MG/1; MG/1
1 TABLET, FILM COATED ORAL EVERY 12 HOURS
Status: DISCONTINUED | OUTPATIENT
Start: 2020-01-02 | End: 2020-01-08 | Stop reason: HOSPADM

## 2020-01-02 RX ADMIN — LATANOPROST 1 DROP: 50 SOLUTION OPHTHALMIC at 08:01

## 2020-01-02 RX ADMIN — DONEPEZIL HYDROCHLORIDE 10 MG: 10 TABLET ORAL at 07:01

## 2020-01-02 RX ADMIN — BRIMONIDINE TARTRATE 1 DROP: 1.5 SOLUTION OPHTHALMIC at 08:01

## 2020-01-02 RX ADMIN — ACETAMINOPHEN 650 MG: 325 TABLET ORAL at 08:01

## 2020-01-02 RX ADMIN — DIGOXIN 0.12 MG: 125 TABLET ORAL at 10:01

## 2020-01-02 RX ADMIN — PIPERACILLIN AND TAZOBACTAM 4.5 G: 4; .5 INJECTION, POWDER, FOR SOLUTION INTRAVENOUS at 06:01

## 2020-01-02 RX ADMIN — HYDROXYUREA 500 MG: 500 CAPSULE ORAL at 08:01

## 2020-01-02 RX ADMIN — AMOXICILLIN AND CLAVULANATE POTASSIUM 1 TABLET: 875; 125 TABLET, FILM COATED ORAL at 08:01

## 2020-01-02 RX ADMIN — BRIMONIDINE TARTRATE 1 DROP: 1.5 SOLUTION OPHTHALMIC at 03:01

## 2020-01-02 RX ADMIN — OXYCODONE HYDROCHLORIDE AND ACETAMINOPHEN 1000 MG: 500 TABLET ORAL at 08:01

## 2020-01-02 RX ADMIN — Medication 5 UNITS: at 12:01

## 2020-01-02 RX ADMIN — ATORVASTATIN CALCIUM 10 MG: 10 TABLET, FILM COATED ORAL at 08:01

## 2020-01-02 RX ADMIN — Medication 3 ML: at 10:01

## 2020-01-02 RX ADMIN — ACETAMINOPHEN 650 MG: 325 TABLET ORAL at 03:01

## 2020-01-02 RX ADMIN — AMOXICILLIN AND CLAVULANATE POTASSIUM 1 TABLET: 875; 125 TABLET, FILM COATED ORAL at 07:01

## 2020-01-02 RX ADMIN — Medication 3 ML: at 06:01

## 2020-01-02 RX ADMIN — Medication 3 ML: at 02:01

## 2020-01-02 NOTE — PHYSICIAN QUERY
PT Name: Cat Garcia  MR #: [id number]     Physician Query Form - Cardiomyopathy Clarification     CDS/: Benita Toney RN                 Contact information:[email or web address]  This form is a permanent document in the medical record.     Query Date: January 2, 2020    By submitting this query, we are merely seeking further clarification of documentation to reflect the severity of illness of your patient. Please utilize your independent clinical judgment when addressing the question(s) below.    The Medical record reflects the following:     Indicators   Supporting Clinical Findings Location in Medical Record   x Cardiomyopathy, NICM, CM or similar term documented 84-year-old woman with comorbidities of mild dementia, cardiomyopathy with EF less than 20%, chronic atrial fibrillation on anticoagulation presents to the emergency department for evaluation of injury sustained in a fall yesterday afternoon ED Prov note 12/31   x Acute/Chronic Illness Chronic combined systolic and diastolic CHF, NYHA class 3  HFrEF 15%  - Euvolemic on exam  - EKG without changes  - Troponin negative,   - Cardiac diet  - Strict I/Os, daily weights    Essential hypertension  - Normo/hypotensive on admit  - Received 1L bolus in the ER  - Held losartan  - Continue to monitor Hosp Med PN 1/2    Echo, Radiology, and/or Heart Cath Findings      BiPAP/Intubation/Supplemental O2      SOB, PURI, Fatigue, Dizziness, LE Edema, Cyanosis, Chest Pain, Pulmonary HTN, Respiratory Distress, Hypoxia, etc.     x Treatment                                 Medication Digoxin 125 mcg daily  Cozaar 25 mg daily  toprol XL 25 mg daily Hosp Med H&P 12/30   x Other EKG nonischemic. Troponin WNL, . Hosp Med PN 1/2     Provider, please specify the type of cardiomyopathy:    [   ] Hypertensive   [   ] Ischemic   [ x  ] Non-ischemic Dilated (congestive)    [   ] Non-ischemic Hypertrophic obstructive    [   ] Non-ischemic Hypertrophic  non-obstructive   [   ] Non-ischemic Restrictive    [   ] Cardiomyopathy, type unspecified or unknown   [   ] Cardiomyopathy Ruled Out   [   ] Other type of cardiomyopathy (please specify):    [   ]  Clinically Undetermined       Please document in your progress notes daily for the duration of treatment until resolved, and include in your discharge summary.

## 2020-01-02 NOTE — SUBJECTIVE & OBJECTIVE
Interval History: No reported events overnight. Pt seen at bedside this AM resting in NAD with sitter at bedside. Pleasant, conversant. UCx pan sensitive E. Coli, will switch to augmentin. Awaiting SNF transfer    Review of Systems   Unable to perform ROS: Dementia     Objective:     Vital Signs (Most Recent):  Temp: 98.2 °F (36.8 °C) (01/02/20 0707)  Pulse: (!) 42 (01/02/20 1104)  Resp: 16 (01/02/20 0707)  BP: 129/60 (01/02/20 0707)  SpO2: 97 % (01/02/20 0707) Vital Signs (24h Range):  Temp:  [97.9 °F (36.6 °C)-99.6 °F (37.6 °C)] 98.2 °F (36.8 °C)  Pulse:  [41-56] 42  Resp:  [16-18] 16  SpO2:  [96 %-99 %] 97 %  BP: (111-138)/(56-61) 129/60     Weight: 57.6 kg (126 lb 15.8 oz)  Body mass index is 21.8 kg/m².    Intake/Output Summary (Last 24 hours) at 1/2/2020 1106  Last data filed at 1/2/2020 0850  Gross per 24 hour   Intake 390 ml   Output 400 ml   Net -10 ml      Physical Exam   Constitutional: She appears well-developed. No distress.   Frail elderly female in NAD   HENT:   Head: Normocephalic.   Eyes: EOM are normal. Right eye exhibits no discharge. Left eye exhibits no discharge.   Neck: Normal range of motion. Neck supple.   Cardiovascular: Normal rate and normal heart sounds. An irregularly irregular rhythm present.   Pulmonary/Chest: Effort normal. No respiratory distress.   Abdominal: Soft. Bowel sounds are normal. She exhibits no distension. There is no tenderness.   Musculoskeletal: She exhibits no edema or tenderness.   Neurological: She is alert.   Oriented to name, place, son at bedside; unsure of year   Skin: Skin is warm and dry. She is not diaphoretic.   Psychiatric: She has a normal mood and affect. Her behavior is normal.   Oriented to self, son, and place       Significant Labs: All pertinent labs within the past 24 hours have been reviewed.    Significant Imaging: I have reviewed all pertinent imaging results/findings within the past 24 hours.

## 2020-01-02 NOTE — PT/OT/SLP PROGRESS
Occupational Therapy   Treatment    Name: Cat Garcia  MRN: [id number]  Admitting Diagnosis:  Acute cystitis without hematuria       Recommendations:     Discharge Recommendations: nursing facility, skilled  Discharge Equipment Recommendations:  bedside commode  Barriers to discharge:  Decreased caregiver support    Assessment:     Cat Garcia is a 84 y.o. female with a medical diagnosis of Acute cystitis without hematuria. Improving mobility but still needs cueing and coaxing due to cognitive deficits. Performance deficits affecting function are weakness, gait instability, impaired endurance, impaired balance, impaired self care skills, impaired functional mobilty, decreased coordination.     Rehab Prognosis:  Good; patient would benefit from acute skilled OT services to address these deficits and reach maximum level of function.       Plan:     Patient to be seen 3 x/week to address the above listed problems via self-care/home management, therapeutic activities, therapeutic exercises  · Plan of Care Expires: 01/30/20  · Plan of Care Reviewed with: patient, caregiver    Subjective     Pain/Comfort:  · Pain Rating 1: 0/10    Objective:     Communicated with: rn prior to session.  Patient found supine with peripheral IV upon OT entry to room.    General Precautions: Standard, fall   Orthopedic Precautions:    Braces:       Occupational Performance:     Bed Mobility:    · Patient completed Rolling/Turning to Right with minimum assistance  · Patient completed Scooting/Bridging with contact guard assistance  · Patient completed Supine to Sit with minimum assistance     Functional Mobility/Transfers:  · Patient completed Sit <> Stand Transfer with minimum assistance  with  rolling walker   · Patient completed Bed <> Chair Transfer using Step Transfer technique with minimum assistance with rolling walker  · Patient completed Toilet Transfer Step Transfer technique with minimum assistance with  rolling walker and  bedside commode  · Functional Mobility: CGA with a RW from bed>bathroom.    Activities of Daily Living:  · Grooming: contact guard assistance for oral care in standing - required 1 sit rest due to c/o weakness.  · Upper Body Dressing: minimum assistance     AMPA 6 Click ADL: 18    Treatment & Education:  Discussed OT POC and benefits of sitting up in bedside chair.  Educated on the SNF setting and what to expect.    Patient left up in chair with all lines intactEducation:      GOALS:   Multidisciplinary Problems     Occupational Therapy Goals        Problem: Occupational Therapy Goal    Goal Priority Disciplines Outcome Interventions   Occupational Therapy Goal     OT, PT/OT Ongoing, Progressing    Description:  Goals to be met by: 1/7/20    Patient will increase functional independence with ADLs by performing:    UE Dressing with Stand-by Assistance.  Grooming while standing at sink with Stand-by Assistance.  Toileting from toilet with Contact Guard Assistance for hygiene and clothing management.   Supine to sit with Minimal Assistance.  Toilet transfer to toilet with Contact Guard Assistance.                      Time Tracking:     OT Date of Treatment: 01/02/20  OT Start Time: 0913  OT Stop Time: 0943  OT Total Time (min): 30 min    Billable Minutes:Self Care/Home Management 15  Therapeutic Activity 15    SKYLAR Vieira  1/2/2020

## 2020-01-02 NOTE — ASSESSMENT & PLAN NOTE
Leukocytosis  Fall  - HDS on admission, afebrile   - Labs significant for WBC 20.25>16.7>10  - LA 1.9  - Received Zosyn in the ER; will continue for now given hx of MDR ESBL  - Follow up urine cultures ( E. Coli, pan-sensitive), d/c zosyn and start Augmentin for total of 10 day abx course  - Follow up blood cultures NGTD  - Xray of sacrum and coccyx without fracture or dislocation  - L hip xray with no acute fracture   - CTH shows no acute hemorrhage or traumatic injury; ventriculomegaly noted  - PT/OT consulted, recommending SNF, CM aware  - Telemetry

## 2020-01-02 NOTE — PT/OT/SLP PROGRESS
Physical Therapy Treatment    Patient Name:  Cat Garcia   MRN:  [id number]    Recommendations:     Discharge Recommendations:  nursing facility, skilled   Discharge Equipment Recommendations: bedside commode   Barriers to discharge: None    Assessment:     Cat Garcia is a 84 y.o. female admitted with a medical diagnosis of Acute cystitis without hematuria.  She presents with the following impairments/functional limitations:  weakness, impaired endurance, impaired self care skills, impaired functional mobilty, gait instability, impaired balance Pt. cooperative and tolerated treatment well. Pt. progressing with mobility.    Rehab Prognosis: Good; patient would benefit from acute skilled PT services to address these deficits and reach maximum level of function.    Recent Surgery: * No surgery found *      Plan:     During this hospitalization, patient to be seen 3 x/week to address the identified rehab impairments via gait training, therapeutic activities, therapeutic exercises and progress toward the following goals:    · Plan of Care Expires:  01/30/20    Subjective     Chief Complaint: none  Patient/Family Comments/goals: to go home  Pain/Comfort:  · Pain Rating 1: 0/10  · Pain Rating Post-Intervention 1: 0/10      Objective:     Communicated with nursing prior to session.  Patient found supine with peripheral IV upon PT entry to room.     General Precautions: Standard, fall   Orthopedic Precautions:N/A   Braces:       Functional Mobility:  · Bed Mobility:     · Rolling Right: contact guard assistance  · Scooting: contact guard assistance  · Supine to Sit: contact guard assistance  · Sit to Supine: contact guard assistance  · Transfers:     · Sit to Stand:  contact guard assistance with rolling walker  · Gait: 90' with RW and CGA-Min A for RW management/guidance. Pt. amb. with decreased step length/vee.  · Balance: fair      AM-PAC 6 CLICK MOBILITY  Turning over in bed (including adjusting bedclothes,  sheets and blankets)?: 3  Sitting down on and standing up from a chair with arms (e.g., wheelchair, bedside commode, etc.): 3  Moving from lying on back to sitting on the side of the bed?: 3  Moving to and from a bed to a chair (including a wheelchair)?: 3  Need to walk in hospital room?: 3  Climbing 3-5 steps with a railing?: 2  Basic Mobility Total Score: 17       Therapeutic Activities and Exercises:   Discussed pt.'s progress, goals, and POC.    Patient left supine with all lines intact and call button in reach..    GOALS:   Multidisciplinary Problems     Physical Therapy Goals        Problem: Physical Therapy Goal    Goal Priority Disciplines Outcome Goal Variances Interventions   Physical Therapy Goal     PT, PT/OT Ongoing, Progressing     Description:  Goals to be met by: 20     Patient will increase functional independence with mobility by performin. Supine to sit with Stand-by Assistance - Not met  2. Sit to supine with Stand-by Assistance - Not met  3. Sit to stand transfer with Stand-by Assistance with RW - Not met  4. Bed to chair transfer with Stand-by Assistance using Rolling Walker - Not met  5. Gait  x 75 feet with Contact Guard Assistance using Rolling Walker - Not met                     Time Tracking:     PT Received On: 20  PT Start Time: 1115     PT Stop Time: 1131  PT Total Time (min): 16 min     Billable Minutes: Gait Training 16    Treatment Type: Treatment  PT/PTA: PT           Roman Alston, PT  2020

## 2020-01-02 NOTE — NURSING
Pt alert and  Oriented x2.  Disoriented to time, no distress this shift. Call light at bedside and pt has been instructed with use. Bed alarm activated. Will continue to monitor

## 2020-01-02 NOTE — PLAN OF CARE
01/02/20 1001   Post-Acute Status   Post-Acute Authorization Placement   Post-Acute Placement Status Referrals Sent     SW sent referral to [facility].  Awaiting more choices from pts son    12:45 PM  OTTO sent referral to Zena Greenfield LMSW  [facility]  [id number]

## 2020-01-02 NOTE — PROGRESS NOTES
[facility] Hospital Medicine  Progress Note    Patient Name: Cat Garcia  MRN: [id number]  Patient Class: IP- Inpatient   Admission Date: 12/30/2019  Length of Stay: 2 days  Attending Physician: Nora Renner MD  Primary Care Provider: Alfie Oconnell MD    [facility] Medicine Team: [facility]OSP MED E Glenis Miller PA-C    Subjective:     Principal Problem:Acute cystitis without hematuria        HPI:  84 year old female with a PMHx of HTN, HLD, chronic afib on coumadin, HFrEF 15%, and dementia presenting to the ER with son at bedside for fall at home. Patient lives alone with sitter and family support. HPI provided by son as patient unable to recall details of event due to dementia. Patient had a fall yesterday afternoon. Son saw fall on video camera and arrived at patient's home in minutes. Son assisted her off the ground and patient was able to ambulate to bed with assistance. This morning, son reports patient was having increased pain and unable to ambulate 2/2 pain; therefore, was brought to ER for evaluation. At the time of my exam, patient has no complaints; she denies pain. Son reports chronic poor appetite, drinks Boost with meals at home. She denies CP, SOB, abdominal pain, N/V/D, fever/chills, dysuria/frequency, headache, palpitations, and focal weakness.       HDS on admission, afebrile. Labs significant for WBC 20.25. LA 1.9. UA with 19 WBC and many bacteria. Received Zosyn in the ER. EKG nonischemic. Troponin WNL, . Xray of sacrum and coccyx without fracture or dislocation. L hip xray with no acute fracture. CTH shows no acute hemorrhage or traumatic injury; ventriculomegaly noted.    Overview/Hospital Course:  Patient admitted for fall at home. Afebrile, with leukocytosis 20k on admission. Found to have UTI, continuing Zosyn for now (hx MDR UTI). UCx pansensitive E. Coli. Leukocytosis, mentation improving with Zosyn. PTOT consulted, recommending SNF. Son in agreement,  CM aware. PCP follow up.    Interval History: No reported events overnight. Pt seen at bedside this AM resting in NAD with sitter at bedside. Pleasant, conversant. UCx pan sensitive E. Coli, will switch to augmentin. Awaiting SNF transfer    Review of Systems   Unable to perform ROS: Dementia     Objective:     Vital Signs (Most Recent):  Temp: 98.2 °F (36.8 °C) (01/02/20 0707)  Pulse: (!) 42 (01/02/20 1104)  Resp: 16 (01/02/20 0707)  BP: 129/60 (01/02/20 0707)  SpO2: 97 % (01/02/20 0707) Vital Signs (24h Range):  Temp:  [97.9 °F (36.6 °C)-99.6 °F (37.6 °C)] 98.2 °F (36.8 °C)  Pulse:  [41-56] 42  Resp:  [16-18] 16  SpO2:  [96 %-99 %] 97 %  BP: (111-138)/(56-61) 129/60     Weight: 57.6 kg (126 lb 15.8 oz)  Body mass index is 21.8 kg/m².    Intake/Output Summary (Last 24 hours) at 1/2/2020 1106  Last data filed at 1/2/2020 0850  Gross per 24 hour   Intake 390 ml   Output 400 ml   Net -10 ml      Physical Exam   Constitutional: She appears well-developed. No distress.   Frail elderly female in NAD   HENT:   Head: Normocephalic.   Eyes: EOM are normal. Right eye exhibits no discharge. Left eye exhibits no discharge.   Neck: Normal range of motion. Neck supple.   Cardiovascular: Normal rate and normal heart sounds. An irregularly irregular rhythm present.   Pulmonary/Chest: Effort normal. No respiratory distress.   Abdominal: Soft. Bowel sounds are normal. She exhibits no distension. There is no tenderness.   Musculoskeletal: She exhibits no edema or tenderness.   Neurological: She is alert.   Oriented to name, place, son at bedside; unsure of year   Skin: Skin is warm and dry. She is not diaphoretic.   Psychiatric: She has a normal mood and affect. Her behavior is normal.   Oriented to self, son, and place       Significant Labs: All pertinent labs within the past 24 hours have been reviewed.    Significant Imaging: I have reviewed all pertinent imaging results/findings within the past 24 hours.      Assessment/Plan:       * Acute cystitis without hematuria  Leukocytosis  Fall  - HDS on admission, afebrile   - Labs significant for WBC 20.25>16.7>10  - LA 1.9  - Received Zosyn in the ER; will continue for now given hx of MDR ESBL  - Follow up urine cultures ( E. Coli, pan-sensitive), d/c zosyn and start Augmentin for total of 10 day abx course  - Follow up blood cultures NGTD  - Xray of sacrum and coccyx without fracture or dislocation  - L hip xray with no acute fracture   - CTH shows no acute hemorrhage or traumatic injury; ventriculomegaly noted  - PT/OT consulted, recommending SNF, CM aware  - Telemetry    Essential hypertension  - Normo/hypotensive on admit  - Received 1L bolus in the ER  - Held losartan  - Continue to monitor    Essential thrombocytosis  Follows with heme onc  Continue hydroxyurea  MCV 110s  Check folate in AM: WNL      CKD (chronic kidney disease), stage III  - Chronic and stable  - Continue to monitor    Atrial fibrillation, chronic  Long term use of anticoagulation  - Rate controlled on admit  - INR 2.0   - Continue home Toprol XL and coumadin  - PT/INR daily    Hypophosphatemia  - Phos 2.1, replaced PO  - Trend daily    Chronic combined systolic and diastolic CHF, NYHA class 3  HFrEF 15%  - Euvolemic on exam  - EKG without changes  - Troponin negative,   - Cardiac diet  - Strict I/Os, daily weights    Dyslipidemia  - Continue home lipitor    Mixed Alzheimer's and vascular dementia  - Oriented to self and son; unable to report events of fall yesterday  - Continue home aricept qhs  - Son looking into assisted living facilities      VTE Risk Mitigation (From admission, onward)         Ordered     warfarin (COUMADIN) tablet 5 mg  Every Sunday 12/30/19 1530     warfarin (COUMADIN) tablet 5 mg  Every Tues, Thurs, Sat      01/02/20 0959     warfarin tablet 7.5 mg  Every Friday 12/30/19 1530     warfarin (COUMADIN) tablet 5 mg  Every Wednesday 12/30/19 1530     warfarin tablet 7.5 mg  Every  Monday 12/30/19 1530     Place PAUL hose  Until discontinued      12/30/19 1458     Place sequential compression device  Until discontinued      12/30/19 1458     Reason for No Pharmacological VTE Prophylaxis  Once     Question:  Reasons:  Answer:  Already adequately anticoagulated on oral Anticoagulants    12/30/19 1458     IP VTE HIGH RISK PATIENT  Once      12/30/19 1458                    Discussed with staff  Glenis Miller PA-C  Department of Hospital Medicine   [facility]

## 2020-01-02 NOTE — PLAN OF CARE
SW completed LOCET and faxed PASRR to state. Waiting on 142.       Ree Greenfield LMSW  [facility]  [id number]

## 2020-01-02 NOTE — LACTATION NOTE
PT Name: Cat Garcia  MR #: [id number]     Physician Query Form - Cardiomyopathy Clarification     CDS/: Benita Toney RN                 Contact information:[email or web address]  This form is a permanent document in the medical record.     Query Date: January 2, 2020    By submitting this query, we are merely seeking further clarification of documentation to reflect the severity of illness of your patient. Please utilize your independent clinical judgment when addressing the question(s) below.    The Medical record reflects the following:     Indicators   Supporting Clinical Findings Location in Medical Record   x Cardiomyopathy, NICM, CM or similar term documented 84-year-old woman with comorbidities of mild dementia, cardiomyopathy with EF less than 20%, chronic atrial fibrillation on anticoagulation presents to the emergency department for evaluation of injury sustained in a fall yesterday afternoon. ED Prov note 12/30   x Acute/Chronic Illness Chronic combined systolic and diastolic CHF, NYHA class 3  HFrEF 15%  - Euvolemic on exam  - EKG without changes  - Troponin negative,   - Cardiac diet  - Strict I/Os, daily weights    Essential hypertension  - Normo/hypotensive on admit  - Received 1L bolus in the ER  - Held losartan  - Continue to monitor Hosp Med PN 1/2    Echo, Radiology, and/or Heart Cath Findings      BiPAP/Intubation/Supplemental O2      SOB, PURI, Fatigue, Dizziness, LE Edema, Cyanosis, Chest Pain, Pulmonary HTN, Respiratory Distress, Hypoxia, etc.     x Treatment                                 Medication Digoxin 125 mcg daily  Cozaar 25 mg daily  Toprol-XL 25 mg daily Hosp Med H&P 12/30   x Other EKG nonischemic. Troponin WNL,  Hosp Med PN 1/2     Provider, please specify the type of cardiomyopathy:    [   ] Hypertensive   [   ] Ischemic   [   ] Non-ischemic Dilated (congestive)    [   ] Non-ischemic Hypertrophic obstructive    [   ] Non-ischemic Hypertrophic  non-obstructive   [   ] Non-ischemic Restrictive    [   ] Cardiomyopathy, type unspecified or unknown   [   ] Cardiomyopathy Ruled Out   [   ] Other type of cardiomyopathy (please specify):    [   ]  Clinically Undetermined       Please document in your progress notes daily for the duration of treatment until resolved, and include in your discharge summary.

## 2020-01-02 NOTE — PLAN OF CARE
Problem: Physical Therapy Goal  Goal: Physical Therapy Goal  Description  Goals to be met by: 20     Patient will increase functional independence with mobility by performin. Supine to sit with Stand-by Assistance - Not met  2. Sit to supine with Stand-by Assistance - Not met  3. Sit to stand transfer with Stand-by Assistance with RW - Not met  4. Bed to chair transfer with Stand-by Assistance using Rolling Walker - Not met  5. Gait  x 75 feet with Contact Guard Assistance using Rolling Walker - Not met    Outcome: Ongoing, Progressing

## 2020-01-02 NOTE — PLAN OF CARE
POC reviewed with pt. Pt verbalized understanding. No direct needs voiced. Nadn; will continue to monitor. Bed in low position. Call bell within reach. Side rails up x 2.

## 2020-01-02 NOTE — PLAN OF CARE
[facility]     Department of Hospital Medicine     [address] [place] [address]     [phone] after hours  [phone] fax       NURSING HOME ORDERS    01/07/2020    Admit to Nursing Home: Skilled Bed                                                Diagnoses:  Active Hospital Problems    Diagnosis  POA    *Acute cystitis without hematuria [N30.00]  Yes     Priority: 1 - High    Essential hypertension [I10]  Yes     Priority: 2      Chronic    Essential thrombocytosis [D47.3]  Yes     Priority: 3     CKD (chronic kidney disease), stage III [N18.3]  Yes     Priority: 3      Chronic    Atrial fibrillation, chronic [I48.20]  Yes     Priority: 4      Chronic    Hypophosphatemia [E83.39]  Yes     Priority: 5     Chronic combined systolic and diastolic CHF, NYHA class 3 [I50.42]  Yes     Priority: 6      Chronic    Dyslipidemia [E78.5]  Yes     Priority: 7      Chronic    Mixed Alzheimer's and vascular dementia [G30.9, F01.50, F02.80]  Yes     Priority: 8     Leukocytosis [D72.829]  Yes    Fall at home, initial encounter [W19.XXXA, Y92.009]  Not Applicable    Long term current use of anticoagulant therapy [Z79.01]  Not Applicable      Resolved Hospital Problems   No resolved problems to display.       Patient is homebound due to:  Acute cystitis without hematuria    Allergies:Review of patient's allergies indicates:  No Known Allergies    Vitals:    Per facility protocol    Diet:Cardiac, 1500mL fluid    Supplement:  Boost Plus three times a day with meals                            Acitivities:     - Up in a chair each morning as tolerated   - Ambulate with assistance to bathroom   - Scheduled walks once each shift (every 8 hours)      LABS:  Per facility protocol    Nursing Precautions:   - Aspiration precautions:             - Total assistance with meals            -  Upright 90 degrees befor during and after meals             -  Suction at bedside           - Fall precautions per nursing home protocol   - Seizure precaution per senior care protocol   - Decubitus precautions:        -  for positioning   - Pressure reducing foam mattress   - Turn patient every two hours. Use wedge pillows to anchor patient    CONSULTS:  Physical Therapy to evaluate and treat     Occupational Therapy to evaluate and treat      MISCELLANEOUS CARE:   Routine Skin for Bedridden Patients:  Apply moisture barrier cream to all    skin folds and wet areas in perineal area daily and after baths and                           all bowel movements.      Medications: Discontinue all previous medication orders, if any. See new list below.     Cat Garcia   Home Medication Instructions MESERET:[id number]    Printed on:01/02/20 0124   Medication Information                      amoxicillin-clavulanate 875-125mg (AUGMENTIN) 875-125 mg per tablet  Take 1 tablet by mouth every 12 (twelve) hours. for 8 days. Last dose 1/09/2019             antiox#10-om3-dha-epa-lut-zeax (I-CAPS) 280-10-2 mg Cap  Take by mouth 2 (two) times daily.             ascorbic acid (VITAMIN C) 1000 MG tablet  Take 1,000 mg by mouth once daily.             atorvastatin (LIPITOR) 10 MG tablet  TAKE 1 TABLET BY MOUTH DAILY             brimonidine 0.15 % OPTH DROP (ALPHAGAN) 0.15 % ophthalmic solution  3 (three) times daily.              calcium carb/vit D3/minerals (CALCIUM-VITAMIN D ORAL)  Take by mouth once daily. 1000mg Vitamin D              digoxin (DIGOX) 125 mcg tablet  Take 1 tablet (0.125 mg total) by mouth once daily.             donepezil (ARICEPT) 10 MG tablet  Take 1 tablet (10 mg total) by mouth every evening.             fish oil-omega-3 fatty acids 300-1,000 mg capsule  Take 2 g by mouth once daily.             hydroxyurea (HYDREA) 500 mg Cap  TAKE ONE CAPSULE BY MOUTH DAILY             influenza (FLUZONE HIGH-DOSE) 180 mcg/0.5 mL vaccine  Inject 0.5 mLs into the muscle.             latanoprost 0.005 %  ophthalmic solution  once daily.              losartan (COZAAR) 25 MG tablet  TAKE 1 TABLET(25 MG) BY MOUTH EVERY DAY             metoprolol succinate (TOPROL-XL) 25 MG 24 hr tablet  TAKE 1 TABLET(25 MG) BY MOUTH EVERY DAY             warfarin (COUMADIN) 2.5 MG tablet  Take 2 tablets (5mg.) by mouth every day, except 3 tablets (7.5mg) on Sunday, and Thursday, Or as directed by [facility].                       Glenis Miller PA-C  01/02/2020

## 2020-01-03 LAB
ANION GAP SERPL CALC-SCNC: 9 MMOL/L (ref 8–16)
BASOPHILS # BLD AUTO: 0.06 K/UL (ref 0–0.2)
BASOPHILS NFR BLD: 0.5 % (ref 0–1.9)
BUN SERPL-MCNC: 10 MG/DL (ref 8–23)
CALCIUM SERPL-MCNC: 9 MG/DL (ref 8.7–10.5)
CHLORIDE SERPL-SCNC: 111 MMOL/L (ref 95–110)
CO2 SERPL-SCNC: 24 MMOL/L (ref 23–29)
CREAT SERPL-MCNC: 0.8 MG/DL (ref 0.5–1.4)
DIFFERENTIAL METHOD: ABNORMAL
EOSINOPHIL # BLD AUTO: 0.3 K/UL (ref 0–0.5)
EOSINOPHIL NFR BLD: 2.6 % (ref 0–8)
ERYTHROCYTE [DISTWIDTH] IN BLOOD BY AUTOMATED COUNT: 12.7 % (ref 11.5–14.5)
EST. GFR  (AFRICAN AMERICAN): >60 ML/MIN/1.73 M^2
EST. GFR  (NON AFRICAN AMERICAN): >60 ML/MIN/1.73 M^2
GLUCOSE SERPL-MCNC: 85 MG/DL (ref 70–110)
HCT VFR BLD AUTO: 38.7 % (ref 37–48.5)
HGB BLD-MCNC: 12.1 G/DL (ref 12–16)
IMM GRANULOCYTES # BLD AUTO: 0.07 K/UL (ref 0–0.04)
IMM GRANULOCYTES NFR BLD AUTO: 0.6 % (ref 0–0.5)
INR PPP: 2.9 (ref 0.8–1.2)
LYMPHOCYTES # BLD AUTO: 1.8 K/UL (ref 1–4.8)
LYMPHOCYTES NFR BLD: 16.1 % (ref 18–48)
MAGNESIUM SERPL-MCNC: 2.1 MG/DL (ref 1.6–2.6)
MCH RBC QN AUTO: 34.7 PG (ref 27–31)
MCHC RBC AUTO-ENTMCNC: 31.3 G/DL (ref 32–36)
MCV RBC AUTO: 111 FL (ref 82–98)
MONOCYTES # BLD AUTO: 0.8 K/UL (ref 0.3–1)
MONOCYTES NFR BLD: 6.7 % (ref 4–15)
NEUTROPHILS # BLD AUTO: 8.3 K/UL (ref 1.8–7.7)
NEUTROPHILS NFR BLD: 73.5 % (ref 38–73)
NRBC BLD-RTO: 0 /100 WBC
PHOSPHATE SERPL-MCNC: 2.3 MG/DL (ref 2.7–4.5)
PLATELET # BLD AUTO: 713 K/UL (ref 150–350)
PMV BLD AUTO: 10.4 FL (ref 9.2–12.9)
POTASSIUM SERPL-SCNC: 3.6 MMOL/L (ref 3.5–5.1)
PROTHROMBIN TIME: 27 SEC (ref 9–12.5)
RBC # BLD AUTO: 3.49 M/UL (ref 4–5.4)
SODIUM SERPL-SCNC: 144 MMOL/L (ref 136–145)
WBC # BLD AUTO: 11.3 K/UL (ref 3.9–12.7)

## 2020-01-03 PROCEDURE — 11000001 HC ACUTE MED/SURG PRIVATE ROOM

## 2020-01-03 PROCEDURE — 83735 ASSAY OF MAGNESIUM: CPT

## 2020-01-03 PROCEDURE — 84100 ASSAY OF PHOSPHORUS: CPT

## 2020-01-03 PROCEDURE — 25000003 PHARM REV CODE 250: Performed by: PHYSICIAN ASSISTANT

## 2020-01-03 PROCEDURE — 85025 COMPLETE CBC W/AUTO DIFF WBC: CPT

## 2020-01-03 PROCEDURE — 36415 COLL VENOUS BLD VENIPUNCTURE: CPT

## 2020-01-03 PROCEDURE — 99232 SBSQ HOSP IP/OBS MODERATE 35: CPT | Mod: ,,, | Performed by: PHYSICIAN ASSISTANT

## 2020-01-03 PROCEDURE — 80048 BASIC METABOLIC PNL TOTAL CA: CPT

## 2020-01-03 PROCEDURE — 99232 PR SUBSEQUENT HOSPITAL CARE,LEVL II: ICD-10-PCS | Mod: ,,, | Performed by: PHYSICIAN ASSISTANT

## 2020-01-03 PROCEDURE — 85610 PROTHROMBIN TIME: CPT

## 2020-01-03 PROCEDURE — A4216 STERILE WATER/SALINE, 10 ML: HCPCS | Performed by: PHYSICIAN ASSISTANT

## 2020-01-03 RX ORDER — LIDOCAINE 50 MG/G
2 PATCH TOPICAL
Status: DISCONTINUED | OUTPATIENT
Start: 2020-01-03 | End: 2020-01-08 | Stop reason: HOSPADM

## 2020-01-03 RX ADMIN — Medication 3 ML: at 10:01

## 2020-01-03 RX ADMIN — DIGOXIN 0.12 MG: 125 TABLET ORAL at 08:01

## 2020-01-03 RX ADMIN — ACETAMINOPHEN 650 MG: 325 TABLET ORAL at 12:01

## 2020-01-03 RX ADMIN — LIDOCAINE 2 PATCH: 50 PATCH TOPICAL at 01:01

## 2020-01-03 RX ADMIN — Medication 3 ML: at 02:01

## 2020-01-03 RX ADMIN — ACETAMINOPHEN 650 MG: 325 TABLET ORAL at 06:01

## 2020-01-03 RX ADMIN — BRIMONIDINE TARTRATE 1 DROP: 1.5 SOLUTION OPHTHALMIC at 08:01

## 2020-01-03 RX ADMIN — AMOXICILLIN AND CLAVULANATE POTASSIUM 1 TABLET: 875; 125 TABLET, FILM COATED ORAL at 08:01

## 2020-01-03 RX ADMIN — BRIMONIDINE TARTRATE 1 DROP: 1.5 SOLUTION OPHTHALMIC at 05:01

## 2020-01-03 RX ADMIN — OXYCODONE HYDROCHLORIDE AND ACETAMINOPHEN 1000 MG: 500 TABLET ORAL at 08:01

## 2020-01-03 RX ADMIN — DONEPEZIL HYDROCHLORIDE 10 MG: 10 TABLET ORAL at 08:01

## 2020-01-03 RX ADMIN — LATANOPROST 1 DROP: 50 SOLUTION OPHTHALMIC at 08:01

## 2020-01-03 RX ADMIN — ACETAMINOPHEN 650 MG: 325 TABLET ORAL at 05:01

## 2020-01-03 RX ADMIN — HYDROXYUREA 500 MG: 500 CAPSULE ORAL at 08:01

## 2020-01-03 RX ADMIN — ATORVASTATIN CALCIUM 10 MG: 10 TABLET, FILM COATED ORAL at 08:01

## 2020-01-03 RX ADMIN — Medication 3 ML: at 06:01

## 2020-01-03 NOTE — SUBJECTIVE & OBJECTIVE
Interval History: No reported events overnight. Pt seen at bedside this AM resting in NAD with son and home sitter present. Discussed plan of care- awaiting SNF    Review of Systems   Unable to perform ROS: Dementia     Objective:     Vital Signs (Most Recent):  Temp: 98.8 °F (37.1 °C) (01/03/20 0752)  Pulse: (!) 57 (01/03/20 0752)  Resp: 20 (01/03/20 0752)  BP: 136/66 (01/03/20 0752)  SpO2: 97 % (01/03/20 0752) Vital Signs (24h Range):  Temp:  [97.8 °F (36.6 °C)-98.8 °F (37.1 °C)] 98.8 °F (37.1 °C)  Pulse:  [40-68] 57  Resp:  [16-20] 20  SpO2:  [96 %-99 %] 97 %  BP: ()/(52-70) 136/66     Weight: 57.6 kg (126 lb 15.8 oz)  Body mass index is 21.8 kg/m².    Intake/Output Summary (Last 24 hours) at 1/3/2020 1106  Last data filed at 1/3/2020 0200  Gross per 24 hour   Intake 600 ml   Output 650 ml   Net -50 ml      Physical Exam   Constitutional: No distress.   Frail elderly female in NAD   Eyes: EOM are normal. Right eye exhibits no discharge. Left eye exhibits no discharge.   Neck: Normal range of motion. Neck supple.   Cardiovascular: Normal rate and normal heart sounds. An irregularly irregular rhythm present.   Pulmonary/Chest: Effort normal. No respiratory distress.   Abdominal: Soft. Bowel sounds are normal. She exhibits no distension. There is no tenderness.   Musculoskeletal: She exhibits no edema or tenderness.   Neurological: She is alert.   Oriented to name, place, son at bedside; unsure of year   Skin: Skin is warm and dry. She is not diaphoretic.   Psychiatric: She has a normal mood and affect.   Oriented to self, son, and place       Significant Labs: All pertinent labs within the past 24 hours have been reviewed.    Significant Imaging: I have reviewed all pertinent imaging results/findings within the past 24 hours.

## 2020-01-03 NOTE — PLAN OF CARE
POC reviewed with pt. Pt verbalized understanding. Positioning for comfort. No direct needs voiced. No acute events this shift. Nadn; will monitor.

## 2020-01-03 NOTE — PROGRESS NOTES
[facility] Hospital Medicine  Progress Note    Patient Name: Cat Garcia  MRN: [id number]  Patient Class: IP- Inpatient   Admission Date: 12/30/2019  Length of Stay: 3 days  Attending Physician: Nora Renner MD  Primary Care Provider: Alfie Oconnell MD    [facility] Medicine Team: [facility] E Glenis Miller PA-C    Subjective:     Principal Problem:Acute cystitis without hematuria        HPI:  84 year old female with a PMHx of HTN, HLD, chronic afib on coumadin, HFrEF 15%, and dementia presenting to the ER with son at bedside for fall at home. Patient lives alone with sitter and family support. HPI provided by son as patient unable to recall details of event due to dementia. Patient had a fall yesterday afternoon. Son saw fall on video camera and arrived at patient's home in minutes. Son assisted her off the ground and patient was able to ambulate to bed with assistance. This morning, son reports patient was having increased pain and unable to ambulate 2/2 pain; therefore, was brought to ER for evaluation. At the time of my exam, patient has no complaints; she denies pain. Son reports chronic poor appetite, drinks Boost with meals at home. She denies CP, SOB, abdominal pain, N/V/D, fever/chills, dysuria/frequency, headache, palpitations, and focal weakness.       HDS on admission, afebrile. Labs significant for WBC 20.25. LA 1.9. UA with 19 WBC and many bacteria. Received Zosyn in the ER. EKG nonischemic. Troponin WNL, . Xray of sacrum and coccyx without fracture or dislocation. L hip xray with no acute fracture. CTH shows no acute hemorrhage or traumatic injury; ventriculomegaly noted.    Overview/Hospital Course:  Patient admitted for fall at home. Afebrile, with leukocytosis 20k on admission. Found to have UTI, continuing Zosyn for now (hx MDR UTI). UCx pansensitive E. Coli, stop Zosyn and switch to Augmentin. Leukocytosis, mentation improving with UTI tx. PTOT consulted,  recommending SNF. Son in agreement, CM aware. PCP follow up.    Interval History: No reported events overnight. Pt seen at bedside this AM resting in NAD with son and home sitter present. Discussed plan of care- awaiting SNF    Review of Systems   Unable to perform ROS: Dementia     Objective:     Vital Signs (Most Recent):  Temp: 98.8 °F (37.1 °C) (01/03/20 0752)  Pulse: (!) 57 (01/03/20 0752)  Resp: 20 (01/03/20 0752)  BP: 136/66 (01/03/20 0752)  SpO2: 97 % (01/03/20 0752) Vital Signs (24h Range):  Temp:  [97.8 °F (36.6 °C)-98.8 °F (37.1 °C)] 98.8 °F (37.1 °C)  Pulse:  [40-68] 57  Resp:  [16-20] 20  SpO2:  [96 %-99 %] 97 %  BP: ()/(52-70) 136/66     Weight: 57.6 kg (126 lb 15.8 oz)  Body mass index is 21.8 kg/m².    Intake/Output Summary (Last 24 hours) at 1/3/2020 1106  Last data filed at 1/3/2020 0200  Gross per 24 hour   Intake 600 ml   Output 650 ml   Net -50 ml      Physical Exam   Constitutional: No distress.   Frail elderly female in NAD   Eyes: EOM are normal. Right eye exhibits no discharge. Left eye exhibits no discharge.   Neck: Normal range of motion. Neck supple.   Cardiovascular: Normal rate and normal heart sounds. An irregularly irregular rhythm present.   Pulmonary/Chest: Effort normal. No respiratory distress.   Abdominal: Soft. Bowel sounds are normal. She exhibits no distension. There is no tenderness.   Musculoskeletal: She exhibits no edema or tenderness.   Neurological: She is alert.   Oriented to name, place, son at bedside; unsure of year   Skin: Skin is warm and dry. She is not diaphoretic.   Psychiatric: She has a normal mood and affect.   Oriented to self, son, and place       Significant Labs: All pertinent labs within the past 24 hours have been reviewed.    Significant Imaging: I have reviewed all pertinent imaging results/findings within the past 24 hours.      Assessment/Plan:      * Acute cystitis without hematuria  Leukocytosis  Fall  - HDS on admission, afebrile   - Labs  significant for WBC 20.25>16.7>10  - LA 1.9  - Received Zosyn in the ER; will continue for now given hx of MDR ESBL  - Follow up urine cultures ( E. Coli, pan-sensitive), d/c zosyn and start Augmentin for total of 10 day abx course  - Follow up blood cultures NGTD  - Xray of sacrum and coccyx without fracture or dislocation  - L hip xray with no acute fracture   - CTH shows no acute hemorrhage or traumatic injury; ventriculomegaly noted  - PT/OT consulted, recommending SNF, CM aware  - Telemetry    Essential hypertension  - Normo/hypotensive on admit  - Received 1L bolus in the ER  - Held losartan  - Continue to monitor    Essential thrombocytosis  Follows with heme onc  Continue hydroxyurea  MCV 110s  Check folate in AM: WNL      CKD (chronic kidney disease), stage III  - Chronic and stable  - Continue to monitor    Atrial fibrillation, chronic  Long term use of anticoagulation  - Rate controlled on admit  - INR 2.0   - Continue home Toprol XL and coumadin  - PT/INR daily    Hypophosphatemia  - Phos 2.1, replaced PO  - Trend daily    Chronic combined systolic and diastolic CHF, NYHA class 3  HFrEF 15%  - Euvolemic on exam  - EKG without changes  - Troponin negative,   - Cardiac diet  - Strict I/Os, daily weights    Dyslipidemia  - Continue home lipitor    Mixed Alzheimer's and vascular dementia  - Oriented to self and son; unable to report events of fall yesterday  - Continue home aricept qhs  - Son looking into assisted living facilities      VTE Risk Mitigation (From admission, onward)         Ordered     warfarin (COUMADIN) tablet 5 mg  Every Sunday 12/30/19 1530     warfarin (COUMADIN) tablet 5 mg  Every Tues, Thurs, Sat      01/02/20 0959     warfarin tablet 7.5 mg  Every Friday 12/30/19 1530     warfarin (COUMADIN) tablet 5 mg  Every Wednesday 12/30/19 1530     warfarin tablet 7.5 mg  Every Monday 12/30/19 1530     Place PAUL hose  Until discontinued      12/30/19 1458     Place  sequential compression device  Until discontinued      12/30/19 1458     Reason for No Pharmacological VTE Prophylaxis  Once     Question:  Reasons:  Answer:  Already adequately anticoagulated on oral Anticoagulants    12/30/19 1458     IP VTE HIGH RISK PATIENT  Once      12/30/19 1458                    Discussed with staff  Glenis Miller PA-C  Department of Hospital Medicine   [facility]

## 2020-01-03 NOTE — PLAN OF CARE
SW received 142 and placed in RC.    Ree Greenfield, GRACIE  [facility]  [id number]

## 2020-01-04 LAB
ANION GAP SERPL CALC-SCNC: 7 MMOL/L (ref 8–16)
BACTERIA BLD CULT: NORMAL
BACTERIA BLD CULT: NORMAL
BASOPHILS # BLD AUTO: 0.07 K/UL (ref 0–0.2)
BASOPHILS NFR BLD: 0.8 % (ref 0–1.9)
BUN SERPL-MCNC: 10 MG/DL (ref 8–23)
CALCIUM SERPL-MCNC: 9 MG/DL (ref 8.7–10.5)
CHLORIDE SERPL-SCNC: 114 MMOL/L (ref 95–110)
CO2 SERPL-SCNC: 21 MMOL/L (ref 23–29)
CREAT SERPL-MCNC: 0.8 MG/DL (ref 0.5–1.4)
DIFFERENTIAL METHOD: ABNORMAL
EOSINOPHIL # BLD AUTO: 0.3 K/UL (ref 0–0.5)
EOSINOPHIL NFR BLD: 3.1 % (ref 0–8)
ERYTHROCYTE [DISTWIDTH] IN BLOOD BY AUTOMATED COUNT: 12.8 % (ref 11.5–14.5)
EST. GFR  (AFRICAN AMERICAN): >60 ML/MIN/1.73 M^2
EST. GFR  (NON AFRICAN AMERICAN): >60 ML/MIN/1.73 M^2
GLUCOSE SERPL-MCNC: 85 MG/DL (ref 70–110)
HCT VFR BLD AUTO: 40.8 % (ref 37–48.5)
HGB BLD-MCNC: 12.6 G/DL (ref 12–16)
IMM GRANULOCYTES # BLD AUTO: 0.07 K/UL (ref 0–0.04)
IMM GRANULOCYTES NFR BLD AUTO: 0.8 % (ref 0–0.5)
INR PPP: 2.2 (ref 0.8–1.2)
LYMPHOCYTES # BLD AUTO: 2 K/UL (ref 1–4.8)
LYMPHOCYTES NFR BLD: 22.4 % (ref 18–48)
MAGNESIUM SERPL-MCNC: 2.1 MG/DL (ref 1.6–2.6)
MCH RBC QN AUTO: 34.7 PG (ref 27–31)
MCHC RBC AUTO-ENTMCNC: 30.9 G/DL (ref 32–36)
MCV RBC AUTO: 112 FL (ref 82–98)
MONOCYTES # BLD AUTO: 0.7 K/UL (ref 0.3–1)
MONOCYTES NFR BLD: 8.3 % (ref 4–15)
NEUTROPHILS # BLD AUTO: 5.6 K/UL (ref 1.8–7.7)
NEUTROPHILS NFR BLD: 64.6 % (ref 38–73)
NRBC BLD-RTO: 0 /100 WBC
PHOSPHATE SERPL-MCNC: 2.5 MG/DL (ref 2.7–4.5)
PLATELET # BLD AUTO: 705 K/UL (ref 150–350)
PMV BLD AUTO: 10.1 FL (ref 9.2–12.9)
POTASSIUM SERPL-SCNC: 4 MMOL/L (ref 3.5–5.1)
PROTHROMBIN TIME: 20.7 SEC (ref 9–12.5)
RBC # BLD AUTO: 3.63 M/UL (ref 4–5.4)
SODIUM SERPL-SCNC: 142 MMOL/L (ref 136–145)
TB INDURATION 48 - 72 HR READ: 0 MM
WBC # BLD AUTO: 8.7 K/UL (ref 3.9–12.7)

## 2020-01-04 PROCEDURE — 25000003 PHARM REV CODE 250: Performed by: PHYSICIAN ASSISTANT

## 2020-01-04 PROCEDURE — 93005 ELECTROCARDIOGRAM TRACING: CPT

## 2020-01-04 PROCEDURE — 83735 ASSAY OF MAGNESIUM: CPT

## 2020-01-04 PROCEDURE — 36415 COLL VENOUS BLD VENIPUNCTURE: CPT

## 2020-01-04 PROCEDURE — 25000003 PHARM REV CODE 250: Performed by: INTERNAL MEDICINE

## 2020-01-04 PROCEDURE — 85610 PROTHROMBIN TIME: CPT

## 2020-01-04 PROCEDURE — 84100 ASSAY OF PHOSPHORUS: CPT

## 2020-01-04 PROCEDURE — 85025 COMPLETE CBC W/AUTO DIFF WBC: CPT

## 2020-01-04 PROCEDURE — 93010 EKG 12-LEAD: ICD-10-PCS | Mod: ,,, | Performed by: INTERNAL MEDICINE

## 2020-01-04 PROCEDURE — 99232 SBSQ HOSP IP/OBS MODERATE 35: CPT | Mod: ,,, | Performed by: PHYSICIAN ASSISTANT

## 2020-01-04 PROCEDURE — 99232 PR SUBSEQUENT HOSPITAL CARE,LEVL II: ICD-10-PCS | Mod: ,,, | Performed by: PHYSICIAN ASSISTANT

## 2020-01-04 PROCEDURE — A4216 STERILE WATER/SALINE, 10 ML: HCPCS | Performed by: PHYSICIAN ASSISTANT

## 2020-01-04 PROCEDURE — 11000001 HC ACUTE MED/SURG PRIVATE ROOM

## 2020-01-04 PROCEDURE — 80048 BASIC METABOLIC PNL TOTAL CA: CPT

## 2020-01-04 PROCEDURE — 93010 ELECTROCARDIOGRAM REPORT: CPT | Mod: ,,, | Performed by: INTERNAL MEDICINE

## 2020-01-04 RX ORDER — HYDROXYZINE HYDROCHLORIDE 25 MG/1
25 TABLET, FILM COATED ORAL 3 TIMES DAILY PRN
Status: DISCONTINUED | OUTPATIENT
Start: 2020-01-04 | End: 2020-01-08 | Stop reason: HOSPADM

## 2020-01-04 RX ADMIN — AMOXICILLIN AND CLAVULANATE POTASSIUM 1 TABLET: 875; 125 TABLET, FILM COATED ORAL at 08:01

## 2020-01-04 RX ADMIN — HYDROXYUREA 500 MG: 500 CAPSULE ORAL at 08:01

## 2020-01-04 RX ADMIN — WARFARIN SODIUM 5 MG: 5 TABLET ORAL at 05:01

## 2020-01-04 RX ADMIN — DONEPEZIL HYDROCHLORIDE 10 MG: 10 TABLET ORAL at 08:01

## 2020-01-04 RX ADMIN — OXYCODONE HYDROCHLORIDE AND ACETAMINOPHEN 1000 MG: 500 TABLET ORAL at 08:01

## 2020-01-04 RX ADMIN — Medication 3 ML: at 02:01

## 2020-01-04 RX ADMIN — LATANOPROST 1 DROP: 50 SOLUTION OPHTHALMIC at 08:01

## 2020-01-04 RX ADMIN — LIDOCAINE 2 PATCH: 50 PATCH TOPICAL at 02:01

## 2020-01-04 RX ADMIN — ATORVASTATIN CALCIUM 10 MG: 10 TABLET, FILM COATED ORAL at 08:01

## 2020-01-04 RX ADMIN — BRIMONIDINE TARTRATE 1 DROP: 1.5 SOLUTION OPHTHALMIC at 08:01

## 2020-01-04 RX ADMIN — Medication 3 ML: at 06:01

## 2020-01-04 RX ADMIN — BRIMONIDINE TARTRATE 1 DROP: 1.5 SOLUTION OPHTHALMIC at 09:01

## 2020-01-04 RX ADMIN — BRIMONIDINE TARTRATE 1 DROP: 1.5 SOLUTION OPHTHALMIC at 02:01

## 2020-01-04 RX ADMIN — ACETAMINOPHEN 650 MG: 325 TABLET ORAL at 08:01

## 2020-01-04 RX ADMIN — Medication 3 ML: at 08:01

## 2020-01-04 RX ADMIN — HYDROXYZINE HYDROCHLORIDE 25 MG: 25 TABLET, FILM COATED ORAL at 02:01

## 2020-01-04 NOTE — NURSING
Pt's heart rate dropped to the 30's. Pt asymptomatic. Lowest at 38. K. Swords notified. No orders rec'd at this time. Nadn; will continue to monitor pt.

## 2020-01-04 NOTE — SUBJECTIVE & OBJECTIVE
Interval History: Pt with asymptomatic bradycardia last night, lowest HR 38bpm. Held MTP this AM. Pt seen at bedside this AM sleeping in NAD. No complaints.     Review of Systems   Unable to perform ROS: Dementia     Objective:     Vital Signs (Most Recent):  Temp: 98.1 °F (36.7 °C) (01/04/20 0829)  Pulse: (!) 51 (01/04/20 0829)  Resp: 16 (01/04/20 0829)  BP: 139/71 (01/04/20 0829)  SpO2: 99 % (01/04/20 0829) Vital Signs (24h Range):  Temp:  [97.6 °F (36.4 °C)-98.1 °F (36.7 °C)] 98.1 °F (36.7 °C)  Pulse:  [42-77] 51  Resp:  [16-18] 16  SpO2:  [95 %-99 %] 99 %  BP: (110-144)/(56-71) 139/71     Weight: 55.9 kg (123 lb 3.8 oz)  Body mass index is 21.15 kg/m².    Intake/Output Summary (Last 24 hours) at 1/4/2020 1053  Last data filed at 1/3/2020 1700  Gross per 24 hour   Intake --   Output 200 ml   Net -200 ml      Physical Exam   Constitutional: No distress.   Frail elderly female in NAD   Eyes: EOM are normal.   Neck: Normal range of motion. Neck supple.   Cardiovascular: Normal rate and normal heart sounds. An irregularly irregular rhythm present.   Pulmonary/Chest: Effort normal. No respiratory distress.   Abdominal: Soft. She exhibits no distension. There is no tenderness.   Neurological: She is alert.   Oriented to name, place, son at bedside; unsure of year   Skin: Skin is warm and dry. She is not diaphoretic.   Psychiatric: She has a normal mood and affect.   Oriented to self, son, and place       Significant Labs: All pertinent labs within the past 24 hours have been reviewed.    Significant Imaging: I have reviewed all pertinent imaging results/findings within the past 24 hours.

## 2020-01-04 NOTE — CARE UPDATE
Rapid Response Nurse AI Alert     AI alert received, chart check completed abnormal VS noted. Bedside RNAlexia contacted, asymptomatic bradycardia noted, primary team aware, ekg pending, instructed to call [phone] for further concerns or assistance.

## 2020-01-04 NOTE — NURSING
"Notified by telemetry that pt's heart rate dropped to 32. K. Zoltan notified again. She stated, "As long as the pt is asymptomatic I am okay with her dipping to the low 30's". Pt is asymptomatic at this time. Pt lying in bed resting quietly. No direct needs noted. Nadn; will continue to monitor.   "

## 2020-01-04 NOTE — PLAN OF CARE
Poc reviewed with pt. No acute events this shift. Positioning promoted. Prn pain medication given as needed. Bed in lowest position. Call bell within reach. Side rails up appropriately. No needs noted. Nadn; will report off to oncoming shift.

## 2020-01-04 NOTE — PROGRESS NOTES
[facility] Hospital Medicine  Progress Note    Patient Name: Cat Garcia  MRN: [id number]  Patient Class: IP- Inpatient   Admission Date: 12/30/2019  Length of Stay: 4 days  Attending Physician: Nora Renner MD  Primary Care Provider: Alfie Oconnell MD    [facility] Medicine Team: [facility] E Glenis Miller PA-C    Subjective:     Principal Problem:Acute cystitis without hematuria        HPI:  84 year old female with a PMHx of HTN, HLD, chronic afib on coumadin, HFrEF 15%, and dementia presenting to the ER with son at bedside for fall at home. Patient lives alone with sitter and family support. HPI provided by son as patient unable to recall details of event due to dementia. Patient had a fall yesterday afternoon. Son saw fall on video camera and arrived at patient's home in minutes. Son assisted her off the ground and patient was able to ambulate to bed with assistance. This morning, son reports patient was having increased pain and unable to ambulate 2/2 pain; therefore, was brought to ER for evaluation. At the time of my exam, patient has no complaints; she denies pain. Son reports chronic poor appetite, drinks Boost with meals at home. She denies CP, SOB, abdominal pain, N/V/D, fever/chills, dysuria/frequency, headache, palpitations, and focal weakness.       HDS on admission, afebrile. Labs significant for WBC 20.25. LA 1.9. UA with 19 WBC and many bacteria. Received Zosyn in the ER. EKG nonischemic. Troponin WNL, . Xray of sacrum and coccyx without fracture or dislocation. L hip xray with no acute fracture. CTH shows no acute hemorrhage or traumatic injury; ventriculomegaly noted.    Overview/Hospital Course:  Patient admitted for fall at home. Afebrile, with leukocytosis 20k on admission. Found to have UTI, continuing Zosyn for now (hx MDR UTI). UCx pansensitive E. Coli, stop Zosyn and switch to Augmentin. Leukocytosis, mentation improving with UTI tx. PTOT consulted,  recommending SNF. Son in agreement, CM aware. PCP follow up.    Interval History: Pt with asymptomatic bradycardia last night, lowest HR 38bpm. Held MTP this AM. Pt seen at bedside this AM sleeping in NAD. No complaints.     Review of Systems   Unable to perform ROS: Dementia     Objective:     Vital Signs (Most Recent):  Temp: 98.1 °F (36.7 °C) (01/04/20 0829)  Pulse: (!) 51 (01/04/20 0829)  Resp: 16 (01/04/20 0829)  BP: 139/71 (01/04/20 0829)  SpO2: 99 % (01/04/20 0829) Vital Signs (24h Range):  Temp:  [97.6 °F (36.4 °C)-98.1 °F (36.7 °C)] 98.1 °F (36.7 °C)  Pulse:  [42-77] 51  Resp:  [16-18] 16  SpO2:  [95 %-99 %] 99 %  BP: (110-144)/(56-71) 139/71     Weight: 55.9 kg (123 lb 3.8 oz)  Body mass index is 21.15 kg/m².    Intake/Output Summary (Last 24 hours) at 1/4/2020 1053  Last data filed at 1/3/2020 1700  Gross per 24 hour   Intake --   Output 200 ml   Net -200 ml      Physical Exam   Constitutional: No distress.   Frail elderly female in NAD   Eyes: EOM are normal.   Neck: Normal range of motion. Neck supple.   Cardiovascular: Normal rate and normal heart sounds. An irregularly irregular rhythm present.   Pulmonary/Chest: Effort normal. No respiratory distress.   Abdominal: Soft. She exhibits no distension. There is no tenderness.   Neurological: She is alert.   Oriented to name, place, son at bedside; unsure of year   Skin: Skin is warm and dry. She is not diaphoretic.   Psychiatric: She has a normal mood and affect.   Oriented to self, son, and place       Significant Labs: All pertinent labs within the past 24 hours have been reviewed.    Significant Imaging: I have reviewed all pertinent imaging results/findings within the past 24 hours.      Assessment/Plan:      * Acute cystitis without hematuria  Leukocytosis  Fall  - HDS on admission, afebrile   - Labs significant for WBC 20.25>16.7>10  - LA 1.9  - Received Zosyn in the ER; will continue for now given hx of MDR ESBL  - Follow up urine cultures ( E.  Coli, pan-sensitive), d/c zosyn and start Augmentin for total of 10 day abx course  - Follow up blood cultures NGTD  - Xray of sacrum and coccyx without fracture or dislocation  - L hip xray with no acute fracture   - CTH shows no acute hemorrhage or traumatic injury; ventriculomegaly noted  - PT/OT consulted, recommending SNF, CM aware  - Telemetry    Essential hypertension  - Normo/hypotensive on admit  - Received 1L bolus in the ER  - Held losartan  - Continue to monitor    Essential thrombocytosis  Follows with heme onc  Continue hydroxyurea  MCV 110s  Check folate in AM: WNL      CKD (chronic kidney disease), stage III  - Chronic and stable  - Continue to monitor    Atrial fibrillation, chronic  Long term use of anticoagulation  - Rate controlled on admit  - INR 2.0   - Continue home Toprol XL and coumadin  - PT/INR daily    Hypophosphatemia  - Phos 2.1, replaced PO  - Trend daily    Chronic combined systolic and diastolic CHF, NYHA class 3  HFrEF 15%  - Euvolemic on exam  - EKG without changes  - Troponin negative,   - Cardiac diet  - Strict I/Os, daily weights    Dyslipidemia  - Continue home lipitor    Mixed Alzheimer's and vascular dementia  - Oriented to self and son; unable to report events of fall yesterday  - Continue home aricept qhs  - Son looking into assisted living facilities      VTE Risk Mitigation (From admission, onward)         Ordered     warfarin (COUMADIN) tablet 5 mg  Every Sunday 12/30/19 1530     warfarin (COUMADIN) tablet 5 mg  Every Tues, Thurs, Sat      01/02/20 0959     warfarin (COUMADIN) tablet 5 mg  Every Wednesday 12/30/19 1530     warfarin tablet 7.5 mg  Every Monday 12/30/19 1530     Place PAUL hose  Until discontinued      12/30/19 1458     Place sequential compression device  Until discontinued      12/30/19 1458     Reason for No Pharmacological VTE Prophylaxis  Once     Question:  Reasons:  Answer:  Already adequately anticoagulated on oral  Anticoagulants    12/30/19 1458     IP VTE HIGH RISK PATIENT  Once      12/30/19 1458                  Discussed with staff    Glenis Miller PA-C  Department of Hospital Medicine   [facility]

## 2020-01-05 LAB
ANION GAP SERPL CALC-SCNC: 6 MMOL/L (ref 8–16)
BASOPHILS # BLD AUTO: 0.07 K/UL (ref 0–0.2)
BASOPHILS NFR BLD: 0.7 % (ref 0–1.9)
BNP SERPL-MCNC: 102 PG/ML (ref 0–99)
BUN SERPL-MCNC: 10 MG/DL (ref 8–23)
CALCIUM SERPL-MCNC: 8.7 MG/DL (ref 8.7–10.5)
CHLORIDE SERPL-SCNC: 114 MMOL/L (ref 95–110)
CO2 SERPL-SCNC: 23 MMOL/L (ref 23–29)
CREAT SERPL-MCNC: 1 MG/DL (ref 0.5–1.4)
DIFFERENTIAL METHOD: ABNORMAL
EOSINOPHIL # BLD AUTO: 0.3 K/UL (ref 0–0.5)
EOSINOPHIL NFR BLD: 2.6 % (ref 0–8)
ERYTHROCYTE [DISTWIDTH] IN BLOOD BY AUTOMATED COUNT: 13 % (ref 11.5–14.5)
EST. GFR  (AFRICAN AMERICAN): >60 ML/MIN/1.73 M^2
EST. GFR  (NON AFRICAN AMERICAN): 52.6 ML/MIN/1.73 M^2
GLUCOSE SERPL-MCNC: 82 MG/DL (ref 70–110)
HCT VFR BLD AUTO: 38 % (ref 37–48.5)
HGB BLD-MCNC: 11.9 G/DL (ref 12–16)
IMM GRANULOCYTES # BLD AUTO: 0.09 K/UL (ref 0–0.04)
IMM GRANULOCYTES NFR BLD AUTO: 1 % (ref 0–0.5)
INR PPP: 1.7 (ref 0.8–1.2)
LYMPHOCYTES # BLD AUTO: 2.2 K/UL (ref 1–4.8)
LYMPHOCYTES NFR BLD: 23.5 % (ref 18–48)
MAGNESIUM SERPL-MCNC: 2 MG/DL (ref 1.6–2.6)
MCH RBC QN AUTO: 35.2 PG (ref 27–31)
MCHC RBC AUTO-ENTMCNC: 31.3 G/DL (ref 32–36)
MCV RBC AUTO: 112 FL (ref 82–98)
MONOCYTES # BLD AUTO: 0.9 K/UL (ref 0.3–1)
MONOCYTES NFR BLD: 9.9 % (ref 4–15)
NEUTROPHILS # BLD AUTO: 5.9 K/UL (ref 1.8–7.7)
NEUTROPHILS NFR BLD: 62.3 % (ref 38–73)
NRBC BLD-RTO: 0 /100 WBC
PHOSPHATE SERPL-MCNC: 2.6 MG/DL (ref 2.7–4.5)
PLATELET # BLD AUTO: 680 K/UL (ref 150–350)
PMV BLD AUTO: 10.6 FL (ref 9.2–12.9)
POTASSIUM SERPL-SCNC: 4.4 MMOL/L (ref 3.5–5.1)
PROTHROMBIN TIME: 16.8 SEC (ref 9–12.5)
RBC # BLD AUTO: 3.38 M/UL (ref 4–5.4)
SODIUM SERPL-SCNC: 143 MMOL/L (ref 136–145)
WBC # BLD AUTO: 9.46 K/UL (ref 3.9–12.7)

## 2020-01-05 PROCEDURE — 25000003 PHARM REV CODE 250: Performed by: PHYSICIAN ASSISTANT

## 2020-01-05 PROCEDURE — 36415 COLL VENOUS BLD VENIPUNCTURE: CPT

## 2020-01-05 PROCEDURE — 99233 PR SUBSEQUENT HOSPITAL CARE,LEVL III: ICD-10-PCS | Mod: ,,, | Performed by: PHYSICIAN ASSISTANT

## 2020-01-05 PROCEDURE — 94761 N-INVAS EAR/PLS OXIMETRY MLT: CPT

## 2020-01-05 PROCEDURE — 83880 ASSAY OF NATRIURETIC PEPTIDE: CPT

## 2020-01-05 PROCEDURE — 83735 ASSAY OF MAGNESIUM: CPT

## 2020-01-05 PROCEDURE — 85025 COMPLETE CBC W/AUTO DIFF WBC: CPT

## 2020-01-05 PROCEDURE — 99233 SBSQ HOSP IP/OBS HIGH 50: CPT | Mod: ,,, | Performed by: PHYSICIAN ASSISTANT

## 2020-01-05 PROCEDURE — 11000001 HC ACUTE MED/SURG PRIVATE ROOM

## 2020-01-05 PROCEDURE — 80048 BASIC METABOLIC PNL TOTAL CA: CPT

## 2020-01-05 PROCEDURE — 84100 ASSAY OF PHOSPHORUS: CPT

## 2020-01-05 PROCEDURE — A4216 STERILE WATER/SALINE, 10 ML: HCPCS | Performed by: PHYSICIAN ASSISTANT

## 2020-01-05 PROCEDURE — 85610 PROTHROMBIN TIME: CPT

## 2020-01-05 RX ORDER — METOPROLOL SUCCINATE 25 MG/1
25 TABLET, EXTENDED RELEASE ORAL DAILY
Status: DISCONTINUED | OUTPATIENT
Start: 2020-01-06 | End: 2020-01-08

## 2020-01-05 RX ORDER — METOPROLOL TARTRATE 25 MG/1
25 TABLET, FILM COATED ORAL ONCE
Status: COMPLETED | OUTPATIENT
Start: 2020-01-05 | End: 2020-01-05

## 2020-01-05 RX ADMIN — METOPROLOL TARTRATE 25 MG: 25 TABLET ORAL at 04:01

## 2020-01-05 RX ADMIN — LATANOPROST 1 DROP: 50 SOLUTION OPHTHALMIC at 10:01

## 2020-01-05 RX ADMIN — BRIMONIDINE TARTRATE 1 DROP: 1.5 SOLUTION OPHTHALMIC at 10:01

## 2020-01-05 RX ADMIN — ATORVASTATIN CALCIUM 10 MG: 10 TABLET, FILM COATED ORAL at 10:01

## 2020-01-05 RX ADMIN — BRIMONIDINE TARTRATE 1 DROP: 1.5 SOLUTION OPHTHALMIC at 08:01

## 2020-01-05 RX ADMIN — AMOXICILLIN AND CLAVULANATE POTASSIUM 1 TABLET: 875; 125 TABLET, FILM COATED ORAL at 08:01

## 2020-01-05 RX ADMIN — Medication 3 ML: at 03:01

## 2020-01-05 RX ADMIN — HYDROXYZINE HYDROCHLORIDE 25 MG: 25 TABLET, FILM COATED ORAL at 10:01

## 2020-01-05 RX ADMIN — OXYCODONE HYDROCHLORIDE AND ACETAMINOPHEN 1000 MG: 500 TABLET ORAL at 10:01

## 2020-01-05 RX ADMIN — AMOXICILLIN AND CLAVULANATE POTASSIUM 1 TABLET: 875; 125 TABLET, FILM COATED ORAL at 10:01

## 2020-01-05 RX ADMIN — DIGOXIN 0.12 MG: 125 TABLET ORAL at 10:01

## 2020-01-05 RX ADMIN — WARFARIN SODIUM 5 MG: 5 TABLET ORAL at 04:01

## 2020-01-05 RX ADMIN — HYDROXYUREA 500 MG: 500 CAPSULE ORAL at 10:01

## 2020-01-05 RX ADMIN — ACETAMINOPHEN 650 MG: 325 TABLET ORAL at 09:01

## 2020-01-05 RX ADMIN — BRIMONIDINE TARTRATE 1 DROP: 1.5 SOLUTION OPHTHALMIC at 03:01

## 2020-01-05 RX ADMIN — DONEPEZIL HYDROCHLORIDE 10 MG: 10 TABLET ORAL at 08:01

## 2020-01-05 RX ADMIN — Medication 3 ML: at 09:01

## 2020-01-05 NOTE — SUBJECTIVE & OBJECTIVE
Interval History: Pt noted to have 3lb wt gain last night. Maintaining sats ORA. Will check CXR and BNP to determine lasix IVP, as patient has had poor PO intake lately, do not want to dehydrate. Pt seen at bedside this AM resting in NAD. No complaints.     Review of Systems   Unable to perform ROS: Dementia     Objective:     Vital Signs (Most Recent):  Temp: 98.2 °F (36.8 °C) (01/05/20 0738)  Pulse: 74 (01/05/20 0738)  Resp: 17 (01/05/20 0738)  BP: (!) 142/70 (01/05/20 0738)  SpO2: 96 % (01/05/20 0738) Vital Signs (24h Range):  Temp:  [98 °F (36.7 °C)-98.6 °F (37 °C)] 98.2 °F (36.8 °C)  Pulse:  [47-78] 74  Resp:  [16-18] 17  SpO2:  [96 %-100 %] 96 %  BP: (113-155)/(53-81) 142/70     Weight: 57.4 kg (126 lb 8.7 oz)  Body mass index is 21.72 kg/m².    Intake/Output Summary (Last 24 hours) at 1/5/2020 1102  Last data filed at 1/5/2020 0732  Gross per 24 hour   Intake 220 ml   Output 500 ml   Net -280 ml      Physical Exam   Constitutional: No distress.   Frail elderly female in NAD   HENT:   Head: Normocephalic and atraumatic.   Eyes: EOM are normal. Right eye exhibits no discharge. Left eye exhibits no discharge.   Neck: Normal range of motion. Neck supple.   Cardiovascular: Normal rate and normal heart sounds. An irregularly irregular rhythm present.   Pulmonary/Chest: Effort normal. No respiratory distress.   Abdominal: Soft. She exhibits no distension. There is no tenderness.   Musculoskeletal: She exhibits edema (1+ BLE).   Neurological: She is alert.   Oriented to name, place   Skin: Skin is warm and dry. She is not diaphoretic.   Psychiatric: She has a normal mood and affect.   Oriented to self, son, and place       Significant Labs: All pertinent labs within the past 24 hours have been reviewed.    Significant Imaging: I have reviewed all pertinent imaging results/findings within the past 24 hours.

## 2020-01-05 NOTE — PLAN OF CARE
Pt slept throughout evening, remains disoriented to time and situation.  No complaints of pain, lidocaine patch in place.     Pt up to BSC with 1 assist.  Pt afib on telemetry.  At 0230, pt's HR into 180s when pt up to BSC.  Pt HR 48-60s when pt back in bed.  Zoltan with hospitalist notified.  Instructed to call if pt's HR sustained.      Pt gained 3 lbs in 1 day.  Pt has no lasix orders.  Zoltan with hospitalist notified.     Pt's TB test to left FA negative.      Bed alarm on.  Nonskid socks in place.  Pt repositioning independently.

## 2020-01-05 NOTE — PROGRESS NOTES
[facility] Hospital Medicine  Progress Note    Patient Name: Cat Garcia  MRN: [id number]  Patient Class: IP- Inpatient   Admission Date: 12/30/2019  Length of Stay: 5 days  Attending Physician: Nora Renner MD  Primary Care Provider: Alfie Oconnell MD    [facility] Hospital Medicine Team: [facility] HOSP MED E Glenis Miller PA-C    Subjective:     Principal Problem:Acute cystitis without hematuria        HPI:  84 year old female with a PMHx of HTN, HLD, chronic afib on coumadin, HFrEF 15%, and dementia presenting to the ER with son at bedside for fall at home. Patient lives alone with sitter and family support. HPI provided by son as patient unable to recall details of event due to dementia. Patient had a fall yesterday afternoon. Son saw fall on video camera and arrived at patient's home in minutes. Son assisted her off the ground and patient was able to ambulate to bed with assistance. This morning, son reports patient was having increased pain and unable to ambulate 2/2 pain; therefore, was brought to ER for evaluation. At the time of my exam, patient has no complaints; she denies pain. Son reports chronic poor appetite, drinks Boost with meals at home. She denies CP, SOB, abdominal pain, N/V/D, fever/chills, dysuria/frequency, headache, palpitations, and focal weakness.       HDS on admission, afebrile. Labs significant for WBC 20.25. LA 1.9. UA with 19 WBC and many bacteria. Received Zosyn in the ER. EKG nonischemic. Troponin WNL, . Xray of sacrum and coccyx without fracture or dislocation. L hip xray with no acute fracture. CTH shows no acute hemorrhage or traumatic injury; ventriculomegaly noted.    Overview/Hospital Course:  Patient admitted for fall at home. Afebrile, with leukocytosis 20k on admission. Found to have UTI, continuing Zosyn for now (hx MDR UTI). UCx pansensitive E. Coli, stop Zosyn and switch to Augmentin. Leukocytosis, mentation improving with UTI tx. PTOT consulted,  recommending SNF. Son in agreement, CM aware. PCP follow up. 1/5: 3lb weight gain reported by RN, ZACHARIAH, maintaining sats ORA, will check CXR/BNP    Interval History: Pt noted to have 3lb wt gain last night. Maintaining sats ORA. Will check CXR and BNP to determine lasix IVP, as patient has had poor PO intake lately, do not want to dehydrate. Pt seen at bedside this AM resting in NAD. No complaints.     Review of Systems   Unable to perform ROS: Dementia     Objective:     Vital Signs (Most Recent):  Temp: 98.2 °F (36.8 °C) (01/05/20 0738)  Pulse: 74 (01/05/20 0738)  Resp: 17 (01/05/20 0738)  BP: (!) 142/70 (01/05/20 0738)  SpO2: 96 % (01/05/20 0738) Vital Signs (24h Range):  Temp:  [98 °F (36.7 °C)-98.6 °F (37 °C)] 98.2 °F (36.8 °C)  Pulse:  [47-78] 74  Resp:  [16-18] 17  SpO2:  [96 %-100 %] 96 %  BP: (113-155)/(53-81) 142/70     Weight: 57.4 kg (126 lb 8.7 oz)  Body mass index is 21.72 kg/m².    Intake/Output Summary (Last 24 hours) at 1/5/2020 1102  Last data filed at 1/5/2020 0732  Gross per 24 hour   Intake 220 ml   Output 500 ml   Net -280 ml      Physical Exam   Constitutional: No distress.   Frail elderly female in NAD   HENT:   Head: Normocephalic and atraumatic.   Eyes: EOM are normal. Right eye exhibits no discharge. Left eye exhibits no discharge.   Neck: Normal range of motion. Neck supple.   Cardiovascular: Normal rate and normal heart sounds. An irregularly irregular rhythm present.   Pulmonary/Chest: Effort normal. No respiratory distress.   Abdominal: Soft. She exhibits no distension. There is no tenderness.   Musculoskeletal: She exhibits edema (1+ BLE).   Neurological: She is alert.   Oriented to name, place   Skin: Skin is warm and dry. She is not diaphoretic.   Psychiatric: She has a normal mood and affect.   Oriented to self, son, and place       Significant Labs: All pertinent labs within the past 24 hours have been reviewed.    Significant Imaging: I have reviewed all pertinent imaging  results/findings within the past 24 hours.      Assessment/Plan:      * Acute cystitis without hematuria  Leukocytosis  Fall  - HDS on admission, afebrile   - Labs significant for WBC 20.25>16.7>10  - LA 1.9  - Received Zosyn in the ER; will continue for now given hx of MDR ESBL  - Follow up urine cultures ( E. Coli, pan-sensitive), d/c zosyn and start Augmentin for total of 10 day abx course  - Follow up blood cultures NGTD  - Xray of sacrum and coccyx without fracture or dislocation  - L hip xray with no acute fracture   - CTH shows no acute hemorrhage or traumatic injury; ventriculomegaly noted  - PT/OT consulted, recommending SNF, CM aware  - Telemetry    Essential hypertension  - Normo/hypotensive on admit  - Received 1L bolus in the ER  - Held losartan  - Continue to monitor    Essential thrombocytosis  Follows with heme onc  Continue hydroxyurea  MCV 110s  Check folate in AM: WNL      CKD (chronic kidney disease), stage III  - Chronic and stable  - Continue to monitor    Atrial fibrillation, chronic  Long term use of anticoagulation  - Rate controlled on admit  - INR 2.0   - Continue home Toprol XL and coumadin  - PT/INR daily    Hypophosphatemia  - Phos 2.1, replaced PO  - Trend daily    Chronic combined systolic and diastolic CHF, NYHA class 3  HFrEF 15%  - Euvolemic on exam  - EKG without changes  - Troponin negative,   - Cardiac diet  - Strict I/Os, daily weights    Dyslipidemia  - Continue home lipitor    Mixed Alzheimer's and vascular dementia  - Oriented to self and son; unable to report events of fall yesterday  - Continue home aricept qhs  - Son looking into assisted living facilities      VTE Risk Mitigation (From admission, onward)         Ordered     warfarin (COUMADIN) tablet 5 mg  Every Sunday 12/30/19 1530     warfarin (COUMADIN) tablet 5 mg  Every Tues, Thurs, Sat      01/02/20 0959     warfarin (COUMADIN) tablet 5 mg  Every Wednesday 12/30/19 1530     warfarin tablet 7.5 mg   Every Monday 12/30/19 1530     Place PAUL hose  Until discontinued      12/30/19 1458     Place sequential compression device  Until discontinued      12/30/19 1458     Reason for No Pharmacological VTE Prophylaxis  Once     Question:  Reasons:  Answer:  Already adequately anticoagulated on oral Anticoagulants    12/30/19 1458     IP VTE HIGH RISK PATIENT  Once      12/30/19 1458                    Discussed with staff  Glenis Miller PA-C  Department of Hospital Medicine   [facility]

## 2020-01-05 NOTE — NURSING
Patient HR elevating to 180s-190s in transition to Xray. Patient asymptomatic. Med Team E notified. Metoprolol ordered. Will administer as well as continue to monitor.

## 2020-01-06 ENCOUNTER — PATIENT MESSAGE (OUTPATIENT)
Dept: INTERNAL MEDICINE | Facility: CLINIC | Age: 85
End: 2020-01-06

## 2020-01-06 DIAGNOSIS — I50.42 CHRONIC COMBINED SYSTOLIC AND DIASTOLIC CHF, NYHA CLASS 3: ICD-10-CM

## 2020-01-06 DIAGNOSIS — I48.20 ATRIAL FIBRILLATION, CHRONIC: Chronic | ICD-10-CM

## 2020-01-06 LAB
ANION GAP SERPL CALC-SCNC: 7 MMOL/L (ref 8–16)
BASOPHILS # BLD AUTO: 0.06 K/UL (ref 0–0.2)
BASOPHILS NFR BLD: 0.5 % (ref 0–1.9)
BUN SERPL-MCNC: 10 MG/DL (ref 8–23)
CALCIUM SERPL-MCNC: 8.5 MG/DL (ref 8.7–10.5)
CHLORIDE SERPL-SCNC: 111 MMOL/L (ref 95–110)
CO2 SERPL-SCNC: 23 MMOL/L (ref 23–29)
CREAT SERPL-MCNC: 0.9 MG/DL (ref 0.5–1.4)
DIFFERENTIAL METHOD: ABNORMAL
EOSINOPHIL # BLD AUTO: 0.3 K/UL (ref 0–0.5)
EOSINOPHIL NFR BLD: 2.8 % (ref 0–8)
ERYTHROCYTE [DISTWIDTH] IN BLOOD BY AUTOMATED COUNT: 12.9 % (ref 11.5–14.5)
EST. GFR  (AFRICAN AMERICAN): >60 ML/MIN/1.73 M^2
EST. GFR  (NON AFRICAN AMERICAN): 59.7 ML/MIN/1.73 M^2
GLUCOSE SERPL-MCNC: 90 MG/DL (ref 70–110)
HCT VFR BLD AUTO: 37.5 % (ref 37–48.5)
HGB BLD-MCNC: 11.7 G/DL (ref 12–16)
IMM GRANULOCYTES # BLD AUTO: 0.1 K/UL (ref 0–0.04)
IMM GRANULOCYTES NFR BLD AUTO: 0.9 % (ref 0–0.5)
INR PPP: 1.4 (ref 0.8–1.2)
LYMPHOCYTES # BLD AUTO: 2.6 K/UL (ref 1–4.8)
LYMPHOCYTES NFR BLD: 23.4 % (ref 18–48)
MAGNESIUM SERPL-MCNC: 2 MG/DL (ref 1.6–2.6)
MCH RBC QN AUTO: 34.5 PG (ref 27–31)
MCHC RBC AUTO-ENTMCNC: 31.2 G/DL (ref 32–36)
MCV RBC AUTO: 111 FL (ref 82–98)
MONOCYTES # BLD AUTO: 1 K/UL (ref 0.3–1)
MONOCYTES NFR BLD: 8.9 % (ref 4–15)
NEUTROPHILS # BLD AUTO: 7 K/UL (ref 1.8–7.7)
NEUTROPHILS NFR BLD: 63.5 % (ref 38–73)
NRBC BLD-RTO: 0 /100 WBC
PHOSPHATE SERPL-MCNC: 2.8 MG/DL (ref 2.7–4.5)
PLATELET # BLD AUTO: 692 K/UL (ref 150–350)
PMV BLD AUTO: 10.1 FL (ref 9.2–12.9)
POTASSIUM SERPL-SCNC: 3.8 MMOL/L (ref 3.5–5.1)
PROTHROMBIN TIME: 13.5 SEC (ref 9–12.5)
RBC # BLD AUTO: 3.39 M/UL (ref 4–5.4)
SODIUM SERPL-SCNC: 141 MMOL/L (ref 136–145)
WBC # BLD AUTO: 10.99 K/UL (ref 3.9–12.7)

## 2020-01-06 PROCEDURE — 97530 THERAPEUTIC ACTIVITIES: CPT

## 2020-01-06 PROCEDURE — 25000003 PHARM REV CODE 250: Performed by: PHYSICIAN ASSISTANT

## 2020-01-06 PROCEDURE — 85610 PROTHROMBIN TIME: CPT

## 2020-01-06 PROCEDURE — 84100 ASSAY OF PHOSPHORUS: CPT

## 2020-01-06 PROCEDURE — 99232 PR SUBSEQUENT HOSPITAL CARE,LEVL II: ICD-10-PCS | Mod: ,,, | Performed by: PHYSICIAN ASSISTANT

## 2020-01-06 PROCEDURE — 99232 SBSQ HOSP IP/OBS MODERATE 35: CPT | Mod: ,,, | Performed by: PHYSICIAN ASSISTANT

## 2020-01-06 PROCEDURE — 80048 BASIC METABOLIC PNL TOTAL CA: CPT

## 2020-01-06 PROCEDURE — A4216 STERILE WATER/SALINE, 10 ML: HCPCS | Performed by: PHYSICIAN ASSISTANT

## 2020-01-06 PROCEDURE — 97116 GAIT TRAINING THERAPY: CPT

## 2020-01-06 PROCEDURE — 11000001 HC ACUTE MED/SURG PRIVATE ROOM

## 2020-01-06 PROCEDURE — 36415 COLL VENOUS BLD VENIPUNCTURE: CPT

## 2020-01-06 PROCEDURE — 83735 ASSAY OF MAGNESIUM: CPT

## 2020-01-06 PROCEDURE — 85025 COMPLETE CBC W/AUTO DIFF WBC: CPT

## 2020-01-06 PROCEDURE — 97535 SELF CARE MNGMENT TRAINING: CPT

## 2020-01-06 RX ADMIN — WARFARIN SODIUM 7.5 MG: 2.5 TABLET ORAL at 05:01

## 2020-01-06 RX ADMIN — AMOXICILLIN AND CLAVULANATE POTASSIUM 1 TABLET: 875; 125 TABLET, FILM COATED ORAL at 09:01

## 2020-01-06 RX ADMIN — ACETAMINOPHEN 650 MG: 325 TABLET ORAL at 02:01

## 2020-01-06 RX ADMIN — BRIMONIDINE TARTRATE 1 DROP: 1.5 SOLUTION OPHTHALMIC at 03:01

## 2020-01-06 RX ADMIN — ATORVASTATIN CALCIUM 10 MG: 10 TABLET, FILM COATED ORAL at 10:01

## 2020-01-06 RX ADMIN — Medication 3 ML: at 09:01

## 2020-01-06 RX ADMIN — ACETAMINOPHEN 650 MG: 325 TABLET ORAL at 10:01

## 2020-01-06 RX ADMIN — BRIMONIDINE TARTRATE 1 DROP: 1.5 SOLUTION OPHTHALMIC at 10:01

## 2020-01-06 RX ADMIN — DIGOXIN 0.12 MG: 125 TABLET ORAL at 10:01

## 2020-01-06 RX ADMIN — ACETAMINOPHEN 650 MG: 325 TABLET ORAL at 11:01

## 2020-01-06 RX ADMIN — Medication 3 ML: at 02:01

## 2020-01-06 RX ADMIN — BRIMONIDINE TARTRATE 1 DROP: 1.5 SOLUTION OPHTHALMIC at 09:01

## 2020-01-06 RX ADMIN — LATANOPROST 1 DROP: 50 SOLUTION OPHTHALMIC at 10:01

## 2020-01-06 RX ADMIN — AMOXICILLIN AND CLAVULANATE POTASSIUM 1 TABLET: 875; 125 TABLET, FILM COATED ORAL at 10:01

## 2020-01-06 RX ADMIN — METOPROLOL SUCCINATE 25 MG: 25 TABLET, EXTENDED RELEASE ORAL at 10:01

## 2020-01-06 RX ADMIN — LIDOCAINE 2 PATCH: 50 PATCH TOPICAL at 05:01

## 2020-01-06 RX ADMIN — Medication 3 ML: at 06:01

## 2020-01-06 RX ADMIN — HYDROXYUREA 500 MG: 500 CAPSULE ORAL at 10:01

## 2020-01-06 RX ADMIN — OXYCODONE HYDROCHLORIDE AND ACETAMINOPHEN 1000 MG: 500 TABLET ORAL at 10:01

## 2020-01-06 RX ADMIN — DONEPEZIL HYDROCHLORIDE 10 MG: 10 TABLET ORAL at 09:01

## 2020-01-06 NOTE — PLAN OF CARE
NO ACUTE CHANGES NOTED.  PATIENT INITIALLY HAD DIFFICULTY VOIDING ON BSCC.  BACK & FORTH FROM BED TO BSCC MULTIPLE TIMES.  ENCOURAGED PO INTAKE.  VOIDING ADEQUATELY PER INTAKE AFTER DRINKING WATER.  MEDICATED X2 FOR LOWER BACK PAIN WITH TYLENOL.  SEE MAR.  NO FURTHER ISSUES.  PATIENT REMAINS CONFUSED AT TIMES TO PLACE & SITUATION.  REORIENTED.

## 2020-01-06 NOTE — PT/OT/SLP PROGRESS
Occupational Therapy   Treatment    Name: Cat Garcia  MRN: [id number]  Admitting Diagnosis:  Acute cystitis without hematuria       Recommendations:     Discharge Recommendations: nursing facility, skilled  Discharge Equipment Recommendations:  bedside commode  Barriers to discharge:  Decreased caregiver support    Assessment:     Cat Garcia is a 84 y.o. female with a medical diagnosis of Acute cystitis without hematuria.  She presents supine and resistant to mobility.  Educated pt on importance of daily movement for gains and slowed decline. Pt with min A for supine to sit edge of bed.  Pt with complaints of pain throughout but able to participate with encouragement.  Pt with toileting on toilet in bathroom with CGA for pericare.  Performance deficits affecting function are weakness, impaired endurance, impaired self care skills, impaired functional mobilty, gait instability, impaired balance.     Rehab Prognosis:  Good; patient would benefit from acute skilled OT services to address these deficits and reach maximum level of function.       Plan:     Patient to be seen 3 x/week to address the above listed problems via self-care/home management, therapeutic activities, therapeutic exercises  · Plan of Care Expires: 01/30/20  · Plan of Care Reviewed with: patient, caregiver    Subjective     Pain/Comfort:  · Pain Rating 1: 6/10  · Location - Orientation 1: generalized  · Location 1: back    Objective:     Communicated with: RN prior to session.  Patient found supine with telemetry upon OT entry to room.    General Precautions: Standard, fall   Orthopedic Precautions:N/A   Braces: N/A     Occupational Performance:     Bed Mobility:    · Patient completed Rolling/Turning to Right with minimum assistance  · Patient completed Scooting/Bridging with minimum assistance  · Patient completed Supine to Sit with minimum assistance     Functional Mobility/Transfers:  · Patient completed Sit <> Stand Transfer with  minimum assistance  with  rolling walker   · Patient completed Bed <> Chair Transfer using Step Transfer technique with minimum assistance with rolling walker  · Patient completed Toilet Transfer Step Transfer technique with minimum assistance with  rolling walker    Activities of Daily Living:  · Toileting: minimum assistance        [facility] 6 Click ADL: 17    Treatment & Education:  Pt educated on safety, role of OT, importance of increased participation in self care for gains , expectations for participation, expectations for gains, POC, energy conservation, caregiver strain. White board updated.   - ADL training  - bed mobility training  - extended education on importance of daily mobility      Patient left supine with all lines intactEducation:      GOALS:   Multidisciplinary Problems     Occupational Therapy Goals        Problem: Occupational Therapy Goal    Goal Priority Disciplines Outcome Interventions   Occupational Therapy Goal     OT, PT/OT Ongoing, Progressing    Description:  Goals to be met by: 1/7/20    Patient will increase functional independence with ADLs by performing:    UE Dressing with Stand-by Assistance.  Grooming while standing at sink with Stand-by Assistance.  Toileting from toilet with Contact Guard Assistance for hygiene and clothing management.   Supine to sit with Minimal Assistance.  Toilet transfer to toilet with Contact Guard Assistance.                      Time Tracking:     OT Date of Treatment: 01/06/20  OT Start Time: 0850  OT Stop Time: 0915  OT Total Time (min): 25 min    Billable Minutes:Self Care/Home Management 13  Therapeutic Activity 12    Fany Arevalo, OT  1/6/2020

## 2020-01-06 NOTE — SUBJECTIVE & OBJECTIVE
Interval History: Yesterday afternoon pt had asymptomatic HR 170s with exertion. MTP had been held the past two days, restarted and HR controlled today. Pt seen at bedside this AM resting in NAD. Anticipate d/c to SNF tomorrow. Son at bedside, discussed plan of care.     Review of Systems   Unable to perform ROS: Dementia     Objective:     Vital Signs (Most Recent):  Temp: 98.9 °F (37.2 °C) (01/06/20 0756)  Pulse: 76 (01/06/20 0756)  Resp: 16 (01/06/20 0756)  BP: (!) 118/56 (01/06/20 0756)  SpO2: 100 % (01/06/20 0756) Vital Signs (24h Range):  Temp:  [96.8 °F (36 °C)-98.9 °F (37.2 °C)] 98.9 °F (37.2 °C)  Pulse:  [] 76  Resp:  [16-18] 16  SpO2:  [93 %-100 %] 100 %  BP: (108-130)/(56-69) 118/56     Weight: 57.4 kg (126 lb 8.7 oz)  Body mass index is 21.72 kg/m².    Intake/Output Summary (Last 24 hours) at 1/6/2020 1043  Last data filed at 1/6/2020 0850  Gross per 24 hour   Intake 390 ml   Output --   Net 390 ml      Physical Exam   Constitutional: No distress.   Frail elderly female in NAD   HENT:   Head: Normocephalic and atraumatic.   Eyes: Right eye exhibits no discharge. Left eye exhibits no discharge.   Neck: Normal range of motion. Neck supple.   Cardiovascular: Normal rate and normal heart sounds. An irregularly irregular rhythm present.   Pulmonary/Chest: Effort normal. No respiratory distress.   Abdominal: Soft. She exhibits no distension. There is no tenderness.   Musculoskeletal: She exhibits edema (1+ BLE).   Neurological: She is alert.   Oriented to name, place   Skin: Skin is warm and dry. She is not diaphoretic.   Psychiatric: She has a normal mood and affect.   Oriented to self, son, and place       Significant Labs: All pertinent labs within the past 24 hours have been reviewed.    Significant Imaging: I have reviewed all pertinent imaging results/findings within the past 24 hours.

## 2020-01-06 NOTE — PROGRESS NOTES
PHARMACY CONSULT NOTE: WARFARIN  Cat Garcia is a 84 y.o. female on warfarin therapy for Atrial Fibrillation. PharmD has been consulted for warfarin dosing.    Current order: 7.5 mg Monday, 5 mg on Tue/Wed/Thur/Sat, Sun (Missing dose for Friday)   Home dose: 7.5 mg Mon/Fri and 5 mg all other days   Coumadin clinic enrollment: Active  INR goal: 2-3    Lab Results   Component Value Date    INR 1.4 (H) 01/06/2020    INR 1.7 (H) 01/05/2020    INR 2.2 (H) 01/04/2020     Significant drug interactions: Augmentin 875 mg BID       Recommendation(s):   Change warfarin regimen to 7.5 mg Q Mon/Fri and 5 mg all other days   Patient was off anticoagulation on 1/2 AND 1/3 and has not received any dose of 7.5 mg so far   Pharmacy will continue to follow and monitor warfarin    Thank you for the consult,  Verenice Figueroa  Extension [phone]     **Note: Consults are reviewed Monday-Friday 7:00am-3:30pm. THE ABOVE RECOMMENDATIONS ARE ONLY SUGGESTED.THE RECOMMENDATIONS SHOULD BE CONSIDERED IN CONJUNCTION WITH ALL PATIENT FACTORS. **

## 2020-01-06 NOTE — PT/OT/SLP PROGRESS
Physical Therapy Treatment    Patient Name:  Cat Garcia   MRN:  [id number]    Recommendations:     Discharge Recommendations:  nursing facility, skilled   Discharge Equipment Recommendations: bedside commode   Barriers to discharge: Decreased caregiver support,  only    Assessment:     Cat Garcia is a 84 y.o. female admitted with a medical diagnosis of Acute cystitis without hematuria.  She presents with the following impairments/functional limitations:  weakness, impaired functional mobilty, impaired endurance, gait instability, pain, impaired cognition, impaired self care skills. Patient participated well in treatment session, continues to need encouragement and assistance with mobility.    Rehab Prognosis: Good; patient would benefit from acute skilled PT services to address these deficits and reach maximum level of function.    Recent Surgery: * No surgery found *      Plan:     During this hospitalization, patient to be seen 3 x/week to address the identified rehab impairments via gait training, therapeutic activities, therapeutic exercises, neuromuscular re-education and progress toward the following goals:    · Plan of Care Expires:  01/30/20    Subjective     Chief Complaint: low back pain  Patient/Family Comments/goals: return to PLOF  Pain/Comfort:  Pain Rating 1: 6/10  Location - Orientation 1: generalized  Location 1: back  Pain Addressed 1: Reposition, Distraction  Pain Rating Post-Intervention 1: 5/10      Objective:     Communicated with nurse and caregiver prior to session.  Patient found HOB elevated with telemetry upon PT entry to room.     General Precautions: Standard, fall   Orthopedic Precautions:N/A   Braces: N/A     Functional Mobility:  · Bed Mobility:     · Supine to Sit: contact guard assistance and HOB elevated  · Transfers:     · Sit to Stand:  contact guard assistance with rolling walker. Verbal cues provided to ensure proper technique for safety.  · Bed to  Chair: contact guard assistance and minimum assistance with  rolling walker  using  Step Transfer. Cues assist for safe technique.  · Toilet Transfer: contact guard assistance and minimum assistance with  rolling walker and grab bar  using  Step Transfer  · Gait: Pt ambulated 75 feet with RW and CGA/min A. Slow pace, small steps, R lateral lean, and downward gaze present. Assist provided to manage RW. Pt ambulated 12 feet with RW and CGA/min A from toilet to bedside chair.      AM-PAC 6 CLICK MOBILITY  Turning over in bed (including adjusting bedclothes, sheets and blankets)?: 3  Sitting down on and standing up from a chair with arms (e.g., wheelchair, bedside commode, etc.): 3  Moving from lying on back to sitting on the side of the bed?: 3  Moving to and from a bed to a chair (including a wheelchair)?: 3  Need to walk in hospital room?: 3  Climbing 3-5 steps with a railing?: 2  Basic Mobility Total Score: 17       Therapeutic Activities and Exercises:   Educated pt and caregiver on PT POC, gait and transfer technique, and call for assistance. Pt educated on importance of sitting up in bedside chair, rather than bed, in order to maintain strength.    Patient left up in chair with all lines intact, call button in reach, nurse notified and caregiver present..    GOALS:   Multidisciplinary Problems     Physical Therapy Goals        Problem: Physical Therapy Goal    Goal Priority Disciplines Outcome Goal Variances Interventions   Physical Therapy Goal     PT, PT/OT Ongoing, Progressing     Description:  Goals to be met by: 20   Goals extended to be met by 2020.     Patient will increase functional independence with mobility by performin. Supine to sit with Stand-by Assistance - Not met  2. Sit to supine with Stand-by Assistance - Not met  3. Sit to stand transfer with Stand-by Assistance with RW - Not met  4. Bed to chair transfer with Stand-by Assistance using Rolling Walker - Not met  5. Gait  x 75  feet with Contact Guard Assistance using Rolling Walker - Not met                      Time Tracking:     PT Received On: 01/06/20  PT Start Time: 0901     PT Stop Time: 0919  PT Total Time (min): 18 min     Billable Minutes: Gait Training 18    Treatment Type: Treatment  PT/PTA: PT     PTA Visit Number: 0     Corin Rodriguez, [facility]  01/06/2020

## 2020-01-06 NOTE — PLAN OF CARE
Problem: Physical Therapy Goal  Goal: Physical Therapy Goal  Description  Goals to be met by: 20   Goals extended to be met by 2020.     Patient will increase functional independence with mobility by performin. Supine to sit with Stand-by Assistance - Not met  2. Sit to supine with Stand-by Assistance - Not met  3. Sit to stand transfer with Stand-by Assistance with RW - Not met  4. Bed to chair transfer with Stand-by Assistance using Rolling Walker - Not met  5. Gait  x 75 feet with Contact Guard Assistance using Rolling Walker - Not met     2020 1123 by Brittany Castañeda, PT  Outcome: Ongoing, Progressing

## 2020-01-06 NOTE — ASSESSMENT & PLAN NOTE
Long term use of anticoagulation  - Rate controlled on admit  - INR 2.0   - Continue home Toprol XL and coumadin  - PT/INR daily> pharmD consulted for coumadin assistance (INR fluctuating between supra and subtherapeutic)  1/5-1/6: Toprol was held because patient had asymptomatic bradycardia HR 38s, however, without Toprol she experienced asymptomatic tachycardia 170s with exertion; toprol resumed and HR controlled

## 2020-01-06 NOTE — PROGRESS NOTES
[facility] Hospital Medicine  Progress Note    Patient Name: Cat Garcia  MRN: [id number]  Patient Class: IP- Inpatient   Admission Date: 12/30/2019  Length of Stay: 6 days  Attending Physician: Nora Renner MD  Primary Care Provider: Alfie Oconnell MD    [facility] Medicine Team: [facility] HOSP MED E Glenis Miller PA-C    Subjective:     Principal Problem:Acute cystitis without hematuria        HPI:  84 year old female with a PMHx of HTN, HLD, chronic afib on coumadin, HFrEF 15%, and dementia presenting to the ER with son at bedside for fall at home. Patient lives alone with sitter and family support. HPI provided by son as patient unable to recall details of event due to dementia. Patient had a fall yesterday afternoon. Son saw fall on video camera and arrived at patient's home in minutes. Son assisted her off the ground and patient was able to ambulate to bed with assistance. This morning, son reports patient was having increased pain and unable to ambulate 2/2 pain; therefore, was brought to ER for evaluation. At the time of my exam, patient has no complaints; she denies pain. Son reports chronic poor appetite, drinks Boost with meals at home. She denies CP, SOB, abdominal pain, N/V/D, fever/chills, dysuria/frequency, headache, palpitations, and focal weakness.       HDS on admission, afebrile. Labs significant for WBC 20.25. LA 1.9. UA with 19 WBC and many bacteria. Received Zosyn in the ER. EKG nonischemic. Troponin WNL, . Xray of sacrum and coccyx without fracture or dislocation. L hip xray with no acute fracture. CTH shows no acute hemorrhage or traumatic injury; ventriculomegaly noted.    Overview/Hospital Course:  Patient admitted for fall at home. Afebrile, with leukocytosis 20k on admission. Found to have UTI, continuing Zosyn for now (hx MDR UTI). UCx pansensitive E. Coli, stop Zosyn and switch to Augmentin until 1/9 (last dose). Leukocytosis, mentation improving with  UTI tx. PTOT consulted, recommending SNF. Son in agreement, CM aware. PCP follow up.     Interval History: Yesterday afternoon pt had asymptomatic HR 170s with exertion. MTP had been held the past two days, restarted and HR controlled today. Pt seen at bedside this AM resting in NAD. Anticipate d/c to SNF tomorrow. Son at bedside, discussed plan of care.     Review of Systems   Unable to perform ROS: Dementia     Objective:     Vital Signs (Most Recent):  Temp: 98.9 °F (37.2 °C) (01/06/20 0756)  Pulse: 76 (01/06/20 0756)  Resp: 16 (01/06/20 0756)  BP: (!) 118/56 (01/06/20 0756)  SpO2: 100 % (01/06/20 0756) Vital Signs (24h Range):  Temp:  [96.8 °F (36 °C)-98.9 °F (37.2 °C)] 98.9 °F (37.2 °C)  Pulse:  [] 76  Resp:  [16-18] 16  SpO2:  [93 %-100 %] 100 %  BP: (108-130)/(56-69) 118/56     Weight: 57.4 kg (126 lb 8.7 oz)  Body mass index is 21.72 kg/m².    Intake/Output Summary (Last 24 hours) at 1/6/2020 1043  Last data filed at 1/6/2020 0850  Gross per 24 hour   Intake 390 ml   Output --   Net 390 ml      Physical Exam   Constitutional: No distress.   Frail elderly female in NAD   HENT:   Head: Normocephalic and atraumatic.   Eyes: Right eye exhibits no discharge. Left eye exhibits no discharge.   Neck: Normal range of motion. Neck supple.   Cardiovascular: Normal rate and normal heart sounds. An irregularly irregular rhythm present.   Pulmonary/Chest: Effort normal. No respiratory distress.   Abdominal: Soft. She exhibits no distension. There is no tenderness.   Musculoskeletal: She exhibits edema (1+ BLE).   Neurological: She is alert.   Oriented to name, place   Skin: Skin is warm and dry. She is not diaphoretic.   Psychiatric: She has a normal mood and affect.   Oriented to self, son, and place       Significant Labs: All pertinent labs within the past 24 hours have been reviewed.    Significant Imaging: I have reviewed all pertinent imaging results/findings within the past 24 hours.      Assessment/Plan:       * Acute cystitis without hematuria  Leukocytosis  Fall  - HDS on admission, afebrile   - Labs significant for WBC 20.25>16.7>10  - LA 1.9  - Received Zosyn in the ER; will continue for now given hx of MDR ESBL  - Follow up urine cultures ( E. Coli, pan-sensitive), d/c zosyn and start Augmentin for total of 10 day abx course  - Follow up blood cultures NGTD  - Xray of sacrum and coccyx without fracture or dislocation  - L hip xray with no acute fracture   - CTH shows no acute hemorrhage or traumatic injury; ventriculomegaly noted  - PT/OT consulted, recommending SNF, CM aware  - Telemetry    Essential hypertension  - Normo/hypotensive on admit  - Received 1L bolus in the ER  - Held losartan  - Continue to monitor    Essential thrombocytosis  Follows with heme onc  Continue hydroxyurea  MCV 110s  Check folate in AM: WNL      CKD (chronic kidney disease), stage III  - Chronic and stable  - Continue to monitor    Atrial fibrillation, chronic  Long term use of anticoagulation  - Rate controlled on admit  - INR 2.0   - Continue home Toprol XL and coumadin  - PT/INR daily> pharmD consulted for coumadin assistance (INR fluctuating between supra and subtherapeutic)  1/5-1/6: Toprol was held because patient had asymptomatic bradycardia HR 38s, however, without Toprol she experienced asymptomatic tachycardia 170s with exertion; toprol resumed and HR controlled    Hypophosphatemia  - Phos 2.1, replaced PO  - Trend daily    Chronic combined systolic and diastolic CHF, NYHA class 3  HFrEF 15%  - Euvolemic on exam  - EKG without changes  - Troponin negative,   - Cardiac diet  - Strict I/Os, daily weights    Dyslipidemia  - Continue home lipitor    Mixed Alzheimer's and vascular dementia  - Oriented to self and son; unable to report events of fall yesterday  - Continue home aricept qhs  - Son looking into assisted living facilities      VTE Risk Mitigation (From admission, onward)         Ordered     warfarin (COUMADIN)  tablet 5 mg  Every Sunday 12/30/19 1530     warfarin (COUMADIN) tablet 5 mg  Every Tues, Thurs, Sat      01/02/20 0959     warfarin (COUMADIN) tablet 5 mg  Every Wednesday 12/30/19 1530     warfarin tablet 7.5 mg  Every Monday 12/30/19 1530     Place PAUL hose  Until discontinued      12/30/19 1458     Place sequential compression device  Until discontinued      12/30/19 1458     Reason for No Pharmacological VTE Prophylaxis  Once     Question:  Reasons:  Answer:  Already adequately anticoagulated on oral Anticoagulants    12/30/19 1458     IP VTE HIGH RISK PATIENT  Once      12/30/19 1458                    Discussed with staff  Glenis Miller PA-C  Department of Hospital Medicine   [facility]

## 2020-01-06 NOTE — PLAN OF CARE
Goals addressed and unmet.  Cont with POC  Fany Arevalo, DEMETRIUS  1/6/2020      Problem: Occupational Therapy Goal  Goal: Occupational Therapy Goal  Description  Goals to be met by: 1/7/20    Patient will increase functional independence with ADLs by performing:    UE Dressing with Stand-by Assistance.  Grooming while standing at sink with Stand-by Assistance.  Toileting from toilet with Contact Guard Assistance for hygiene and clothing management.   Supine to sit with Minimal Assistance.  Toilet transfer to toilet with Contact Guard Assistance.     Outcome: Ongoing, Progressing

## 2020-01-07 ENCOUNTER — TELEPHONE (OUTPATIENT)
Dept: INTERNAL MEDICINE | Facility: CLINIC | Age: 85
End: 2020-01-07

## 2020-01-07 ENCOUNTER — PATIENT MESSAGE (OUTPATIENT)
Dept: INTERNAL MEDICINE | Facility: CLINIC | Age: 85
End: 2020-01-07

## 2020-01-07 LAB
ANION GAP SERPL CALC-SCNC: 7 MMOL/L (ref 8–16)
BASOPHILS # BLD AUTO: 0.07 K/UL (ref 0–0.2)
BASOPHILS NFR BLD: 0.6 % (ref 0–1.9)
BUN SERPL-MCNC: 8 MG/DL (ref 8–23)
CALCIUM SERPL-MCNC: 8.9 MG/DL (ref 8.7–10.5)
CHLORIDE SERPL-SCNC: 111 MMOL/L (ref 95–110)
CO2 SERPL-SCNC: 26 MMOL/L (ref 23–29)
CREAT SERPL-MCNC: 0.8 MG/DL (ref 0.5–1.4)
DIFFERENTIAL METHOD: ABNORMAL
EOSINOPHIL # BLD AUTO: 0.3 K/UL (ref 0–0.5)
EOSINOPHIL NFR BLD: 2.8 % (ref 0–8)
ERYTHROCYTE [DISTWIDTH] IN BLOOD BY AUTOMATED COUNT: 13.2 % (ref 11.5–14.5)
EST. GFR  (AFRICAN AMERICAN): >60 ML/MIN/1.73 M^2
EST. GFR  (NON AFRICAN AMERICAN): >60 ML/MIN/1.73 M^2
GLUCOSE SERPL-MCNC: 82 MG/DL (ref 70–110)
HCT VFR BLD AUTO: 35.9 % (ref 37–48.5)
HGB BLD-MCNC: 11.4 G/DL (ref 12–16)
IMM GRANULOCYTES # BLD AUTO: 0.12 K/UL (ref 0–0.04)
IMM GRANULOCYTES NFR BLD AUTO: 1 % (ref 0–0.5)
INR PPP: 1.3 (ref 0.8–1.2)
LYMPHOCYTES # BLD AUTO: 2.7 K/UL (ref 1–4.8)
LYMPHOCYTES NFR BLD: 23.4 % (ref 18–48)
MAGNESIUM SERPL-MCNC: 2 MG/DL (ref 1.6–2.6)
MCH RBC QN AUTO: 35.3 PG (ref 27–31)
MCHC RBC AUTO-ENTMCNC: 31.8 G/DL (ref 32–36)
MCV RBC AUTO: 111 FL (ref 82–98)
MONOCYTES # BLD AUTO: 0.9 K/UL (ref 0.3–1)
MONOCYTES NFR BLD: 8.2 % (ref 4–15)
NEUTROPHILS # BLD AUTO: 7.4 K/UL (ref 1.8–7.7)
NEUTROPHILS NFR BLD: 64 % (ref 38–73)
NRBC BLD-RTO: 0 /100 WBC
PHOSPHATE SERPL-MCNC: 2.8 MG/DL (ref 2.7–4.5)
PLATELET # BLD AUTO: 676 K/UL (ref 150–350)
PMV BLD AUTO: 9.9 FL (ref 9.2–12.9)
POTASSIUM SERPL-SCNC: 3.8 MMOL/L (ref 3.5–5.1)
PROTHROMBIN TIME: 12.6 SEC (ref 9–12.5)
RBC # BLD AUTO: 3.23 M/UL (ref 4–5.4)
SODIUM SERPL-SCNC: 144 MMOL/L (ref 136–145)
WBC # BLD AUTO: 11.51 K/UL (ref 3.9–12.7)

## 2020-01-07 PROCEDURE — 84100 ASSAY OF PHOSPHORUS: CPT

## 2020-01-07 PROCEDURE — 80048 BASIC METABOLIC PNL TOTAL CA: CPT

## 2020-01-07 PROCEDURE — 97803 MED NUTRITION INDIV SUBSEQ: CPT

## 2020-01-07 PROCEDURE — 36415 COLL VENOUS BLD VENIPUNCTURE: CPT

## 2020-01-07 PROCEDURE — 99232 SBSQ HOSP IP/OBS MODERATE 35: CPT | Mod: ,,, | Performed by: PHYSICIAN ASSISTANT

## 2020-01-07 PROCEDURE — 25000003 PHARM REV CODE 250: Performed by: PHYSICIAN ASSISTANT

## 2020-01-07 PROCEDURE — 11000001 HC ACUTE MED/SURG PRIVATE ROOM

## 2020-01-07 PROCEDURE — 99232 PR SUBSEQUENT HOSPITAL CARE,LEVL II: ICD-10-PCS | Mod: ,,, | Performed by: PHYSICIAN ASSISTANT

## 2020-01-07 PROCEDURE — 83735 ASSAY OF MAGNESIUM: CPT

## 2020-01-07 PROCEDURE — 97530 THERAPEUTIC ACTIVITIES: CPT

## 2020-01-07 PROCEDURE — 85610 PROTHROMBIN TIME: CPT

## 2020-01-07 PROCEDURE — A4216 STERILE WATER/SALINE, 10 ML: HCPCS | Performed by: PHYSICIAN ASSISTANT

## 2020-01-07 PROCEDURE — 85025 COMPLETE CBC W/AUTO DIFF WBC: CPT

## 2020-01-07 PROCEDURE — 97116 GAIT TRAINING THERAPY: CPT

## 2020-01-07 RX ORDER — ATORVASTATIN CALCIUM 10 MG/1
10 TABLET, FILM COATED ORAL DAILY
Qty: 90 TABLET | Refills: 11 | Status: SHIPPED | OUTPATIENT
Start: 2020-01-07 | End: 2021-01-19

## 2020-01-07 RX ORDER — METOPROLOL SUCCINATE 25 MG/1
TABLET, EXTENDED RELEASE ORAL
Qty: 90 TABLET | Refills: 11 | Status: SHIPPED | OUTPATIENT
Start: 2020-01-07 | End: 2020-01-08 | Stop reason: SDUPTHER

## 2020-01-07 RX ORDER — LOSARTAN POTASSIUM 25 MG/1
TABLET ORAL
Qty: 90 TABLET | Refills: 11 | Status: SHIPPED | OUTPATIENT
Start: 2020-01-07 | End: 2020-01-08 | Stop reason: HOSPADM

## 2020-01-07 RX ORDER — ACETAMINOPHEN 325 MG/1
650 TABLET ORAL EVERY 6 HOURS PRN
Status: DISCONTINUED | OUTPATIENT
Start: 2020-01-07 | End: 2020-01-08 | Stop reason: HOSPADM

## 2020-01-07 RX ORDER — WARFARIN SODIUM 5 MG/1
5 TABLET ORAL
Status: DISCONTINUED | OUTPATIENT
Start: 2020-01-09 | End: 2020-01-08 | Stop reason: HOSPADM

## 2020-01-07 RX ORDER — ACETAMINOPHEN 500 MG
1000 TABLET ORAL 3 TIMES DAILY
Status: DISCONTINUED | OUTPATIENT
Start: 2020-01-07 | End: 2020-01-08 | Stop reason: HOSPADM

## 2020-01-07 RX ORDER — AMOXICILLIN 500 MG
1 CAPSULE ORAL DAILY
Qty: 90 CAPSULE | Refills: 11 | Status: SHIPPED | OUTPATIENT
Start: 2020-01-07 | End: 2024-03-18

## 2020-01-07 RX ORDER — IBUPROFEN 100 MG/5ML
1000 SUSPENSION, ORAL (FINAL DOSE FORM) ORAL DAILY
Qty: 90 TABLET | Refills: 11 | Status: SHIPPED | OUTPATIENT
Start: 2020-01-07 | End: 2024-03-18

## 2020-01-07 RX ORDER — ACETAMINOPHEN 500 MG
1000 TABLET ORAL 3 TIMES DAILY
Status: DISCONTINUED | OUTPATIENT
Start: 2020-01-07 | End: 2020-01-07

## 2020-01-07 RX ADMIN — AMOXICILLIN AND CLAVULANATE POTASSIUM 1 TABLET: 875; 125 TABLET, FILM COATED ORAL at 08:01

## 2020-01-07 RX ADMIN — ACETAMINOPHEN 1000 MG: 500 TABLET ORAL at 08:01

## 2020-01-07 RX ADMIN — LATANOPROST 1 DROP: 50 SOLUTION OPHTHALMIC at 08:01

## 2020-01-07 RX ADMIN — DIGOXIN 0.12 MG: 125 TABLET ORAL at 08:01

## 2020-01-07 RX ADMIN — HYDROXYUREA 500 MG: 500 CAPSULE ORAL at 08:01

## 2020-01-07 RX ADMIN — ACETAMINOPHEN 650 MG: 325 TABLET ORAL at 08:01

## 2020-01-07 RX ADMIN — OXYCODONE HYDROCHLORIDE AND ACETAMINOPHEN 1000 MG: 500 TABLET ORAL at 08:01

## 2020-01-07 RX ADMIN — LIDOCAINE 2 PATCH: 50 PATCH TOPICAL at 03:01

## 2020-01-07 RX ADMIN — Medication 3 ML: at 10:01

## 2020-01-07 RX ADMIN — BRIMONIDINE TARTRATE 1 DROP: 1.5 SOLUTION OPHTHALMIC at 08:01

## 2020-01-07 RX ADMIN — METOPROLOL SUCCINATE 25 MG: 25 TABLET, EXTENDED RELEASE ORAL at 08:01

## 2020-01-07 RX ADMIN — BRIMONIDINE TARTRATE 1 DROP: 1.5 SOLUTION OPHTHALMIC at 03:01

## 2020-01-07 RX ADMIN — WARFARIN SODIUM 7.5 MG: 2.5 TABLET ORAL at 05:01

## 2020-01-07 RX ADMIN — Medication 3 ML: at 02:01

## 2020-01-07 RX ADMIN — ATORVASTATIN CALCIUM 10 MG: 10 TABLET, FILM COATED ORAL at 08:01

## 2020-01-07 RX ADMIN — Medication 3 ML: at 06:01

## 2020-01-07 RX ADMIN — ACETAMINOPHEN 1000 MG: 500 TABLET ORAL at 03:01

## 2020-01-07 RX ADMIN — DONEPEZIL HYDROCHLORIDE 10 MG: 10 TABLET ORAL at 08:01

## 2020-01-07 NOTE — ASSESSMENT & PLAN NOTE
Long term use of anticoagulation  Stable, heart rate improved   - Rate controlled on admit  - INR 2.0   - Continue home Toprol XL and coumadin  - PT/INR daily  - pharmD consulted for coumadin assistance (INR fluctuating between supra and subtherapeutic)   - 1/7: Give 7.5mg x1 today then resume home dose: 7.5 mg Mon/Fri and 5 mg all other days    1/5-1/6: Toprol was held because patient had asymptomatic bradycardia HR 38s, however, without Toprol she experienced asymptomatic tachycardia 170s with exertion; toprol resumed and HR controlled

## 2020-01-07 NOTE — PLAN OF CARE
Problem: Physical Therapy Goal  Goal: Physical Therapy Goal  Description  Goals to be met by: 20   Goals extended to be met by 2020.     Patient will increase functional independence with mobility by performin. Supine to sit with Stand-by Assistance - Not met  2. Sit to supine with Stand-by Assistance - Not met  3. Sit to stand transfer with Stand-by Assistance with RW - Not met  4. Bed to chair transfer with Stand-by Assistance using Rolling Walker - Not met  5. Gait  x 75 feet with Contact Guard Assistance using Rolling Walker - Not met     Outcome: Ongoing, Progressing

## 2020-01-07 NOTE — NURSING
Pt is a/ox4, up with walker and SBA, VSS. She c/o pain in BLE but denies n/v, dizziness, and sob. Fall risks and precautions, as well as pain management  were discussed, and any questions addressed. Pt remains free from falls and injury. Personal belongings and call light are within reach. Will continue to monitor.

## 2020-01-07 NOTE — PROGRESS NOTES
"[facility]  Adult Nutrition  Progress Note    SUMMARY       Recommendations    1. Continue Cardiac diet with Boost Plus TID.   2. Provide full assistance with meals and supplements.    Goals: 1. Pt to meet % EEN and EPN by RD follow up.  Nutrition Goal Status: goal not met  Communication of RD Recs: (POC)    Reason for Assessment    Reason For Assessment: RD follow-up  Diagnosis: other (see comments)(acute cystitis)  Relevant Medical History: dementia, HTN, HLD, Afib  Interdisciplinary Rounds: did not attend  General Information Comments: Pt resting in bed with no family members at bedside. Pt with dementia. Reports she does not know how her appetite is doing. Spoke with RN who states that pt eating small amounts and drinks Boost Plus. Denies N/V. NFPE completed 1/1. Pt continus with mild age related wasting. RD does not feel that pt meets malnutrition criteria at this time, but is at risk 2/2 poor appetite/po intake.  Nutrition Discharge Planning: Pt to continue current meal regimen with daily high calorie nutrition supplements.    Nutrition Risk Screen    Nutrition Risk Screen: no indicators present    Nutrition/Diet History    Patient Reported Diet/Restrictions/Preferences: general  Spiritual, Cultural Beliefs, Synagogue Practices, Values that Affect Care: no  Supplemental Drinks or Food Habits: (Boost VHC 2-3 cans/day)  Factors Affecting Nutritional Intake: impaired cognitive status/motor control, decreased appetite    Anthropometrics    Temp: 98.1 °F (36.7 °C)  Height Method: Stated  Height: 5' 4" (162.6 cm)  Height (inches): 64 in  Weight Method: Bed Scale  Weight: 57.4 kg (126 lb 8.7 oz)  Weight (lb): 126.55 lb  Ideal Body Weight (IBW), Female: 120 lb  % Ideal Body Weight, Female (lb): 107.48 %  BMI (Calculated): 21.7  BMI Grade: 18.5-24.9 - normal     Lab/Procedures/Meds    Pertinent Labs Reviewed: reviewed  Pertinent Labs Comments: noted  Pertinent Medications Reviewed: " reviewed  Pertinent Medications Comments: vitamin C, metoprolol, warfarin  Estimated/Assessed Needs    Weight Used For Calorie Calculations: 57.6 kg (126 lb 15.8 oz)  Energy Calorie Requirements (kcal): 1440 kcal  Energy Need Method: Kcal/kg  Protein Requirements: 63-75g/day  Weight Used For Protein Calculations: 57.6 kg (126 lb 15.8 oz)        RDA Method (mL): 1440     Nutrition Prescription Ordered    Current Diet Order: Cardiac diet  Nutrition Order Comments: 1500ml fluid restriction  Oral Nutrition Supplement: Boost Plus TID    Evaluation of Received Nutrient/Fluid Intake    Comments: LBM 1/6  % Intake of Estimated Energy Needs: 25 - 50 %  % Meal Intake: 25 - 50 %    Nutrition Risk    Level of Risk/Frequency of Follow-up: low     Assessment and Plan    Nutrition Problem  Inadequate oral intake     Related to (etiology):   dementia     Signs and Symptoms (as evidenced by):   Pt with UTI and disorientation yesterday, pt requires full assistance with meals and supplements      Interventions(treatment strategy):  Collaboration of care with providers.  Commercial Beverage: Boost Plus TID     Nutrition Diagnosis Status:   Continues     Monitor and Evaluation    Food and Nutrient Intake: energy intake, food and beverage intake  Food and Nutrient Adminstration: diet order  Anthropometric Measurements: weight, weight change  Biochemical Data, Medical Tests and Procedures: electrolyte and renal panel, gastrointestinal profile, glucose/endocrine profile, inflammatory profile, lipid profile  Nutrition-Focused Physical Findings: overall appearance, extremities, muscles and bones, head and eyes, skin     Malnutrition Assessment                 Orbital Region (Subcutaneous Fat Loss): mild depletion  Upper Arm Region (Subcutaneous Fat Loss): well nourished  Thoracic and Lumbar Region: well nourished   Moravian Region (Muscle Loss): mild depletion  Clavicle Bone Region (Muscle Loss): well nourished  Clavicle and Acromion Bone  Region (Muscle Loss): well nourished  Scapular Bone Region (Muscle Loss): well nourished  Dorsal Hand (Muscle Loss): mild depletion  Patellar Region (Muscle Loss): well nourished  Anterior Thigh Region (Muscle Loss): well nourished  Posterior Calf Region (Muscle Loss): well nourished                 Nutrition Follow-Up    RD Follow-up?: Yes

## 2020-01-07 NOTE — SUBJECTIVE & OBJECTIVE
Interval History: heart rate in the 50's. Patient up walking with PTOT today and placed in chair. She repeatedly stated that her back hurt and requested to get in bed. Tylenol scheduled.     Review of Systems   Unable to perform ROS: Dementia   Psychiatric/Behavioral: Positive for confusion.     Objective:     Vital Signs (Most Recent):  Temp: 98.6 °F (37 °C) (01/07/20 1622)  Pulse: (!) 52 (01/07/20 1622)  Resp: 16 (01/07/20 1622)  BP: (!) 114/58 (01/07/20 1622)  SpO2: 99 % (01/07/20 1622) Vital Signs (24h Range):  Temp:  [96.2 °F (35.7 °C)-98.6 °F (37 °C)] 98.6 °F (37 °C)  Pulse:  [] 52  Resp:  [16-20] 16  SpO2:  [96 %-100 %] 99 %  BP: (113-138)/(57-76) 114/58     Weight: 57.4 kg (126 lb 8.7 oz)  Body mass index is 21.72 kg/m².    Intake/Output Summary (Last 24 hours) at 1/7/2020 1703  Last data filed at 1/7/2020 0900  Gross per 24 hour   Intake 120 ml   Output --   Net 120 ml      Physical Exam   Constitutional: No distress.   Frail elderly female in NAD   HENT:   Head: Normocephalic and atraumatic.   Eyes: Right eye exhibits no discharge. Left eye exhibits no discharge.   Neck: Normal range of motion. Neck supple.   Cardiovascular: Normal rate and normal heart sounds. An irregularly irregular rhythm present.   Pulmonary/Chest: Effort normal. No respiratory distress.   Abdominal: Soft. She exhibits no distension. There is no tenderness.   Musculoskeletal: She exhibits edema (1+ BLE).   Neurological: She is alert.   Oriented to name, place   Skin: Skin is warm and dry. She is not diaphoretic.   Psychiatric: She has a normal mood and affect.       Significant Labs: All pertinent labs within the past 24 hours have been reviewed.    Significant Imaging: I have reviewed all pertinent imaging results/findings within the past 24 hours.

## 2020-01-07 NOTE — TELEPHONE ENCOUNTER
----- Message from Misael Lai RN sent at 1/7/2020  2:26 PM CST -----  Dr. Oconnell,  Dr. Abdon Ruiz's mother Cat Garcia is hospitalized at [facility] in [facility]. Dr. Ruiz states that he sent to you a form required by the [facility] Home that needs to be completed before she can go to facility. Please lt me know when you have completed or if this will be an issue.   Thank you,  Misael  [phone]

## 2020-01-07 NOTE — PROGRESS NOTES
[facility] Hospital Medicine  Progress Note    Patient Name: Cat Garcia  MRN: [id number]  Patient Class: IP- Inpatient   Admission Date: 12/30/2019  Length of Stay: 7 days  Attending Physician: Trey Horvath MD  Primary Care Provider: Alfie Oconnell MD    [facility] Medicine Team: [facility] MED E Mona Sandoval PA-C    Subjective:     Principal Problem:Acute cystitis without hematuria        HPI:  84 year old female with a PMHx of HTN, HLD, chronic afib on coumadin, HFrEF 15%, and dementia presenting to the ER with son at bedside for fall at home. Patient lives alone with sitter and family support. HPI provided by son as patient unable to recall details of event due to dementia. Patient had a fall yesterday afternoon. Son saw fall on video camera and arrived at patient's home in minutes. Son assisted her off the ground and patient was able to ambulate to bed with assistance. This morning, son reports patient was having increased pain and unable to ambulate 2/2 pain; therefore, was brought to ER for evaluation. At the time of my exam, patient has no complaints; she denies pain. Son reports chronic poor appetite, drinks Boost with meals at home. She denies CP, SOB, abdominal pain, N/V/D, fever/chills, dysuria/frequency, headache, palpitations, and focal weakness.       HDS on admission, afebrile. Labs significant for WBC 20.25. LA 1.9. UA with 19 WBC and many bacteria. Received Zosyn in the ER. EKG nonischemic. Troponin WNL, . Xray of sacrum and coccyx without fracture or dislocation. L hip xray with no acute fracture. CTH shows no acute hemorrhage or traumatic injury; ventriculomegaly noted.    Overview/Hospital Course:  Patient admitted for fall at home. Afebrile, with leukocytosis 20k on admission. Found to have UTI, continuing Zosyn for now (hx MDR UTI). UCx pansensitive E. Coli, stop Zosyn and switch to Augmentin until 1/9 (last dose). Leukocytosis, mentation improving with UTI  tx. PTOT consulted, recommending SNF. Son in agreement, CM aware. PCP follow up.     Interval History: heart rate in the 50's. Patient up walking with PTOT today and placed in chair. She repeatedly stated that her back hurt and requested to get in bed. Tylenol scheduled.     Review of Systems   Unable to perform ROS: Dementia   Psychiatric/Behavioral: Positive for confusion.     Objective:     Vital Signs (Most Recent):  Temp: 98.6 °F (37 °C) (01/07/20 1622)  Pulse: (!) 52 (01/07/20 1622)  Resp: 16 (01/07/20 1622)  BP: (!) 114/58 (01/07/20 1622)  SpO2: 99 % (01/07/20 1622) Vital Signs (24h Range):  Temp:  [96.2 °F (35.7 °C)-98.6 °F (37 °C)] 98.6 °F (37 °C)  Pulse:  [] 52  Resp:  [16-20] 16  SpO2:  [96 %-100 %] 99 %  BP: (113-138)/(57-76) 114/58     Weight: 57.4 kg (126 lb 8.7 oz)  Body mass index is 21.72 kg/m².    Intake/Output Summary (Last 24 hours) at 1/7/2020 1703  Last data filed at 1/7/2020 0900  Gross per 24 hour   Intake 120 ml   Output --   Net 120 ml      Physical Exam   Constitutional: No distress.   Frail elderly female in NAD   HENT:   Head: Normocephalic and atraumatic.   Eyes: Right eye exhibits no discharge. Left eye exhibits no discharge.   Neck: Normal range of motion. Neck supple.   Cardiovascular: Normal rate and normal heart sounds. An irregularly irregular rhythm present.   Pulmonary/Chest: Effort normal. No respiratory distress.   Abdominal: Soft. She exhibits no distension. There is no tenderness.   Musculoskeletal: She exhibits edema (1+ BLE).   Neurological: She is alert.   Oriented to name, place   Skin: Skin is warm and dry. She is not diaphoretic.   Psychiatric: She has a normal mood and affect.       Significant Labs: All pertinent labs within the past 24 hours have been reviewed.    Significant Imaging: I have reviewed all pertinent imaging results/findings within the past 24 hours.      Assessment/Plan:      * Acute cystitis without hematuria  Leukocytosis  Fall    - continue  Augmentin for total of 10 day abx course, day 6/10  - HDS on admission, afebrile   - Labs significant for WBC 20.25>16.7>10  - LA 1.9  - Received Zosyn in the ER; will continue for now given hx of MDR ESBL  - Follow up urine cultures ( E. Coli, pan-sensitive), d/c zosyn   - Follow up blood cultures NGTD  - Xray of sacrum and coccyx without fracture or dislocation  - L hip xray with no acute fracture   - CTH shows no acute hemorrhage or traumatic injury; ventriculomegaly noted  - PT/OT consulted, recommending SNF, CM aware  - Telemetry    Atrial fibrillation, chronic  Long term use of anticoagulation  Stable, heart rate improved   - Rate controlled on admit  - INR 2.0   - Continue home Toprol XL and coumadin  - PT/INR daily  - pharmD consulted for coumadin assistance (INR fluctuating between supra and subtherapeutic)   - 1/7: Give 7.5mg x1 today then resume home dose: 7.5 mg Mon/Fri and 5 mg all other days    1/5-1/6: Toprol was held because patient had asymptomatic bradycardia HR 38s, however, without Toprol she experienced asymptomatic tachycardia 170s with exertion; toprol resumed and HR controlled    Hypophosphatemia  - Phos 2.1, replaced PO  - Trend daily    Essential thrombocytosis  Follows with heme onc  Continue hydroxyurea  MCV 110s  Check folate in AM: WNL      Chronic combined systolic and diastolic CHF, NYHA class 3  HFrEF 15%  - Euvolemic on exam  - EKG without changes  - Troponin negative,   - Cardiac diet  - Strict I/Os, daily weights    CKD (chronic kidney disease), stage III  - Chronic and stable  - Continue to monitor    Mixed Alzheimer's and vascular dementia  - Oriented to self and son; unable to report events of fall yesterday  - Continue home aricept qhs  - Son looking into assisted living facilities    Essential hypertension  - Normo/hypotensive on admit  - Received 1L bolus in the ER  - Held losartan  - Continue to monitor    Dyslipidemia  - Continue home lipitor    VTE Risk Mitigation  (From admission, onward)         Ordered     warfarin tablet 7.5 mg  Every Mon, Fri 01/07/20 1412     warfarin (COUMADIN) tablet 5 mg  Every Tues, Thurs, Sat      01/07/20 1408     warfarin tablet 7.5 mg  Once      01/07/20 1408     warfarin (COUMADIN) tablet 5 mg  Every Sunday 12/30/19 1530     warfarin (COUMADIN) tablet 5 mg  Every Wednesday 12/30/19 1530     Place PAUL hose  Until discontinued      12/30/19 1458     Place sequential compression device  Until discontinued      12/30/19 1458     Reason for No Pharmacological VTE Prophylaxis  Once     Question:  Reasons:  Answer:  Already adequately anticoagulated on oral Anticoagulants    12/30/19 1458     IP VTE HIGH RISK PATIENT  Once      12/30/19 1458                      Mona Sandoval PA-C  Department of Hospital Medicine   [facility]

## 2020-01-07 NOTE — ASSESSMENT & PLAN NOTE
Leukocytosis  Fall    - continue Augmentin for total of 10 day abx course, day 6/10  - HDS on admission, afebrile   - Labs significant for WBC 20.25>16.7>10  - LA 1.9  - Received Zosyn in the ER; will continue for now given hx of MDR ESBL  - Follow up urine cultures ( E. Coli, pan-sensitive), d/c zosyn   - Follow up blood cultures NGTD  - Xray of sacrum and coccyx without fracture or dislocation  - L hip xray with no acute fracture   - CTH shows no acute hemorrhage or traumatic injury; ventriculomegaly noted  - PT/OT consulted, recommending SNF, CM aware  - Telemetry

## 2020-01-07 NOTE — PT/OT/SLP PROGRESS
Physical Therapy Treatment    Patient Name:  Cat Garcia   MRN:  [id number]    Recommendations:     Discharge Recommendations:  nursing facility, skilled   Discharge Equipment Recommendations: bedside commode   Barriers to discharge: None    Assessment:     Cat Garcia is a 84 y.o. female admitted with a medical diagnosis of Acute cystitis without hematuria.  She presents with the following impairments/functional limitations:  weakness, impaired endurance, impaired self care skills, impaired functional mobilty, gait instability, impaired balance, impaired cognition, decreased safety awareness Pt. cooperative and tolerated treatment well. Pt. progressing with mobility despite c/o back pain.    Rehab Prognosis: Fair; patient would benefit from acute skilled PT services to address these deficits and reach maximum level of function.    Recent Surgery: * No surgery found *      Plan:     During this hospitalization, patient to be seen 3 x/week to address the identified rehab impairments via gait training, therapeutic activities, therapeutic exercises and progress toward the following goals:    · Plan of Care Expires:  01/30/20    Subjective     Chief Complaint: back pain  Patient/Family Comments/goals: pt. did not state  Pain/Comfort:  · Pain Rating 1: (pt. did not rate)  · Location - Orientation 1: generalized  · Location 1: back  · Pain Addressed 1: Reposition, Distraction, Cessation of Activity      Objective:     Communicated with nursing prior to session.  Patient found supine with peripheral IV, telemetry upon PT entry to room.     General Precautions: Standard, fall   Orthopedic Precautions:N/A   Braces:       Functional Mobility:  · Bed Mobility:     · Rolling Right: minimum assistance  · Scooting: minimum assistance  · Supine to Sit: minimum assistance  · Transfers:     · Sit to Stand:  minimum assistance with rolling walker  · Bed to Chair: minimum assistance with  rolling walker  using  Stand  Pivot  · Gait: 125' with RW and Min A for RW management/guidance/initiation. Pt. required x3 standing rest breaks. Pt. amb. with decreased step length/vee.  · Balance: fair      AM-PAC 6 CLICK MOBILITY  Turning over in bed (including adjusting bedclothes, sheets and blankets)?: 3  Sitting down on and standing up from a chair with arms (e.g., wheelchair, bedside commode, etc.): 3  Moving from lying on back to sitting on the side of the bed?: 3  Moving to and from a bed to a chair (including a wheelchair)?: 3  Need to walk in hospital room?: 3  Climbing 3-5 steps with a railing?: 2  Basic Mobility Total Score: 17       Therapeutic Activities and Exercises:   Instructed pt. on bed mobility and transfer technique with lessen back discomfort. Discussed importance of OOB activity, pt.'s progress, goals, and POC.    Patient left up in chair with all lines intact, call button in reach and nursing notified..    GOALS:   Multidisciplinary Problems     Physical Therapy Goals        Problem: Physical Therapy Goal    Goal Priority Disciplines Outcome Goal Variances Interventions   Physical Therapy Goal     PT, PT/OT Ongoing, Progressing     Description:  Goals to be met by: 20   Goals extended to be met by 2020.     Patient will increase functional independence with mobility by performin. Supine to sit with Stand-by Assistance - Not met  2. Sit to supine with Stand-by Assistance - Not met  3. Sit to stand transfer with Stand-by Assistance with RW - Not met  4. Bed to chair transfer with Stand-by Assistance using Rolling Walker - Not met  5. Gait  x 75 feet with Contact Guard Assistance using Rolling Walker - Not met                      Time Tracking:     PT Received On: 20  PT Start Time: 1040     PT Stop Time: 1103  PT Total Time (min): 23 min     Billable Minutes: Gait Training 15 and Therapeutic Activity 8    Treatment Type: Treatment  PT/PTA: PT     PTA Visit Number: 0     Roman Alston  PT  01/07/2020

## 2020-01-07 NOTE — TELEPHONE ENCOUNTER
No current facility-administered medications for this visit.      Current Outpatient Medications   Medication Sig Dispense Refill    amoxicillin-clavulanate 875-125mg (AUGMENTIN) 875-125 mg per tablet Take 1 tablet by mouth every 12 (twelve) hours. for 8 days 16 tablet 0     Facility-Administered Medications Ordered in Other Visits   Medication Dose Route Frequency Provider Last Rate Last Dose    acetaminophen tablet 1,000 mg  1,000 mg Oral TID Mona Sandoval PA-C   1,000 mg at 01/07/20 1518    acetaminophen tablet 650 mg  650 mg Oral Q6H PRN Mona Sandoval PA-C        albuterol-ipratropium 2.5 mg-0.5 mg/3 mL nebulizer solution 3 mL  3 mL Nebulization Q4H PRN Renée Menezes PA-C        amoxicillin-clavulanate 875-125mg per tablet 1 tablet  1 tablet Oral Q12H Glenis Miller PA-C   1 tablet at 01/07/20 0847    ascorbic acid (vitamin C) tablet 1,000 mg  1,000 mg Oral Daily MICHAEL AndradeC   1,000 mg at 01/07/20 0847    atorvastatin tablet 10 mg  10 mg Oral Daily EDNA Andrade-C   10 mg at 01/07/20 0848    bisacodyl suppository 10 mg  10 mg Rectal Daily PRN Renée Menezes PA-C        brimonidine 0.15 % OPTH DROP ophthalmic solution 1 drop  1 drop Both Eyes TID EDNA Andrade-C   1 drop at 01/07/20 0846    dextrose 10% (D10W) Bolus  12.5 g Intravenous PRN Trey Horvath MD        dextrose 10% (D10W) Bolus  25 g Intravenous PRN Trey Horvath MD        digoxin tablet 0.125 mg  0.125 mg Oral Daily Carol Charlton PA-C   0.125 mg at 01/07/20 0847    donepezil tablet 10 mg  10 mg Oral QHS EDNA Andrade-C   10 mg at 01/06/20 2140    glucagon (human recombinant) injection 1 mg  1 mg Intramuscular PRN Renée Menezes PA-C        glucose chewable tablet 16 g  16 g Oral PRN EDNA Andrade-ELLA        glucose chewable tablet 24 g  24 g Oral PRN Renée Menezes PA-C        hydroxyurea capsule 500 mg  500 mg Oral Daily Renée Menezes PA-C   500 mg at 01/07/20 0847    hydrOXYzine  HCl tablet 25 mg  25 mg Oral TID PRN Glenis Miller PA-C   25 mg at 01/05/20 1052    latanoprost 0.005 % ophthalmic solution 1 drop  1 drop Both Eyes Daily Renée Menezes PA-C   1 drop at 01/07/20 0846    lidocaine 5 % patch 2 patch  2 patch Transdermal Q24H Glenis Miller PA-C   2 patch at 01/07/20 1520    metoprolol succinate (TOPROL-XL) 24 hr tablet 25 mg  25 mg Oral Daily Glenis Miller PA-C   25 mg at 01/07/20 0846    ondansetron disintegrating tablet 8 mg  8 mg Oral Q8H PRN Renée Menezes PA-C        ondansetron injection 4 mg  4 mg Intravenous Q8H PRN Renée Menezes PA-C        sodium chloride 0.9% flush 3 mL  3 mL Intravenous Q8H Renée Menezes PA-C   3 mL at 01/07/20 1400    warfarin (COUMADIN) tablet 5 mg  5 mg Oral Every Wed Renée Menezes PA-C   5 mg at 01/01/20 1901    warfarin (COUMADIN) tablet 5 mg  5 mg Oral Every Sun Renée Menezes PA-C   5 mg at 01/05/20 1642    [START ON 1/9/2020] warfarin (COUMADIN) tablet 5 mg  5 mg Oral Every Tues, Thurs, Sat Mona Sandoval PA-C        warfarin tablet 7.5 mg  7.5 mg Oral Once Mona Sandoval PA-C        [START ON 1/10/2020] warfarin tablet 7.5 mg  7.5 mg Oral Every Mon, Fri Trey Horvath MD

## 2020-01-07 NOTE — PROGRESS NOTES
PHARMACY CONSULT NOTE: WARFARIN  Cat Garcia is a 84 y.o. female on warfarin therapy for Atrial Fibrillation. PharmD has been consulted for warfarin dosing.    Current order: 7.5 mg Monday, Friday 5 mg on Tue/Wed/Thur/Sat, Sun   Home dose: 7.5 mg Mon/Fri and 5 mg all other days    Coumadin clinic enrollment: Active  INR goal: 2-3    Lab Results   Component Value Date    INR 1.3 (H) 01/07/2020    INR 1.4 (H) 01/06/2020    INR 1.7 (H) 01/05/2020     Significant drug interactions: Augmentin 875 mg BID       Recommendation(s):    Give 7.5mg x1 today then resume home dose: 7.5 mg Mon/Fri and 5 mg all other days      Pharmacy will continue to follow and monitor warfarin    Thank you for the consult,  [place]  [place] [phone]    **Note: Consults are reviewed Monday-Friday 7:00am-3:30pm. THE ABOVE RECOMMENDATIONS ARE ONLY SUGGESTED.THE RECOMMENDATIONS SHOULD BE CONSIDERED IN CONJUNCTION WITH ALL PATIENT FACTORS. **

## 2020-01-08 ENCOUNTER — PATIENT MESSAGE (OUTPATIENT)
Dept: CARDIOLOGY | Facility: CLINIC | Age: 85
End: 2020-01-08

## 2020-01-08 ENCOUNTER — PATIENT MESSAGE (OUTPATIENT)
Dept: NEUROLOGY | Facility: CLINIC | Age: 85
End: 2020-01-08

## 2020-01-08 ENCOUNTER — ANTI-COAG VISIT (OUTPATIENT)
Dept: CARDIOLOGY | Facility: CLINIC | Age: 85
End: 2020-01-08
Payer: MEDICARE

## 2020-01-08 VITALS
BODY MASS INDEX: 21.61 KG/M2 | HEART RATE: 53 BPM | SYSTOLIC BLOOD PRESSURE: 126 MMHG | HEIGHT: 64 IN | WEIGHT: 126.56 LBS | DIASTOLIC BLOOD PRESSURE: 58 MMHG | RESPIRATION RATE: 17 BRPM | OXYGEN SATURATION: 99 % | TEMPERATURE: 98 F

## 2020-01-08 DIAGNOSIS — I48.20 ATRIAL FIBRILLATION, CHRONIC: ICD-10-CM

## 2020-01-08 DIAGNOSIS — Z79.01 LONG TERM CURRENT USE OF ANTICOAGULANT THERAPY: Primary | ICD-10-CM

## 2020-01-08 LAB
ANION GAP SERPL CALC-SCNC: 8 MMOL/L (ref 8–16)
BASOPHILS # BLD AUTO: 0.07 K/UL (ref 0–0.2)
BASOPHILS NFR BLD: 0.6 % (ref 0–1.9)
BUN SERPL-MCNC: 12 MG/DL (ref 8–23)
CALCIUM SERPL-MCNC: 8.8 MG/DL (ref 8.7–10.5)
CHLORIDE SERPL-SCNC: 112 MMOL/L (ref 95–110)
CO2 SERPL-SCNC: 23 MMOL/L (ref 23–29)
CREAT SERPL-MCNC: 0.9 MG/DL (ref 0.5–1.4)
DIFFERENTIAL METHOD: ABNORMAL
EOSINOPHIL # BLD AUTO: 0.3 K/UL (ref 0–0.5)
EOSINOPHIL NFR BLD: 2.4 % (ref 0–8)
ERYTHROCYTE [DISTWIDTH] IN BLOOD BY AUTOMATED COUNT: 13.2 % (ref 11.5–14.5)
EST. GFR  (AFRICAN AMERICAN): >60 ML/MIN/1.73 M^2
EST. GFR  (NON AFRICAN AMERICAN): 59.7 ML/MIN/1.73 M^2
GLUCOSE SERPL-MCNC: 89 MG/DL (ref 70–110)
HCT VFR BLD AUTO: 37.8 % (ref 37–48.5)
HGB BLD-MCNC: 11.9 G/DL (ref 12–16)
IMM GRANULOCYTES # BLD AUTO: 0.1 K/UL (ref 0–0.04)
IMM GRANULOCYTES NFR BLD AUTO: 0.9 % (ref 0–0.5)
INR PPP: 1.4 (ref 0.8–1.2)
LYMPHOCYTES # BLD AUTO: 2.7 K/UL (ref 1–4.8)
LYMPHOCYTES NFR BLD: 24.6 % (ref 18–48)
MAGNESIUM SERPL-MCNC: 2.1 MG/DL (ref 1.6–2.6)
MCH RBC QN AUTO: 35.3 PG (ref 27–31)
MCHC RBC AUTO-ENTMCNC: 31.5 G/DL (ref 32–36)
MCV RBC AUTO: 112 FL (ref 82–98)
MONOCYTES # BLD AUTO: 0.9 K/UL (ref 0.3–1)
MONOCYTES NFR BLD: 8.3 % (ref 4–15)
NEUTROPHILS # BLD AUTO: 7 K/UL (ref 1.8–7.7)
NEUTROPHILS NFR BLD: 63.2 % (ref 38–73)
NRBC BLD-RTO: 0 /100 WBC
PHOSPHATE SERPL-MCNC: 3 MG/DL (ref 2.7–4.5)
PLATELET # BLD AUTO: 671 K/UL (ref 150–350)
PMV BLD AUTO: 10.2 FL (ref 9.2–12.9)
POTASSIUM SERPL-SCNC: 4.1 MMOL/L (ref 3.5–5.1)
PROTHROMBIN TIME: 14.1 SEC (ref 9–12.5)
RBC # BLD AUTO: 3.37 M/UL (ref 4–5.4)
SODIUM SERPL-SCNC: 143 MMOL/L (ref 136–145)
WBC # BLD AUTO: 11.12 K/UL (ref 3.9–12.7)

## 2020-01-08 PROCEDURE — 25000003 PHARM REV CODE 250: Performed by: PHYSICIAN ASSISTANT

## 2020-01-08 PROCEDURE — 85610 PROTHROMBIN TIME: CPT

## 2020-01-08 PROCEDURE — 80048 BASIC METABOLIC PNL TOTAL CA: CPT

## 2020-01-08 PROCEDURE — 85025 COMPLETE CBC W/AUTO DIFF WBC: CPT

## 2020-01-08 PROCEDURE — 93793 PR ANTICOAGULANT MGMT FOR PT TAKING WARFARIN: ICD-10-PCS | Mod: S$GLB,,,

## 2020-01-08 PROCEDURE — 99239 PR HOSPITAL DISCHARGE DAY,>30 MIN: ICD-10-PCS | Mod: ,,, | Performed by: PHYSICIAN ASSISTANT

## 2020-01-08 PROCEDURE — 99239 HOSP IP/OBS DSCHRG MGMT >30: CPT | Mod: ,,, | Performed by: PHYSICIAN ASSISTANT

## 2020-01-08 PROCEDURE — 93793 ANTICOAG MGMT PT WARFARIN: CPT | Mod: S$GLB,,,

## 2020-01-08 PROCEDURE — 83735 ASSAY OF MAGNESIUM: CPT

## 2020-01-08 PROCEDURE — 36415 COLL VENOUS BLD VENIPUNCTURE: CPT

## 2020-01-08 PROCEDURE — 84100 ASSAY OF PHOSPHORUS: CPT

## 2020-01-08 RX ORDER — POLYETHYLENE GLYCOL 3350 17 G/17G
17 POWDER, FOR SOLUTION ORAL 2 TIMES DAILY PRN
Refills: 0
Start: 2020-01-08 | End: 2024-03-18

## 2020-01-08 RX ORDER — LIDOCAINE 50 MG/G
2 PATCH TOPICAL DAILY
Qty: 60 PATCH | Refills: 0 | Status: SHIPPED | OUTPATIENT
Start: 2020-01-08 | End: 2020-02-07

## 2020-01-08 RX ORDER — DIGOXIN 125 MCG
0.12 TABLET ORAL DAILY
Qty: 30 TABLET | Refills: 11 | Status: SHIPPED | OUTPATIENT
Start: 2020-01-08 | End: 2020-02-03 | Stop reason: SDUPTHER

## 2020-01-08 RX ORDER — ACETAMINOPHEN 500 MG
1000 TABLET ORAL EVERY 8 HOURS PRN
Refills: 0
Start: 2020-01-08 | End: 2024-03-18

## 2020-01-08 RX ORDER — WARFARIN 2.5 MG/1
TABLET ORAL
Qty: 210 TABLET | Refills: 2 | Status: SHIPPED | OUTPATIENT
Start: 2020-01-08 | End: 2020-01-10 | Stop reason: SDUPTHER

## 2020-01-08 RX ORDER — AMOXICILLIN AND CLAVULANATE POTASSIUM 875; 125 MG/1; MG/1
1 TABLET, FILM COATED ORAL EVERY 12 HOURS
Qty: 6 TABLET | Refills: 0 | Status: SHIPPED | OUTPATIENT
Start: 2020-01-08 | End: 2020-01-11

## 2020-01-08 RX ORDER — BISACODYL 10 MG
10 SUPPOSITORY, RECTAL RECTAL DAILY PRN
Refills: 0 | COMMUNITY
Start: 2020-01-08 | End: 2024-03-18

## 2020-01-08 RX ORDER — DONEPEZIL HYDROCHLORIDE 10 MG/1
10 TABLET, FILM COATED ORAL NIGHTLY
Qty: 90 TABLET | Refills: 3 | Status: SHIPPED | OUTPATIENT
Start: 2020-01-08 | End: 2021-02-11

## 2020-01-08 RX ORDER — METOPROLOL SUCCINATE 25 MG/1
12.5 TABLET, EXTENDED RELEASE ORAL DAILY
Qty: 90 TABLET | Refills: 11 | Status: SHIPPED | OUTPATIENT
Start: 2020-01-08 | End: 2021-02-03

## 2020-01-08 RX ADMIN — ACETAMINOPHEN 1000 MG: 500 TABLET ORAL at 09:01

## 2020-01-08 RX ADMIN — BRIMONIDINE TARTRATE 1 DROP: 1.5 SOLUTION OPHTHALMIC at 09:01

## 2020-01-08 RX ADMIN — HYDROXYUREA 500 MG: 500 CAPSULE ORAL at 09:01

## 2020-01-08 RX ADMIN — ATORVASTATIN CALCIUM 10 MG: 10 TABLET, FILM COATED ORAL at 09:01

## 2020-01-08 RX ADMIN — METOPROLOL SUCCINATE 12.5 MG: 25 TABLET, EXTENDED RELEASE ORAL at 09:01

## 2020-01-08 RX ADMIN — LATANOPROST 1 DROP: 50 SOLUTION OPHTHALMIC at 09:01

## 2020-01-08 RX ADMIN — AMOXICILLIN AND CLAVULANATE POTASSIUM 1 TABLET: 875; 125 TABLET, FILM COATED ORAL at 09:01

## 2020-01-08 RX ADMIN — OXYCODONE HYDROCHLORIDE AND ACETAMINOPHEN 1000 MG: 500 TABLET ORAL at 09:01

## 2020-01-08 RX ADMIN — DIGOXIN 0.12 MG: 125 TABLET ORAL at 09:01

## 2020-01-08 NOTE — PROGRESS NOTES
PHARMACY CONSULT NOTE: WARFARIN  Cat Garcia is a 84 y.o. female on warfarin therapy for Atrial Fibrillation. PharmD has been consulted for warfarin dosing.    Current order: 7.5 mg Monday, Friday 5 mg on Tue/Wed/Thur/Sat, Sun   Home dose: 7.5 mg Mon/Fri and 5 mg all other days    Coumadin clinic enrollment: Active  INR goal: 2-3    Lab Results   Component Value Date    INR 1.4 (H) 01/08/2020    INR 1.3 (H) 01/07/2020    INR 1.4 (H) 01/06/2020     Significant drug interactions: Augmentin 875 mg BID       Recommendation(s):    Continue home dose: 7.5 mg Mon/Fri and 5 mg all other days      Pharmacy will continue to follow and monitor warfarin    Thank you for the consult,  [place]  [place] [phone]    **Note: Consults are reviewed Monday-Friday 7:00am-3:30pm. THE ABOVE RECOMMENDATIONS ARE ONLY SUGGESTED.THE RECOMMENDATIONS SHOULD BE CONSIDERED IN CONJUNCTION WITH ALL PATIENT FACTORS. **

## 2020-01-08 NOTE — PLAN OF CARE
[facility]    HOME HEALTH ORDERS  FACE TO FACE ENCOUNTER    Patient Name: Cat Garcia  YOB: 1935    PCP: Alfie Oconnell MD   PCP Address: [address] / [place] [address]  PCP Phone Number: [phone]  PCP Fax: [phone]    Encounter Date: 01/08/2020    Admit to Home Health    Diagnoses:  Active Hospital Problems    Diagnosis  POA    *Acute cystitis without hematuria [N30.00]  Yes    Atrial fibrillation, chronic [I48.20]  Yes     Priority: 2      Chronic    Hypophosphatemia [E83.39]  Yes    Leukocytosis [D72.829]  Yes    Fall at home, initial encounter [W19.XXXA, Y92.009]  Not Applicable    Essential thrombocytosis [D47.3]  Yes    Mixed Alzheimer's and vascular dementia [G30.9, F01.50, F02.80]  Yes    Long term current use of anticoagulant therapy [Z79.01]  Not Applicable    Chronic combined systolic and diastolic CHF, NYHA class 3 [I50.42]  Yes     Chronic    Essential hypertension [I10]  Yes     Chronic    CKD (chronic kidney disease), stage III [N18.3]  Yes     Chronic    Dyslipidemia [E78.5]  Yes     Chronic      Resolved Hospital Problems   No resolved problems to display.       Future Appointments   Date Time Provider Department Center   1/14/2020 10:30 AM [facility] [place] [facility] [place]   1/16/2020 10:40 AM Alfie Oconnell MD [facility]     Follow-up Information     Alfie Oconnell MD.    Specialty:  Internal Medicine  Contact information:  [address]  [facility] [address]  [phone]                     I have seen and examined this patient face to face today. My clinical findings that support the need for the home health skilled services and home bound status are the following:  Weakness/numbness causing balance and gait disturbance due to Infection and Weakness/Debility making it taxing to leave home.  Requiring assistive device to leave home due to unsteady gait caused by  Infection and Weakness/Debility.  Mental  confusion making it unsafe for patient to leave home alone due to  Dementia.    Allergies:Review of patient's allergies indicates:  No Known Allergies    Diet: cardiac diet and 2 gram sodium diet    Activities: activity as tolerated and ambulate in house with assistance    Nursing:   SN to complete comprehensive assessment including routine vital signs. Instruct on disease process and s/s of complications to report to MD. Review/verify medication list sent home with the patient at time of discharge  and instruct patient/caregiver as needed. Frequency may be adjusted depending on start of care date.    Notify MD if SBP > 160 or < 90; DBP > 90 or < 50; HR > 120 or < 50; Temp > 101      CONSULTS:    Physical Therapy to evaluate and treat. Evaluate for home safety and equipment needs; Establish/upgrade home exercise program. Perform / instruct on therapeutic exercises, gait training, transfer training, and Range of Motion.  Occupational Therapy to evaluate and treat. Evaluate home environment for safety and equipment needs. Perform/Instruct on transfers, ADL training, ROM, and therapeutic exercises.  Aide to provide assistance with personal care, ADLs, and vital signs.    WARFARIN MONITORING:    Warfarin maintenance plan: 7.5 mg (2.5 mg x 3) every Mon, Fri; 5 mg (2.5 mg x 2) all other days  Last dose: warfarin tablet 7.5 mg on 01/07/2020 @ 1700  INR goal: 2-3    Lab Results   Component Value Date    INR 1.4 (H) 01/08/2020    INR 1.3 (H) 01/07/2020    INR 1.4 (H) 01/06/2020       **Obtain INR daily (until within goal range) and then per coumadin clinic protocol.    Please report/fax results to:     [facility]   [address]   [facility] [address]   Phone: [phone]   Fax: [phone]     WOUND CARE ORDERS  n/a      Medications: Review discharge medications with patient and family and provide education.      Current Discharge Medication List      START taking these medications    Details    acetaminophen (TYLENOL) 500 MG tablet Take 2 tablets (1,000 mg total) by mouth every 8 (eight) hours as needed for Pain.  Refills: 0    Associated Diagnoses: Atrial fibrillation, chronic      amoxicillin-clavulanate 875-125mg (AUGMENTIN) 875-125 mg per tablet Take 1 tablet by mouth every 12 (twelve) hours. for 3 days  Qty: 6 tablet, Refills: 0      bisacodyl (DULCOLAX) 10 mg Supp Place 1 suppository (10 mg total) rectally daily as needed (Until bowel movement if patient has no bowel movement for 2 days).  Refills: 0      lidocaine (LIDODERM) 5 % Place 2 patches onto the skin once daily. Remove & Discard patch within 12 hours or as directed by MD  Qty: 60 patch, Refills: 0      polyethylene glycol (GLYCOLAX) 17 gram PwPk Take 17 g by mouth 2 (two) times daily as needed (constipation).  Refills: 0         CONTINUE these medications which have CHANGED    Details   digoxin (DIGOX) 125 mcg tablet Take 1 tablet (0.125 mg total) by mouth once daily.  Qty: 30 tablet, Refills: 11    Associated Diagnoses: Vascular dementia without behavioral disturbance      donepezil (ARICEPT) 10 MG tablet Take 1 tablet (10 mg total) by mouth every evening.  Qty: 90 tablet, Refills: 3    Associated Diagnoses: Atrial fibrillation, chronic      metoprolol succinate (TOPROL-XL) 25 MG 24 hr tablet Take 0.5 tablets (12.5 mg total) by mouth once daily.   Qty: 90 tablet, Refills: 11    Associated Diagnoses: Atrial fibrillation, chronic      warfarin (COUMADIN) 2.5 MG tablet 7.5 mg (2.5 mg x 3) every Mon, Fri; 5 mg (2.5 mg x 2) all other days Or as directed by Coumadin Clinic.  Qty: 210 tablet, Refills: 2    Comments: Dr. Brayan France  Associated Diagnoses: Atrial fibrillation, chronic         CONTINUE these medications which have NOT CHANGED    Details   antiox#10-om3-dha-epa-lut-zeax (I-CAPS) 280-10-2 mg Cap Take by mouth 2 (two) times daily.      ascorbic acid, vitamin C, (VITAMIN C) 1000 MG tablet Take 1 tablet (1,000 mg total) by mouth once  daily.  Qty: 90 tablet, Refills: 11      atorvastatin (LIPITOR) 10 MG tablet Take 1 tablet (10 mg total) by mouth once daily.  Qty: 90 tablet, Refills: 11      brimonidine 0.15 % OPTH DROP (ALPHAGAN) 0.15 % ophthalmic solution 3 (three) times daily.     Associated Diagnoses: Cognitive impairment      calcium carb/vit D3/minerals (CALCIUM-VITAMIN D ORAL) Take by mouth once daily. 1000mg Vitamin D       hydroxyurea (HYDREA) 500 mg Cap TAKE ONE CAPSULE BY MOUTH DAILY  Qty: 30 capsule, Refills: 0    Associated Diagnoses: Essential thrombocytosis      influenza (FLUZONE HIGH-DOSE) 180 mcg/0.5 mL vaccine Inject 0.5 mLs into the muscle.  Qty: 0.5 mL, Refills: 0      latanoprost 0.005 % ophthalmic solution once daily.       omega-3 fatty acids/fish oil (FISH OIL-OMEGA-3 FATTY ACIDS) 300-1,000 mg capsule Take 1 capsule by mouth once daily.  Qty: 90 capsule, Refills: 11         STOP taking these medications       losartan (COZAAR) 25 MG tablet Comments:   Reason for Stopping:               I certify that this patient is confined to her home and needs intermittent skilled nursing care, physical therapy and occupational therapy.

## 2020-01-08 NOTE — PLAN OF CARE
01/08/20 1124   Final Note   Assessment Type Final Discharge Note   Anticipated Discharge Disposition SNF  ([facility] in [place])   What phone number can be called within the next 1-3 days to see how you are doing after discharge? [phone]   Hospital Follow Up  Appt(s) scheduled? Yes   Discharge plans and expectations educations in teach back method with documentation complete? Yes     Future Appointments   Date Time Provider Department Center   1/14/2020 10:30 AM SERGIO ROBERTS [facility]   1/16/2020 10:40 AM Alfie Oconnell MD [facility]     PHN staffed  services with [facility].

## 2020-01-08 NOTE — NURSING
"Neurologically @ baseline, states "help" a lot at random times. HR SB 30-60's, 60s w/ movement, afib, BP WNL. 02WNL on RA. Tylenol scheduled ATC for back pain as well as lido patches and T&R q2h/PRN. OOB to/from commode, assist x1, s/v +BM. Bed alarm for safety. Plan to d/c to assisted living today. Yan Coppola updated re: POC and d/c by Minh.  "

## 2020-01-08 NOTE — PT/OT/SLP PROGRESS
Occupational Therapy      Patient Name:  Cat Garcia   MRN:  [id number]    Patient not seen today secondary to declining any participation with OT. Will follow-up per schedule.    SKYLAR Vieira  1/8/2020

## 2020-01-09 ENCOUNTER — TELEPHONE (OUTPATIENT)
Dept: HEPATOLOGY | Facility: CLINIC | Age: 85
End: 2020-01-09

## 2020-01-09 RX ORDER — METOPROLOL SUCCINATE 25 MG/1
12.5 TABLET, EXTENDED RELEASE ORAL DAILY
COMMUNITY
End: 2021-02-03

## 2020-01-09 NOTE — DISCHARGE SUMMARY
[facility] Hospital Medicine  Discharge Summary      Patient Name: Cat Garcia  MRN: [id number]  Admission Date: 12/30/2019  Hospital Length of Stay: 8 days  Discharge Date and Time: 1/8/2020 12:41 PM  Attending Physician: ZACHERY PARMAR  Discharging Provider: Mona Sandoval PA-C  Primary Care Provider: Alfie Oconnell MD  [facility] Medicine Team: [facility]OSP MED E Mona Sandoval PA-C    HPI:   84 year old female with a PMHx of HTN, HLD, chronic afib on coumadin, HFrEF 15%, and dementia presenting to the ER with son at bedside for fall at home. Patient lives alone with sitter and family support. HPI provided by son as patient unable to recall details of event due to dementia. Patient had a fall yesterday afternoon. Son saw fall on video camera and arrived at patient's home in minutes. Son assisted her off the ground and patient was able to ambulate to bed with assistance. This morning, son reports patient was having increased pain and unable to ambulate 2/2 pain; therefore, was brought to ER for evaluation. At the time of my exam, patient has no complaints; she denies pain. Son reports chronic poor appetite, drinks Boost with meals at home. She denies CP, SOB, abdominal pain, N/V/D, fever/chills, dysuria/frequency, headache, palpitations, and focal weakness.       HDS on admission, afebrile. Labs significant for WBC 20.25. LA 1.9. UA with 19 WBC and many bacteria. Received Zosyn in the ER. EKG nonischemic. Troponin WNL, . Xray of sacrum and coccyx without fracture or dislocation. L hip xray with no acute fracture. CTH shows no acute hemorrhage or traumatic injury; ventriculomegaly noted.    * No surgery found *      Hospital Course:     84 y.o. who was admitted to hospital medicine s/p mechanical fall. Hospital course complicated by UTI. Initially started on Zosyn due to hx of MDR however, culture grew pan sensitive E. Coli. Patient switched to Augmentin to complete a full course. It  was recommended by PTOT that patient discharge to SNF. The patients son was in agreement and opted to admit the patient into an assisted living facility with PTOT services. The patient's INR fluctuated throughout her stay. The inpatient pharmacist was consulted for assistance. Suspect antibiotics affecting INR. Home health services were ordered for further monitoring of the INR at discharge. Patient discharged in the care of her son, in stable condition.      Consults:   Consults (From admission, onward)        Status Ordering Provider     Inpatient consult to Registered Dietitian/Nutritionist  Once     Provider:  (Not yet assigned)    Completed ТАТЬЯНА ARRIAZA     Nutrition Services Referral  Once     Provider:  (Not yet assigned)    Completed DULCE JIMENES          * Acute cystitis without hematuria  Leukocytosis  Fall    - continue Augmentin for total of 10 day abx course  - HDS on admission, afebrile   - Labs significant for WBC 20.25>16.7>10  - LA 1.9  - Received Zosyn in the ER; will continue for now given hx of MDR ESBL  - Follow up urine cultures ( E. Coli, pan-sensitive), d/c zosyn   - Follow up blood cultures NGTD  - Xray of sacrum and coccyx without fracture or dislocation  - L hip xray with no acute fracture   - CTH shows no acute hemorrhage or traumatic injury; ventriculomegaly noted  - PT/OT consulted, recommending SNF, CM aware  - Telemetry    Atrial fibrillation, chronic  Long term use of anticoagulation    Bradycardic but with appropriate chronotropic response  - decreased metoprolol succinate (TOPROL-XL) 25 MG 24 hr tablet to 12.5 MG   - INR 2.0   - Continue home Toprol XL and coumadin  - PT/INR daily  - pharmD consulted for coumadin assistance (INR fluctuating between supra and subtherapeutic)   - 1/7: Give 7.5mg x1 today then resume home dose: 7.5 mg Mon/Fri and 5 mg all other days    - HH ordered at discharge for further monitoring until INR therapeutic, no bridging required   1/5-1/6:  Toprol was held because patient had asymptomatic bradycardia HR 38s, however, without Toprol she experienced asymptomatic tachycardia 170s with exertion; toprol resumed and HR controlled    Hypophosphatemia  - supplemented   - Trend daily    Essential thrombocytosis  Follows with heme onc  Continue hydroxyurea  MCV 110s  Check folate in AM: WNL      Chronic combined systolic and diastolic CHF, NYHA class 3  HFrEF 15%  - Euvolemic on exam  - EKG without changes  - Troponin negative,   - Cardiac diet  - Strict I/Os, daily weights    CKD (chronic kidney disease), stage III  - Chronic and stable  - Continue to monitor    Mixed Alzheimer's and vascular dementia  - Oriented to self and son  - Continue home aricept qhs  - discharged to an assisted living facility     Essential hypertension  - Normo/hypotensive throughout hospital course   - Received 1L bolus in the ER  - Held losartan --- restart per PCP  - Continue to monitor    Dyslipidemia  - Continue home lipitor      Final Active Diagnoses:    Diagnosis Date Noted POA    PRINCIPAL PROBLEM:  Acute cystitis without hematuria [N30.00] 12/30/2019 Yes    Atrial fibrillation, chronic [I48.20] 12/15/2013 Yes     Chronic    Hypophosphatemia [E83.39] 12/30/2019 Yes    Leukocytosis [D72.829] 12/30/2019 Yes    Fall at home, initial encounter [W19.XXXA, Y92.009] 12/30/2019 Not Applicable    Essential thrombocytosis [D47.3] 10/09/2018 Yes    Mixed Alzheimer's and vascular dementia [G30.9, F01.50, F02.80] 11/03/2016 Yes    Long term current use of anticoagulant therapy [Z79.01] 12/30/2013 Not Applicable    Chronic combined systolic and diastolic CHF, NYHA class 3 [I50.42] 12/18/2013 Yes     Chronic    Essential hypertension [I10] 12/15/2013 Yes     Chronic    CKD (chronic kidney disease), stage III [N18.3] 12/15/2013 Yes     Chronic    Dyslipidemia [E78.5] 12/15/2013 Yes     Chronic      Problems Resolved During this Admission:       Discharged Condition:  stable    Disposition: Long Term Care    Follow Up:  Follow-up Information     Alfie Oconnell MD.    Specialty:  Internal Medicine  Contact information:  Misti BERTRAND  [facility] [id number] [phone]                 Patient Instructions:      Diet Cardiac     Notify your health care provider if you experience any of the following:  increased confusion or weakness     Activity as tolerated       Significant Diagnostic Studies: Labs: All labs within the past 24 hours have been reviewed    Pending Diagnostic Studies:     None         Medications:  Reconciled Home Medications:      Medication List      START taking these medications    acetaminophen 500 MG tablet  Commonly known as:  TYLENOL  Take 2 tablets (1,000 mg total) by mouth every 8 (eight) hours as needed for Pain.     amoxicillin-clavulanate 875-125mg 875-125 mg per tablet  Commonly known as:  AUGMENTIN  Take 1 tablet by mouth every 12 (twelve) hours. for 3 days     bisacodyl 10 mg Supp  Commonly known as:  DULCOLAX  Place 1 suppository (10 mg total) rectally daily as needed (Until bowel movement if patient has no bowel movement for 2 days).     lidocaine 5 %  Commonly known as:  LIDODERM  Place 2 patches onto the skin once daily. Remove & Discard patch within 12 hours or as directed by MD     polyethylene glycol 17 gram Pwpk  Commonly known as:  GLYCOLAX  Take 17 g by mouth 2 (two) times daily as needed (constipation).        CHANGE how you take these medications    atorvastatin 10 MG tablet  Commonly known as:  LIPITOR  Take 1 tablet (10 mg total) by mouth once daily.  What changed:  additional instructions     fish oil-omega-3 fatty acids 300-1,000 mg capsule  Take 1 capsule by mouth once daily.  What changed:  how much to take     warfarin 2.5 MG tablet  Commonly known as:  COUMADIN  7.5 mg (2.5 mg x 3) every Mon, Fri; 5 mg (2.5 mg x 2) all other days Or as directed by [facility].  What changed:  additional instructions        CONTINUE taking  these medications    ascorbic acid (vitamin C) 1000 MG tablet  Commonly known as:  Vitamin C  Take 1 tablet (1,000 mg total) by mouth once daily.     brimonidine 0.15 % OPTH DROP 0.15 % ophthalmic solution  Commonly known as:  ALPHAGAN  3 (three) times daily.     CALCIUM-VITAMIN D ORAL  Take by mouth once daily. 1000mg Vitamin D     digoxin 125 mcg tablet  Commonly known as:  Digox  Take 1 tablet (0.125 mg total) by mouth once daily.     donepezil 10 MG tablet  Commonly known as:  ARICEPT  Take 1 tablet (10 mg total) by mouth every evening.     Fluzone High-Dose 2018-19 (PF) 180 mcg/0.5 mL vaccine  Generic drug:  influenza  Inject 0.5 mLs into the muscle.     hydroxyurea 500 mg Cap  Commonly known as:  HYDREA  TAKE ONE CAPSULE BY MOUTH DAILY     I-Caps 280-10-2 mg Cap  Generic drug:  antiox. no.[id number]-lut-belle  Take by mouth 2 (two) times daily.     latanoprost 0.005 % ophthalmic solution  once daily.        STOP taking these medications    losartan 25 MG tablet  Commonly known as:  COZAAR        ASK your doctor about these medications    * metoprolol succinate 25 MG 24 hr tablet  Commonly known as:  TOPROL-XL  Take 12.5 mg by mouth once daily.  Ask about: Which instructions should I use?     * metoprolol succinate 25 MG 24 hr tablet  Commonly known as:  TOPROL-XL  Take 0.5 tablets (12.5 mg total) by mouth once daily. TAKE 1 TABLET(25 MG) BY MOUTH EVERY DAY  Ask about: Which instructions should I use?         * This list has 2 medication(s) that are the same as other medications prescribed for you. Read the directions carefully, and ask your doctor or other care provider to review them with you.                Indwelling Lines/Drains at time of discharge:   Lines/Drains/Airways     None                 Time spent on the discharge of patient: 30 minutes  Patient was seen and examined on the date of discharge and determined to be suitable for discharge.         Mona Sandoval PA-C  Department of [facility]  Medicine  [facility]

## 2020-01-09 NOTE — TELEPHONE ENCOUNTER
Clarified script with [facility].  Toporal XL 25mg, take 1/2 tablet (12.5mg) once daily.  Pharmacy notified.

## 2020-01-09 NOTE — ASSESSMENT & PLAN NOTE
- Oriented to self and son  - Continue home aricept qhs  - discharged to an assisted living facility

## 2020-01-09 NOTE — ASSESSMENT & PLAN NOTE
Long term use of anticoagulation    Bradycardic but with appropriate chronotropic response  - decreased metoprolol succinate (TOPROL-XL) 25 MG 24 hr tablet to 12.5 MG   - INR 2.0   - Continue home Toprol XL and coumadin  - PT/INR daily  - pharmD consulted for coumadin assistance (INR fluctuating between supra and subtherapeutic)   - 1/7: Give 7.5mg x1 today then resume home dose: 7.5 mg Mon/Fri and 5 mg all other days    -  ordered at discharge for further monitoring until INR therapeutic, no bridging required   1/5-1/6: Toprol was held because patient had asymptomatic bradycardia HR 38s, however, without Toprol she experienced asymptomatic tachycardia 170s with exertion; toprol resumed and HR controlled

## 2020-01-09 NOTE — ASSESSMENT & PLAN NOTE
Leukocytosis  Fall    - continue Augmentin for total of 10 day abx course  - HDS on admission, afebrile   - Labs significant for WBC 20.25>16.7>10  - LA 1.9  - Received Zosyn in the ER; will continue for now given hx of MDR ESBL  - Follow up urine cultures ( E. Coli, pan-sensitive), d/c zosyn   - Follow up blood cultures NGTD  - Xray of sacrum and coccyx without fracture or dislocation  - L hip xray with no acute fracture   - CTH shows no acute hemorrhage or traumatic injury; ventriculomegaly noted  - PT/OT consulted, recommending SNF, CM aware  - Telemetry

## 2020-01-09 NOTE — TELEPHONE ENCOUNTER
----- Message from Conrad Acuña sent at 1/8/2020 12:53 PM CST -----  [phone]  Pharmacy is calling to clarify an RX.  RX name: metoprolol succinate (TOPROL-XL) 25 MG 24 hr tablet  What do they need to clarify:  The directions   Comments: Mona BISHOP sent this script in today

## 2020-01-09 NOTE — ASSESSMENT & PLAN NOTE
- Normo/hypotensive throughout hospital course   - Received 1L bolus in the ER  - Held losartan --- restart per PCP  - Continue to monitor

## 2020-01-10 ENCOUNTER — HOSPITAL ENCOUNTER (EMERGENCY)
Facility: HOSPITAL | Age: 85
Discharge: HOME | End: 2020-01-10
Attending: EMERGENCY MEDICINE
Payer: MEDICARE

## 2020-01-10 VITALS
HEART RATE: 59 BPM | HEIGHT: 63 IN | WEIGHT: 125 LBS | OXYGEN SATURATION: 99 % | DIASTOLIC BLOOD PRESSURE: 60 MMHG | TEMPERATURE: 98 F | SYSTOLIC BLOOD PRESSURE: 130 MMHG | RESPIRATION RATE: 18 BRPM | BODY MASS INDEX: 22.15 KG/M2

## 2020-01-10 DIAGNOSIS — R52 GENERALIZED PAIN: ICD-10-CM

## 2020-01-10 DIAGNOSIS — N30.00 ACUTE CYSTITIS WITHOUT HEMATURIA: Primary | ICD-10-CM

## 2020-01-10 DIAGNOSIS — Z86.59 HISTORY OF DEMENTIA: ICD-10-CM
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
## 2020-01-10 DIAGNOSIS — Z86.79 HISTORY OF ATRIAL FIBRILLATION: ICD-10-CM

## 2020-01-10 DIAGNOSIS — R29.6 UNWITNESSED FALL: ICD-10-CM

## 2020-01-10 DIAGNOSIS — Z92.29 HISTORY OF COUMADIN THERAPY: ICD-10-CM

## 2020-01-10 LAB
ALBUMIN SERPL BCP-MCNC: 3.2 G/DL (ref 3.5–5.2)
ALP SERPL-CCNC: 54 U/L (ref 55–135)
ALT SERPL W/O P-5'-P-CCNC: 21 U/L (ref 10–44)
ANION GAP SERPL CALC-SCNC: 9 MMOL/L (ref 8–16)
AST SERPL-CCNC: 46 U/L (ref 10–40)
BACTERIA #/AREA URNS AUTO: ABNORMAL /HPF
BASOPHILS # BLD AUTO: 0.06 K/UL (ref 0–0.2)
BASOPHILS NFR BLD: 0.4 % (ref 0–1.9)
BILIRUB SERPL-MCNC: 0.5 MG/DL (ref 0.1–1)
BILIRUB UR QL STRIP: NEGATIVE
BUN SERPL-MCNC: 13 MG/DL (ref 6–30)
BUN SERPL-MCNC: 13 MG/DL (ref 8–23)
CALCIUM SERPL-MCNC: 9.5 MG/DL (ref 8.7–10.5)
CHLORIDE SERPL-SCNC: 111 MMOL/L (ref 95–110)
CHLORIDE SERPL-SCNC: 115 MMOL/L (ref 95–110)
CLARITY UR REFRACT.AUTO: ABNORMAL
CO2 SERPL-SCNC: 20 MMOL/L (ref 23–29)
COLOR UR AUTO: YELLOW
CREAT SERPL-MCNC: 0.8 MG/DL (ref 0.5–1.4)
CREAT SERPL-MCNC: 0.8 MG/DL (ref 0.5–1.4)
DIFFERENTIAL METHOD: ABNORMAL
EOSINOPHIL # BLD AUTO: 0 K/UL (ref 0–0.5)
EOSINOPHIL NFR BLD: 0.1 % (ref 0–8)
ERYTHROCYTE [DISTWIDTH] IN BLOOD BY AUTOMATED COUNT: 13.5 % (ref 11.5–14.5)
EST. GFR  (AFRICAN AMERICAN): >60 ML/MIN/1.73 M^2
EST. GFR  (NON AFRICAN AMERICAN): >60 ML/MIN/1.73 M^2
GLUCOSE SERPL-MCNC: 92 MG/DL (ref 70–110)
GLUCOSE SERPL-MCNC: 95 MG/DL (ref 70–110)
GLUCOSE UR QL STRIP: NEGATIVE
HCT VFR BLD AUTO: 39.8 % (ref 37–48.5)
HCT VFR BLD CALC: 36 %PCV (ref 36–54)
HGB BLD-MCNC: 12.7 G/DL (ref 12–16)
HGB UR QL STRIP: NEGATIVE
IMM GRANULOCYTES # BLD AUTO: 0.13 K/UL (ref 0–0.04)
IMM GRANULOCYTES NFR BLD AUTO: 0.9 % (ref 0–0.5)
INR PPP: 1.3 (ref 0.8–1.2)
KETONES UR QL STRIP: NEGATIVE
LEUKOCYTE ESTERASE UR QL STRIP: ABNORMAL
LYMPHOCYTES # BLD AUTO: 1.6 K/UL (ref 1–4.8)
LYMPHOCYTES NFR BLD: 10.5 % (ref 18–48)
MCH RBC QN AUTO: 34.9 PG (ref 27–31)
MCHC RBC AUTO-ENTMCNC: 31.9 G/DL (ref 32–36)
MCV RBC AUTO: 109 FL (ref 82–98)
MICROSCOPIC COMMENT: ABNORMAL
MONOCYTES # BLD AUTO: 0.7 K/UL (ref 0.3–1)
MONOCYTES NFR BLD: 4.8 % (ref 4–15)
NEUTROPHILS # BLD AUTO: 12.7 K/UL (ref 1.8–7.7)
NEUTROPHILS NFR BLD: 83.3 % (ref 38–73)
NITRITE UR QL STRIP: NEGATIVE
NRBC BLD-RTO: 0 /100 WBC
PH UR STRIP: 5 [PH] (ref 5–8)
PLATELET # BLD AUTO: 620 K/UL (ref 150–350)
PMV BLD AUTO: 10.2 FL (ref 9.2–12.9)
POC IONIZED CALCIUM: 1.3 MMOL/L (ref 1.06–1.42)
POC TCO2 (MEASURED): 23 MMOL/L (ref 23–29)
POTASSIUM BLD-SCNC: 4 MMOL/L (ref 3.5–5.1)
POTASSIUM SERPL-SCNC: 5.5 MMOL/L (ref 3.5–5.1)
PROT SERPL-MCNC: 6.4 G/DL (ref 6–8.4)
PROT UR QL STRIP: NEGATIVE
PROTHROMBIN TIME: 13.3 SEC (ref 9–12.5)
RBC # BLD AUTO: 3.64 M/UL (ref 4–5.4)
RBC #/AREA URNS AUTO: 4 /HPF (ref 0–4)
SAMPLE: ABNORMAL
SODIUM BLD-SCNC: 144 MMOL/L (ref 136–145)
SODIUM SERPL-SCNC: 144 MMOL/L (ref 136–145)
SP GR UR STRIP: 1.02 (ref 1–1.03)
TROPONIN I SERPL DL<=0.01 NG/ML-MCNC: <0.006 NG/ML (ref 0–0.03)
URN SPEC COLLECT METH UR: ABNORMAL
WBC # BLD AUTO: 15.27 K/UL (ref 3.9–12.7)
WBC #/AREA URNS AUTO: 72 /HPF (ref 0–5)
WBC CLUMPS UR QL AUTO: ABNORMAL
YEAST UR QL AUTO: ABNORMAL

## 2020-01-10 PROCEDURE — 87088 URINE BACTERIA CULTURE: CPT

## 2020-01-10 PROCEDURE — 63600175 PHARM REV CODE 636 W HCPCS: Performed by: EMERGENCY MEDICINE

## 2020-01-10 PROCEDURE — 99285 PR EMERGENCY DEPT VISIT,LEVEL V: ICD-10-PCS | Mod: ,,, | Performed by: EMERGENCY MEDICINE

## 2020-01-10 PROCEDURE — 96374 THER/PROPH/DIAG INJ IV PUSH: CPT

## 2020-01-10 PROCEDURE — 84484 ASSAY OF TROPONIN QUANT: CPT

## 2020-01-10 PROCEDURE — 81001 URINALYSIS AUTO W/SCOPE: CPT

## 2020-01-10 PROCEDURE — 93010 EKG 12-LEAD: ICD-10-PCS | Mod: ,,, | Performed by: INTERNAL MEDICINE

## 2020-01-10 PROCEDURE — 87086 URINE CULTURE/COLONY COUNT: CPT

## 2020-01-10 PROCEDURE — 93010 ELECTROCARDIOGRAM REPORT: CPT | Mod: ,,, | Performed by: INTERNAL MEDICINE

## 2020-01-10 PROCEDURE — 85610 PROTHROMBIN TIME: CPT

## 2020-01-10 PROCEDURE — 87106 FUNGI IDENTIFICATION YEAST: CPT

## 2020-01-10 PROCEDURE — 99285 EMERGENCY DEPT VISIT HI MDM: CPT | Mod: ,,, | Performed by: EMERGENCY MEDICINE

## 2020-01-10 PROCEDURE — 80053 COMPREHEN METABOLIC PANEL: CPT

## 2020-01-10 PROCEDURE — 93005 ELECTROCARDIOGRAM TRACING: CPT

## 2020-01-10 PROCEDURE — 85025 COMPLETE CBC W/AUTO DIFF WBC: CPT

## 2020-01-10 PROCEDURE — 99285 EMERGENCY DEPT VISIT HI MDM: CPT | Mod: 25

## 2020-01-10 RX ORDER — CEPHALEXIN 250 MG/1
500 CAPSULE ORAL EVERY 12 HOURS
Qty: 28 CAPSULE | Refills: 0 | Status: SHIPPED | OUTPATIENT
Start: 2020-01-10 | End: 2020-01-10 | Stop reason: CLARIF

## 2020-01-10 RX ORDER — CEPHALEXIN 500 MG/1
500 CAPSULE ORAL ONCE
Qty: 20 CAPSULE | Refills: 0 | Status: SHIPPED | OUTPATIENT
Start: 2020-01-10 | End: 2020-01-10 | Stop reason: CLARIF

## 2020-01-10 RX ORDER — FENTANYL CITRATE 50 UG/ML
25 INJECTION, SOLUTION INTRAMUSCULAR; INTRAVENOUS
Status: COMPLETED | OUTPATIENT
Start: 2020-01-10 | End: 2020-01-10

## 2020-01-10 RX ORDER — WARFARIN 2.5 MG/1
TABLET ORAL
Qty: 70 TABLET | Refills: 11 | Status: SHIPPED | OUTPATIENT
Start: 2020-01-10 | End: 2020-02-26 | Stop reason: ALTCHOICE

## 2020-01-10 RX ADMIN — FENTANYL CITRATE 25 MCG: 50 INJECTION INTRAMUSCULAR; INTRAVENOUS at 09:01

## 2020-01-10 NOTE — ED NOTES
Assumed care of patient. Patient resting quietly in bed, no apparent distress noted, resp w/ ease.

## 2020-01-10 NOTE — ED NOTES
Patient now resting on stretcher eyes closed, no acute distress noted, no acute distress noted, vss, will continue to monitor

## 2020-01-10 NOTE — PROGRESS NOTES
Patient back in ER 1/10 due to unwitnessed fall. Work-up negative for anything significant for re-admit. Patient to be d/c. Patient was not d/c home 1/8. Patient was d/c to assisted living or possibly SNF. I also noted in CM note that she was set up with [facility] from hospital d/c 1/8. I s/w son, Dr. Ruiz, to confirm placement and discuss coumadin management with this change. He is still in the process of working with facility to determine coumadin management. He will notify us once this is confirmed. He was advised we would be able to continue managing if assisted living and HH checking INR. However if she under more of nursing home care then they would take care of coumadin. In the meantime, he was advised to give her 10mg tonight then resume past dosing if nothing else figured out by the jose alberto of the day. Then, we will f/u with him on Monday.    Update 3:30pm: Called [facility] who confirmed having patient in their services. Will fax order for INR on Monday. Refill sent to [facility] per son request. Confirmed patient will be at [facility] [place] in [place].

## 2020-01-10 NOTE — PROGRESS NOTES
Son/Abdon ([phone])  would like  A prescription to be called in to the Pharmacy/[facility]( [phone]). He said please call him if any questions.

## 2020-01-10 NOTE — ED NOTES
Bed: [facility]  Expected date:   Expected time:   Means of arrival:   Comments:

## 2020-01-10 NOTE — DISCHARGE INSTRUCTIONS
X-Ray Hips Bilateral 2 View Incl AP Pelvis (Final result)   Result time 01/10/20 10:26:58   Final result by Philippe Aleman MD (01/10/20 10:26:58)                Impression:      See above      Electronically signed by: Philippe Aleman MD  Date: 01/10/2020  Time: 10:26            Narrative:    EXAMINATION:  XR HIPS BILATERAL 2 VIEW INCL AP PELVIS    CLINICAL HISTORY:  Unspecified fall, initial encounter    TECHNIQUE:  AP view of the pelvis and frogleg lateral views of both hips were performed.    COMPARISON:  None 12/30/2019.    FINDINGS:  No fracture or dislocation.  No bone destruction identified                    X-Ray Chest 1 View (Final result)   Result time 01/10/20 10:25:31   Final result by Philippe Aleman MD (01/10/20 10:25:31)                Impression:      See above      Electronically signed by: Philippe Aleman MD  Date: 01/10/2020  Time: 10:25            Narrative:    EXAMINATION:  XR CHEST 1 VIEW    CLINICAL HISTORY:  Unspecified fall, initial encounter    TECHNIQUE:  Single frontal view of the chest was performed.    COMPARISON:  N 01/05/2020 one    FINDINGS:  Mild cardiomegaly.  The lungs are clear.  No pleural effusion.  The visualized osseous structures are unremarkable                    CT Head Without Contrast (Final result)   Result time 01/10/20 09:53:46   Final result by Reginaldo Mcintosh DO (01/10/20 09:53:46)                Impression:      No significant change from prior.  Specifically without evidence for acute intracranial hemorrhage or new abnormal parenchymal attenuation.    Continued generalized cerebral volume loss with patchy and confluent decreased attenuation supratentorial white matter while nonspecific concerning for chronic ischemic change.      Electronically signed by: Reginaldo Mcintosh DO  Date: 01/10/2020  Time: 09:53            Narrative:    EXAMINATION:  CT HEAD WITHOUT CONTRAST    CLINICAL HISTORY:  Head trauma, minor, GCS>=13, NOC/NEXUS/CCR neg, first  study;    TECHNIQUE:  Multiple sequential 5 mm axial images of the head without contrast.  Coronal and sagittal reformatted imaging from the axial acquisition.    COMPARISON:  12/30/2019    FINDINGS:  Generalized cerebral volume loss somewhat more prominent involving the frontal and anterior temporal lobes canal exclude underlying neuro degenerative process.  Stable prominence of the lateral and 3rd ventricles as well as the sylvian fissures likely compensatory to volume loss component of normal pressure hydrocephalus not excluded..  There is patchy and confluent decreased attenuation supratentorial white matter while nonspecific concerning for chronic ischemic change.    There is no evidence for acute intracranial hemorrhage or sulcal effacement to suggest large territory recent infarction.  Mild moderate peripheral opacification in the maxillary antra suggestive for mucosal thickening with superimposed aerated secretions can not exclude acute sinusitis with few patchy ethmoid air cell opacities.  There is probable partial cerumen opacification of the left EAC.                    CT Cervical Spine Without Contrast (Final result)   Result time 01/10/20 10:38:44   Final result by Peña Taveras MD (01/10/20 10:38:44)                Impression:      No acute abnormality of the cervical spine, specifically no evidence of fracture.    Multilevel degenerative changes of the cervical spine, as detailed above, most prominent at C3-C4 through C6-C7 resulting in mild spinal canal stenosis and variable mild neural foraminal narrowing.    Additional incidental findings, as above.    Electronically signed by resident: Bette Gonzalez  Date: 01/10/2020  Time: 09:47    Electronically signed by: Peña Taveras MD  Date: 01/10/2020  Time: 10:38            Narrative:    EXAMINATION:  CT CERVICAL SPINE WITHOUT CONTRAST    CLINICAL HISTORY:  C-spine trauma, NEXUS/CCR positive, low risk;    TECHNIQUE:  Low dose axial images,  sagittal and coronal reformations were performed through the cervical spine.  Contrast was not administered.  Examination is suboptimal due to patient motion artifact.    COMPARISON:  CT soft tissue neck 02/01/2018    FINDINGS:  Skull base and craniocervical junction (partially imaged): No significant abnormality.    Spinal alignment: Slight reversal of normal cervical lordosis.  No spondylolisthesis.    Vertebrae: Anterior and posterior arches of C1 are normal. Ondontoid process is intact. Vertebral body heights are well maintained.  No evidence of fracture or dislocation.  Well-circumscribed hypodensity within the C6 vertebral body, favored to represent a benign etiology, stable from prior CT 02/01/2018.    Discs: Multilevel degenerative disc disease associated with endplate sclerosis and severe disc space narrowing from C3-C4 through C7-T1.    Degenerative changes: Uncovertebral spurring, mild facet hypertrophy, and posterior disc osteophyte complexes most prominent at C3-C4 through C6-C7 resulting in mild spinal canal stenosis and variable mild neural foraminal narrowing.    Right thyroid lobe is asymmetrically smaller than the left.  Otherwise, visualized soft tissue structures of the neck are unremarkable.  Partially imaged bilateral maxillary sinuses and ethmoid air cells demonstrate mild mucosal thickening and small volume of aerated secretions.    The airway is patent and unremarkable.  Bilateral lung apices demonstrate mild fibronodular change.  No mass, consolidation, or pneumothorax.  The visualized portions of the brain demonstrate no significant abnormality.

## 2020-01-10 NOTE — ED NOTES
LOC: The patient is awake, alert  APPEARANCE:  patient is clean and well groomed, patient's clothing is properly fastened.  SKIN: The skin is warm and dry, color consistent with ethnicity, patient has normal skin turgor and moist mucus membranes  MUSCULOSKELETAL: Patient moving all extremities spontaneously, no obvious swelling or deformities noted.  RESPIRATORY: Airway is open and patent, respirations are spontaneous, patient has a normal effort and rate, no accessory muscle use noted  ABDOMEN: Soft and non tender to palpation, no distention noted  NEUROLOGIC:  facial expression is symmetrical, patient moving all extremities spontaneously, normal sensation in all extremities when touched with a finger.  Follows all commands appropriately.    Patient sent from [facility] nursing [place], patient had unwitnessed fall this am and was put back into the bed by staff before calling ems, patient has no idea how she fell due to having dementia, patient states the only pain she is currently having at this time is lower back pain, rates pain 10/10 on pain scale, no acute distress noted ,will continue to monitor

## 2020-01-10 NOTE — ED PROVIDER NOTES
Encounter Date: 1/10/2020       History     Chief Complaint   Patient presents with    Fall     Found on the floor at 0530 this morning, patient on Coumadin, hx of dementia    Back Pain    Hip Pain     Ms. Garcia is 84 years old female patient with past medical history HTN, HLD, chronic afib on coumadin, HFrEF 15%, recent fall 2/2 UTI, and dementia presenting to the ER with fall at nursing home. Patient was found on the ground beside her bed. Patient does not remember how did she fell and unsure if she hit her head or past out. Fall was unwitnessed. Patient is complaining of generalized body pain but more prominent in lower back and left hip. No head pain, blurry vision, chest pain, sob, abd pain, urinary symptoms, LE swelling or weakness, and bruises.  History was difficult to be obtained given patient's dementia.           Review of patient's allergies indicates:  No Known Allergies  Past Medical History:   Diagnosis Date    Atrial fibrillation     with rapid ventricular rate    Breast cancer     XRT    Chronic bronchitis     CKD (chronic kidney disease), stage III     Dementia     Squamous cell carcinoma      Past Surgical History:   Procedure Laterality Date    BREAST SURGERY      right     Family History   Problem Relation Age of Onset    Heart disease Father     Melanoma Neg Hx      Social History     Tobacco Use    Smoking status: Former Smoker     Packs/day: 0.25     Types: Cigarettes     Last attempt to quit: 1982     Years since quittin.0    Smokeless tobacco: Never Used    Tobacco comment: pt was social smoker   Substance Use Topics    Alcohol use: No     Alcohol/week: 0.0 standard drinks    Drug use: No     Review of Systems   Reason unable to perform ROS: ROS was difficult given patient dementia and pain sensitivity to any mild stimuli but with repeated reassuring she has the following:    Constitutional: Negative for chills, diaphoresis and fever.   HENT: Negative for  congestion, rhinorrhea and sore throat.    Eyes: Negative for photophobia and visual disturbance.   Respiratory: Negative for cough and shortness of breath.    Cardiovascular: Negative for chest pain and leg swelling.   Gastrointestinal: Negative for abdominal distention, abdominal pain, blood in stool, diarrhea, nausea and vomiting.   Genitourinary: Negative for decreased urine volume, dysuria and flank pain.   Musculoskeletal: Positive for arthralgias (lef hip) and back pain.   Skin: Negative for color change, rash and wound.   Allergic/Immunologic: Negative for immunocompromised state.   Neurological: Positive for weakness. Negative for dizziness, light-headedness, numbness and headaches.   Hematological: Bruises/bleeds easily.       Physical Exam     Initial Vitals [01/10/20 0802]   BP Pulse Resp Temp SpO2   132/62 72 16 98.1 °F (36.7 °C) 98 %      MAP       --         Physical Exam    Nursing note and vitals reviewed.  Constitutional: She appears well-developed and well-nourished. No distress.   HENT:   Head: Normocephalic and atraumatic.   Eyes: EOM are normal. Pupils are equal, round, and reactive to light.   Cardiovascular: Normal rate, regular rhythm, normal heart sounds and intact distal pulses. Exam reveals no friction rub.    No murmur heard.  Pulmonary/Chest: Breath sounds normal. No respiratory distress. She has no wheezes. She has no rales.   Abdominal: Soft. Bowel sounds are normal. She exhibits no distension. There is no tenderness.   Musculoskeletal: She exhibits tenderness (Tender left hip). She exhibits no edema.   Back is not tender to palpation. No erythema or bruises    Normal bilateral UE ROM. No erythema or bruises    Decreased left hip ROM and normal right LE ROM. No erythema or bruises.   Neurological: She is alert. GCS score is 15. GCS eye subscore is 4. GCS verbal subscore is 5. GCS motor subscore is 6.   Oriented to self but not time and place.  UE and LE normal sensation     Skin: Skin  is warm and dry.         ED Course   Procedures  Labs Reviewed   PROTIME-INR - Abnormal; Notable for the following components:       Result Value    Prothrombin Time 13.3 (*)     INR 1.3 (*)     All other components within normal limits   CBC W/ AUTO DIFFERENTIAL - Abnormal; Notable for the following components:    WBC 15.27 (*)     RBC 3.64 (*)     Mean Corpuscular Volume 109 (*)     Mean Corpuscular Hemoglobin 34.9 (*)     Mean Corpuscular Hemoglobin Conc 31.9 (*)     Platelets 620 (*)     Immature Granulocytes 0.9 (*)     Gran # (ANC) 12.7 (*)     Immature Grans (Abs) 0.13 (*)     Gran% 83.3 (*)     Lymph% 10.5 (*)     All other components within normal limits   COMPREHENSIVE METABOLIC PANEL - Abnormal; Notable for the following components:    Potassium 5.5 (*)     Chloride 115 (*)     CO2 20 (*)     Albumin 3.2 (*)     Alkaline Phosphatase 54 (*)     AST 46 (*)     All other components within normal limits   URINALYSIS, REFLEX TO URINE CULTURE - Abnormal; Notable for the following components:    Appearance, UA Hazy (*)     Leukocytes, UA 2+ (*)     All other components within normal limits    Narrative:     Preferred Collection Type->Urine, Clean Catch   URINALYSIS MICROSCOPIC - Abnormal; Notable for the following components:    WBC, UA 72 (*)     WBC Clumps, UA Occasional (*)     Yeast, UA Few (*)     All other components within normal limits    Narrative:     Preferred Collection Type->Urine, Clean Catch   ISTAT PROCEDURE - Abnormal; Notable for the following components:    POC Chloride 111 (*)     All other components within normal limits   CULTURE, URINE   TROPONIN I     EKG Readings: (Independently Interpreted)   Initial Reading: No STEMI. Rhythm: Atrial Fibrillation. Heart Rate: 53.   Atrial fibrillation with VENTRICULAR RESPONSE OF 53 BEATS PER MINUTE; normal ventricular axis with QRS QTC intervals within normal limits; no STEMI.  ____________________  Hubert Preciado MD, FAA  Emergency Medicine  Staff  9:23 AM 1/10/2020       ECG Results          EKG 12-lead (Final result)  Result time 01/10/20 13:53:47    Final result by Interface, Lab In [facility] (01/10/20 13:53:47)                 Narrative:    Test Reason : W19.XXXA,    Vent. Rate : 053 BPM     Atrial Rate : 208 BPM     P-R Int : 000 ms          QRS Dur : 070 ms      QT Int : 434 ms       P-R-T Axes : 000 -19 -79 degrees     QTc Int : 407 ms    Atrial fibrillation with slow ventricular response  Nonspecific T wave abnormality    Abnormal ECG  When compared with ECG of 04-JAN-2020 02:17,  Criteria for Anteroseptal infarct are no longer Present  Confirmed by CINDA HUERTA MD ([id number]) on 1/10/2020 1:53:37 PM    Referred By: [email or web address]   SELF           Confirmed By:CINDA HUERTA MD                            Imaging Results          X-Ray Hips Bilateral 2 View Incl AP Pelvis (Final result)  Result time 01/10/20 10:26:58    Final result by Philippe Aleman MD (01/10/20 10:26:58)                 Impression:      See above      Electronically signed by: Philippe Aleman MD  Date:    01/10/2020  Time:    10:26             Narrative:    EXAMINATION:  XR HIPS BILATERAL 2 VIEW INCL AP PELVIS    CLINICAL HISTORY:  Unspecified fall, initial encounter    TECHNIQUE:  AP view of the pelvis and frogleg lateral views of both hips were performed.    COMPARISON:  None 12/30/2019.    FINDINGS:  No fracture or dislocation.  No bone destruction identified                               X-Ray Chest 1 View (Final result)  Result time 01/10/20 10:25:31    Final result by Philippe Aleman MD (01/10/20 10:25:31)                 Impression:      See above      Electronically signed by: Philippe Aleman MD  Date:    01/10/2020  Time:    10:25             Narrative:    EXAMINATION:  XR CHEST 1 VIEW    CLINICAL HISTORY:  Unspecified fall, initial encounter    TECHNIQUE:  Single frontal view of the chest was performed.    COMPARISON:  N 01/05/2020 one    FINDINGS:  Mild cardiomegaly.  The lungs are clear.   No pleural effusion.  The visualized osseous structures are unremarkable                               CT Head Without Contrast (Final result)  Result time 01/10/20 09:53:46    Final result by Reginaldo Mcintosh DO (01/10/20 09:53:46)                 Impression:      No significant change from prior.  Specifically without evidence for acute intracranial hemorrhage or new abnormal parenchymal attenuation.    Continued generalized cerebral volume loss with patchy and confluent decreased attenuation supratentorial white matter while nonspecific concerning for chronic ischemic change.      Electronically signed by: Reginaldo Mcintosh DO  Date:    01/10/2020  Time:    09:53             Narrative:    EXAMINATION:  CT HEAD WITHOUT CONTRAST    CLINICAL HISTORY:  Head trauma, minor, GCS>=13, NOC/NEXUS/CCR neg, first study;    TECHNIQUE:  Multiple sequential 5 mm axial images of the head without contrast.  Coronal and sagittal reformatted imaging from the axial acquisition.    COMPARISON:  12/30/2019    FINDINGS:  Generalized cerebral volume loss somewhat more prominent involving the frontal and anterior temporal lobes canal exclude underlying neuro degenerative process.  Stable prominence of the lateral and 3rd ventricles as well as the sylvian fissures likely compensatory to volume loss component of normal pressure hydrocephalus not excluded..  There is patchy and confluent decreased attenuation supratentorial white matter while nonspecific concerning for chronic ischemic change.    There is no evidence for acute intracranial hemorrhage or sulcal effacement to suggest large territory recent infarction.  Mild moderate peripheral opacification in the maxillary antra suggestive for mucosal thickening with superimposed aerated secretions can not exclude acute sinusitis with few patchy ethmoid air cell opacities.  There is probable partial cerumen opacification of the left EAC.                               CT Cervical Spine Without  Contrast (Final result)  Result time 01/10/20 10:38:44    Final result by Peña Taveras MD (01/10/20 10:38:44)                 Impression:      No acute abnormality of the cervical spine, specifically no evidence of fracture.    Multilevel degenerative changes of the cervical spine, as detailed above, most prominent at C3-C4 through C6-C7 resulting in mild spinal canal stenosis and variable mild neural foraminal narrowing.    Additional incidental findings, as above.    Electronically signed by resident: Bette Gonzalez  Date:    01/10/2020  Time:    09:47    Electronically signed by: Peña Taveras MD  Date:    01/10/2020  Time:    10:38             Narrative:    EXAMINATION:  CT CERVICAL SPINE WITHOUT CONTRAST    CLINICAL HISTORY:  C-spine trauma, NEXUS/CCR positive, low risk;    TECHNIQUE:  Low dose axial images, sagittal and coronal reformations were performed through the cervical spine.  Contrast was not administered.  Examination is suboptimal due to patient motion artifact.    COMPARISON:  CT soft tissue neck 02/01/2018    FINDINGS:  Skull base and craniocervical junction (partially imaged): No significant abnormality.    Spinal alignment: Slight reversal of normal cervical lordosis.  No spondylolisthesis.    Vertebrae: Anterior and posterior arches of C1 are normal. Ondontoid process is intact. Vertebral body heights are well maintained.  No evidence of fracture or dislocation.  Well-circumscribed hypodensity within the C6 vertebral body, favored to represent a benign etiology, stable from prior CT 02/01/2018.    Discs: Multilevel degenerative disc disease associated with endplate sclerosis and severe disc space narrowing from C3-C4 through C7-T1.    Degenerative changes: Uncovertebral spurring, mild facet hypertrophy, and posterior disc osteophyte complexes most prominent at C3-C4 through C6-C7 resulting in mild spinal canal stenosis and variable mild neural foraminal narrowing.    Right thyroid  lobe is asymmetrically smaller than the left.  Otherwise, visualized soft tissue structures of the neck are unremarkable.  Partially imaged bilateral maxillary sinuses and ethmoid air cells demonstrate mild mucosal thickening and small volume of aerated secretions.    The airway is patent and unremarkable.  Bilateral lung apices demonstrate mild fibronodular change.  No mass, consolidation, or pneumothorax.  The visualized portions of the brain demonstrate no significant abnormality.                                 Medical Decision Making:   History:   I obtained history from: someone other than patient, another health care provider and EMS provider.  Old Medical Records: I decided to obtain old medical records.  Initial Assessment:   84 years old female patient with past medical history HTN, HLD, chronic afib on coumadin, HFrEF 15%, recent fall 2/2 UTI, and dementia. Presents with unwitnessed fall. Patient doesn't remember the incident and does not know if she past out or not, probably d/t her dementia. HDS. On exam, atraumatic and normocephalic. she has UE and LE normal neurovascular exam. Tender left hip and decreased ROM. Ordered Pelvis and bilateral hip x ray, CXR, CT head and C-spine. CBC, CMP, PT/INR, and UA. I'll reassess.  Dr. Chaitanya Mendez  PGY-1 Resident    F/U:  Aware of elevated potassium, however, likely secondary to tourniquet.  Will repeat as I do not perceive true hyperkalemia.  ____________________  Nolan Preciado MD, [facility]  Emergency Medicine Staff  10:27 AM 1/10/2020    F/U:  CT and x-rays with no intracranial hemorrhage, fractures, dislocations or any pathology warranting emergent intervention at this time.  Spoke with son, who reports patient was just put on Augmentin a few days ago.  ____________________  Hubert Preciado MD, [facility]  Emergency Medicine Staff   11:54 AM 1/10/2020    Patient is stable for discharge. Recommend continuing Augmentin course.  Dr. Chaitanya Mendez  PGY-1  Resident  Clinical Tests:   Lab Tests: Ordered and Reviewed  The following lab test(s) were unremarkable: CBC, CMP, PT, Urinalysis and Troponin  Radiological Study: Ordered and Reviewed  Medical Tests: Ordered and Reviewed  Other:   I discussed test(s) with the performing physician.              Attending Attestation:   Physician Attestation Statement for Resident:  As the supervising MD   Physician Attestation Statement: I have personally seen and examined this patient.   I agree with the above history. -:   As the supervising MD I agree with the above PE.    As the supervising MD I agree with the above treatment, course, plan, and disposition.            Attending ED Notes:   STAFF ATTENDING PHYSICIAN NOTE:  I have individually/jointly evaluated Cat Garcia and discussed their ED management with the resident physician. I have also reviewed their notes, assessments, and procedures documented.  I was present during all critical portions of any procedure(s) performed on Cat Garcia.   ____________________  Hubert Preciado MD, [facility]  Emergency Medicine Staff  11:53 AM 1/10/2020                          Clinical Impression:       ICD-10-CM ICD-9-CM   1. Acute cystitis without hematuria N30.00 595.0   2. Fall W19.XXXA E888.9   3. Generalized pain R52 780.96   4. History of atrial fibrillation Z86.79 V12.59   5. History of Coumadin therapy Z92.29 V58.61   6. Unwitnessed fall R29.6 [id number]   7. History of dementia Z86.59 V11.8         Disposition:   Disposition: Discharged  Condition: Stable                     Chaitanya Mendez MD  Resident  01/10/20 1336       Nolan Preciado MD  01/10/20 [id number]

## 2020-01-12 LAB — BACTERIA UR CULT: ABNORMAL

## 2020-01-14 NOTE — PROGRESS NOTES
Called [facility] for INR results from 1/13. Staff reports they were not able to admit her. She reports [facility] was going to be able to manage patient. She reports patients son was involved.    LMOVM for son to callback. Need to confirm if assisted living is now taking over INR/coumadin management or what we need to do.     Update 1/15: s/w patients son. He reports no HH has been set up. He reports he has been in contact with [facility]. Patient has OV tomorrow at /[facility]. We will plan for INR while she is there. He will keep me updated with HH changes for plans for INR next week. He confirmed dose as planned.

## 2020-01-16 ENCOUNTER — ANTI-COAG VISIT (OUTPATIENT)
Dept: CARDIOLOGY | Facility: CLINIC | Age: 85
End: 2020-01-16
Payer: MEDICARE

## 2020-01-16 ENCOUNTER — OFFICE VISIT (OUTPATIENT)
Dept: INTERNAL MEDICINE | Facility: CLINIC | Age: 85
End: 2020-01-16
Payer: MEDICARE

## 2020-01-16 ENCOUNTER — TELEPHONE (OUTPATIENT)
Dept: INTERNAL MEDICINE | Facility: CLINIC | Age: 85
End: 2020-01-16

## 2020-01-16 ENCOUNTER — LAB VISIT (OUTPATIENT)
Dept: LAB | Facility: HOSPITAL | Age: 85
End: 2020-01-16
Payer: MEDICARE

## 2020-01-16 VITALS
HEART RATE: 50 BPM | OXYGEN SATURATION: 100 % | BODY MASS INDEX: 23.17 KG/M2 | WEIGHT: 130.75 LBS | SYSTOLIC BLOOD PRESSURE: 104 MMHG | DIASTOLIC BLOOD PRESSURE: 64 MMHG | HEIGHT: 63 IN

## 2020-01-16 DIAGNOSIS — I48.20 ATRIAL FIBRILLATION, CHRONIC: Primary | ICD-10-CM

## 2020-01-16 DIAGNOSIS — G21.4 VASCULAR PARKINSONISM: ICD-10-CM

## 2020-01-16 DIAGNOSIS — I48.20 ATRIAL FIBRILLATION, CHRONIC: ICD-10-CM

## 2020-01-16 DIAGNOSIS — N18.30 CKD (CHRONIC KIDNEY DISEASE), STAGE III: Chronic | ICD-10-CM

## 2020-01-16 DIAGNOSIS — D47.3 ESSENTIAL THROMBOCYTOSIS: ICD-10-CM

## 2020-01-16 DIAGNOSIS — G30.9 MIXED ALZHEIMER'S AND VASCULAR DEMENTIA: ICD-10-CM

## 2020-01-16 DIAGNOSIS — I50.42 CHRONIC COMBINED SYSTOLIC AND DIASTOLIC CHF, NYHA CLASS 3: ICD-10-CM

## 2020-01-16 DIAGNOSIS — Z79.01 LONG TERM CURRENT USE OF ANTICOAGULANT THERAPY: ICD-10-CM

## 2020-01-16 DIAGNOSIS — F01.50 MIXED ALZHEIMER'S AND VASCULAR DEMENTIA: ICD-10-CM

## 2020-01-16 DIAGNOSIS — I70.0 AORTIC ATHEROSCLEROSIS: ICD-10-CM

## 2020-01-16 DIAGNOSIS — F02.80 MIXED ALZHEIMER'S AND VASCULAR DEMENTIA: ICD-10-CM

## 2020-01-16 LAB
INR PPP: 2.2 (ref 0.8–1.2)
PROTHROMBIN TIME: 22.3 SEC (ref 9–12.5)

## 2020-01-16 PROCEDURE — 99499 UNLISTED E&M SERVICE: CPT | Mod: S$GLB,,, | Performed by: INTERNAL MEDICINE

## 2020-01-16 PROCEDURE — 1101F PT FALLS ASSESS-DOCD LE1/YR: CPT | Mod: CPTII,S$GLB,, | Performed by: INTERNAL MEDICINE

## 2020-01-16 PROCEDURE — 1126F AMNT PAIN NOTED NONE PRSNT: CPT | Mod: S$GLB,,, | Performed by: INTERNAL MEDICINE

## 2020-01-16 PROCEDURE — 3074F PR MOST RECENT SYSTOLIC BLOOD PRESSURE < 130 MM HG: ICD-10-PCS | Mod: CPTII,S$GLB,, | Performed by: INTERNAL MEDICINE

## 2020-01-16 PROCEDURE — 1101F PR PT FALLS ASSESS DOC 0-1 FALLS W/OUT INJ PAST YR: ICD-10-PCS | Mod: CPTII,S$GLB,, | Performed by: INTERNAL MEDICINE

## 2020-01-16 PROCEDURE — 99215 PR OFFICE/OUTPT VISIT, EST, LEVL V, 40-54 MIN: ICD-10-PCS | Mod: S$GLB,,, | Performed by: INTERNAL MEDICINE

## 2020-01-16 PROCEDURE — 3078F DIAST BP <80 MM HG: CPT | Mod: CPTII,S$GLB,, | Performed by: INTERNAL MEDICINE

## 2020-01-16 PROCEDURE — 93793 ANTICOAG MGMT PT WARFARIN: CPT | Mod: S$GLB,,,

## 2020-01-16 PROCEDURE — 99215 OFFICE O/P EST HI 40 MIN: CPT | Mod: S$GLB,,, | Performed by: INTERNAL MEDICINE

## 2020-01-16 PROCEDURE — 99999 PR PBB SHADOW E&M-EST. PATIENT-LVL IV: ICD-10-PCS | Mod: PBBFAC,,, | Performed by: INTERNAL MEDICINE

## 2020-01-16 PROCEDURE — 1159F MED LIST DOCD IN RCRD: CPT | Mod: S$GLB,,, | Performed by: INTERNAL MEDICINE

## 2020-01-16 PROCEDURE — 1126F PR PAIN SEVERITY QUANTIFIED, NO PAIN PRESENT: ICD-10-PCS | Mod: S$GLB,,, | Performed by: INTERNAL MEDICINE

## 2020-01-16 PROCEDURE — 3074F SYST BP LT 130 MM HG: CPT | Mod: CPTII,S$GLB,, | Performed by: INTERNAL MEDICINE

## 2020-01-16 PROCEDURE — 99999 PR PBB SHADOW E&M-EST. PATIENT-LVL IV: CPT | Mod: PBBFAC,,, | Performed by: INTERNAL MEDICINE

## 2020-01-16 PROCEDURE — 36415 COLL VENOUS BLD VENIPUNCTURE: CPT

## 2020-01-16 PROCEDURE — 1159F PR MEDICATION LIST DOCUMENTED IN MEDICAL RECORD: ICD-10-PCS | Mod: S$GLB,,, | Performed by: INTERNAL MEDICINE

## 2020-01-16 PROCEDURE — 85610 PROTHROMBIN TIME: CPT

## 2020-01-16 PROCEDURE — 93793 PR ANTICOAGULANT MGMT FOR PT TAKING WARFARIN: ICD-10-PCS | Mod: S$GLB,,,

## 2020-01-16 PROCEDURE — 99499 RISK ADDL DX/OHS AUDIT: ICD-10-PCS | Mod: S$GLB,,, | Performed by: INTERNAL MEDICINE

## 2020-01-16 PROCEDURE — 3078F PR MOST RECENT DIASTOLIC BLOOD PRESSURE < 80 MM HG: ICD-10-PCS | Mod: CPTII,S$GLB,, | Performed by: INTERNAL MEDICINE

## 2020-01-16 RX ORDER — LOSARTAN POTASSIUM 25 MG/1
25 TABLET ORAL DAILY
COMMUNITY
End: 2021-01-12

## 2020-01-16 NOTE — PROGRESS NOTES
INR good. We still are pending HH info. Noted Dr. Oconnell sent in new orders today for HH.     I messaged Dr. Oconnell's staff for HH info since we are having a hard time getting it. Per reply   [address] [id number] [phone]  FAX # [phone]

## 2020-01-16 NOTE — TELEPHONE ENCOUNTER
Hi, please fax the home health orders for her to [facility] (fax number should be on the order form) and to [facility].  Thank you, Alfie Oconnell

## 2020-01-16 NOTE — PROGRESS NOTES
Subjective:       Patient ID: Cat Garcia is a 84 y.o. female.    Chief Complaint: Annual Exam    Patient is here for followup for chronic conditions.    To review she had failure to thrive and a fall at home and was admitted to [facility]. Found to have a UTI and deemed not safe to stay at home. She was discharhed to [facility]:  At [facility]    Has had one fall there, went to ED and no acute fractures or new issues found. She has not had any wounds from the fall.    Home health was ordered but has not yet started.    Review of Systems   Constitutional: Positive for fatigue. Negative for activity change, appetite change, fever and unexpected weight change.   HENT: Negative for trouble swallowing.    Eyes: Negative for visual disturbance.   Respiratory: Positive for shortness of breath (stable). Negative for chest tightness and wheezing.    Cardiovascular: Negative for chest pain, palpitations and leg swelling.   Gastrointestinal: Negative for abdominal distention, abdominal pain and constipation.   Endocrine: Negative for cold intolerance, heat intolerance, polydipsia and polyuria.   Genitourinary: Negative for difficulty urinating.   Musculoskeletal: Positive for gait problem.   Skin: Negative for rash.   Neurological: Negative for tremors and syncope.        Chronic imbalance    + falls   Hematological: Negative for adenopathy. Does not bruise/bleed easily.   Psychiatric/Behavioral: Negative for dysphoric mood.       Objective:      Physical Exam   Constitutional: She appears well-developed and well-nourished. No distress.   HENT:   Head: Normocephalic and atraumatic.   Mouth/Throat: No oropharyngeal exudate.   Eyes: Pupils are equal, round, and reactive to light. Conjunctivae are normal. No scleral icterus.   Neck: Normal range of motion. Neck supple. No thyromegaly present.   No neck mass palpated today   Cardiovascular: Normal rate and normal heart sounds.   Irregularly irregular       Pulmonary/Chest:  Effort normal and breath sounds normal. She has no rales.   Abdominal: Soft. Bowel sounds are normal. She exhibits no distension. There is no tenderness.   Musculoskeletal: She exhibits no edema.   Lymphadenopathy:     She has no cervical adenopathy.   Neurological: She is alert. She exhibits normal muscle tone.   Somewhat agitated on mild palpation of the back and legs   Skin: She is not diaphoretic.   No obvious skin rash or wound seen   Psychiatric: She has a normal mood and affect. Her behavior is normal.   Somewhat agitated, for example feels like the WC is uncomfortable       Assessment:       1. Atrial fibrillation, chronic    2. Mixed Alzheimer's and vascular dementia    3. Chronic combined systolic and diastolic CHF, NYHA class 3    4. Essential thrombocytosis    5. Vascular parkinsonism    6. Aortic atherosclerosis    7. CKD (chronic kidney disease), stage III        Plan:       Cat MOCK was seen today for annual exam.    Diagnoses and all orders for this visit:    Atrial fibrillation, chronic  -     Protime-INR; Future  -     Digoxin level; Future    Mixed Alzheimer's and vascular dementia  -     Ambulatory referral to Home Health  She needs Home health for deconditioning, failure to thrive    Chronic combined systolic and diastolic CHF, NYHA class 3  Currently euvolemic    Essential thrombocytosis  On med from onc    Vascular parkinsonism  No recent changes    Aortic atherosclerosis  On statin    CKD (chronic kidney disease), stage III  Has been stable    Over 40 minutes spent with patient and majority in counseling and patient education.        Health Maintenance       Date Due Completion Date    TETANUS VACCINE 04/22/1953 ---    Shingles Vaccine (2 of 3) 09/06/2016 7/12/2016    Influenza Vaccine (1) 09/01/2019 9/27/2018    DEXA SCAN 03/12/2021 3/12/2018    Lipid Panel 02/07/2022 2/7/2017          Follow up in about 6 months (around 7/16/2020).    No future appointments.

## 2020-01-17 NOTE — PROGRESS NOTES
Tony/[facility]) was advised ( Warfarin 5mg daily, except 7.5mg Mon/Fri).Also (Boost 2-3 times) daily.

## 2020-01-20 ENCOUNTER — TELEPHONE (OUTPATIENT)
Dept: INTERNAL MEDICINE | Facility: CLINIC | Age: 85
End: 2020-01-20

## 2020-01-20 ENCOUNTER — PATIENT MESSAGE (OUTPATIENT)
Dept: CARDIOLOGY | Facility: CLINIC | Age: 85
End: 2020-01-20

## 2020-01-20 NOTE — TELEPHONE ENCOUNTER
Hi referral placed for [facility]   [address] [address]  [address]  [facility] [address]  [phone]  FAX # [phone]

## 2020-01-20 NOTE — TELEPHONE ENCOUNTER
----- Message from Florecita Green PharmD sent at 1/20/2020  9:29 AM CST -----  Regarding: HH info  Hi! We are having a hard time getting home health contact info for Mrs. Garcia. We need to order an INR with them. Do you have their phone & fax number? Thanks! - Florecita

## 2020-01-22 ENCOUNTER — TELEPHONE (OUTPATIENT)
Dept: INTERNAL MEDICINE | Facility: CLINIC | Age: 85
End: 2020-01-22

## 2020-01-22 NOTE — TELEPHONE ENCOUNTER
Hi Ty, please fax my home health orders to [facility] and to [facility]. I just printed these out.  Haydee Rosales, EMILIA  Thank you, Alfie Oconnell

## 2020-01-22 NOTE — TELEPHONE ENCOUNTER
----- Message from Melisa Rosales RN sent at 1/22/2020  2:11 PM CST -----  Regarding: [facility] - HH issue  Can you please check on the status of this patient's home health order?  I see from a note today in her chart that [facility] still hasn't done an admit home visit.  We need the home health visit to get her INRs for the [facility].  The patient lives in a memory care department at [facility] so this may be the delay and/or denial by [facility] that is occurring from the home health order.  The patient's son refused the terms and agreement for a home INR meter from the [facility] so home health is the only other resource we have for INRs for her.    Thanks  Melisa Rosales, RN  [facility]

## 2020-01-22 NOTE — TELEPHONE ENCOUNTER
----- Message from Curt Hector MA sent at 1/22/2020  2:11 PM CST -----  Regarding: FW: [facility] - meter request by PCP      ----- Message -----  From: Melisa Rosales RN  Sent: 1/22/2020   1:54 PM CST  To: Florecita Green, SiobhanD, Anish Judge Staff  Subject: [facility] - meter request by PCP           Please let Dr Oconnell know that I did discuss this patient getting a meter with her son/caretaker Abdon Ruiz on 12/31/19 and he refused the meter after discussing the terms and agreement for the meter program.  It is the [facility]'s policy that a contract has to be signed to process for a meter so we can't move forward with her as she lives in a memory care department in assisted living.  I do see since last week the patient was ordered home health so we can send orders to them for her INRs while she has nursing services with them.      Melisa Rosales RN

## 2020-01-22 NOTE — PROGRESS NOTES
Still no answers on HH agency. Called contact for [facility] who does not have pt in their system. Called [facility] who reports no HH as of yet but thinks they are trying to get [facility]. Message had been sent to son in [facility] but he has not replied. lmovm for son to callback

## 2020-01-24 ENCOUNTER — TELEPHONE (OUTPATIENT)
Dept: INTERNAL MEDICINE | Facility: CLINIC | Age: 85
End: 2020-01-24

## 2020-01-24 DIAGNOSIS — I48.20 ATRIAL FIBRILLATION, CHRONIC: Primary | ICD-10-CM

## 2020-01-24 NOTE — PROGRESS NOTES
Tony/[facility]) called 01/24/20 asking about an inr. She was told it was scheduled for 01/23/20. She said she will look into and inr being drawn today 1/24/20.

## 2020-01-24 NOTE — PROGRESS NOTES
CHUYITA for Tony to callback regarding INR. INR does not have to be drawn today. We are waiting on HH orders and INR can wait. Dose is stable. Please forward Tony's call to me if she callsback

## 2020-01-24 NOTE — TELEPHONE ENCOUNTER
Ordered made --  Orders Placed This Encounter    [facility]-INR   through [facility]    Thank you, Alfie Oconnell

## 2020-01-24 NOTE — TELEPHONE ENCOUNTER
----- Message from Conrad Acuña sent at 1/24/2020 12:54 PM CST -----  Contact: Jessica mera/ Tony [phone]  She is requesting lab orders for PT INR be sent to [facility] ASAP at fax# [phone].    Thank you

## 2020-01-27 ENCOUNTER — TELEPHONE (OUTPATIENT)
Dept: INTERNAL MEDICINE | Facility: CLINIC | Age: 85
End: 2020-01-27

## 2020-01-27 NOTE — TELEPHONE ENCOUNTER
Spoke with Zulma with [facility] she has received an order for [facility] but states that pt ios already signed with [facility]. Also that order for [facility] hasn't been signed. She also said [facility] can't go to pt's home just to check on INR's. Zulma will call [facility] to check if pt is admitted with them.

## 2020-01-27 NOTE — TELEPHONE ENCOUNTER
----- Message from Rigoberto Dan sent at 1/27/2020 12:26 PM CST -----  Contact: [facility], [place] [phone]  Patient would like to get medical advice.     Comments: [facility] stating patient was not admitted to [facility] health, patient was in a facility [facility], stating they would take care of all patient needs, would like a call back to discuss. [facility], [place] [phone], requesting a call back asap today. Fax [phone]    Please call an advise  Thank you

## 2020-01-27 NOTE — TELEPHONE ENCOUNTER
----- Message from Shanell Gould sent at 1/27/2020 10:33 AM CST -----  Type:  Needs Medical Advice    Who Called: Dereck ---- Zulma       Would the patient rather a call back or a response via [facility]? Call back     Best Call Back Number: [phone]    Additional Information: They would like to speak with the nurse about getting the pt home health &  orders.

## 2020-01-29 ENCOUNTER — ANTI-COAG VISIT (OUTPATIENT)
Dept: CARDIOLOGY | Facility: CLINIC | Age: 85
End: 2020-01-29
Payer: MEDICARE

## 2020-01-29 ENCOUNTER — TELEPHONE (OUTPATIENT)
Dept: INTERNAL MEDICINE | Facility: CLINIC | Age: 85
End: 2020-01-29

## 2020-01-29 ENCOUNTER — PATIENT MESSAGE (OUTPATIENT)
Dept: CARDIOLOGY | Facility: CLINIC | Age: 85
End: 2020-01-29

## 2020-01-29 DIAGNOSIS — Z79.01 LONG TERM CURRENT USE OF ANTICOAGULANT THERAPY: ICD-10-CM

## 2020-01-29 DIAGNOSIS — N39.0 URINARY TRACT INFECTION WITHOUT HEMATURIA, SITE UNSPECIFIED: Primary | ICD-10-CM

## 2020-01-29 DIAGNOSIS — I48.20 ATRIAL FIBRILLATION, CHRONIC: ICD-10-CM

## 2020-01-29 LAB — INR PPP: 2.2

## 2020-01-29 PROCEDURE — 93793 ANTICOAG MGMT PT WARFARIN: CPT | Mod: S$GLB,,,

## 2020-01-29 PROCEDURE — G0180 MD CERTIFICATION HHA PATIENT: HCPCS | Mod: ,,, | Performed by: INTERNAL MEDICINE

## 2020-01-29 PROCEDURE — 93793 PR ANTICOAGULANT MGMT FOR PT TAKING WARFARIN: ICD-10-PCS | Mod: S$GLB,,,

## 2020-01-29 PROCEDURE — G0180 PR HOME HEALTH MD CERTIFICATION: ICD-10-PCS | Mod: ,,, | Performed by: INTERNAL MEDICINE

## 2020-01-29 NOTE — TELEPHONE ENCOUNTER
Hi, please call and provide my verbal order. Here is a printed order if they need that as well.  Thank you, Alfie Oconnell    Orders Placed This Encounter    Urine culture    Urinalysis

## 2020-01-29 NOTE — PROGRESS NOTES
INR at goal. Medications and chart reviewed. No changes noted to necessitate adjustment of warfarin or follow-up plan. See calendar.    Confirmed INR came from [facility] and chart updated.

## 2020-01-30 LAB
INR PPP: 1.7
PROTHROMBIN TIME: 17.2 SEC (ref 9–11.5)

## 2020-01-31 ENCOUNTER — PATIENT MESSAGE (OUTPATIENT)
Dept: CARDIOLOGY | Facility: CLINIC | Age: 85
End: 2020-01-31

## 2020-01-31 ENCOUNTER — PATIENT MESSAGE (OUTPATIENT)
Dept: NEUROLOGY | Facility: CLINIC | Age: 85
End: 2020-01-31

## 2020-02-03 ENCOUNTER — PATIENT MESSAGE (OUTPATIENT)
Dept: NEUROLOGY | Facility: CLINIC | Age: 85
End: 2020-02-03

## 2020-02-03 DIAGNOSIS — F01.50 VASCULAR DEMENTIA WITHOUT BEHAVIORAL DISTURBANCE: Chronic | ICD-10-CM

## 2020-02-03 RX ORDER — DIGOXIN 125 MCG
0.12 TABLET ORAL DAILY
Qty: 30 TABLET | Refills: 11 | Status: SHIPPED | OUTPATIENT
Start: 2020-02-03 | End: 2021-02-05

## 2020-02-07 ENCOUNTER — PATIENT MESSAGE (OUTPATIENT)
Dept: INTERNAL MEDICINE | Facility: CLINIC | Age: 85
End: 2020-02-07

## 2020-02-07 ENCOUNTER — EXTERNAL HOME HEALTH (OUTPATIENT)
Dept: HOME HEALTH SERVICES | Facility: HOSPITAL | Age: 85
End: 2020-02-07
Payer: MEDICARE

## 2020-02-11 ENCOUNTER — PATIENT MESSAGE (OUTPATIENT)
Dept: CARDIOLOGY | Facility: CLINIC | Age: 85
End: 2020-02-11

## 2020-02-12 ENCOUNTER — ANTI-COAG VISIT (OUTPATIENT)
Dept: CARDIOLOGY | Facility: CLINIC | Age: 85
End: 2020-02-12
Payer: MEDICARE

## 2020-02-12 DIAGNOSIS — Z79.01 LONG TERM CURRENT USE OF ANTICOAGULANT THERAPY: ICD-10-CM

## 2020-02-12 DIAGNOSIS — I48.20 ATRIAL FIBRILLATION, CHRONIC: ICD-10-CM

## 2020-02-12 LAB — INR PPP: 2.4

## 2020-02-12 PROCEDURE — 93793 PR ANTICOAGULANT MGMT FOR PT TAKING WARFARIN: ICD-10-PCS | Mod: S$GLB,,,

## 2020-02-12 PROCEDURE — 93793 ANTICOAG MGMT PT WARFARIN: CPT | Mod: S$GLB,,,

## 2020-02-12 NOTE — PROGRESS NOTES
INR at goal. Medications and chart reviewed. No changes noted to necessitate adjustment of warfarin or follow-up plan. See calendar.    Noted son has requested start of Eliquis yet it has not been prescribed yet. Will ask him to notify us if/whe she plans to start Eliquis

## 2020-02-13 ENCOUNTER — EXTERNAL HOME HEALTH (OUTPATIENT)
Dept: HOME HEALTH SERVICES | Facility: HOSPITAL | Age: 85
End: 2020-02-13

## 2020-02-26 ENCOUNTER — ANTI-COAG VISIT (OUTPATIENT)
Dept: CARDIOLOGY | Facility: CLINIC | Age: 85
End: 2020-02-26

## 2020-02-26 ENCOUNTER — TELEPHONE (OUTPATIENT)
Dept: CARDIOLOGY | Facility: CLINIC | Age: 85
End: 2020-02-26

## 2020-02-26 DIAGNOSIS — I48.21 PERMANENT ATRIAL FIBRILLATION: Primary | ICD-10-CM

## 2020-02-26 DIAGNOSIS — Z79.01 LONG TERM CURRENT USE OF ANTICOAGULANT THERAPY: ICD-10-CM

## 2020-02-26 DIAGNOSIS — I48.20 ATRIAL FIBRILLATION, CHRONIC: ICD-10-CM

## 2020-02-26 NOTE — TELEPHONE ENCOUNTER
Son, Brain notified to stop Coumadin for 3 days before starting Eliquis. Coumadin clinic notified Coumadin has been discontinue.

## 2020-02-26 NOTE — TELEPHONE ENCOUNTER
----- Message from Sampson Kay MD sent at 2/26/2020 11:22 AM CST -----  Contact: Abdon Ruiz  Another order for eliquis 2.5 mg was sent. Remind her [son] to stop coumadin 3 days before starting eliquis and notify Coumadin clinic to [place] cou[place] clinic. PB  ----- Message -----  From: Makenna Tavreas RN  Sent: 2/26/2020  10:47 AM CST  To: Sampson Kay MD    See Below, Do you want patient to start Eliquis if so please order?  ----- Message -----  From: Tamara Porter  Sent: 2/26/2020   8:48 AM CST  To: Makenna Taveras RN    Pls call pt's son Abdon Ruiz at [phone].  He was under the impression that pt would be starting Eliquis but no rx has been called in to her pharmacy [facility] - [place], [place] - [address] [place] [address] [phone] (Phone)  [phone] (Fax)    Pt's son states that he has sent several [facility] messages and has never received a response to any of them.  She is a pt of [facility] 6/14/19.    Thank you

## 2020-04-23 ENCOUNTER — EXTERNAL HOME HEALTH (OUTPATIENT)
Dept: HOME HEALTH SERVICES | Facility: HOSPITAL | Age: 85
End: 2020-04-23
Payer: MEDICARE

## 2020-08-04 DIAGNOSIS — I10 ESSENTIAL HYPERTENSION: Primary | Chronic | ICD-10-CM

## 2020-08-04 DIAGNOSIS — E78.5 DYSLIPIDEMIA: Chronic | ICD-10-CM

## 2020-08-04 DIAGNOSIS — Z79.01 LONG TERM CURRENT USE OF ANTICOAGULANT THERAPY: ICD-10-CM

## 2020-08-04 DIAGNOSIS — D47.3 ESSENTIAL THROMBOCYTOSIS: ICD-10-CM

## 2020-08-27 ENCOUNTER — PATIENT OUTREACH (OUTPATIENT)
Dept: ADMINISTRATIVE | Facility: OTHER | Age: 85
End: 2020-08-27

## 2020-08-28 NOTE — PROGRESS NOTES
Requested updates within Care Everywhere.  Patient's chart was reviewed for overdue SAIDA topics.  Immunizations reconciled.    Orders placed:n/a  Tasked appts:n/a  Labs Linked:n/a

## 2021-01-11 DIAGNOSIS — I50.42 CHRONIC COMBINED SYSTOLIC (CONGESTIVE) AND DIASTOLIC (CONGESTIVE) HEART FAILURE: ICD-10-CM

## 2021-01-12 RX ORDER — LOSARTAN POTASSIUM 25 MG/1
TABLET ORAL
Qty: 90 TABLET | Refills: 0 | Status: SHIPPED | OUTPATIENT
Start: 2021-01-12 | End: 2021-04-16

## 2021-01-13 ENCOUNTER — PATIENT OUTREACH (OUTPATIENT)
Dept: ADMINISTRATIVE | Facility: HOSPITAL | Age: 86
End: 2021-01-13

## 2021-01-14 ENCOUNTER — PATIENT MESSAGE (OUTPATIENT)
Dept: INTERNAL MEDICINE | Facility: CLINIC | Age: 86
End: 2021-01-14

## 2021-02-05 ENCOUNTER — OFFICE VISIT (OUTPATIENT)
Dept: INTERNAL MEDICINE | Facility: CLINIC | Age: 86
End: 2021-02-05
Payer: MEDICARE

## 2021-02-05 ENCOUNTER — TELEPHONE (OUTPATIENT)
Dept: INTERNAL MEDICINE | Facility: CLINIC | Age: 86
End: 2021-02-05

## 2021-02-05 DIAGNOSIS — I48.20 ATRIAL FIBRILLATION, CHRONIC: ICD-10-CM

## 2021-02-05 DIAGNOSIS — F01.50 VASCULAR DEMENTIA WITHOUT BEHAVIORAL DISTURBANCE: Chronic | ICD-10-CM

## 2021-02-05 PROCEDURE — 99499 UNLISTED E&M SERVICE: CPT | Mod: 95,,, | Performed by: INTERNAL MEDICINE

## 2021-02-05 PROCEDURE — 99499 NO LOS: ICD-10-PCS | Mod: 95,,, | Performed by: INTERNAL MEDICINE

## 2021-02-05 RX ORDER — DIGOXIN 125 MCG
0.12 TABLET ORAL DAILY
Qty: 90 TABLET | Refills: 0 | Status: SHIPPED | OUTPATIENT
Start: 2021-02-05 | End: 2021-05-03

## 2021-02-10 ENCOUNTER — TELEPHONE (OUTPATIENT)
Dept: INTERNAL MEDICINE | Facility: CLINIC | Age: 86
End: 2021-02-10

## 2021-02-12 ENCOUNTER — TELEPHONE (OUTPATIENT)
Dept: INTERNAL MEDICINE | Facility: CLINIC | Age: 86
End: 2021-02-12

## 2021-02-12 DIAGNOSIS — I48.20 ATRIAL FIBRILLATION, CHRONIC: Chronic | ICD-10-CM

## 2021-02-12 RX ORDER — DONEPEZIL HYDROCHLORIDE 10 MG/1
10 TABLET, FILM COATED ORAL NIGHTLY
Qty: 90 TABLET | Refills: 0 | Status: SHIPPED | OUTPATIENT
Start: 2021-02-12 | End: 2021-10-20

## 2021-03-09 ENCOUNTER — OFFICE VISIT (OUTPATIENT)
Dept: INTERNAL MEDICINE | Facility: CLINIC | Age: 86
End: 2021-03-09
Payer: MEDICARE

## 2021-03-09 ENCOUNTER — LAB VISIT (OUTPATIENT)
Dept: LAB | Facility: HOSPITAL | Age: 86
End: 2021-03-09
Attending: INTERNAL MEDICINE
Payer: MEDICARE

## 2021-03-09 VITALS
WEIGHT: 122.38 LBS | SYSTOLIC BLOOD PRESSURE: 116 MMHG | HEART RATE: 65 BPM | BODY MASS INDEX: 20.89 KG/M2 | HEIGHT: 64 IN | OXYGEN SATURATION: 99 % | DIASTOLIC BLOOD PRESSURE: 68 MMHG

## 2021-03-09 DIAGNOSIS — I48.20 ATRIAL FIBRILLATION, CHRONIC: Primary | ICD-10-CM

## 2021-03-09 DIAGNOSIS — D47.3 ESSENTIAL THROMBOCYTOSIS: ICD-10-CM

## 2021-03-09 DIAGNOSIS — I50.42 CHRONIC COMBINED SYSTOLIC AND DIASTOLIC CHF, NYHA CLASS 3: ICD-10-CM

## 2021-03-09 DIAGNOSIS — F02.80 DEMENTIA IN OTHER DISEASES CLASSIFIED ELSEWHERE WITHOUT BEHAVIORAL DISTURBANCE: ICD-10-CM

## 2021-03-09 DIAGNOSIS — N18.30 STAGE 3 CHRONIC KIDNEY DISEASE, UNSPECIFIED WHETHER STAGE 3A OR 3B CKD: ICD-10-CM

## 2021-03-09 DIAGNOSIS — Z66 DNR (DO NOT RESUSCITATE): ICD-10-CM

## 2021-03-09 DIAGNOSIS — I48.20 ATRIAL FIBRILLATION, CHRONIC: ICD-10-CM

## 2021-03-09 DIAGNOSIS — G21.4 VASCULAR PARKINSONISM: ICD-10-CM

## 2021-03-09 DIAGNOSIS — I70.0 AORTIC ATHEROSCLEROSIS: ICD-10-CM

## 2021-03-09 LAB
BASOPHILS # BLD AUTO: 0.1 K/UL (ref 0–0.2)
BASOPHILS NFR BLD: 0.9 % (ref 0–1.9)
DIFFERENTIAL METHOD: ABNORMAL
DIGOXIN SERPL-MCNC: 1.3 NG/ML (ref 0.8–2)
EOSINOPHIL # BLD AUTO: 0.1 K/UL (ref 0–0.5)
EOSINOPHIL NFR BLD: 1 % (ref 0–8)
ERYTHROCYTE [DISTWIDTH] IN BLOOD BY AUTOMATED COUNT: 15.6 % (ref 11.5–14.5)
HCT VFR BLD AUTO: 37.4 % (ref 37–48.5)
HGB BLD-MCNC: 11.2 G/DL (ref 12–16)
IMM GRANULOCYTES # BLD AUTO: 0.04 K/UL (ref 0–0.04)
IMM GRANULOCYTES NFR BLD AUTO: 0.3 % (ref 0–0.5)
LYMPHOCYTES # BLD AUTO: 1.9 K/UL (ref 1–4.8)
LYMPHOCYTES NFR BLD: 16.2 % (ref 18–48)
MCH RBC QN AUTO: 30.8 PG (ref 27–31)
MCHC RBC AUTO-ENTMCNC: 29.9 G/DL (ref 32–36)
MCV RBC AUTO: 103 FL (ref 82–98)
MONOCYTES # BLD AUTO: 1.1 K/UL (ref 0.3–1)
MONOCYTES NFR BLD: 9.3 % (ref 4–15)
NEUTROPHILS # BLD AUTO: 8.4 K/UL (ref 1.8–7.7)
NEUTROPHILS NFR BLD: 72.3 % (ref 38–73)
NRBC BLD-RTO: 0 /100 WBC
PLATELET # BLD AUTO: 968 K/UL (ref 150–350)
PMV BLD AUTO: 10.8 FL (ref 9.2–12.9)
RBC # BLD AUTO: 3.64 M/UL (ref 4–5.4)
TSH SERPL DL<=0.005 MIU/L-ACNC: 2.13 UIU/ML (ref 0.4–4)
WBC # BLD AUTO: 11.65 K/UL (ref 3.9–12.7)

## 2021-03-09 PROCEDURE — 1126F PR PAIN SEVERITY QUANTIFIED, NO PAIN PRESENT: ICD-10-PCS | Mod: S$GLB,,, | Performed by: INTERNAL MEDICINE

## 2021-03-09 PROCEDURE — 80162 ASSAY OF DIGOXIN TOTAL: CPT | Performed by: INTERNAL MEDICINE

## 2021-03-09 PROCEDURE — 3288F FALL RISK ASSESSMENT DOCD: CPT | Mod: CPTII,S$GLB,, | Performed by: INTERNAL MEDICINE

## 2021-03-09 PROCEDURE — 85025 COMPLETE CBC W/AUTO DIFF WBC: CPT | Performed by: INTERNAL MEDICINE

## 2021-03-09 PROCEDURE — 3078F DIAST BP <80 MM HG: CPT | Mod: CPTII,S$GLB,, | Performed by: INTERNAL MEDICINE

## 2021-03-09 PROCEDURE — 99499 RISK ADDL DX/OHS AUDIT: ICD-10-PCS | Mod: S$GLB,,, | Performed by: INTERNAL MEDICINE

## 2021-03-09 PROCEDURE — 99215 PR OFFICE/OUTPT VISIT, EST, LEVL V, 40-54 MIN: ICD-10-PCS | Mod: S$GLB,,, | Performed by: INTERNAL MEDICINE

## 2021-03-09 PROCEDURE — 1159F PR MEDICATION LIST DOCUMENTED IN MEDICAL RECORD: ICD-10-PCS | Mod: S$GLB,,, | Performed by: INTERNAL MEDICINE

## 2021-03-09 PROCEDURE — 1101F PR PT FALLS ASSESS DOC 0-1 FALLS W/OUT INJ PAST YR: ICD-10-PCS | Mod: CPTII,S$GLB,, | Performed by: INTERNAL MEDICINE

## 2021-03-09 PROCEDURE — 3074F SYST BP LT 130 MM HG: CPT | Mod: CPTII,S$GLB,, | Performed by: INTERNAL MEDICINE

## 2021-03-09 PROCEDURE — 3074F PR MOST RECENT SYSTOLIC BLOOD PRESSURE < 130 MM HG: ICD-10-PCS | Mod: CPTII,S$GLB,, | Performed by: INTERNAL MEDICINE

## 2021-03-09 PROCEDURE — 3288F PR FALLS RISK ASSESSMENT DOCUMENTED: ICD-10-PCS | Mod: CPTII,S$GLB,, | Performed by: INTERNAL MEDICINE

## 2021-03-09 PROCEDURE — 1126F AMNT PAIN NOTED NONE PRSNT: CPT | Mod: S$GLB,,, | Performed by: INTERNAL MEDICINE

## 2021-03-09 PROCEDURE — 1101F PT FALLS ASSESS-DOCD LE1/YR: CPT | Mod: CPTII,S$GLB,, | Performed by: INTERNAL MEDICINE

## 2021-03-09 PROCEDURE — 99215 OFFICE O/P EST HI 40 MIN: CPT | Mod: S$GLB,,, | Performed by: INTERNAL MEDICINE

## 2021-03-09 PROCEDURE — 99499 UNLISTED E&M SERVICE: CPT | Mod: S$GLB,,, | Performed by: INTERNAL MEDICINE

## 2021-03-09 PROCEDURE — 1159F MED LIST DOCD IN RCRD: CPT | Mod: S$GLB,,, | Performed by: INTERNAL MEDICINE

## 2021-03-09 PROCEDURE — 3078F PR MOST RECENT DIASTOLIC BLOOD PRESSURE < 80 MM HG: ICD-10-PCS | Mod: CPTII,S$GLB,, | Performed by: INTERNAL MEDICINE

## 2021-03-09 PROCEDURE — 99999 PR PBB SHADOW E&M-EST. PATIENT-LVL IV: CPT | Mod: PBBFAC,,, | Performed by: INTERNAL MEDICINE

## 2021-03-09 PROCEDURE — 99999 PR PBB SHADOW E&M-EST. PATIENT-LVL IV: ICD-10-PCS | Mod: PBBFAC,,, | Performed by: INTERNAL MEDICINE

## 2021-03-09 PROCEDURE — 36415 COLL VENOUS BLD VENIPUNCTURE: CPT | Performed by: INTERNAL MEDICINE

## 2021-03-09 PROCEDURE — 84443 ASSAY THYROID STIM HORMONE: CPT | Performed by: INTERNAL MEDICINE

## 2021-03-10 ENCOUNTER — TELEPHONE (OUTPATIENT)
Dept: INTERNAL MEDICINE | Facility: CLINIC | Age: 86
End: 2021-03-10

## 2021-03-12 ENCOUNTER — PATIENT MESSAGE (OUTPATIENT)
Dept: INTERNAL MEDICINE | Facility: CLINIC | Age: 86
End: 2021-03-12

## 2021-03-12 DIAGNOSIS — Z66 DNR (DO NOT RESUSCITATE): Primary | ICD-10-CM

## 2021-05-07 ENCOUNTER — TELEPHONE (OUTPATIENT)
Dept: INTERNAL MEDICINE | Facility: CLINIC | Age: 86
End: 2021-05-07

## 2021-05-30 NOTE — TELEPHONE ENCOUNTER
----- Message from Nuris Ramirez sent at 1/29/2020  1:34 PM CST -----  Contact: Natacha/ [facility] [phone]  Admitted to [facility] today. Patient is having frequent urination. Requesting orders for a UA.    Please call and advise.    Thank You     Controlled Substance Refill Request  Medication:   Requested Prescriptions     Pending Prescriptions Disp Refills     ALPRAZolam (XANAX) 0.5 MG tablet [Pharmacy Med Name: ALPRAZOLAM 0.5MG TABLETS] 30 tablet 0     Sig: TAKE 1 TABLET BY MOUTH AS NEEDED SLEEP OR ANXIETY     Date Last Fill: 4/30/19  Pharmacy: [id number]   Submit electronically to pharmacy  Controlled Substance Agreement on File:   Encounter-Level CSA Scan Date - 12/21/2016:    Scan on 12/21/2016 (below)             Encounter-Level CSA Scan Date - 02/18/2016:    Scan on 2/18/2016: [facility] (below)         Last office visit: Last office visit pertaining to requested medication was 4/30/19.

## 2021-06-30 DIAGNOSIS — I48.21 PERMANENT ATRIAL FIBRILLATION: ICD-10-CM

## 2021-07-01 ENCOUNTER — PATIENT MESSAGE (OUTPATIENT)
Dept: INTERNAL MEDICINE | Facility: CLINIC | Age: 86
End: 2021-07-01

## 2021-07-02 ENCOUNTER — TELEPHONE (OUTPATIENT)
Dept: INTERNAL MEDICINE | Facility: CLINIC | Age: 86
End: 2021-07-02

## 2021-07-05 RX ORDER — BENZONATATE 200 MG/1
200 CAPSULE ORAL 3 TIMES DAILY PRN
Qty: 20 CAPSULE | Refills: 0 | Status: SHIPPED | OUTPATIENT
Start: 2021-07-05 | End: 2021-07-15

## 2021-11-04 ENCOUNTER — PES CALL (OUTPATIENT)
Dept: ADMINISTRATIVE | Facility: CLINIC | Age: 86
End: 2021-11-04
Payer: MEDICARE

## 2022-01-18 ENCOUNTER — PES CALL (OUTPATIENT)
Dept: ADMINISTRATIVE | Facility: CLINIC | Age: 87
End: 2022-01-18
Payer: MEDICARE

## 2022-02-01 ENCOUNTER — TELEPHONE (OUTPATIENT)
Dept: INTERNAL MEDICINE | Facility: CLINIC | Age: 87
End: 2022-02-01
Payer: MEDICARE

## 2022-02-01 NOTE — TELEPHONE ENCOUNTER
----- Message from Sheila Ingram sent at 1/31/2022  2:00 PM CST -----  Contact: [facility] [phone]  [facility] with assisted living home is calling for the pt she has been lethargic and runny nose and vitals are  normal she is a bit shaky she does have a slight cough please advise and guve return call if anything is to be called in [facility] and also if they need to do a covid test they do have on site for that as well    Fax [phone]

## 2022-02-01 NOTE — TELEPHONE ENCOUNTER
----- Message from Sheila Ingram sent at 1/31/2022  2:00 PM CST -----  Contact: [facility] [phone]  [facility] with assisted living home is calling for the pt she has been lethargic and runny nose and vitals are  normal she is a bit shaky she does have a slight cough please advise and guve return call if anything is to be called in [facility] and also if they need to do a covid test they do have on site for that as well    Fax [phone]

## 2022-02-02 ENCOUNTER — TELEPHONE (OUTPATIENT)
Dept: INTERNAL MEDICINE | Facility: CLINIC | Age: 87
End: 2022-02-02
Payer: MEDICARE

## 2022-02-02 RX ORDER — BENZONATATE 200 MG/1
200 CAPSULE ORAL 3 TIMES DAILY PRN
Qty: 20 CAPSULE | Refills: 0 | Status: SHIPPED | OUTPATIENT
Start: 2022-02-02 | End: 2022-02-12

## 2022-02-02 NOTE — TELEPHONE ENCOUNTER
----- Message from Sheila Ingram sent at 1/31/2022  2:00 PM CST -----  Contact: [facility] [phone]  [facility] with assisted living home is calling for the pt she has been lethargic and runny nose and vitals are  normal she is a bit shaky she does have a slight cough please advise and gucharley return call if anything is to be called in [facility] and also if they need to do a covid test they do have on site for that as well     Fax [phone]  Please advise

## 2022-02-02 NOTE — TELEPHONE ENCOUNTER
----- Message from Caitlin Newman sent at 2/1/2022 10:31 AM CST -----  Contact: [facility]/ [facility]/ [phone]  Patient is returning a phone call.  Who left a message for the patient: Nilsa  Does patient know what this is regarding:    Would you like a call back, or a response through your [facility] portal?:  call back  Comments:[facility]/ [facility]/ [phone]

## 2022-02-02 NOTE — TELEPHONE ENCOUNTER
Hi, I have sent this in to her pharmacy:  Orders Placed This Encounter    benzonatate (TESSALON) 200 MG capsule     Let me know if nurse has any further questions.  Thank you, Alfie Oconnell

## 2022-02-02 NOTE — TELEPHONE ENCOUNTER
Can we order some cough medication for the cough and something for the runny nose? OTC is fine, per [email or web address].

## 2022-02-02 NOTE — TELEPHONE ENCOUNTER
Hi, the assisted living should definitely do a covid test and assume she has covid at this time.  For now I do not have any medicines to recommend accept over the counter:  Tylenol or acetaminophen 500-1000mg every 6-8 hours as needed for pain/fevers/chills    Let me know if staff has any questions.  Thank you, Alfie Oconnell

## 2022-02-09 ENCOUNTER — PATIENT MESSAGE (OUTPATIENT)
Dept: INTERNAL MEDICINE | Facility: CLINIC | Age: 87
End: 2022-02-09
Payer: MEDICARE

## 2022-02-17 ENCOUNTER — TELEPHONE (OUTPATIENT)
Dept: INTERNAL MEDICINE | Facility: CLINIC | Age: 87
End: 2022-02-17
Payer: MEDICARE

## 2022-02-17 DIAGNOSIS — I50.42 CHRONIC COMBINED SYSTOLIC AND DIASTOLIC CHF, NYHA CLASS 3: ICD-10-CM

## 2022-02-17 DIAGNOSIS — G21.4 VASCULAR PARKINSONISM: Primary | ICD-10-CM

## 2022-02-17 DIAGNOSIS — F02.80 DEMENTIA IN OTHER DISEASES CLASSIFIED ELSEWHERE WITHOUT BEHAVIORAL DISTURBANCE: ICD-10-CM

## 2022-02-17 NOTE — TELEPHONE ENCOUNTER
----- Message from Janet Navarrete sent at 2/17/2022 12:38 PM CST -----  Contact: [facility]/[phone]  Type:Home Health(orders,updates,clarifications,etc)    Home Health Agency/Nurse:[facility]    Phone number: [phone]    Reason for call:    Comments: [facility] called, requesting pt, ot and sp order for cognitive evaluation and swallow study.  Fax # [phone]. Thank you

## 2022-02-18 ENCOUNTER — PATIENT MESSAGE (OUTPATIENT)
Dept: INTERNAL MEDICINE | Facility: CLINIC | Age: 87
End: 2022-02-18
Payer: MEDICARE

## 2022-03-16 ENCOUNTER — PATIENT MESSAGE (OUTPATIENT)
Dept: ADMINISTRATIVE | Facility: HOSPITAL | Age: 87
End: 2022-03-16
Payer: MEDICARE

## 2022-03-17 NOTE — TELEPHONE ENCOUNTER
No new care gaps identified.  Powered by [employer]. Reference number: [id number].   3/17/2022 5:08:24 PM CDT

## 2022-03-17 NOTE — TELEPHONE ENCOUNTER
----- Message from Sheila Ingram sent at 3/16/2022 10:51 AM CDT -----  Contact: [place] [phone]  [place] with [facility] is calling to check on the fax they sent over for the pt prescription for metoprolol succinate (TOPROL-XL) 25 MG 24 hr tablet  please advise and give return call

## 2022-03-18 RX ORDER — METOPROLOL SUCCINATE 25 MG/1
12.5 TABLET, EXTENDED RELEASE ORAL DAILY
Qty: 90 TABLET | Refills: 0 | Status: SHIPPED | OUTPATIENT
Start: 2022-03-18 | End: 2023-04-18 | Stop reason: SDUPTHER

## 2022-04-19 ENCOUNTER — PATIENT OUTREACH (OUTPATIENT)
Dept: ADMINISTRATIVE | Facility: HOSPITAL | Age: 87
End: 2022-04-19
Payer: MEDICARE

## 2022-04-19 NOTE — PROGRESS NOTES
Health Maintenance Due   Topic Date Due    TETANUS VACCINE  Never done    Shingles Vaccine (1 of 2) 09/06/2016    Pneumococcal Vaccines (Age 65+) (3 - PPSV23 or PCV20) 11/11/2019    DEXA Scan  03/12/2020    Lipid Panel  02/07/2022    COVID-19 Vaccine (3 - Booster for Moderna series) 04/10/2022     Triggered LINKS & reconciled immunizations. Updated Care Everywhere. Chart review completed as part of PHN attestation.

## 2022-04-20 DIAGNOSIS — I50.42 CHRONIC COMBINED SYSTOLIC (CONGESTIVE) AND DIASTOLIC (CONGESTIVE) HEART FAILURE: ICD-10-CM

## 2022-04-20 DIAGNOSIS — I48.20 ATRIAL FIBRILLATION, CHRONIC: Chronic | ICD-10-CM

## 2022-04-20 NOTE — TELEPHONE ENCOUNTER
No new care gaps identified.  Powered by [employer]. Reference number: [id number].   4/20/2022 8:05:38 AM CDT

## 2022-04-20 NOTE — TELEPHONE ENCOUNTER
Refill Routing Note   Medication(s) are not appropriate for processing by [facility] for the following reason(s):      - Outside of protocol  - Required laboratory values are outdated    [facility] action(s):  Defer Medication-related problems identified: Requires labs     Medication Therapy Plan: Defer losartan and atorvastatin b/c outdated labs. Defer donepezil b/c OOS.  Medication reconciliation completed: No     Appointments  past 12m or future 3m with PCP    Date Provider   Last Visit   3/9/2021 Alfie Oconnell MD   Next Visit   4/21/2022 Alfie Oconnell MD   ED visits in past 90 days: 0        Note composed:10:41 AM 04/20/2022

## 2022-04-21 ENCOUNTER — OFFICE VISIT (OUTPATIENT)
Dept: INTERNAL MEDICINE | Facility: CLINIC | Age: 87
End: 2022-04-21
Payer: MEDICARE

## 2022-04-21 ENCOUNTER — LAB VISIT (OUTPATIENT)
Dept: LAB | Facility: HOSPITAL | Age: 87
End: 2022-04-21
Attending: INTERNAL MEDICINE
Payer: MEDICARE

## 2022-04-21 ENCOUNTER — PATIENT MESSAGE (OUTPATIENT)
Dept: INTERNAL MEDICINE | Facility: CLINIC | Age: 87
End: 2022-04-21

## 2022-04-21 ENCOUNTER — TELEPHONE (OUTPATIENT)
Dept: INTERNAL MEDICINE | Facility: CLINIC | Age: 87
End: 2022-04-21

## 2022-04-21 VITALS
HEIGHT: 64 IN | OXYGEN SATURATION: 99 % | BODY MASS INDEX: 20.25 KG/M2 | DIASTOLIC BLOOD PRESSURE: 58 MMHG | WEIGHT: 118.63 LBS | SYSTOLIC BLOOD PRESSURE: 94 MMHG | HEART RATE: 53 BPM | RESPIRATION RATE: 18 BRPM

## 2022-04-21 DIAGNOSIS — I50.42 CHRONIC COMBINED SYSTOLIC (CONGESTIVE) AND DIASTOLIC (CONGESTIVE) HEART FAILURE: ICD-10-CM

## 2022-04-21 DIAGNOSIS — I50.42 CHRONIC COMBINED SYSTOLIC AND DIASTOLIC CHF, NYHA CLASS 3: ICD-10-CM

## 2022-04-21 DIAGNOSIS — F02.80 DEMENTIA IN OTHER DISEASES CLASSIFIED ELSEWHERE WITHOUT BEHAVIORAL DISTURBANCE: ICD-10-CM

## 2022-04-21 DIAGNOSIS — I48.20 ATRIAL FIBRILLATION, CHRONIC: ICD-10-CM

## 2022-04-21 DIAGNOSIS — R90.89 OTHER ABNORMAL FINDINGS ON DIAGNOSTIC IMAGING OF CENTRAL NERVOUS SYSTEM: ICD-10-CM

## 2022-04-21 DIAGNOSIS — G21.4 VASCULAR PARKINSONISM: ICD-10-CM

## 2022-04-21 DIAGNOSIS — G21.4 VASCULAR PARKINSONISM: Primary | ICD-10-CM

## 2022-04-21 DIAGNOSIS — I73.9 PERIPHERAL VASCULAR DISEASE, UNSPECIFIED: ICD-10-CM

## 2022-04-21 DIAGNOSIS — D47.3 ESSENTIAL THROMBOCYTOSIS: ICD-10-CM

## 2022-04-21 DIAGNOSIS — N18.30 STAGE 3 CHRONIC KIDNEY DISEASE, UNSPECIFIED WHETHER STAGE 3A OR 3B CKD: ICD-10-CM

## 2022-04-21 LAB
ALBUMIN SERPL BCP-MCNC: 3.7 G/DL (ref 3.5–5.2)
ALP SERPL-CCNC: 63 U/L (ref 55–135)
ALT SERPL W/O P-5'-P-CCNC: 14 U/L (ref 10–44)
ANION GAP SERPL CALC-SCNC: 13 MMOL/L (ref 8–16)
ANISOCYTOSIS BLD QL SMEAR: ABNORMAL
AST SERPL-CCNC: 26 U/L (ref 10–40)
BASOPHILS # BLD AUTO: 0.13 K/UL (ref 0–0.2)
BASOPHILS NFR BLD: 1 % (ref 0–1.9)
BILIRUB SERPL-MCNC: 0.4 MG/DL (ref 0.1–1)
BUN SERPL-MCNC: 16 MG/DL (ref 8–23)
CALCIUM SERPL-MCNC: 9.3 MG/DL (ref 8.7–10.5)
CHLORIDE SERPL-SCNC: 110 MMOL/L (ref 95–110)
CHOLEST SERPL-MCNC: 157 MG/DL (ref 120–199)
CHOLEST/HDLC SERPL: 3.3 {RATIO} (ref 2–5)
CO2 SERPL-SCNC: 21 MMOL/L (ref 23–29)
CREAT SERPL-MCNC: 1 MG/DL (ref 0.5–1.4)
DIFFERENTIAL METHOD: ABNORMAL
DIGOXIN SERPL-MCNC: 1.8 NG/ML (ref 0.8–2)
EOSINOPHIL # BLD AUTO: 0.2 K/UL (ref 0–0.5)
EOSINOPHIL NFR BLD: 1.8 % (ref 0–8)
ERYTHROCYTE [DISTWIDTH] IN BLOOD BY AUTOMATED COUNT: 15.2 % (ref 11.5–14.5)
EST. GFR  (AFRICAN AMERICAN): 59.8 ML/MIN/1.73 M^2
EST. GFR  (NON AFRICAN AMERICAN): 51.9 ML/MIN/1.73 M^2
GLUCOSE SERPL-MCNC: 75 MG/DL (ref 70–110)
HCT VFR BLD AUTO: 38.6 % (ref 37–48.5)
HDLC SERPL-MCNC: 47 MG/DL (ref 40–75)
HDLC SERPL: 29.9 % (ref 20–50)
HGB BLD-MCNC: 11.4 G/DL (ref 12–16)
IMM GRANULOCYTES # BLD AUTO: 0.08 K/UL (ref 0–0.04)
IMM GRANULOCYTES NFR BLD AUTO: 0.6 % (ref 0–0.5)
LDLC SERPL CALC-MCNC: 83.4 MG/DL (ref 63–159)
LYMPHOCYTES # BLD AUTO: 2.9 K/UL (ref 1–4.8)
LYMPHOCYTES NFR BLD: 21.7 % (ref 18–48)
MCH RBC QN AUTO: 30.4 PG (ref 27–31)
MCHC RBC AUTO-ENTMCNC: 29.5 G/DL (ref 32–36)
MCV RBC AUTO: 103 FL (ref 82–98)
MONOCYTES # BLD AUTO: 1.3 K/UL (ref 0.3–1)
MONOCYTES NFR BLD: 10.1 % (ref 4–15)
NEUTROPHILS # BLD AUTO: 8.5 K/UL (ref 1.8–7.7)
NEUTROPHILS NFR BLD: 64.8 % (ref 38–73)
NONHDLC SERPL-MCNC: 110 MG/DL
NRBC BLD-RTO: 0 /100 WBC
OVALOCYTES BLD QL SMEAR: ABNORMAL
PLATELET # BLD AUTO: 1433 K/UL (ref 150–450)
PLATELET BLD QL SMEAR: ABNORMAL
PMV BLD AUTO: 9.9 FL (ref 9.2–12.9)
POIKILOCYTOSIS BLD QL SMEAR: SLIGHT
POLYCHROMASIA BLD QL SMEAR: ABNORMAL
POTASSIUM SERPL-SCNC: 5.2 MMOL/L (ref 3.5–5.1)
PROT SERPL-MCNC: 6.5 G/DL (ref 6–8.4)
RBC # BLD AUTO: 3.75 M/UL (ref 4–5.4)
SODIUM SERPL-SCNC: 144 MMOL/L (ref 136–145)
T4 FREE SERPL-MCNC: 0.9 NG/DL (ref 0.71–1.51)
TRIGL SERPL-MCNC: 133 MG/DL (ref 30–150)
TSH SERPL DL<=0.005 MIU/L-ACNC: 4.48 UIU/ML (ref 0.4–4)
WBC # BLD AUTO: 13.12 K/UL (ref 3.9–12.7)

## 2022-04-21 PROCEDURE — 1158F ADVNC CARE PLAN TLK DOCD: CPT | Mod: CPTII,S$GLB,, | Performed by: INTERNAL MEDICINE

## 2022-04-21 PROCEDURE — 3078F DIAST BP <80 MM HG: CPT | Mod: CPTII,S$GLB,, | Performed by: INTERNAL MEDICINE

## 2022-04-21 PROCEDURE — 85025 COMPLETE CBC W/AUTO DIFF WBC: CPT | Performed by: INTERNAL MEDICINE

## 2022-04-21 PROCEDURE — 1126F PR PAIN SEVERITY QUANTIFIED, NO PAIN PRESENT: ICD-10-PCS | Mod: CPTII,S$GLB,, | Performed by: INTERNAL MEDICINE

## 2022-04-21 PROCEDURE — 36415 COLL VENOUS BLD VENIPUNCTURE: CPT | Performed by: INTERNAL MEDICINE

## 2022-04-21 PROCEDURE — 1158F PR ADVANCE CARE PLANNING DISCUSS DOCUMENTED IN MEDICAL RECORD: ICD-10-PCS | Mod: CPTII,S$GLB,, | Performed by: INTERNAL MEDICINE

## 2022-04-21 PROCEDURE — 84443 ASSAY THYROID STIM HORMONE: CPT | Performed by: INTERNAL MEDICINE

## 2022-04-21 PROCEDURE — 3074F PR MOST RECENT SYSTOLIC BLOOD PRESSURE < 130 MM HG: ICD-10-PCS | Mod: CPTII,S$GLB,, | Performed by: INTERNAL MEDICINE

## 2022-04-21 PROCEDURE — 1159F PR MEDICATION LIST DOCUMENTED IN MEDICAL RECORD: ICD-10-PCS | Mod: CPTII,S$GLB,, | Performed by: INTERNAL MEDICINE

## 2022-04-21 PROCEDURE — 3074F SYST BP LT 130 MM HG: CPT | Mod: CPTII,S$GLB,, | Performed by: INTERNAL MEDICINE

## 2022-04-21 PROCEDURE — 80061 LIPID PANEL: CPT | Performed by: INTERNAL MEDICINE

## 2022-04-21 PROCEDURE — 80162 ASSAY OF DIGOXIN TOTAL: CPT | Performed by: INTERNAL MEDICINE

## 2022-04-21 PROCEDURE — 1126F AMNT PAIN NOTED NONE PRSNT: CPT | Mod: CPTII,S$GLB,, | Performed by: INTERNAL MEDICINE

## 2022-04-21 PROCEDURE — 1160F RVW MEDS BY RX/DR IN RCRD: CPT | Mod: CPTII,S$GLB,, | Performed by: INTERNAL MEDICINE

## 2022-04-21 PROCEDURE — 84439 ASSAY OF FREE THYROXINE: CPT | Performed by: INTERNAL MEDICINE

## 2022-04-21 PROCEDURE — 99999 PR PBB SHADOW E&M-EST. PATIENT-LVL V: CPT | Mod: PBBFAC,,, | Performed by: INTERNAL MEDICINE

## 2022-04-21 PROCEDURE — 99499 RISK ADDL DX/OHS AUDIT: ICD-10-PCS | Mod: S$GLB,,, | Performed by: INTERNAL MEDICINE

## 2022-04-21 PROCEDURE — 80053 COMPREHEN METABOLIC PANEL: CPT | Performed by: INTERNAL MEDICINE

## 2022-04-21 PROCEDURE — 99999 PR PBB SHADOW E&M-EST. PATIENT-LVL V: ICD-10-PCS | Mod: PBBFAC,,, | Performed by: INTERNAL MEDICINE

## 2022-04-21 PROCEDURE — 99214 PR OFFICE/OUTPT VISIT, EST, LEVL IV, 30-39 MIN: ICD-10-PCS | Mod: S$GLB,,, | Performed by: INTERNAL MEDICINE

## 2022-04-21 PROCEDURE — 1159F MED LIST DOCD IN RCRD: CPT | Mod: CPTII,S$GLB,, | Performed by: INTERNAL MEDICINE

## 2022-04-21 PROCEDURE — 99214 OFFICE O/P EST MOD 30 MIN: CPT | Mod: S$GLB,,, | Performed by: INTERNAL MEDICINE

## 2022-04-21 PROCEDURE — 3078F PR MOST RECENT DIASTOLIC BLOOD PRESSURE < 80 MM HG: ICD-10-PCS | Mod: CPTII,S$GLB,, | Performed by: INTERNAL MEDICINE

## 2022-04-21 PROCEDURE — 1160F PR REVIEW ALL MEDS BY PRESCRIBER/CLIN PHARMACIST DOCUMENTED: ICD-10-PCS | Mod: CPTII,S$GLB,, | Performed by: INTERNAL MEDICINE

## 2022-04-21 PROCEDURE — 99499 UNLISTED E&M SERVICE: CPT | Mod: S$GLB,,, | Performed by: INTERNAL MEDICINE

## 2022-04-21 RX ORDER — LOSARTAN POTASSIUM 25 MG/1
12.5 TABLET ORAL DAILY
Qty: 90 TABLET | Refills: 11 | Status: SHIPPED | OUTPATIENT
Start: 2022-04-21 | End: 2022-07-29

## 2022-04-21 RX ORDER — LOSARTAN POTASSIUM 25 MG/1
TABLET ORAL
Qty: 90 TABLET | Refills: 0 | Status: SHIPPED | OUTPATIENT
Start: 2022-04-21 | End: 2022-04-21

## 2022-04-21 RX ORDER — DONEPEZIL HYDROCHLORIDE 10 MG/1
TABLET, FILM COATED ORAL
Qty: 90 TABLET | Refills: 0 | Status: SHIPPED | OUTPATIENT
Start: 2022-04-21 | End: 2022-08-15

## 2022-04-21 RX ORDER — ATORVASTATIN CALCIUM 10 MG/1
TABLET, FILM COATED ORAL
Qty: 90 TABLET | Refills: 0 | Status: SHIPPED | OUTPATIENT
Start: 2022-04-21 | End: 2022-09-06

## 2022-04-21 NOTE — PROGRESS NOTES
Subjective:       Patient ID: Cat Garcia is a 86 y.o. female.    Chief Complaint: Annual Exam    Patient is here for followup for chronic conditions.    [facility], she was losing some wt, which has stabilized on soft diet.    Has been walking with walker at [facility], no report of falls.    Has care for urinary incont, has a service that helps treat her.    As confirmed previous she is here with her son Dr Ruiz her HCP, and she carried a DNR/I code status.    She has had worsened dementia -- decrease in weight and swallowing issues and also less interactive, will default to closing eyes unless she is super engaged like in bingo playing.    Review of Systems   Constitutional: Positive for fatigue. Negative for activity change, appetite change, fever and unexpected weight change.   HENT: Negative for trouble swallowing.    Eyes: Negative for visual disturbance.   Respiratory: Positive for shortness of breath (stable). Negative for chest tightness and wheezing.    Cardiovascular: Negative for chest pain, palpitations and leg swelling.   Gastrointestinal: Negative for abdominal distention, abdominal pain and constipation.   Endocrine: Negative for cold intolerance, heat intolerance, polydipsia and polyuria.   Genitourinary: Negative for difficulty urinating.   Musculoskeletal: Positive for gait problem.   Skin: Negative for rash.   Neurological: Negative for tremors and syncope.        Chronic imbalance       Hematological: Negative for adenopathy. Does not bruise/bleed easily.   Psychiatric/Behavioral: Positive for confusion. Negative for dysphoric mood.           Objective:      Physical Exam  Constitutional:       General: She is not in acute distress.     Appearance: Normal appearance. She is well-developed. She is not ill-appearing, toxic-appearing or diaphoretic.      Comments: Son speaks for her in general due to her dementia.  Has eyes closed unless I engage her directly    HENT:      Head: Normocephalic and atraumatic.      Mouth/Throat:      Pharynx: No oropharyngeal exudate.   Eyes:      General: No scleral icterus.     Conjunctiva/sclera: Conjunctivae normal.      Pupils: Pupils are equal, round, and reactive to light.   Neck:      Thyroid: No thyromegaly.      Comments: No neck mass palpated today  Cardiovascular:      Rate and Rhythm: Normal rate.      Heart sounds: Normal heart sounds. No murmur heard.    No friction rub. No gallop.      Comments: Irregularly irregular      Pulmonary:      Effort: Pulmonary effort is normal. No respiratory distress.      Breath sounds: Normal breath sounds. No wheezing or rales.   Abdominal:      General: Bowel sounds are normal. There is no distension.      Palpations: Abdomen is soft. There is no mass.      Tenderness: There is no abdominal tenderness. There is no guarding or rebound.   Musculoskeletal:         General: No tenderness. Normal range of motion.      Cervical back: Normal range of motion and neck supple.   Lymphadenopathy:      Cervical: No cervical adenopathy.   Skin:     Comments: No obvious skin rash or wound seen   Neurological:      General: No focal deficit present.      Mental Status: She is alert. She is disoriented.      Motor: No abnormal muscle tone.      Comments: Answers questions, when I engage her directly   Psychiatric:         Speech: Speech normal.         Behavior: Behavior normal.      Comments: Calm today         Assessment:       1. Vascular parkinsonism    2. Chronic combined systolic and diastolic CHF, NYHA class 3    3. Dementia in other diseases classified elsewhere without behavioral disturbance    4. Essential thrombocytosis    5. Atrial fibrillation, chronic    6. Chronic combined systolic (congestive) and diastolic (congestive) heart failure    7. Other abnormal findings on diagnostic imaging of central nervous system     8. Peripheral vascular disease, unspecified    9. Stage 3 chronic kidney  "disease, unspecified whether stage 3a or 3b CKD        Plan:       Cat was seen today for annual exam.    Diagnoses and all orders for this visit:    Vascular parkinsonism  -     CBC Auto Differential; Future  -     Comprehensive Metabolic Panel; Future  -     TSH; Future    Chronic combined systolic and diastolic CHF, NYHA class 3  -     Lipid Panel; Future    Dementia in other diseases classified elsewhere without behavioral disturbance  Worsening, but doing OK in [facility]    Essential thrombocytosis  recheck    Atrial fibrillation, chronic  -     Lipid Panel; Future  -     Digoxin Level; Future    Chronic combined systolic (congestive) and diastolic (congestive) heart failure  -     Digoxin Level; Future  -     losartan (COZAAR) 25 MG tablet; Take 0.5 tablets (12.5 mg total) by mouth once daily.  Has been euvolemic despite very low LVEF  Will lower dose losartan since pressure low, here are monthly checks at [facility] also --   "The monthly readings are:  Apr: 100/58;  Mar: 120/68;  Feb: 110/68;  Martin: 100/60;  Dec: 102/66;  Nov: 114/64.  Please let me know if you need anything else.  Thanks, Abdon"      Other abnormal findings on diagnostic imaging of central nervous system   -     TSH; Future    Peripheral vascular disease, unspecified  On statin    Stage 3 chronic kidney disease, unspecified whether stage 3a or 3b CKD  Recheck today      Health Maintenance       Date Due Completion Date    TETANUS VACCINE Never done ---    Shingles Vaccine (1 of 2) 09/06/2016 7/12/2016    Pneumococcal Vaccines (Age 65+) (3 - PPSV23 or PCV20) 11/11/2019 12/18/2015    DEXA Scan 03/12/2020 3/12/2018    Lipid Panel 02/07/2022 2/7/2017    COVID-19 Vaccine (3 - Booster for Moderna series) 04/10/2022 11/10/2021      [facility] covid vax    Follow up in about 6 months (around 10/21/2022).    Future Appointments   Date Time Provider Department Center   10/24/2022 11:40 AM Alfie Oconnell MD [facility] [place] [facility]                "

## 2022-04-22 ENCOUNTER — PATIENT MESSAGE (OUTPATIENT)
Dept: HEMATOLOGY/ONCOLOGY | Facility: CLINIC | Age: 87
End: 2022-04-22
Payer: MEDICARE

## 2022-04-22 DIAGNOSIS — D47.3 ESSENTIAL THROMBOCYTOSIS: Primary | ICD-10-CM

## 2022-04-22 RX ORDER — HYDROXYUREA 500 MG/1
1000 CAPSULE ORAL DAILY
Qty: 60 CAPSULE | Refills: 11 | Status: SHIPPED | OUTPATIENT
Start: 2022-04-22 | End: 2023-06-06

## 2022-04-22 NOTE — PROGRESS NOTES
Hematology and Medical Oncology   Follow Up     04/25/2022    Primary Oncologic Diagnosis: Essential Thrombocytosis    Telemedicine Documentation:  The patient location is: assisted living facility  The chief complaint leading to consultation is: ET    Visit type: audiovisual    Face to Face time with patient: 25  40 minutes of total time spent on the encounter, which includes face to face time and non-face to face time preparing to see the patient (eg, review of tests), Obtaining and/or reviewing separately obtained history, Documenting clinical information in the electronic or other health record, Independently interpreting results (not separately reported) and communicating results to the patient/family/caregiver, or Care coordination (not separately reported).         Each patient to whom he or she provides medical services by telemedicine is:  (1) informed of the relationship between the physician and patient and the respective role of any other health care provider with respect to management of the patient; and (2) notified that he or she may decline to receive medical services by telemedicine and may withdraw from such care at any time.      History of Present Ilness:   Cat Quintero) is a pleasant 87 y.o.female with a past medical history of a.fib, breast cancer, CKD stage III, and dementia who presents today to discuss her elevated platelet count. She was unsure of why she was in the hematology office.    On chart review Mrs. Garcia's platelets were in the mid 300's range from 4195-3700. In February 2017 her platelets crossed the 400k ismael and have been slowly rising to > 500k in January 2018.    She specifically denies blurry vision, palmar erythema, or hepatosplenomegaly known side effects of elevated platlet count. While she expressers no complaints today on further questioning she has a poor appetite, but is drinking adequate liquids.    Interval History: Ms. Garcia presents virtually with  her son. She has no issues or complaints. Denies specific worsening of symptoms. Has resumed hydrea as of last Friday. If not actively engaged in conversation she will nod off.     PAST MEDICAL HISTORY:   Past Medical History:   Diagnosis Date    Atrial fibrillation     with rapid ventricular rate    Breast cancer     XRT    Chronic bronchitis     CKD (chronic kidney disease), stage III     Dementia     Squamous cell carcinoma        PAST SURGICAL HISTORY:   Past Surgical History:   Procedure Laterality Date    BREAST SURGERY  2011    right       PAST SOCIAL HISTORY:   reports that she quit smoking about 40 years ago. Her smoking use included cigarettes. She smoked 0.25 packs per day. She has never used smokeless tobacco. She reports that she does not drink alcohol and does not use drugs.    FAMILY HISTORY:  Family History   Problem Relation Age of Onset    Heart disease Father     Melanoma Neg Hx        CURRENT MEDICATIONS:   Current Outpatient Medications   Medication Sig    acetaminophen (TYLENOL) 500 MG tablet Take 2 tablets (1,000 mg total) by mouth every 8 (eight) hours as needed for Pain.    antiox. no.10-omeg3s-lut-belle 280-10-2 mg Cap Take by mouth 2 (two) times daily.    ascorbic acid, vitamin C, (VITAMIN C) 1000 MG tablet Take 1 tablet (1,000 mg total) by mouth once daily.    atorvastatin (LIPITOR) 10 MG tablet TAKE ONE TABLET BY MOUTH ONCE DAILY    bisacodyl (DULCOLAX) 10 mg Supp Place 1 suppository (10 mg total) rectally daily as needed (Until bowel movement if patient has no bowel movement for 2 days).    brimonidine 0.15 % OPTH DROP (ALPHAGAN) 0.15 % ophthalmic solution 3 (three) times daily.     calcium carb/vit D3/minerals (CALCIUM-VITAMIN D ORAL) Take by mouth once daily. 1000mg Vitamin D    digoxin (LANOXIN) 125 mcg tablet TAKE ONE TABLET BY MOUTH EVERY DAY    donepeziL (ARICEPT) 10 MG tablet TAKE ONE TABLET BY MOUTH EVERY EVENING    ELIQUIS 2.5 mg Tab TAKE ONE TABLET BY MOUTH  TWICE DAILY. DO NOT START UNTIL YOU HAVE HELD WARFARIN FOR 3 DAYS.    hydroxyurea (HYDREA) 500 mg Cap TAKE ONE CAPSULE BY MOUTH DAILY    hydroxyurea (HYDREA) 500 mg Cap Take 2 capsules (1,000 mg total) by mouth once daily.    influenza (FLUZONE HIGH-DOSE) 180 mcg/0.5 mL vaccine Inject 0.5 mLs into the muscle.    latanoprost 0.005 % ophthalmic solution once daily.     losartan (COZAAR) 25 MG tablet Take 0.5 tablets (12.5 mg total) by mouth once daily.    metoprolol succinate (TOPROL-XL) 25 MG 24 hr tablet Take 0.5 tablets (12.5 mg total) by mouth once daily.    omega-3 fatty acids/fish oil (FISH OIL-OMEGA-3 FATTY ACIDS) 300-1,000 mg capsule Take 1 capsule by mouth once daily.    polyethylene glycol (GLYCOLAX) 17 gram PwPk Take 17 g by mouth 2 (two) times daily as needed (constipation).     No current facility-administered medications for this visit.     ALLERGIES:   Review of patient's allergies indicates:  No Known Allergies      Review of Systems:     Review of Systems   Constitutional: Positive for appetite change and fatigue. Negative for chills, diaphoresis, fever and unexpected weight change.   HENT:   Negative for hearing loss, mouth sores, nosebleeds, sore throat, trouble swallowing and voice change.    Eyes: Negative for eye problems and icterus.   Respiratory: Negative for chest tightness, cough, hemoptysis, shortness of breath and wheezing.    Cardiovascular: Negative for chest pain, leg swelling and palpitations.   Gastrointestinal: Negative for abdominal distention, abdominal pain, blood in stool, diarrhea, nausea and vomiting.   Endocrine: Negative for hot flashes.   Genitourinary: Negative for bladder incontinence, difficulty urinating, dysuria and hematuria.    Musculoskeletal: Negative for arthralgias, back pain, flank pain, gait problem, myalgias, neck pain and neck stiffness.   Skin: Negative for itching, rash and wound.   Neurological: Negative for dizziness, extremity weakness, gait  problem, headaches, numbness, seizures and speech difficulty.   Hematological: Negative for adenopathy. Does not bruise/bleed easily.   Psychiatric/Behavioral: Negative for confusion, depression and sleep disturbance. The patient is not nervous/anxious.         Physical Exam:   Limited secondary to virtual visit      ECOG Performance Status: (foot note - ECOG PS provided by Eastern Cooperative Oncology Group) 2 - Symptomatic, <50% confined to bed    Labs:   Lab Results   Component Value Date    WBC 13.12 (H) 04/21/2022    HGB 11.4 (L) 04/21/2022    HCT 38.6 04/21/2022    PLT 1,433 (HH) 04/21/2022    CHOL 157 04/21/2022    TRIG 133 04/21/2022    HDL 47 04/21/2022    ALT 11 04/25/2022    AST 39 04/25/2022     04/25/2022    K 4.7 04/25/2022     04/25/2022    CREATININE 1.1 04/25/2022    BUN 14 04/25/2022    CO2 25 04/25/2022    TSH 4.477 (H) 04/21/2022    INR 2.4 02/12/2020       Imaging: Previous imaging has been reviewed     Assessment and Plan:     Ms. Garcia is a pleasant 87 year old female who presents to clinic today to discuss her elevated platelet count    Essential Thrombocytosis  --CALR exon 9 detected  --CALR mutation is identified in approximately   49-88% of JAK2 and MPL-wild type essential thrombocythemia   --We discussed the fact that 90% of ET patients have one of the above mutations  --There has been an up tick in the platelet count. Resumed hydrea 1000mg. Plan to recheck labs at the end of the week      Hypotension  --Appears to be close to baseline given     30 minutes were spent face to face with the patient and her  Care giver to discuss the lab value, treatment options. I have provided the patient with an opportunity to ask questions and have all questions answered to her satisfaction.       She will follow up with me PRN, but knows to call in the interim if symptoms change or should a problem arise.        Renée Gannon MD  Hematology and Medical Oncology  [facility]  Transplant  [facility]        BMT Chart Routing      Follow up with physician No follow up needed. Labs will be drawn at assisted living facility later this week   Follow up with [facility]    Labs CBC and CMP   Lab interval:  Being done at the facility   Imaging None      Pharmacy appointment No pharmacy appointment needed      Other referrals No additional referrals needed

## 2022-04-22 NOTE — TELEPHONE ENCOUNTER
I was called  For plt count of 1,433k--  Reviewed note.  This is highest plt count I have seen.  I spoke with hem/onc on call-- they suggested that she follow back up with Dr Mahmood and get treatment but stated if asymptomatic she does not need emergency evaluation in the ED>

## 2022-04-25 ENCOUNTER — OFFICE VISIT (OUTPATIENT)
Dept: HEMATOLOGY/ONCOLOGY | Facility: CLINIC | Age: 87
End: 2022-04-25
Payer: MEDICARE

## 2022-04-25 ENCOUNTER — LAB VISIT (OUTPATIENT)
Dept: LAB | Facility: HOSPITAL | Age: 87
End: 2022-04-25
Attending: INTERNAL MEDICINE
Payer: MEDICARE

## 2022-04-25 DIAGNOSIS — D47.3 ESSENTIAL THROMBOCYTHEMIA: ICD-10-CM

## 2022-04-25 DIAGNOSIS — D47.3 ESSENTIAL THROMBOCYTOSIS: Primary | ICD-10-CM

## 2022-04-25 DIAGNOSIS — D75.839 THROMBOCYTOSIS: ICD-10-CM

## 2022-04-25 DIAGNOSIS — Z79.01 LONG TERM CURRENT USE OF ANTICOAGULANT THERAPY: ICD-10-CM

## 2022-04-25 PROCEDURE — 80053 COMPREHEN METABOLIC PANEL: CPT | Performed by: INTERNAL MEDICINE

## 2022-04-25 PROCEDURE — 99215 OFFICE O/P EST HI 40 MIN: CPT | Mod: 95,,, | Performed by: INTERNAL MEDICINE

## 2022-04-25 PROCEDURE — 99215 PR OFFICE/OUTPT VISIT, EST, LEVL V, 40-54 MIN: ICD-10-PCS | Mod: 95,,, | Performed by: INTERNAL MEDICINE

## 2022-04-25 PROCEDURE — 36415 COLL VENOUS BLD VENIPUNCTURE: CPT | Mod: PO | Performed by: INTERNAL MEDICINE

## 2022-04-26 LAB
ALBUMIN SERPL BCP-MCNC: 3.8 G/DL (ref 3.5–5.2)
ALP SERPL-CCNC: 72 U/L (ref 55–135)
ALT SERPL W/O P-5'-P-CCNC: 11 U/L (ref 10–44)
ANION GAP SERPL CALC-SCNC: 9 MMOL/L (ref 8–16)
AST SERPL-CCNC: 39 U/L (ref 10–40)
BILIRUB SERPL-MCNC: 0.3 MG/DL (ref 0.1–1)
BUN SERPL-MCNC: 14 MG/DL (ref 8–23)
CALCIUM SERPL-MCNC: 9.7 MG/DL (ref 8.7–10.5)
CHLORIDE SERPL-SCNC: 107 MMOL/L (ref 95–110)
CO2 SERPL-SCNC: 25 MMOL/L (ref 23–29)
CREAT SERPL-MCNC: 1.1 MG/DL (ref 0.5–1.4)
EST. GFR  (AFRICAN AMERICAN): 52.9 ML/MIN/1.73 M^2
EST. GFR  (NON AFRICAN AMERICAN): 45.9 ML/MIN/1.73 M^2
GLUCOSE SERPL-MCNC: 67 MG/DL (ref 70–110)
POTASSIUM SERPL-SCNC: 4.7 MMOL/L (ref 3.5–5.1)
PROT SERPL-MCNC: 6.5 G/DL (ref 6–8.4)
SODIUM SERPL-SCNC: 141 MMOL/L (ref 136–145)

## 2022-04-27 ENCOUNTER — TELEPHONE (OUTPATIENT)
Dept: HEMATOLOGY/ONCOLOGY | Facility: CLINIC | Age: 87
End: 2022-04-27
Payer: MEDICARE

## 2022-04-27 NOTE — TELEPHONE ENCOUNTER
Received staff message regarding the clotting of 's recent CBC. I spoke pt son who referred em to the assisted care facility to coordinate the redrawing of 's labs. I spoke with Ms.Sarah Lauren LPN and advised her that the patient needed to be redrawn. She stated that Ms. Garcia would be redrawn on Friday (4/29/22) and that the results would be faxed over to the clinic. Fax number was left with Nurse Aguilar.

## 2022-04-27 NOTE — TELEPHONE ENCOUNTER
----- Message from Sally Padilla sent at 4/27/2022  3:20 AM CDT -----  Regarding: Lab Client Services  Contact: [phone]  Good Morning,     My name is Sally Padilla I work in the Lab Client Services. We had a problem with some lab work on this patient. If someone from your office could call us at [phone] or [phone] that would be great. Anyone in my department can help.      Thank you

## 2022-07-20 ENCOUNTER — HOSPITAL ENCOUNTER (EMERGENCY)
Facility: HOSPITAL | Age: 87
Discharge: HOME | End: 2022-07-20
Attending: EMERGENCY MEDICINE
Payer: MEDICARE

## 2022-07-20 VITALS
SYSTOLIC BLOOD PRESSURE: 135 MMHG | WEIGHT: 118 LBS | RESPIRATION RATE: 16 BRPM | TEMPERATURE: 99 F | OXYGEN SATURATION: 98 % | BODY MASS INDEX: 20.25 KG/M2 | DIASTOLIC BLOOD PRESSURE: 60 MMHG | HEART RATE: 52 BPM

## 2022-07-20 DIAGNOSIS — S01.81XA LACERATION OF FOREHEAD, INITIAL ENCOUNTER: Primary | ICD-10-CM

## 2022-07-20 DIAGNOSIS — S09.90XA CLOSED HEAD INJURY, INITIAL ENCOUNTER: ICD-10-CM

## 2022-07-20 PROCEDURE — 99284 EMERGENCY DEPT VISIT MOD MDM: CPT | Mod: 25

## 2022-07-20 PROCEDURE — 25000003 PHARM REV CODE 250: Performed by: EMERGENCY MEDICINE

## 2022-07-20 PROCEDURE — 12011 RPR F/E/E/N/L/M 2.5 CM/<: CPT

## 2022-07-20 RX ORDER — LIDOCAINE HYDROCHLORIDE 10 MG/ML
5 INJECTION, SOLUTION EPIDURAL; INFILTRATION; INTRACAUDAL; PERINEURAL
Status: COMPLETED | OUTPATIENT
Start: 2022-07-20 | End: 2022-07-20

## 2022-07-20 RX ORDER — MUPIROCIN 20 MG/G
OINTMENT TOPICAL 3 TIMES DAILY
Qty: 15 G | Refills: 0 | Status: SHIPPED | OUTPATIENT
Start: 2022-07-20 | End: 2022-07-25

## 2022-07-20 RX ORDER — LIDOCAINE HYDROCHLORIDE AND EPINEPHRINE 10; 10 MG/ML; UG/ML
5 INJECTION, SOLUTION INFILTRATION; PERINEURAL ONCE
Status: DISCONTINUED | OUTPATIENT
Start: 2022-07-20 | End: 2022-07-20

## 2022-07-20 RX ADMIN — LIDOCAINE HYDROCHLORIDE 50 MG: 10 INJECTION, SOLUTION EPIDURAL; INFILTRATION; INTRACAUDAL at 02:07

## 2022-07-20 NOTE — ED NOTES
Bactroban not in pyxis, sent patient home with prescription (given to patient's son). Discharge instructions reviewed with patient and patient's son @ bedside. Report called to Rachel @ [facility]. Patient in stable condition, VSS. Patient's son states he will drive patient back to NH. Patient wheeled to son's car via wheelchair.

## 2022-07-20 NOTE — ED NOTES
Patient presents to ED via EMS from [facility] due to a fall from her walker. Patient has large bruising/bleeding to the right side of her forehead. Patient states she has pain to her right forehead but denies pain anywhere else. Patients's son @ bedside, states patient has a hx of dementia. Patient able to follow commands and answer questions appropriately.

## 2022-07-20 NOTE — ED PROVIDER NOTES
NAME:  Cat Garcia  CSN:     [id number]  MRN:    [id number]  ADMIT DATE: 2022        eMERGENCY dEPARTMENT eNCOUnter    CHIEF COMPLAINT    Chief Complaint   Patient presents with    Fall     [facility] after [facility], denies LOC, + blood thinner use,  confused at baseline + lac on right side of forehead        HPI      Cat Garcia is a 87 y.o. female who presents to the ED for evaluation after a fall.  Patient fell while using her walker and struck the right side of her forehead on the ground sustaining a laceration.  She denies LOC.  Patient is on Eliquis.  She has a history of dementia and has mild confusion at baseline.          ALLERGIES    Review of patient's allergies indicates:  No Known Allergies    PAST MEDICAL HISTORY  Past Medical History:   Diagnosis Date    Atrial fibrillation     with rapid ventricular rate    Breast cancer     XRT    Chronic bronchitis     CKD (chronic kidney disease), stage III     Dementia     Squamous cell carcinoma        SURGICAL HISTORY    Past Surgical History:   Procedure Laterality Date    BREAST SURGERY  2011    right       SOCIAL HISTORY    Social History     Socioeconomic History    Marital status:    Tobacco Use    Smoking status: Former Smoker     Packs/day: 0.25     Types: Cigarettes     Quit date: 1982     Years since quittin.5    Smokeless tobacco: Never Used    Tobacco comment: pt was social smoker   Substance and Sexual Activity    Alcohol use: No     Alcohol/week: 0.0 standard drinks    Drug use: No    Sexual activity: Never   Social History Narrative    Lives alone. husb  2006.    4 kids.    1 store home. No falls       FAMILY HISTORY    Family History   Problem Relation Age of Onset    Heart disease Father     Melanoma Neg Hx        REVIEW OF SYSTEMS   ROS  All Systems otherwise negative except as noted in the History of Present Illness.        PHYSICAL EXAM    Reviewed Triage  Note  VITAL SIGNS:   ED Triage Vitals   Enc Vitals Group      BP 07/20/22 1234 138/60      Pulse 07/20/22 1234 (!) 55      Resp 07/20/22 1234 16      Temp 07/20/22 1235 98.9 °F (37.2 °C)      Temp src 07/20/22 1235 Oral      SpO2 07/20/22 1234 96 %      Weight 07/20/22 1234 118 lb      Height --       Head Circumference --       Peak Flow --       Pain Score --       Pain Loc --       Pain Edu? --       Excl. in GC? --        Patient Vitals for the past 24 hrs:   BP Temp Temp src Pulse Resp SpO2 Weight   07/20/22 1324 -- -- -- (!) 54 -- 100 % --   07/20/22 1315 (!) 116/53 -- -- (!) 51 -- 100 % --   07/20/22 1235 -- 98.9 °F (37.2 °C) Oral -- -- -- --   07/20/22 1234 138/60 -- -- (!) 55 16 96 % 53.5 kg (118 lb)           Physical Exam    Constitutional:  Well-developed, well-nourished. No acute distress  HENT:  Normocephalic, 1 cm laceration noted to the right side of the forehead  Eyes:  EOMI. Conjunctiva normal without discharge.   Neck: Normal range of motion.No stridor. No meningismus.  No midline cervical tenderness  Respiratory:  Normal breath sounds bilaterally.  No respiratory distress, retractions, or conversational dyspnea. No wheezing. No rhonchi. No rales.   Cardiovascular:  Normal heart rate. Normal rhythm. No pitting lower extremity edema.   GI:  Abdomen soft, non-distended, non-tender. Normal bowel sounds. No guarding, rigidity or rebound.    : No CVA tenderness.   Musculoskeletal:  No gross deformity or limited range of motion of all major joints. No palpable bony deformity. No tenderness to palpation.  Integument:  Warm and dry. No rash.  Neurologic:  Normal motor function. Normal sensory function. No focal deficits noted. Alert and Interactive.  Psychiatric:  Affect normal. Mood normal.         LABS  Pertinent labs reviewed. (See chart for details)   Labs Reviewed - No data to display      RADIOLOGY    Imaging Results          CT Cervical Spine Without Contrast (Final result)  Result time 07/20/22  14:59:33    Final result by Néstor Bermeo III, MD (07/20/22 14:59:33)                 Impression:      No acute process seen.    DJD and bilateral areas of neural foraminal narrowing.      Electronically signed by: Néstor Bermeo MD  Date:    07/20/2022  Time:    14:59             Narrative:    EXAMINATION:  CT CERVICAL SPINE WITHOUT CONTRAST    CLINICAL HISTORY:  Neck trauma (Age >= 65y);    FINDINGS:  There is severe degenerative change of the cervical spine upper thoracic spine.  The odontoid, prevertebral soft tissues, and the posterior elements are intact.  The spinous processes, and the facets, and the lateral masses are well aligned and intact.  The occiput C1-C1 C2 relationship is unremarkable.  No definite fracture, dislocation, or bone destruction is seen.  No definite skull base fracture is seen.  Mastoid air cells are clear.  There is DJD of the C-spine with bilateral areas of neural foraminal narrowing.  No fracture, dislocation, or bone destruction seen.  There is scar at the lung apices.  Posterior fossa is unremarkable.  No significant adenopathy seen.  No soft tissue masses are seen.                               CT Head Without Contrast (Final result)  Result time 07/20/22 14:57:48    Final result by Abdon Hernandez MD (07/20/22 14:57:48)                 Impression:      Lateral right frontal/supraorbital scalp soft tissue swelling/contusion with suspected superimposed laceration type injury, without displaced skull fracture, acute large vascular territory infarct or intracranial hemorrhage identified.    Chronic senescent and microvascular ischemic change.    Left paranasal chronic sinusitis as above, progressed from 01/10/2020.      Electronically signed by: Abdon Hernandez MD  Date:    07/20/2022  Time:    14:57             Narrative:    EXAMINATION:  CT HEAD WITHOUT CONTRAST    CLINICAL HISTORY:  Head trauma, minor (Age >= 65y);    TECHNIQUE:  Low dose axial CT images obtained throughout the  head without intravenous contrast. Sagittal and coronal reconstructions were performed.    COMPARISON:  Head CT most recent 01/10/2020 and MRI brain 10/16/2014    FINDINGS:  Intracranial compartment:    Generalized cerebral volume loss.  Ventricles are midline and stable in size and configuration without distortion by mass effect or acute hydrocephalus.  No extra-axial blood or fluid collections.    Grossly stable distribution of patchy and confluent hypoattenuation of the subcortical and periventricular white matter consistent with chronic microvascular ischemic change.  Bilateral basal ganglia and skull base atherosclerotic vascular calcifications noted.  No definite new focal areas of abnormal parenchymal attenuation.  No parenchymal mass, hemorrhage, edema or major vascular distribution infarct.    Skull/extracranial contents (limited evaluation): Lateral right frontal/supraorbital mild soft tissue swelling/contusion with few small subcutaneous gas foci likely representing superimposed laceration type injury.  Hyperostosis frontalis interna.  No fracture.  Complete opacification with hyperostosis of left maxillary sinus and anterior and middle ethmoidal air cells extending to the left frontal ethmoidal recess, progressed since 2020 and suggestive of chronic sinusitis.  Partial opacification of the left frontal sinus.  Mastoid air cells and remaining imaged paranasal sinuses are essentially clear.                                PROCEDURES    Lac Repair    Date/Time: 7/20/2022 2:53 PM  Performed by: Mir Burroughs MD  Authorized by: Mir Burroughs MD     Consent:     Consent obtained:  Verbal    Consent given by:  Patient    Risks discussed:  Pain    Alternatives discussed:  No treatment  Anesthesia:     Anesthesia method:  Local infiltration    Local anesthetic:  Lidocaine 1% w/o epi  Laceration details:     Location:  Face    Face location:  Forehead    Length (cm):  1  Exploration:     Imaging outcome:  foreign body not noted      Wound extent: no underlying fracture noted      Contaminated: no    Treatment:     Area cleansed with:  Saline    Amount of cleaning:  Standard    Irrigation solution:  Sterile saline    Irrigation volume:  20    Irrigation method:  Syringe  Skin repair:     Repair method:  Sutures    Suture size:  5-0    Suture material:  Nylon    Suture technique:  Simple interrupted    Number of sutures:  2  Approximation:     Approximation:  Close  Repair type:     Repair type:  Simple  Post-procedure details:     Dressing:  Open (no dressing)    Procedure completion:  Tolerated well, no immediate complications          EKG     Interpreted by ERP:          ED COURSE & MEDICAL DECISION MAKING    Pertinent & Imaging studies reviewed. (See chart for details and specific orders.)        Medications   LIDOcaine (PF) 10 mg/ml (1%) injection 50 mg (50 mg Infiltration Given by Provider 7/20/22 5181)          Normal imaging. Laceration repaired without difficulty       DISPOSITION  Patient discharged in stable condition at No discharge date for patient encounter.      DISCHARGE INSTRUCTIONS & MEDS    @DISCHARGEMEDSLIST(<NOROUTINE> error)@      New Prescriptions    No medications on file           FINAL IMPRESSION    1. Laceration of forehead, initial encounter    2. Closed head injury, initial encounter              Blood Pressure Follow-Up Advised  Patient advised to follow up with PCP within 3-5 days for blood pressure re-check if blood pressure is equal to or greater than 120/80.         Critical care time spent with this patient (not including separately billable items) was  0 minutes.     DISCLAIMER: This note was prepared with Dragon NaturallySpeaking voice recognition transcription software. Garbled syntax, mangled pronouns, and other bizarre constructions may be attributed to that software system.      Mir Burroughs MD  07/20/2022  2:51 PM          Mir Burroughs MD  07/20/22 8953

## 2022-07-28 ENCOUNTER — TELEPHONE (OUTPATIENT)
Dept: INTERNAL MEDICINE | Facility: CLINIC | Age: 87
End: 2022-07-28
Payer: MEDICARE

## 2022-07-28 NOTE — TELEPHONE ENCOUNTER
----- Message from Sheila Rosario MA sent at 7/28/2022 10:29 AM CDT -----  Contact: Aylin from [facility]   Please call pt care giver. Thanks.   ----- Message -----  From: Heaven Stone  Sent: 7/28/2022  10:28 AM CDT  To: Anish Judge Staff    Essence from [facility] would like a call back about blood pressure isssues [facility]

## 2022-07-28 NOTE — TELEPHONE ENCOUNTER
I spoke to Essence, the nurse at [facility] regarding pt's blood pressure. She states that pt is prescribed several hypertension medications, and that her blood pressure readings have been low for two days.   07/27/2022: BP 96/56, HR 58                       BP 98/60  07/28/2022: /60, HR 62  She admits that pt is feeling okay and not having hypotensive symptoms, but that she would like to know if you want to make any changes to her medications. Please advise.    Contact: Essence at [facility]   Phone number   Fax number

## 2022-07-29 NOTE — TELEPHONE ENCOUNTER
I have discontinued the patient's losartan. Spoke with Natasha (nurse) at [facility]. They are to call back in 3 days with an update on her blood pressure readings.

## 2022-08-15 DIAGNOSIS — I48.20 ATRIAL FIBRILLATION, CHRONIC: Chronic | ICD-10-CM

## 2022-08-15 RX ORDER — DONEPEZIL HYDROCHLORIDE 10 MG/1
TABLET, FILM COATED ORAL
Qty: 90 TABLET | Refills: 2 | Status: SHIPPED | OUTPATIENT
Start: 2022-08-15 | End: 2023-08-01

## 2022-08-31 DIAGNOSIS — Z78.0 MENOPAUSE: ICD-10-CM

## 2022-10-11 ENCOUNTER — TELEPHONE (OUTPATIENT)
Dept: INTERNAL MEDICINE | Facility: CLINIC | Age: 87
End: 2022-10-11
Payer: MEDICARE

## 2022-10-11 NOTE — TELEPHONE ENCOUNTER
Hi, I do not think any workup is needed if patient does not have any signs of injury  Let me know if nurse has any more questions.  Thank you, Alfie Oconnell

## 2022-10-11 NOTE — TELEPHONE ENCOUNTER
Called Essence with [facility] back re: patient fall. CNA stated patient did not hit her head nor any obvious injuries. Advised I will forward message to .- Sherita LUI MA

## 2022-10-11 NOTE — TELEPHONE ENCOUNTER
----- Message from Becky Dodd sent at 10/11/2022  3:00 PM CDT -----  Contact: Essence Lopez [place] [phone]   Essence called in regards to the pt had a fall on today when CNA was helping her get out of bed the is no visible signs of injury wanted to advise the doctor please advise

## 2022-10-18 ENCOUNTER — OFFICE VISIT (OUTPATIENT)
Dept: INTERNAL MEDICINE | Facility: CLINIC | Age: 87
End: 2022-10-18
Payer: MEDICARE

## 2022-10-18 ENCOUNTER — IMMUNIZATION (OUTPATIENT)
Dept: INTERNAL MEDICINE | Facility: CLINIC | Age: 87
End: 2022-10-18
Payer: MEDICARE

## 2022-10-18 ENCOUNTER — LAB VISIT (OUTPATIENT)
Dept: LAB | Facility: HOSPITAL | Age: 87
End: 2022-10-18
Attending: INTERNAL MEDICINE
Payer: MEDICARE

## 2022-10-18 VITALS
RESPIRATION RATE: 18 BRPM | BODY MASS INDEX: 17.39 KG/M2 | DIASTOLIC BLOOD PRESSURE: 64 MMHG | OXYGEN SATURATION: 98 % | HEART RATE: 53 BPM | WEIGHT: 101.88 LBS | SYSTOLIC BLOOD PRESSURE: 112 MMHG | HEIGHT: 64 IN

## 2022-10-18 DIAGNOSIS — D47.3 ESSENTIAL THROMBOCYTOSIS: ICD-10-CM

## 2022-10-18 DIAGNOSIS — Z23 NEED FOR VACCINATION: Primary | ICD-10-CM

## 2022-10-18 DIAGNOSIS — I48.20 ATRIAL FIBRILLATION, CHRONIC: ICD-10-CM

## 2022-10-18 DIAGNOSIS — D47.3 ESSENTIAL THROMBOCYTOSIS: Primary | ICD-10-CM

## 2022-10-18 LAB
ALBUMIN SERPL BCP-MCNC: 3.9 G/DL (ref 3.5–5.2)
ALP SERPL-CCNC: 53 U/L (ref 55–135)
ALT SERPL W/O P-5'-P-CCNC: 8 U/L (ref 10–44)
ANION GAP SERPL CALC-SCNC: 6 MMOL/L (ref 8–16)
AST SERPL-CCNC: 17 U/L (ref 10–40)
BASOPHILS # BLD AUTO: 0.05 K/UL (ref 0–0.2)
BASOPHILS NFR BLD: 0.5 % (ref 0–1.9)
BILIRUB SERPL-MCNC: 0.6 MG/DL (ref 0.1–1)
BUN SERPL-MCNC: 14 MG/DL (ref 8–23)
CALCIUM SERPL-MCNC: 9.2 MG/DL (ref 8.7–10.5)
CHLORIDE SERPL-SCNC: 108 MMOL/L (ref 95–110)
CO2 SERPL-SCNC: 25 MMOL/L (ref 23–29)
CREAT SERPL-MCNC: 0.8 MG/DL (ref 0.5–1.4)
DIFFERENTIAL METHOD: ABNORMAL
EOSINOPHIL # BLD AUTO: 0 K/UL (ref 0–0.5)
EOSINOPHIL NFR BLD: 0.3 % (ref 0–8)
ERYTHROCYTE [DISTWIDTH] IN BLOOD BY AUTOMATED COUNT: 13.2 % (ref 11.5–14.5)
EST. GFR  (NO RACE VARIABLE): >60 ML/MIN/1.73 M^2
GLUCOSE SERPL-MCNC: 86 MG/DL (ref 70–110)
HCT VFR BLD AUTO: 37.6 % (ref 37–48.5)
HGB BLD-MCNC: 11.7 G/DL (ref 12–16)
IMM GRANULOCYTES # BLD AUTO: 0.04 K/UL (ref 0–0.04)
IMM GRANULOCYTES NFR BLD AUTO: 0.4 % (ref 0–0.5)
LYMPHOCYTES # BLD AUTO: 1.7 K/UL (ref 1–4.8)
LYMPHOCYTES NFR BLD: 18.2 % (ref 18–48)
MCH RBC QN AUTO: 38.4 PG (ref 27–31)
MCHC RBC AUTO-ENTMCNC: 31.1 G/DL (ref 32–36)
MCV RBC AUTO: 123 FL (ref 82–98)
MONOCYTES # BLD AUTO: 0.5 K/UL (ref 0.3–1)
MONOCYTES NFR BLD: 5.1 % (ref 4–15)
NEUTROPHILS # BLD AUTO: 6.9 K/UL (ref 1.8–7.7)
NEUTROPHILS NFR BLD: 75.5 % (ref 38–73)
NRBC BLD-RTO: 0 /100 WBC
PLATELET # BLD AUTO: 681 K/UL (ref 150–450)
PMV BLD AUTO: 10.2 FL (ref 9.2–12.9)
POTASSIUM SERPL-SCNC: 3.9 MMOL/L (ref 3.5–5.1)
PROT SERPL-MCNC: 6.5 G/DL (ref 6–8.4)
RBC # BLD AUTO: 3.05 M/UL (ref 4–5.4)
SODIUM SERPL-SCNC: 139 MMOL/L (ref 136–145)
WBC # BLD AUTO: 9.14 K/UL (ref 3.9–12.7)

## 2022-10-18 PROCEDURE — 85025 COMPLETE CBC W/AUTO DIFF WBC: CPT | Performed by: INTERNAL MEDICINE

## 2022-10-18 PROCEDURE — 3074F SYST BP LT 130 MM HG: CPT | Mod: CPTII,S$GLB,, | Performed by: INTERNAL MEDICINE

## 2022-10-18 PROCEDURE — 3288F PR FALLS RISK ASSESSMENT DOCUMENTED: ICD-10-PCS | Mod: CPTII,S$GLB,, | Performed by: INTERNAL MEDICINE

## 2022-10-18 PROCEDURE — 91312 COVID-19, MRNA, LNP-S, BIVALENT BOOSTER, PF, 30 MCG/0.3 ML DOSE: ICD-10-PCS | Mod: S$GLB,,, | Performed by: INTERNAL MEDICINE

## 2022-10-18 PROCEDURE — 3288F FALL RISK ASSESSMENT DOCD: CPT | Mod: CPTII,S$GLB,, | Performed by: INTERNAL MEDICINE

## 2022-10-18 PROCEDURE — 91312 COVID-19, MRNA, LNP-S, BIVALENT BOOSTER, PF, 30 MCG/0.3 ML DOSE: CPT | Mod: S$GLB,,, | Performed by: INTERNAL MEDICINE

## 2022-10-18 PROCEDURE — 99999 PR PBB SHADOW E&M-EST. PATIENT-LVL IV: CPT | Mod: PBBFAC,,, | Performed by: INTERNAL MEDICINE

## 2022-10-18 PROCEDURE — 1101F PT FALLS ASSESS-DOCD LE1/YR: CPT | Mod: CPTII,S$GLB,, | Performed by: INTERNAL MEDICINE

## 2022-10-18 PROCEDURE — 3078F DIAST BP <80 MM HG: CPT | Mod: CPTII,S$GLB,, | Performed by: INTERNAL MEDICINE

## 2022-10-18 PROCEDURE — 1126F AMNT PAIN NOTED NONE PRSNT: CPT | Mod: CPTII,S$GLB,, | Performed by: INTERNAL MEDICINE

## 2022-10-18 PROCEDURE — 99999 PR PBB SHADOW E&M-EST. PATIENT-LVL IV: ICD-10-PCS | Mod: PBBFAC,,, | Performed by: INTERNAL MEDICINE

## 2022-10-18 PROCEDURE — 80053 COMPREHEN METABOLIC PANEL: CPT | Performed by: INTERNAL MEDICINE

## 2022-10-18 PROCEDURE — 99214 OFFICE O/P EST MOD 30 MIN: CPT | Mod: S$GLB,,, | Performed by: INTERNAL MEDICINE

## 2022-10-18 PROCEDURE — 1159F MED LIST DOCD IN RCRD: CPT | Mod: CPTII,S$GLB,, | Performed by: INTERNAL MEDICINE

## 2022-10-18 PROCEDURE — 1101F PR PT FALLS ASSESS DOC 0-1 FALLS W/OUT INJ PAST YR: ICD-10-PCS | Mod: CPTII,S$GLB,, | Performed by: INTERNAL MEDICINE

## 2022-10-18 PROCEDURE — 1160F PR REVIEW ALL MEDS BY PRESCRIBER/CLIN PHARMACIST DOCUMENTED: ICD-10-PCS | Mod: CPTII,S$GLB,, | Performed by: INTERNAL MEDICINE

## 2022-10-18 PROCEDURE — 3078F PR MOST RECENT DIASTOLIC BLOOD PRESSURE < 80 MM HG: ICD-10-PCS | Mod: CPTII,S$GLB,, | Performed by: INTERNAL MEDICINE

## 2022-10-18 PROCEDURE — 1159F PR MEDICATION LIST DOCUMENTED IN MEDICAL RECORD: ICD-10-PCS | Mod: CPTII,S$GLB,, | Performed by: INTERNAL MEDICINE

## 2022-10-18 PROCEDURE — 3074F PR MOST RECENT SYSTOLIC BLOOD PRESSURE < 130 MM HG: ICD-10-PCS | Mod: CPTII,S$GLB,, | Performed by: INTERNAL MEDICINE

## 2022-10-18 PROCEDURE — 1126F PR PAIN SEVERITY QUANTIFIED, NO PAIN PRESENT: ICD-10-PCS | Mod: CPTII,S$GLB,, | Performed by: INTERNAL MEDICINE

## 2022-10-18 PROCEDURE — 0124A COVID-19, MRNA, LNP-S, BIVALENT BOOSTER, PF, 30 MCG/0.3 ML DOSE: CPT | Mod: CV19,PBBFAC | Performed by: INTERNAL MEDICINE

## 2022-10-18 PROCEDURE — 1160F RVW MEDS BY RX/DR IN RCRD: CPT | Mod: CPTII,S$GLB,, | Performed by: INTERNAL MEDICINE

## 2022-10-18 PROCEDURE — 36415 COLL VENOUS BLD VENIPUNCTURE: CPT | Performed by: INTERNAL MEDICINE

## 2022-10-18 PROCEDURE — 99214 PR OFFICE/OUTPT VISIT, EST, LEVL IV, 30-39 MIN: ICD-10-PCS | Mod: S$GLB,,, | Performed by: INTERNAL MEDICINE

## 2022-10-18 NOTE — PROGRESS NOTES
Subjective:       Patient ID: Cat Garcia is a 87 y.o. female.    Chief Complaint: Follow-up    Patient is here for followup for chronic conditions.    No new health issues.    Did have a mild fall last week, no trauma resulting.    Had a fall a few months ago and had mild lac R eye. Seen in ER. CT head no internal bleeding.    Has had continued loss of appetite and wt loss.  Review of Systems   Constitutional:  Positive for appetite change, fatigue and unexpected weight change. Negative for activity change and fever.   HENT:  Negative for trouble swallowing.    Eyes:  Negative for visual disturbance.   Respiratory:  Positive for shortness of breath (stable). Negative for chest tightness and wheezing.    Cardiovascular:  Negative for chest pain, palpitations and leg swelling.   Gastrointestinal:  Negative for abdominal distention, abdominal pain and constipation.   Endocrine: Negative for cold intolerance, heat intolerance, polydipsia and polyuria.   Genitourinary:  Negative for difficulty urinating.   Musculoskeletal:  Positive for gait problem.   Skin:  Negative for rash.   Neurological:  Negative for tremors and syncope.        Chronic imbalance       Hematological:  Negative for adenopathy. Does not bruise/bleed easily.   Psychiatric/Behavioral:  Positive for confusion. Negative for dysphoric mood.          Objective:      Physical Exam  Constitutional:       General: She is not in acute distress.     Appearance: Normal appearance. She is well-developed. She is not ill-appearing, toxic-appearing or diaphoretic.      Comments: Son speaks for her in general due to her dementia.  Has eyes closed unless I engage her directly   HENT:      Head: Normocephalic and atraumatic.      Mouth/Throat:      Pharynx: No oropharyngeal exudate.   Eyes:      General: No scleral icterus.     Conjunctiva/sclera: Conjunctivae normal.      Pupils: Pupils are equal, round, and reactive to light.   Neck:      Thyroid: No  thyromegaly.      Comments: No neck mass palpated today  Cardiovascular:      Rate and Rhythm: Normal rate.      Heart sounds: Normal heart sounds. No murmur heard.    No friction rub. No gallop.      Comments: Irregularly irregular      Pulmonary:      Effort: Pulmonary effort is normal. No respiratory distress.      Breath sounds: Normal breath sounds. No wheezing or rales.   Abdominal:      General: Bowel sounds are normal. There is no distension.      Palpations: Abdomen is soft. There is no mass.      Tenderness: There is no abdominal tenderness. There is no guarding or rebound.   Musculoskeletal:         General: No tenderness. Normal range of motion.      Cervical back: Normal range of motion and neck supple.   Lymphadenopathy:      Cervical: No cervical adenopathy.   Skin:     Comments: No obvious skin rash or wound seen   Neurological:      General: No focal deficit present.      Mental Status: She is alert. She is disoriented.      Motor: No abnormal muscle tone.      Comments: Answers questions, when I engage her directly   Psychiatric:         Speech: Speech normal.         Behavior: Behavior normal.      Comments: Calm       Assessment:       1. Essential thrombocytosis    2. Atrial fibrillation, chronic        Plan:       Cat was seen today for follow-up.    Diagnoses and all orders for this visit:    Essential thrombocytosis  -     CBC Auto Differential; Future  -     Comprehensive Metabolic Panel; Future    Atrial fibrillation, chronic  -     CBC Auto Differential; Future  -     Comprehensive Metabolic Panel; Future    Wt loss from dementia -- advised that nutritional shake be added after a meal (not in place of).    Falls -- discussed risks/benefits of anticoag with son. Since she has high risk afib along with thrombocytosis I recommended she stay on anticoag for now. Son agrees and is aware of Risks, benefits and side effects discussed.    Health Maintenance         Date Due Completion Date     TETANUS VACCINE Never done ---    Shingles Vaccine (1 of 2) 09/06/2016 7/12/2016    DEXA Scan 03/12/2020 3/12/2018    Lipid Panel 04/21/2027 4/21/2022            Follow up in about 6 months (around 4/18/2023).    Future Appointments   Date Time Provider Department Center   4/18/2023 10:40 AM Alfie Oconnell MD [facility] [place] [facility] car[facility] PCW

## 2023-02-13 ENCOUNTER — TELEPHONE (OUTPATIENT)
Dept: INTERNAL MEDICINE | Facility: CLINIC | Age: 88
End: 2023-02-13
Payer: MEDICARE

## 2023-02-13 NOTE — TELEPHONE ENCOUNTER
Called patients facility and the nurse Essence stated they have a in house physical therapy that they feel the patient would benefit from since she had a fall last weekend and is not not using her walker only wheelchair. Patients son was also made aware of the fall. The therapist is going to send over something in writing so that you can see what they are wanting to do for/with patient.- Sherita LUI MA

## 2023-02-13 NOTE — TELEPHONE ENCOUNTER
Hi, please call the person back and ask what reason for the home PT request. I cannot place an order without the reason. Also medicare requires that I see that patient face to face prior to paying for home health, I would need to see her for medicare to pay for home health.  Thank you, Alfie Oconnell

## 2023-02-13 NOTE — TELEPHONE ENCOUNTER
----- Message from Sheila Ingram sent at 2/13/2023 10:21 AM CST -----  Contact: [phone]  [facility] is calling to see if the dr can fax over a referral for physical therapy for the pt     Fax [phone]

## 2023-04-18 ENCOUNTER — OFFICE VISIT (OUTPATIENT)
Dept: INTERNAL MEDICINE | Facility: CLINIC | Age: 88
End: 2023-04-18
Payer: MEDICARE

## 2023-04-18 ENCOUNTER — LAB VISIT (OUTPATIENT)
Dept: LAB | Facility: HOSPITAL | Age: 88
End: 2023-04-18
Attending: INTERNAL MEDICINE
Payer: MEDICARE

## 2023-04-18 VITALS
HEIGHT: 64 IN | OXYGEN SATURATION: 97 % | DIASTOLIC BLOOD PRESSURE: 60 MMHG | BODY MASS INDEX: 18.82 KG/M2 | WEIGHT: 110.25 LBS | HEART RATE: 70 BPM | RESPIRATION RATE: 18 BRPM | SYSTOLIC BLOOD PRESSURE: 112 MMHG

## 2023-04-18 DIAGNOSIS — G21.4 VASCULAR PARKINSONISM: ICD-10-CM

## 2023-04-18 DIAGNOSIS — F02.80 MIXED ALZHEIMER'S AND VASCULAR DEMENTIA: ICD-10-CM

## 2023-04-18 DIAGNOSIS — I48.20 ATRIAL FIBRILLATION, CHRONIC: ICD-10-CM

## 2023-04-18 DIAGNOSIS — F01.50 MIXED ALZHEIMER'S AND VASCULAR DEMENTIA: ICD-10-CM

## 2023-04-18 DIAGNOSIS — I50.42 CHRONIC COMBINED SYSTOLIC (CONGESTIVE) AND DIASTOLIC (CONGESTIVE) HEART FAILURE: ICD-10-CM

## 2023-04-18 DIAGNOSIS — D47.3 ESSENTIAL THROMBOCYTOSIS: Primary | ICD-10-CM

## 2023-04-18 DIAGNOSIS — G30.9 MIXED ALZHEIMER'S AND VASCULAR DEMENTIA: ICD-10-CM

## 2023-04-18 DIAGNOSIS — I73.9 PERIPHERAL VASCULAR DISEASE, UNSPECIFIED: ICD-10-CM

## 2023-04-18 DIAGNOSIS — N18.30 STAGE 3 CHRONIC KIDNEY DISEASE, UNSPECIFIED WHETHER STAGE 3A OR 3B CKD: ICD-10-CM

## 2023-04-18 LAB
ALBUMIN SERPL BCP-MCNC: 3.8 G/DL (ref 3.5–5.2)
ALP SERPL-CCNC: 65 U/L (ref 55–135)
ALT SERPL W/O P-5'-P-CCNC: 13 U/L (ref 10–44)
ANION GAP SERPL CALC-SCNC: 10 MMOL/L (ref 8–16)
AST SERPL-CCNC: 23 U/L (ref 10–40)
BASOPHILS # BLD AUTO: 0.07 K/UL (ref 0–0.2)
BASOPHILS NFR BLD: 0.7 % (ref 0–1.9)
BILIRUB SERPL-MCNC: 0.6 MG/DL (ref 0.1–1)
BUN SERPL-MCNC: 24 MG/DL (ref 8–23)
CALCIUM SERPL-MCNC: 9.5 MG/DL (ref 8.7–10.5)
CHLORIDE SERPL-SCNC: 110 MMOL/L (ref 95–110)
CHOLEST SERPL-MCNC: 151 MG/DL (ref 120–199)
CHOLEST/HDLC SERPL: 2.7 {RATIO} (ref 2–5)
CO2 SERPL-SCNC: 23 MMOL/L (ref 23–29)
CREAT SERPL-MCNC: 0.9 MG/DL (ref 0.5–1.4)
DIFFERENTIAL METHOD: ABNORMAL
DIGOXIN SERPL-MCNC: 1.3 NG/ML (ref 0.8–2)
EOSINOPHIL # BLD AUTO: 0.1 K/UL (ref 0–0.5)
EOSINOPHIL NFR BLD: 1.1 % (ref 0–8)
ERYTHROCYTE [DISTWIDTH] IN BLOOD BY AUTOMATED COUNT: 14.1 % (ref 11.5–14.5)
EST. GFR  (NO RACE VARIABLE): >60 ML/MIN/1.73 M^2
GLUCOSE SERPL-MCNC: 90 MG/DL (ref 70–110)
HCT VFR BLD AUTO: 37.1 % (ref 37–48.5)
HDLC SERPL-MCNC: 56 MG/DL (ref 40–75)
HDLC SERPL: 37.1 % (ref 20–50)
HGB BLD-MCNC: 11.5 G/DL (ref 12–16)
IMM GRANULOCYTES # BLD AUTO: 0.04 K/UL (ref 0–0.04)
IMM GRANULOCYTES NFR BLD AUTO: 0.4 % (ref 0–0.5)
LDLC SERPL CALC-MCNC: 74 MG/DL (ref 63–159)
LYMPHOCYTES # BLD AUTO: 2 K/UL (ref 1–4.8)
LYMPHOCYTES NFR BLD: 20.8 % (ref 18–48)
MCH RBC QN AUTO: 36.3 PG (ref 27–31)
MCHC RBC AUTO-ENTMCNC: 31 G/DL (ref 32–36)
MCV RBC AUTO: 117 FL (ref 82–98)
MONOCYTES # BLD AUTO: 0.8 K/UL (ref 0.3–1)
MONOCYTES NFR BLD: 8.5 % (ref 4–15)
NEUTROPHILS # BLD AUTO: 6.5 K/UL (ref 1.8–7.7)
NEUTROPHILS NFR BLD: 68.5 % (ref 38–73)
NONHDLC SERPL-MCNC: 95 MG/DL
NRBC BLD-RTO: 0 /100 WBC
PLATELET # BLD AUTO: 873 K/UL (ref 150–450)
PMV BLD AUTO: 10.5 FL (ref 9.2–12.9)
POTASSIUM SERPL-SCNC: 4.5 MMOL/L (ref 3.5–5.1)
PROT SERPL-MCNC: 6.7 G/DL (ref 6–8.4)
RBC # BLD AUTO: 3.17 M/UL (ref 4–5.4)
SODIUM SERPL-SCNC: 143 MMOL/L (ref 136–145)
TRIGL SERPL-MCNC: 105 MG/DL (ref 30–150)
WBC # BLD AUTO: 9.49 K/UL (ref 3.9–12.7)

## 2023-04-18 PROCEDURE — 3074F PR MOST RECENT SYSTOLIC BLOOD PRESSURE < 130 MM HG: ICD-10-PCS | Mod: CPTII,S$GLB,, | Performed by: INTERNAL MEDICINE

## 2023-04-18 PROCEDURE — 3288F FALL RISK ASSESSMENT DOCD: CPT | Mod: CPTII,S$GLB,, | Performed by: INTERNAL MEDICINE

## 2023-04-18 PROCEDURE — 1160F PR REVIEW ALL MEDS BY PRESCRIBER/CLIN PHARMACIST DOCUMENTED: ICD-10-PCS | Mod: CPTII,S$GLB,, | Performed by: INTERNAL MEDICINE

## 2023-04-18 PROCEDURE — 1126F PR PAIN SEVERITY QUANTIFIED, NO PAIN PRESENT: ICD-10-PCS | Mod: CPTII,S$GLB,, | Performed by: INTERNAL MEDICINE

## 2023-04-18 PROCEDURE — 1159F MED LIST DOCD IN RCRD: CPT | Mod: CPTII,S$GLB,, | Performed by: INTERNAL MEDICINE

## 2023-04-18 PROCEDURE — 99214 PR OFFICE/OUTPT VISIT, EST, LEVL IV, 30-39 MIN: ICD-10-PCS | Mod: S$GLB,,, | Performed by: INTERNAL MEDICINE

## 2023-04-18 PROCEDURE — 85025 COMPLETE CBC W/AUTO DIFF WBC: CPT | Performed by: INTERNAL MEDICINE

## 2023-04-18 PROCEDURE — 1101F PT FALLS ASSESS-DOCD LE1/YR: CPT | Mod: CPTII,S$GLB,, | Performed by: INTERNAL MEDICINE

## 2023-04-18 PROCEDURE — 80061 LIPID PANEL: CPT | Performed by: INTERNAL MEDICINE

## 2023-04-18 PROCEDURE — 1159F PR MEDICATION LIST DOCUMENTED IN MEDICAL RECORD: ICD-10-PCS | Mod: CPTII,S$GLB,, | Performed by: INTERNAL MEDICINE

## 2023-04-18 PROCEDURE — 1160F RVW MEDS BY RX/DR IN RCRD: CPT | Mod: CPTII,S$GLB,, | Performed by: INTERNAL MEDICINE

## 2023-04-18 PROCEDURE — 36415 COLL VENOUS BLD VENIPUNCTURE: CPT | Performed by: INTERNAL MEDICINE

## 2023-04-18 PROCEDURE — 1101F PR PT FALLS ASSESS DOC 0-1 FALLS W/OUT INJ PAST YR: ICD-10-PCS | Mod: CPTII,S$GLB,, | Performed by: INTERNAL MEDICINE

## 2023-04-18 PROCEDURE — 99999 PR PBB SHADOW E&M-EST. PATIENT-LVL IV: ICD-10-PCS | Mod: PBBFAC,,, | Performed by: INTERNAL MEDICINE

## 2023-04-18 PROCEDURE — 3078F PR MOST RECENT DIASTOLIC BLOOD PRESSURE < 80 MM HG: ICD-10-PCS | Mod: CPTII,S$GLB,, | Performed by: INTERNAL MEDICINE

## 2023-04-18 PROCEDURE — 3074F SYST BP LT 130 MM HG: CPT | Mod: CPTII,S$GLB,, | Performed by: INTERNAL MEDICINE

## 2023-04-18 PROCEDURE — 99214 OFFICE O/P EST MOD 30 MIN: CPT | Mod: S$GLB,,, | Performed by: INTERNAL MEDICINE

## 2023-04-18 PROCEDURE — 80053 COMPREHEN METABOLIC PANEL: CPT | Performed by: INTERNAL MEDICINE

## 2023-04-18 PROCEDURE — 3288F PR FALLS RISK ASSESSMENT DOCUMENTED: ICD-10-PCS | Mod: CPTII,S$GLB,, | Performed by: INTERNAL MEDICINE

## 2023-04-18 PROCEDURE — 1126F AMNT PAIN NOTED NONE PRSNT: CPT | Mod: CPTII,S$GLB,, | Performed by: INTERNAL MEDICINE

## 2023-04-18 PROCEDURE — 99999 PR PBB SHADOW E&M-EST. PATIENT-LVL IV: CPT | Mod: PBBFAC,,, | Performed by: INTERNAL MEDICINE

## 2023-04-18 PROCEDURE — 80162 ASSAY OF DIGOXIN TOTAL: CPT | Performed by: INTERNAL MEDICINE

## 2023-04-18 PROCEDURE — 3078F DIAST BP <80 MM HG: CPT | Mod: CPTII,S$GLB,, | Performed by: INTERNAL MEDICINE

## 2023-04-18 RX ORDER — METOPROLOL SUCCINATE 25 MG/1
12.5 TABLET, EXTENDED RELEASE ORAL DAILY
Qty: 90 TABLET | Refills: 11 | Status: SHIPPED | OUTPATIENT
Start: 2023-04-18

## 2023-04-18 NOTE — PROGRESS NOTES
Subjective:       Patient ID: Cat Garcia is a 88 y.o. female.    Chief Complaint: Follow-up    Patient is here for followup for chronic conditions.    Has gained some wt -- has been using boost    Walking with walker, no recent falls.    No new health issues.      Review of Systems   Constitutional:  Positive for fatigue. Negative for activity change, appetite change, fever and unexpected weight change.   HENT:  Negative for trouble swallowing.    Eyes:  Negative for visual disturbance.   Respiratory:  Positive for shortness of breath (stable). Negative for chest tightness and wheezing.    Cardiovascular:  Negative for chest pain, palpitations and leg swelling.   Gastrointestinal:  Negative for abdominal distention, abdominal pain and constipation.   Endocrine: Negative for cold intolerance, heat intolerance, polydipsia and polyuria.   Genitourinary:  Negative for difficulty urinating.   Musculoskeletal:  Positive for gait problem.   Skin:  Negative for rash.   Neurological:  Negative for tremors and syncope.        Chronic imbalance       Hematological:  Negative for adenopathy. Does not bruise/bleed easily.   Psychiatric/Behavioral:  Positive for confusion. Negative for dysphoric mood.          Objective:      Physical Exam  Constitutional:       General: She is not in acute distress.     Appearance: Normal appearance. She is well-developed. She is not ill-appearing, toxic-appearing or diaphoretic.      Comments: Son speaks for her in general due to her dementia.  Has eyes closed unless I engage her directly   HENT:      Head: Normocephalic and atraumatic.      Mouth/Throat:      Pharynx: No oropharyngeal exudate.   Eyes:      General: No scleral icterus.     Conjunctiva/sclera: Conjunctivae normal.      Pupils: Pupils are equal, round, and reactive to light.   Neck:      Thyroid: No thyromegaly.      Comments: No neck mass palpated today  Cardiovascular:      Rate and Rhythm: Normal rate.      Heart sounds:  Normal heart sounds. No murmur heard.    No friction rub. No gallop.      Comments: Irregularly irregular      Pulmonary:      Effort: Pulmonary effort is normal. No respiratory distress.      Breath sounds: Normal breath sounds. No wheezing or rales.   Abdominal:      General: Bowel sounds are normal. There is no distension.      Palpations: Abdomen is soft. There is no mass.      Tenderness: There is no abdominal tenderness. There is no guarding or rebound.   Musculoskeletal:         General: No tenderness. Normal range of motion.      Cervical back: Normal range of motion and neck supple.   Lymphadenopathy:      Cervical: No cervical adenopathy.   Neurological:      General: No focal deficit present.      Mental Status: She is alert. She is disoriented.      Motor: No abnormal muscle tone.      Comments: Engages minimally   Psychiatric:         Speech: Speech normal.         Behavior: Behavior normal.      Comments: Calm       Assessment:       1. Essential thrombocytosis    2. Atrial fibrillation, chronic    3. 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 Mixed Alzheimer's and vascular dementia    6. Peripheral vascular disease, unspecified    7. Stage 3 chronic kidney disease, unspecified whether stage 3a or 3b CKD        Plan:       Cat was seen today for follow-up.    Diagnoses and all orders for this visit:    Essential thrombocytosis  Due to see back heme    Atrial fibrillation, chronic  -     Lipid Panel; Future  -     DIGOXIN LEVEL; Future    Vascular parkinsonism  -     Lipid Panel; Future  On statin    Chronic combined systolic (congestive) and diastolic (congestive) heart failure  -     metoprolol succinate (TOPROL-XL) 25 MG 24 hr tablet; Take 0.5 tablets (12.5 mg total) by mouth once daily.  -     CBC Auto Differential; Future  -     Comprehensive Metabolic Panel; Future  -     Lipid Panel; Future  euvolemic    Mixed Alzheimer's  and vascular dementia  -     Lipid Panel; Future    Peripheral vascular disease, unspecified  statin    Stage 3 chronic kidney disease, unspecified whether stage 3a or 3b CKD  Recheck today      Health Maintenance         Date Due Completion Date    TETANUS VACCINE Never done ---    Abdominal Aortic Aneurysm Screening Never done ---    Shingles Vaccine (1 of 2) 09/06/2016 7/12/2016    DEXA Scan 03/12/2020 3/12/2018    Lipid Panel 04/18/2028 4/18/2023   Next time consider repeat BMD         Follow up in about 6 months (around 10/18/2023).    Future Appointments   Date Time Provider Department Center   10/24/2023 11:40 AM Alfie Oconnell MD [facility] [place]

## 2023-06-06 DIAGNOSIS — D47.3 ESSENTIAL THROMBOCYTOSIS: ICD-10-CM

## 2023-06-06 DIAGNOSIS — F01.50 VASCULAR DEMENTIA WITHOUT BEHAVIORAL DISTURBANCE: Chronic | ICD-10-CM

## 2023-06-06 RX ORDER — HYDROXYUREA 500 MG/1
CAPSULE ORAL
Qty: 60 CAPSULE | Refills: 11 | Status: SHIPPED | OUTPATIENT
Start: 2023-06-06

## 2023-06-06 RX ORDER — DIGOXIN 125 MCG
TABLET ORAL
Qty: 90 TABLET | Refills: 0 | Status: SHIPPED | OUTPATIENT
Start: 2023-06-06 | End: 2023-10-05

## 2023-06-06 NOTE — TELEPHONE ENCOUNTER
No care due was identified.  Health [facility] Embedded Care Due Messages. Reference number: [id number].   6/06/2023 9:41:04 AM CDT

## 2023-06-12 RX ORDER — ATORVASTATIN CALCIUM 10 MG/1
TABLET, FILM COATED ORAL
Qty: 90 TABLET | Refills: 3 | Status: SHIPPED | OUTPATIENT
Start: 2023-06-12

## 2023-06-12 NOTE — TELEPHONE ENCOUNTER
Refill Decision Note   Cat Garcia  is requesting a refill authorization.  Brief Assessment and Rationale for Refill:  Approve     Medication Therapy Plan:       Medication Reconciliation Completed: No   Comments:     No Care Gaps recommended.     Note composed:11:25 AM 06/12/2023

## 2023-06-12 NOTE — TELEPHONE ENCOUNTER
No care due was identified.  [facility] Embedded Care Due Messages. Reference number: [id number].   6/12/2023 8:59:06 AM CDT

## 2023-06-27 ENCOUNTER — PES CALL (OUTPATIENT)
Dept: ADMINISTRATIVE | Facility: CLINIC | Age: 88
End: 2023-06-27
Payer: MEDICARE

## 2023-08-01 DIAGNOSIS — I48.20 ATRIAL FIBRILLATION, CHRONIC: Chronic | ICD-10-CM

## 2023-08-01 RX ORDER — DONEPEZIL HYDROCHLORIDE 10 MG/1
TABLET, FILM COATED ORAL
Qty: 90 TABLET | Refills: 2 | Status: SHIPPED | OUTPATIENT
Start: 2023-08-01

## 2023-09-27 ENCOUNTER — PATIENT OUTREACH (OUTPATIENT)
Dept: ADMINISTRATIVE | Facility: HOSPITAL | Age: 88
End: 2023-09-27
Payer: MEDICARE

## 2023-09-27 NOTE — PROGRESS NOTES
Health Maintenance Due   Topic Date Due    TETANUS VACCINE  Never done    Shingles Vaccine (1 of 2) 09/06/2016    DEXA Scan  03/12/2020    COVID-19 Vaccine (4 - Moderna series) 04/16/2023    Influenza Vaccine (1) 09/01/2023     Triggered LINKS and reconciled immunizations. Updated Care Everywhere. Imported OT Recert, Progress Reports & Updated Therapy Plan received from [facility] into chart. Chart review completed.

## 2023-10-04 DIAGNOSIS — F01.50 VASCULAR DEMENTIA WITHOUT BEHAVIORAL DISTURBANCE: Chronic | ICD-10-CM

## 2023-10-04 NOTE — TELEPHONE ENCOUNTER
No care due was identified.  Health [facility] Embedded Care Due Messages. Reference number: [id number].   10/04/2023 4:19:08 PM CDT

## 2023-10-05 RX ORDER — DIGOXIN 125 MCG
TABLET ORAL
Qty: 90 TABLET | Refills: 2 | Status: SHIPPED | OUTPATIENT
Start: 2023-10-05

## 2023-10-24 ENCOUNTER — OFFICE VISIT (OUTPATIENT)
Dept: INTERNAL MEDICINE | Facility: CLINIC | Age: 88
End: 2023-10-24
Payer: MEDICARE

## 2023-10-24 ENCOUNTER — LAB VISIT (OUTPATIENT)
Dept: LAB | Facility: HOSPITAL | Age: 88
End: 2023-10-24
Attending: INTERNAL MEDICINE
Payer: MEDICARE

## 2023-10-24 VITALS
SYSTOLIC BLOOD PRESSURE: 112 MMHG | OXYGEN SATURATION: 100 % | DIASTOLIC BLOOD PRESSURE: 62 MMHG | HEART RATE: 51 BPM | HEIGHT: 64 IN | WEIGHT: 114 LBS | RESPIRATION RATE: 18 BRPM | BODY MASS INDEX: 19.46 KG/M2
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
## 2023-10-24 DIAGNOSIS — D47.3 ESSENTIAL THROMBOCYTOSIS: ICD-10-CM

## 2023-10-24 DIAGNOSIS — F02.80 MIXED ALZHEIMER'S AND VASCULAR DEMENTIA: ICD-10-CM

## 2023-10-24 DIAGNOSIS — F01.50 MIXED ALZHEIMER'S AND VASCULAR DEMENTIA: ICD-10-CM

## 2023-10-24 DIAGNOSIS — G30.9 MIXED ALZHEIMER'S AND VASCULAR DEMENTIA: ICD-10-CM

## 2023-10-24 DIAGNOSIS — I48.20 ATRIAL FIBRILLATION, CHRONIC: ICD-10-CM

## 2023-10-24 LAB
ANION GAP SERPL CALC-SCNC: 8 MMOL/L (ref 8–16)
BASOPHILS # BLD AUTO: 0.06 K/UL (ref 0–0.2)
BASOPHILS NFR BLD: 0.6 % (ref 0–1.9)
BUN SERPL-MCNC: 20 MG/DL (ref 8–23)
CALCIUM SERPL-MCNC: 9.3 MG/DL (ref 8.7–10.5)
CHLORIDE SERPL-SCNC: 112 MMOL/L (ref 95–110)
CO2 SERPL-SCNC: 22 MMOL/L (ref 23–29)
CREAT SERPL-MCNC: 1.1 MG/DL (ref 0.5–1.4)
DIFFERENTIAL METHOD: ABNORMAL
EOSINOPHIL # BLD AUTO: 0.2 K/UL (ref 0–0.5)
EOSINOPHIL NFR BLD: 1.8 % (ref 0–8)
ERYTHROCYTE [DISTWIDTH] IN BLOOD BY AUTOMATED COUNT: 13.1 % (ref 11.5–14.5)
EST. GFR  (NO RACE VARIABLE): 48.9 ML/MIN/1.73 M^2
GLUCOSE SERPL-MCNC: 79 MG/DL (ref 70–110)
HCT VFR BLD AUTO: 34.8 % (ref 37–48.5)
HGB BLD-MCNC: 10.5 G/DL (ref 12–16)
IMM GRANULOCYTES # BLD AUTO: 0.07 K/UL (ref 0–0.04)
IMM GRANULOCYTES NFR BLD AUTO: 0.8 % (ref 0–0.5)
LYMPHOCYTES # BLD AUTO: 1.9 K/UL (ref 1–4.8)
LYMPHOCYTES NFR BLD: 20 % (ref 18–48)
MCH RBC QN AUTO: 35.8 PG (ref 27–31)
MCHC RBC AUTO-ENTMCNC: 30.2 G/DL (ref 32–36)
MCV RBC AUTO: 119 FL (ref 82–98)
MONOCYTES # BLD AUTO: 0.9 K/UL (ref 0.3–1)
MONOCYTES NFR BLD: 9.1 % (ref 4–15)
NEUTROPHILS # BLD AUTO: 6.3 K/UL (ref 1.8–7.7)
NEUTROPHILS NFR BLD: 67.7 % (ref 38–73)
NRBC BLD-RTO: 0 /100 WBC
PLATELET # BLD AUTO: 935 K/UL (ref 150–450)
PMV BLD AUTO: 9.9 FL (ref 9.2–12.9)
POTASSIUM SERPL-SCNC: 4.6 MMOL/L (ref 3.5–5.1)
RBC # BLD AUTO: 2.93 M/UL (ref 4–5.4)
SODIUM SERPL-SCNC: 142 MMOL/L (ref 136–145)
WBC # BLD AUTO: 9.31 K/UL (ref 3.9–12.7)

## 2023-10-24 PROCEDURE — 3288F PR FALLS RISK ASSESSMENT DOCUMENTED: ICD-10-PCS | Mod: CPTII,S$GLB,, | Performed by: INTERNAL MEDICINE

## 2023-10-24 PROCEDURE — 99214 PR OFFICE/OUTPT VISIT, EST, LEVL IV, 30-39 MIN: ICD-10-PCS | Mod: S$GLB,,, | Performed by: INTERNAL MEDICINE

## 2023-10-24 PROCEDURE — 99999 PR PBB SHADOW E&M-EST. PATIENT-LVL IV: CPT | Mod: PBBFAC,,, | Performed by: INTERNAL MEDICINE

## 2023-10-24 PROCEDURE — 1160F RVW MEDS BY RX/DR IN RCRD: CPT | Mod: CPTII,S$GLB,, | Performed by: INTERNAL MEDICINE

## 2023-10-24 PROCEDURE — 99214 OFFICE O/P EST MOD 30 MIN: CPT | Mod: S$GLB,,, | Performed by: INTERNAL MEDICINE

## 2023-10-24 PROCEDURE — 1159F MED LIST DOCD IN RCRD: CPT | Mod: CPTII,S$GLB,, | Performed by: INTERNAL MEDICINE

## 2023-10-24 PROCEDURE — 1126F PR PAIN SEVERITY QUANTIFIED, NO PAIN PRESENT: ICD-10-PCS | Mod: CPTII,S$GLB,, | Performed by: INTERNAL MEDICINE

## 2023-10-24 PROCEDURE — 1159F PR MEDICATION LIST DOCUMENTED IN MEDICAL RECORD: ICD-10-PCS | Mod: CPTII,S$GLB,, | Performed by: INTERNAL MEDICINE

## 2023-10-24 PROCEDURE — 85025 COMPLETE CBC W/AUTO DIFF WBC: CPT | Performed by: INTERNAL MEDICINE

## 2023-10-24 PROCEDURE — 1100F PTFALLS ASSESS-DOCD GE2>/YR: CPT | Mod: CPTII,S$GLB,, | Performed by: INTERNAL MEDICINE

## 2023-10-24 PROCEDURE — 36415 COLL VENOUS BLD VENIPUNCTURE: CPT | Performed by: INTERNAL MEDICINE

## 2023-10-24 PROCEDURE — 1160F PR REVIEW ALL MEDS BY PRESCRIBER/CLIN PHARMACIST DOCUMENTED: ICD-10-PCS | Mod: CPTII,S$GLB,, | Performed by: INTERNAL MEDICINE

## 2023-10-24 PROCEDURE — 80048 BASIC METABOLIC PNL TOTAL CA: CPT | Performed by: INTERNAL MEDICINE

## 2023-10-24 PROCEDURE — 99999 PR PBB SHADOW E&M-EST. PATIENT-LVL IV: ICD-10-PCS | Mod: PBBFAC,,, | Performed by: INTERNAL MEDICINE

## 2023-10-24 PROCEDURE — 3288F FALL RISK ASSESSMENT DOCD: CPT | Mod: CPTII,S$GLB,, | Performed by: INTERNAL MEDICINE

## 2023-10-24 PROCEDURE — 1100F PR PT FALLS ASSESS DOC 2+ FALLS/FALL W/INJURY/YR: ICD-10-PCS | Mod: CPTII,S$GLB,, | Performed by: INTERNAL MEDICINE

## 2023-10-24 PROCEDURE — 1126F AMNT PAIN NOTED NONE PRSNT: CPT | Mod: CPTII,S$GLB,, | Performed by: INTERNAL MEDICINE

## 2023-10-24 NOTE — PROGRESS NOTES
Subjective:       Patient ID: Cat Garcia is a 88 y.o. female.    Chief Complaint: Follow-up    Patient is here for followup for chronic conditions.    She had a fall 1 wk ago, hit L forehead, she was in her room and fell unwitnessed. No prolonged issues since her fall last week.    Continuing shakes after dinner. She has an aide that helps ensure she eats 3 meals per day.    She walks with walker from end of woods at NH to eating area for each meal. She is on continence program and son has not been told abt any bedsores.    She is here with son Dr Ruiz today.          Review of Systems   Constitutional:  Positive for fatigue. Negative for activity change, appetite change, fever and unexpected weight change.   HENT:  Negative for trouble swallowing.    Eyes:  Negative for visual disturbance.   Respiratory:  Positive for shortness of breath (stable). Negative for chest tightness and wheezing.    Cardiovascular:  Negative for chest pain, palpitations and leg swelling.   Gastrointestinal:  Negative for abdominal distention, abdominal pain and constipation.   Endocrine: Negative for cold intolerance, heat intolerance, polydipsia and polyuria.   Genitourinary:  Negative for difficulty urinating.   Musculoskeletal:  Positive for gait problem.   Skin:  Negative for rash.   Neurological:  Negative for tremors and syncope.        Chronic imbalance       Hematological:  Negative for adenopathy. Does not bruise/bleed easily.   Psychiatric/Behavioral:  Positive for confusion. Negative for dysphoric mood.            Objective:      Physical Exam  Constitutional:       General: She is not in acute distress.     Appearance: Normal appearance. She is well-developed. She is not ill-appearing, toxic-appearing or diaphoretic.      Comments: Son speaks for her in general due to her dementia.  Has eyes closed unless I engage her directly   HENT:      Head: Normocephalic and atraumatic.      Mouth/Throat:      Pharynx: No  oropharyngeal exudate.   Eyes:      General: No scleral icterus.     Conjunctiva/sclera: Conjunctivae normal.      Pupils: Pupils are equal, round, and reactive to light.   Neck:      Thyroid: No thyromegaly.      Comments: No neck mass palpated today  Cardiovascular:      Rate and Rhythm: Normal rate.      Heart sounds: Normal heart sounds. No murmur heard.     No friction rub. No gallop.      Comments: Irregularly irregular      Pulmonary:      Effort: Pulmonary effort is normal. No respiratory distress.      Breath sounds: Normal breath sounds. No wheezing or rales.   Abdominal:      General: Bowel sounds are normal. There is no distension.      Palpations: Abdomen is soft. There is no mass.      Tenderness: There is no abdominal tenderness. There is no guarding or rebound.   Musculoskeletal:         General: No tenderness. Normal range of motion.      Cervical back: Normal range of motion and neck supple.   Lymphadenopathy:      Cervical: No cervical adenopathy.   Skin:     Comments: Bruise to L of the eye, mild tenderness at the bruise site, orbit is not affected   Neurological:      General: No focal deficit present.      Mental Status: She is alert. She is disoriented.      Motor: No abnormal muscle tone.      Comments: Engages minimally   Psychiatric:         Speech: Speech normal.         Behavior: Behavior normal.      Comments: Calm         Assessment:       1. Fall, initial encounter    2. Essential thrombocytosis    3. Atrial fibrillation, chronic    4. Mixed Alzheimer's and vascular dementia        Plan:       Cat was seen today for follow-up.    Diagnoses and all orders for this visit:    Fall, initial encounter  I discussed with son whether to continue eliquis which is rxed at lower dose. He wants to hear Dr Gannon's thoughts also. I will communicate with her.    Essential thrombocytosis  -     CBC Auto Differential; Future  -     BASIC METABOLIC PANEL; Future    Atrial fibrillation, chronic  -      CBC Auto Differential; Future  -     BASIC METABOLIC PANEL; Future    Mixed Alzheimer's and vascular dementia  Symptoms stable, wt is stable also      Health Maintenance         Date Due Completion Date    TETANUS VACCINE Never done ---    Shingles Vaccine (1 of 2) 09/06/2016 7/12/2016    DEXA Scan 03/12/2020 3/12/2018    Lipid Panel 04/18/2028 4/18/2023   Covid vax today         Follow up in about 6 months (around 4/24/2024).    No future appointments.

## 2023-10-27 ENCOUNTER — TELEPHONE (OUTPATIENT)
Dept: INTERNAL MEDICINE | Facility: CLINIC | Age: 88
End: 2023-10-27
Payer: MEDICARE

## 2023-10-27 NOTE — TELEPHONE ENCOUNTER
----- Message from Rayshawn Mora MA sent at 10/27/2023 10:30 AM CDT -----  Contact: [facility]    ----- Message -----  From: Janet Navarrete  Sent: 10/27/2023  10:03 AM CDT  To: Anish Judge Staff    Essence with [facility] called, stated that resident appear to have a wound on the left forearm, and look infected, will need a call back ASAP to get orders for wound to be treat it. Please call [phone]. Thank you.        [id number]

## 2023-10-27 NOTE — TELEPHONE ENCOUNTER
Hi, please call back and ask if they can take a picture of the sore/wound and have the son email it to me over [facility].  Thank you, Alfie Oconnell

## 2023-10-27 NOTE — TELEPHONE ENCOUNTER
Hi please call back -- I agree about wound care for this  Patient.  please provide my verbal authorization  Thank you, Alfie Oconnell

## 2023-10-27 NOTE — TELEPHONE ENCOUNTER
Talk with a cna she stated that home health needs to come and treat the wound its looks bad she stated that it also has a lump on it.

## 2023-10-27 NOTE — TELEPHONE ENCOUNTER
----- Message from Hanane Taveras sent at 10/27/2023  4:24 PM CDT -----  Contact: [phone]  1MEDICALADVICE     Patient is calling for Medical Advice regarding:nurse at [facility] is returning the office call     Would like response via [facility]:  ##call back     Comments:

## 2023-11-01 ENCOUNTER — TELEPHONE (OUTPATIENT)
Dept: INTERNAL MEDICINE | Facility: CLINIC | Age: 88
End: 2023-11-01
Payer: MEDICARE

## 2023-11-01 NOTE — TELEPHONE ENCOUNTER
I called son and discussed further eliquis (I have not yet heard from hematologist). We agreed to keep med for now, but revisit if worsened falls, more frequ, any severe falls.  ?s answered

## 2023-11-02 ENCOUNTER — TELEPHONE (OUTPATIENT)
Dept: INTERNAL MEDICINE | Facility: CLINIC | Age: 88
End: 2023-11-02
Payer: MEDICARE

## 2023-11-30 DIAGNOSIS — I48.21 PERMANENT ATRIAL FIBRILLATION: ICD-10-CM

## 2023-11-30 NOTE — TELEPHONE ENCOUNTER
Refill Routing Note   Medication(s) are not appropriate for processing by [facility] for the following reason(s):        Outside of protocol    [facility] action(s):  Route               Appointments  past 12m or future 3m with PCP    Date Provider   Last Visit   10/24/2023 Alfie Oconnell MD   Next Visit   Visit date not found Alfie Oconnell MD   ED visits in past 90 days: 0        Note composed:11:53 AM 11/30/2023

## 2023-12-05 RX ORDER — APIXABAN 2.5 MG/1
TABLET, FILM COATED ORAL
Qty: 180 TABLET | Refills: 3 | Status: SHIPPED | OUTPATIENT
Start: 2023-12-05 | End: 2024-03-07

## 2024-02-19 NOTE — TELEPHONE ENCOUNTER
Hi,  Please call Mrs. Garcia son 1st : [phone] Dr. Abdon Ruiz  Her bone density test shows osteopenia but not osteoporosis.  I recommend vitamin D supplementation 1000 units daily (over the counter pills) which will help keep the bones from losing strength. I think that she already takes vitamin D, but I am not sure if she gets 1000 units at least.    Also, his mother has had a few elevated levels of her platelet count -- I recommend that she see a hematologist.    Let me know if son has any questions.  Thank you, Alfie Oconnell    I called son and LM.        
Spoke to patient son and informed---number giving to Hem/Onc, he will call to make appt  
Vianey

## 2024-02-28 ENCOUNTER — PATIENT MESSAGE (OUTPATIENT)
Dept: HEMATOLOGY/ONCOLOGY | Facility: CLINIC | Age: 89
End: 2024-02-28
Payer: MEDICARE

## 2024-02-28 ENCOUNTER — DOCUMENTATION ONLY (OUTPATIENT)
Dept: HEMATOLOGY/ONCOLOGY | Facility: CLINIC | Age: 89
End: 2024-02-28
Payer: MEDICARE

## 2024-03-07 DIAGNOSIS — I48.21 PERMANENT ATRIAL FIBRILLATION: ICD-10-CM

## 2024-03-07 RX ORDER — APIXABAN 2.5 MG/1
TABLET, FILM COATED ORAL
Qty: 180 TABLET | Refills: 2 | Status: SHIPPED | OUTPATIENT
Start: 2024-03-07

## 2024-03-11 ENCOUNTER — TELEPHONE (OUTPATIENT)
Dept: INTERNAL MEDICINE | Facility: CLINIC | Age: 89
End: 2024-03-11

## 2024-03-11 ENCOUNTER — PATIENT MESSAGE (OUTPATIENT)
Dept: INTERNAL MEDICINE | Facility: CLINIC | Age: 89
End: 2024-03-11

## 2024-03-11 NOTE — TELEPHONE ENCOUNTER
----- Message from Rayshawn Mora MA sent at 3/11/2024  1:04 PM CDT -----  Contact: Jessica Diaz [phone]    ----- Message -----  From: Conrad Acuña  Sent: 3/11/2024  12:05 PM CDT  To: Anish Judge Staff    1MEDICALADVICE     Patient is calling for Medical Advice regarding: Urine has an odor, burning, urgent and frequent    How long has patient had these symptoms:  2 days    Pharmacy name and phone#: [facility] - [address]   Phone: [phone] Fax: [phone]

## 2024-03-11 NOTE — TELEPHONE ENCOUNTER
Hi, please call the nursing home back and offer her an appt with Ms Moura Orlando  for Wednesday.  As An alternative plan her son can complete an online E visit for her and we can do an E visit with a urine collection at home.  I will send a [facility] message for the E visit    Thank you, Alfie Oconnell

## 2024-03-12 ENCOUNTER — PATIENT MESSAGE (OUTPATIENT)
Dept: INTERNAL MEDICINE | Facility: CLINIC | Age: 89
End: 2024-03-12
Payer: MEDICARE

## 2024-03-12 ENCOUNTER — LAB VISIT (OUTPATIENT)
Dept: LAB | Facility: HOSPITAL | Age: 89
End: 2024-03-12
Attending: INTERNAL MEDICINE
Payer: MEDICARE

## 2024-03-12 DIAGNOSIS — R30.0 DYSURIA: ICD-10-CM

## 2024-03-12 DIAGNOSIS — R30.0 DYSURIA: Primary | ICD-10-CM

## 2024-03-12 PROCEDURE — 87086 URINE CULTURE/COLONY COUNT: CPT | Performed by: INTERNAL MEDICINE

## 2024-03-12 PROCEDURE — 81001 URINALYSIS AUTO W/SCOPE: CPT | Performed by: INTERNAL MEDICINE

## 2024-03-12 RX ORDER — NITROFURANTOIN 25; 75 MG/1; MG/1
100 CAPSULE ORAL 2 TIMES DAILY
Qty: 14 CAPSULE | Refills: 0 | Status: SHIPPED | OUTPATIENT
Start: 2024-03-12 | End: 2024-03-19

## 2024-03-12 NOTE — TELEPHONE ENCOUNTER
Spoke with pt son and explained to him the urine home collect. He stated he is going to do that today

## 2024-03-12 NOTE — TELEPHONE ENCOUNTER
Hi, I opened the E visit --  please contact the patient's son Dr Ruiz to assist in scheduling  A home urine test which I ordered with the E visit       Thank you, Alfie Oconnell

## 2024-03-13 LAB
BACTERIA #/AREA URNS AUTO: ABNORMAL /HPF
BILIRUB UR QL STRIP: NEGATIVE
CLARITY UR REFRACT.AUTO: ABNORMAL
COLOR UR AUTO: YELLOW
GLUCOSE UR QL STRIP: NEGATIVE
HGB UR QL STRIP: ABNORMAL
HYALINE CASTS UR QL AUTO: 0 /LPF
KETONES UR QL STRIP: NEGATIVE
LEUKOCYTE ESTERASE UR QL STRIP: ABNORMAL
MICROSCOPIC COMMENT: ABNORMAL
NITRITE UR QL STRIP: NEGATIVE
PH UR STRIP: 8 [PH] (ref 5–8)
PROT UR QL STRIP: ABNORMAL
RBC #/AREA URNS AUTO: 8 /HPF (ref 0–4)
SP GR UR STRIP: 1.02 (ref 1–1.03)
SQUAMOUS #/AREA URNS AUTO: 0 /HPF
UNSPECIFIED CRY UR QL COMP ASSIST: 13
URN SPEC COLLECT METH UR: ABNORMAL
WBC #/AREA URNS AUTO: 20 /HPF (ref 0–5)

## 2024-03-14 LAB
BACTERIA UR CULT: NORMAL
BACTERIA UR CULT: NORMAL

## 2024-03-18 ENCOUNTER — HOSPITAL ENCOUNTER (EMERGENCY)
Facility: HOSPITAL | Age: 89
Discharge: HOME | End: 2024-03-18
Attending: EMERGENCY MEDICINE
Payer: MEDICARE

## 2024-03-18 VITALS
HEART RATE: 94 BPM | HEIGHT: 64 IN | OXYGEN SATURATION: 98 % | RESPIRATION RATE: 17 BRPM | TEMPERATURE: 99 F | BODY MASS INDEX: 19.56 KG/M2 | DIASTOLIC BLOOD PRESSURE: 60 MMHG | SYSTOLIC BLOOD PRESSURE: 119 MMHG

## 2024-03-18 DIAGNOSIS — S00.03XA SCALP HEMATOMA, INITIAL ENCOUNTER: ICD-10-CM
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
## 2024-03-18 PROCEDURE — 99284 EMERGENCY DEPT VISIT MOD MDM: CPT | Mod: 25

## 2024-03-18 RX ORDER — INFLUENZA A VIRUS A/VICTORIA/4897/2022 IVR-238 (H1N1) ANTIGEN (FORMALDEHYDE INACTIVATED), INFLUENZA A VIRUS A/DARWIN/6/2021 IVR-227 (H3N2) ANTIGEN (FORMALDEHYDE INACTIVATED), INFLUENZA B VIRUS B/AUSTRIA/1359417/2021 BVR-26 ANTIGEN (FORMALDEHYDE INACTIVATED), INFLUENZA B VIRUS B/PHUKET/3073/2013 BVR-1B ANTIGEN (FORMALDEHYDE INACTIVATED) 15; 15; 15; 15 UG/.5ML; UG/.5ML; UG/.5ML; UG/.5ML
INJECTION, SUSPENSION INTRAMUSCULAR
COMMUNITY
Start: 2023-10-13

## 2024-03-18 NOTE — ED NOTES
Called carehome [facility] at [place] [phone], left a voicemail to call us back if they wanted more information. Son is here to take mom back home to care facility.

## 2024-03-18 NOTE — PHARMACY MED REC
"Admission Medication History     The home medication history was taken by Carito Ta CPhT.    Medication history obtained from, [facility] List and Nurse Verified    You may go to "Admission" then "Reconcile Home Medications" tabs to review and/or act upon these items.     The home medication list has been updated by the Pharmacy department.   Please read ALL comments highlighted in yellow.   Please address this information as you see fit.    Feel free to contact us if you have any questions or require assistance.      The medications listed below were removed from the home medication list.  Please reorder if appropriate:  Patient reports no longer taking the following medication(s):  Acetaminophen 500 mg  Antiox. no.-10-omega3-lut-belle 280-10-2 mg  Bisacodyl 10 mg supp  Brimonidine 0.15% eye drop  Calcium+D  Omega-3  Fish Oil 300-1000 mg  Polyethylene Glycol 17 gram packet  Vitamin C 1000 mg      Carito Ta CPhT.  Ext [phone]               .          "

## 2024-03-18 NOTE — ED PROVIDER NOTES
Encounter Date: 3/18/2024       History     Chief Complaint   Patient presents with    Fall     Fall backwards; denies loc. + hematoma to head. Pt is on blood thinners.      Patient is an 88 year old female brought in by EMS from her assisted living center where she had a witnessed fall.  Patient fell backwards striking her head.  There was no loss of consciousness.  No vomiting.  Patient has dementia, making any further history currently unobtainable.      Review of patient's allergies indicates:  No Known Allergies  Past Medical History:   Diagnosis Date    Atrial fibrillation     with rapid ventricular rate    Breast cancer     XRT    Chronic bronchitis     CKD (chronic kidney disease), stage III     Dementia     Squamous cell carcinoma      Past Surgical History:   Procedure Laterality Date    BREAST SURGERY  2011    right     Family History   Problem Relation Age of Onset    Heart disease Father     Melanoma Neg Hx      Social History     Tobacco Use    Smoking status: Former     Current packs/day: 0.00     Types: Cigarettes     Quit date: 1982     Years since quittin.2    Smokeless tobacco: Never    Tobacco comments:     pt was social smoker   Substance Use Topics    Alcohol use: No     Alcohol/week: 0.0 standard drinks of alcohol    Drug use: No     Review of Systems   Unable to perform ROS: Dementia       Physical Exam     Initial Vitals [24 1028]   BP Pulse Resp Temp SpO2   (!) 117/54 97 17 98.8 °F (37.1 °C) 98 %      MAP       --         Physical Exam    Nursing note and vitals reviewed.  Constitutional: No distress.   HENT:   Hematoma to the occipital scalp.   Neck: Neck supple.   Cardiovascular:            Irregular.   Pulmonary/Chest: No respiratory distress.   Abdominal: Abdomen is soft. She exhibits no distension. There is no abdominal tenderness.   Musculoskeletal:         General: No tenderness. Normal range of motion.      Cervical back: Neck supple.     Neurological: GCS score is  15. GCS eye subscore is 4. GCS verbal subscore is 5. GCS motor subscore is 6.   Skin: Skin is warm and dry.         ED Course   Procedures  Labs Reviewed - No data to display       Imaging Results              CT Cervical Spine Without Contrast (Final result)  Result time 03/18/24 12:03:24      Final result by Elliot Tang MD (03/18/24 12:03:24)                   Impression:      CT head shows advance involutional changes and white matter disease, but no acute intracranial abnormality.    Paranasal sinus disease including air-fluid levels right maxillary sinus and sphenoid sinus suggesting acute and chronic sinusitis.    Cervical spine has severe degenerative disc disease and spondylosis from C3-4 through C7-T1, similar to the prior      Electronically signed by: Elliot Tang MD  Date:    03/18/2024  Time:    12:03               Narrative:    EXAMINATION:  CT HEAD WITHOUT CONTRAST; CT CERVICAL SPINE WITHOUT CONTRAST    CLINICAL HISTORY:  Head trauma, minor (Age >= 65y);; Neck trauma (Age >= 65y);    TECHNIQUE:  Low dose axial images were obtained through the head.  Coronal and sagittal reformations were also performed. Contrast was not administered.    COMPARISON:  07/20/2022 head CT and cervical spine CT    FINDINGS:  Head CT demonstrates generalized cerebral volume loss that is advanced.  Ventricles are enlarged similar to the prior study.  Extensive white matter hypoattenuation is seen in the cerebral hemispheres likely relating to chronic microvascular disease.    No acute infarct is seen.  There is no intracranial hemorrhage.  Basal ganglia calcifications are likely physiologic.  No extra-axial collections are appreciated.    Air-fluid levels present in the right maxillary sinus and sphenoid sinus.  There is also membrane thickening throughout the paranasal sinuses, and the visualized portions of left maxillary sinus are occluded similar to the prior CT.    CT of the cervical spine demonstrates mild  grade 1 anterolisthesis of C2-3.  There is complete loss of disc space height at C3-4, C4-5, C5-6, C6-7 and C7-T1 from degenerative disc disease.  There is spondylosis with anterior and posterior osteophytes at each vertebral level.  Facet arthropathy is present in lower cervical spine.  Neural foramen narrowing is present at C3-4 through C7-T1, especially on the right.  Mild to moderate spinal canal stenosis is present at C3-4, C4-5 and C5-6                                       CT Head Without Contrast (Final result)  Result time 03/18/24 12:03:24      Final result by Elliot Tang MD (03/18/24 12:03:24)                   Impression:      CT head shows advance involutional changes and white matter disease, but no acute intracranial abnormality.    Paranasal sinus disease including air-fluid levels right maxillary sinus and sphenoid sinus suggesting acute and chronic sinusitis.    Cervical spine has severe degenerative disc disease and spondylosis from C3-4 through C7-T1, similar to the prior      Electronically signed by: Elliot Tang MD  Date:    03/18/2024  Time:    12:03               Narrative:    EXAMINATION:  CT HEAD WITHOUT CONTRAST; CT CERVICAL SPINE WITHOUT CONTRAST    CLINICAL HISTORY:  Head trauma, minor (Age >= 65y);; Neck trauma (Age >= 65y);    TECHNIQUE:  Low dose axial images were obtained through the head.  Coronal and sagittal reformations were also performed. Contrast was not administered.    COMPARISON:  07/20/2022 head CT and cervical spine CT    FINDINGS:  Head CT demonstrates generalized cerebral volume loss that is advanced.  Ventricles are enlarged similar to the prior study.  Extensive white matter hypoattenuation is seen in the cerebral hemispheres likely relating to chronic microvascular disease.    No acute infarct is seen.  There is no intracranial hemorrhage.  Basal ganglia calcifications are likely physiologic.  No extra-axial collections are appreciated.    Air-fluid levels  present in the right maxillary sinus and sphenoid sinus.  There is also membrane thickening throughout the paranasal sinuses, and the visualized portions of left maxillary sinus are occluded similar to the prior CT.    CT of the cervical spine demonstrates mild grade 1 anterolisthesis of C2-3.  There is complete loss of disc space height at C3-4, C4-5, C5-6, C6-7 and C7-T1 from degenerative disc disease.  There is spondylosis with anterior and posterior osteophytes at each vertebral level.  Facet arthropathy is present in lower cervical spine.  Neural foramen narrowing is present at C3-4 through C7-T1, especially on the right.  Mild to moderate spinal canal stenosis is present at C3-4, C4-5 and C5-6                                       Medications - No data to display  Medical Decision Making  DDx :  Including but not limited to :  Scalp hematoma, skull fracture, subarachnoid hemorrhage, intracranial bleed, cervical fracture.    Emergent evaluation of an 88-year-old female from her assisted living center where she sustained a mechanical fall striking the back of her head.  There was no loss of consciousness.  Patient has no focal deficits.  Head and neck CTs without acute abnormalities.  I feel the patient is safe to discharge back to her assisted living center.  She may follow up with her primary physician as needed but return to the ED for any further urgent concerns.    Amount and/or Complexity of Data Reviewed  Radiology: ordered.     Details: Cervical spine and head CT without acute abnormalities.                                      Clinical Impression:  Final diagnoses:  [W19.XXXA] Fall, initial encounter (Primary)  [S00.03XA] Scalp hematoma, initial encounter          ED Disposition Condition    Discharge Stable          ED Prescriptions    None       Follow-up Information       Follow up With Specialties Details Why Contact Info    Alfie Oconnell MD Internal Medicine  As needed [id number]  [facility] [address]  [phone]               Melvin Padilla MD  03/18/24 4507     none

## 2024-03-25 ENCOUNTER — PATIENT MESSAGE (OUTPATIENT)
Dept: INTERNAL MEDICINE | Facility: CLINIC | Age: 89
End: 2024-03-25
Payer: MEDICARE

## 2024-04-02 ENCOUNTER — OFFICE VISIT (OUTPATIENT)
Dept: HEMATOLOGY/ONCOLOGY | Facility: CLINIC | Age: 89
End: 2024-04-02
Payer: MEDICARE

## 2024-04-02 ENCOUNTER — LAB VISIT (OUTPATIENT)
Dept: LAB | Facility: HOSPITAL | Age: 89
End: 2024-04-02
Attending: INTERNAL MEDICINE
Payer: MEDICARE

## 2024-04-02 DIAGNOSIS — D47.3 ESSENTIAL THROMBOCYTHEMIA: ICD-10-CM

## 2024-04-02 DIAGNOSIS — D47.3 ESSENTIAL THROMBOCYTOSIS: Primary | ICD-10-CM

## 2024-04-02 DIAGNOSIS — Z79.01 LONG TERM CURRENT USE OF ANTICOAGULANT THERAPY: ICD-10-CM

## 2024-04-02 LAB
ALBUMIN SERPL BCP-MCNC: 3.4 G/DL (ref 3.5–5.2)
ALP SERPL-CCNC: 75 U/L (ref 55–135)
ALT SERPL W/O P-5'-P-CCNC: 10 U/L (ref 10–44)
ANION GAP SERPL CALC-SCNC: 9 MMOL/L (ref 8–16)
AST SERPL-CCNC: 21 U/L (ref 10–40)
BILIRUB SERPL-MCNC: 0.3 MG/DL (ref 0.1–1)
BUN SERPL-MCNC: 12 MG/DL (ref 8–23)
CALCIUM SERPL-MCNC: 9.1 MG/DL (ref 8.7–10.5)
CHLORIDE SERPL-SCNC: 109 MMOL/L (ref 95–110)
CO2 SERPL-SCNC: 22 MMOL/L (ref 23–29)
CREAT SERPL-MCNC: 1.1 MG/DL (ref 0.5–1.4)
ERYTHROCYTE [DISTWIDTH] IN BLOOD BY AUTOMATED COUNT: 14.8 % (ref 11.5–14.5)
EST. GFR  (NO RACE VARIABLE): 48.9 ML/MIN/1.73 M^2
GLUCOSE SERPL-MCNC: 89 MG/DL (ref 70–110)
HCT VFR BLD AUTO: 35.2 % (ref 37–48.5)
HGB BLD-MCNC: 10.7 G/DL (ref 12–16)
IMM GRANULOCYTES # BLD AUTO: 0.05 K/UL (ref 0–0.04)
MCH RBC QN AUTO: 35.3 PG (ref 27–31)
MCHC RBC AUTO-ENTMCNC: 30.4 G/DL (ref 32–36)
MCV RBC AUTO: 116 FL (ref 82–98)
NEUTROPHILS # BLD AUTO: 5.8 K/UL (ref 1.8–7.7)
PLATELET # BLD AUTO: 840 K/UL (ref 150–450)
PMV BLD AUTO: 10.4 FL (ref 9.2–12.9)
POTASSIUM SERPL-SCNC: 4.4 MMOL/L (ref 3.5–5.1)
PROT SERPL-MCNC: 6 G/DL (ref 6–8.4)
RBC # BLD AUTO: 3.03 M/UL (ref 4–5.4)
SODIUM SERPL-SCNC: 140 MMOL/L (ref 136–145)
WBC # BLD AUTO: 8.87 K/UL (ref 3.9–12.7)

## 2024-04-02 PROCEDURE — 36415 COLL VENOUS BLD VENIPUNCTURE: CPT | Mod: PO | Performed by: INTERNAL MEDICINE

## 2024-04-02 PROCEDURE — 1157F ADVNC CARE PLAN IN RCRD: CPT | Mod: CPTII,95,, | Performed by: INTERNAL MEDICINE

## 2024-04-02 PROCEDURE — 99214 OFFICE O/P EST MOD 30 MIN: CPT | Mod: 95,,, | Performed by: INTERNAL MEDICINE

## 2024-04-02 PROCEDURE — 80053 COMPREHEN METABOLIC PANEL: CPT | Performed by: INTERNAL MEDICINE

## 2024-04-02 PROCEDURE — 85027 COMPLETE CBC AUTOMATED: CPT | Performed by: INTERNAL MEDICINE

## 2024-04-09 NOTE — PROGRESS NOTES
Hematology and Medical Oncology   Follow Up     04/02/2024    Primary Oncologic Diagnosis: Essential Thrombocytosis    Telemedicine Documentation:  The patient location is: assisted living facility  The chief complaint leading to consultation is: ET    Visit type: audiovisual    Face to Face time with patient: 20  30 minutes of total time spent on the encounter, which includes face to face time and non-face to face time preparing to see the patient (eg, review of tests), Obtaining and/or reviewing separately obtained history, Documenting clinical information in the electronic or other health record, Independently interpreting results (not separately reported) and communicating results to the patient/family/caregiver, or Care coordination (not separately reported).         Each patient to whom he or she provides medical services by telemedicine is:  (1) informed of the relationship between the physician and patient and the respective role of any other health care provider with respect to management of the patient; and (2) notified that he or she may decline to receive medical services by telemedicine and may withdraw from such care at any time.      History of Present Ilness:   Cat Quintero) is a pleasant 88 y.o.female with a past medical history of a.fib, breast cancer, CKD stage III, and dementia who presents today to discuss her elevated platelet count. She was unsure of why she was in the hematology office.    On chart review Mrs. Garcia's platelets were in the mid 300's range from 2786-9501. In February 2017 her platelets crossed the 400k ismael and have been slowly rising to > 500k in January 2018.    She specifically denies blurry vision, palmar erythema, or hepatosplenomegaly known side effects of elevated platlet count. While she expressers no complaints today on further questioning she has a poor appetite, but is drinking adequate liquids.    Interval History: Ms. Garcia presents virtually with  her son. She has no issues or complaints. Denies specific worsening of symptoms.     Continues to take hydrea as prescribed 1000mg daily.     [place] times a day to meals using a walker. Overall doing well in the memory care unit.     PAST MEDICAL HISTORY:   Past Medical History:   Diagnosis Date    Atrial fibrillation     with rapid ventricular rate    Breast cancer     XRT    Chronic bronchitis     CKD (chronic kidney disease), stage III     Dementia     Squamous cell carcinoma        PAST SURGICAL HISTORY:   Past Surgical History:   Procedure Laterality Date    BREAST SURGERY  2011    right       PAST SOCIAL HISTORY:   reports that she quit smoking about 42 years ago. Her smoking use included cigarettes. She has never used smokeless tobacco. She reports that she does not drink alcohol and does not use drugs.    FAMILY HISTORY:  Family History   Problem Relation Age of Onset    Heart disease Father     Melanoma Neg Hx        CURRENT MEDICATIONS:   Current Outpatient Medications   Medication Sig    apixaban (ELIQUIS) 2.5 mg Tab TAKE ONE TABLET BY MOUTH TWICE DAILY. DO NOT TAKE UNTIL YOU HAVE HELD WARFARIN FOR 3 DAYS!! (Patient taking differently: Take 2.5 mg by mouth 2 (two) times daily.)    atorvastatin (LIPITOR) 10 MG tablet TAKE ONE TABLET BY MOUTH ONCE DAILY    digoxin (LANOXIN) 125 mcg tablet TAKE ONE TABLET BY MOUTH EVERY DAY    donepeziL (ARICEPT) 10 MG tablet TAKE ONE TABLET BY MOUTH EVERY EVENING    FLUAD QUAD 2023-24,65Y UP,,PF, 60 mcg (15 mcg x 4)/0.5 mL Syrg     hydroxyurea (HYDREA) 500 mg Cap TAKE TWO CAPSULES BY MOUTH ONCE DAILY    influenza (FLUZONE HIGH-DOSE) 180 mcg/0.5 mL vaccine Inject 0.5 mLs into the muscle. (Patient taking differently: Inject 0.5 mLs into the muscle once.)    latanoprost 0.005 % ophthalmic solution Place 1 drop into both eyes every evening.    metoprolol succinate (TOPROL-XL) 25 MG 24 hr tablet Take 0.5 tablets (12.5 mg total)  by mouth once daily. (Patient taking differently: Take 25 mg by mouth once daily.)     No current facility-administered medications for this visit.     ALLERGIES:   Review of patient's allergies indicates:  No Known Allergies      Review of Systems:     Review of Systems   Constitutional: Positive for appetite change and fatigue. Negative for chills, diaphoresis, fever and unexpected weight change.  +memory loss  HENT:   Negative for hearing loss, mouth sores, nosebleeds, sore throat, trouble swallowing and voice change.    Eyes: Negative for eye problems and icterus.   Respiratory: Negative for chest tightness, cough, hemoptysis, shortness of breath and wheezing.    Cardiovascular: Negative for chest pain, leg swelling and palpitations.   Gastrointestinal: Negative for abdominal distention, abdominal pain, blood in stool, diarrhea, nausea and vomiting.   Endocrine: Negative for hot flashes.   Genitourinary: Negative for bladder incontinence, difficulty urinating, dysuria and hematuria.    Musculoskeletal: Negative for arthralgias, back pain, flank pain, gait problem, myalgias, neck pain and neck stiffness.   Skin: Negative for itching, rash and wound.   Neurological: Negative for dizziness, extremity weakness, + gait problem, headaches, numbness, seizures and speech difficulty.   Hematological: Negative for adenopathy. Does not bruise/bleed easily.   Psychiatric/Behavioral: Negative for confusion, depression and sleep disturbance. The patient is not nervous/anxious.         Physical Exam:   Limited secondary to virtual visit      ECOG Performance Status: (foot note - ECOG PS provided by Eastern Cooperative Oncology Group) 2 - Symptomatic, <50% confined to bed    Labs:   Lab Results   Component Value Date    WBC 8.87 04/02/2024    HGB 10.7 (L) 04/02/2024    HCT 35.2 (L) 04/02/2024     (H) 04/02/2024    CHOL 151 04/18/2023    TRIG 105 04/18/2023    HDL 56 04/18/2023    ALT 10 04/02/2024    AST 21 04/02/2024      04/02/2024    K 4.4 04/02/2024     04/02/2024    CREATININE 1.1 04/02/2024    BUN 12 04/02/2024    CO2 22 (L) 04/02/2024    TSH 4.477 (H) 04/21/2022    INR 2.4 02/12/2020       Imaging: Previous imaging has been reviewed     Assessment and Plan:     Ms. Garcia is a pleasant 88 year old female who presents to clinic today to discuss her elevated platelet count    Essential Thrombocytosis  --CALR exon 9 detected  --CALR mutation is identified in approximately   49-88% of JAK2 and MPL-wild type essential thrombocythemia   --We discussed the fact that 90% of ET patients have one of the above mutations  --Continue hydrea 1000mg.   --Plan to recheck labs in 3 months       Hypotension  --Appears to be close to baseline given     30 minutes were spent in total on this encounter, of which 20 minutes wrer face to face with the patient and her  Care giver to discuss the lab value, treatment options. I have provided the patient with an opportunity to ask questions and have all questions answered to her satisfaction.       She will follow up with me in 3 months, but knows to call in the interim if symptoms change or should a problem arise.        Renée Gannon MD  Hematology and Medical Oncology  Bone Marrow Transplant  [facility]        BMT Chart Routing      Follow up with physician 3 months. 1. cbc in 3 months 2. see me virtually 1-2 days after labs   Follow up with [facility]    Provider visit type    Infusion scheduling note    Injection scheduling note    Labs    Imaging    Pharmacy appointment    Other referrals

## 2024-05-20 DIAGNOSIS — I50.42 CHRONIC COMBINED SYSTOLIC (CONGESTIVE) AND DIASTOLIC (CONGESTIVE) HEART FAILURE: ICD-10-CM

## 2024-05-20 NOTE — TELEPHONE ENCOUNTER
Care Due:                  Date            Visit Type   Department     Provider  --------------------------------------------------------------------------------                                EP -                              PRIMARY      [facility] INTER[facility]  Last Visit: 10-      CARE ([facility])   MEDICINE       Alfie Oconnell  Next Visit: None Scheduled  None         None Found                                                            Last  Test          Frequency    Reason                     Performed    Due Date  --------------------------------------------------------------------------------    Lipid Panel.  12 months..  atorvastatin.............  04- 04-    [facility] Embedded Care Due Messages. Reference number: [id number].   5/20/2024 3:59:20 PM CDT

## 2024-05-21 RX ORDER — METOPROLOL SUCCINATE 25 MG/1
12.5 TABLET, EXTENDED RELEASE ORAL DAILY
Qty: 45 TABLET | Refills: 11 | Status: SHIPPED | OUTPATIENT
Start: 2024-05-21 | End: 2024-05-21

## 2024-05-21 RX ORDER — METOPROLOL SUCCINATE 25 MG/1
12.5 TABLET, EXTENDED RELEASE ORAL DAILY
Qty: 45 TABLET | Refills: 1 | Status: SHIPPED | OUTPATIENT
Start: 2024-05-21

## 2024-05-21 NOTE — TELEPHONE ENCOUNTER
Refill Routing Note   Medication(s) are not appropriate for processing by [facility] for the following reason(s):        ED/Hospital Visit since last OV with provider    [facility] action(s):  Defer     Requires labs : Yes      Medication Therapy Plan: ED visit on 3/18/24 for a witnessed fall; did not follow up with pcp since the ED visit    Extended chart review required: Yes     Appointments  past 12m or future 3m with PCP    Date Provider   Last Visit   3/11/2024 Alfie Oconnell MD   Next Visit   Visit date not found Alfie Oconnell MD   ED visits in past 90 days: 1        Note composed:10:51 AM 05/21/2024

## 2024-06-06 DIAGNOSIS — D47.3 ESSENTIAL THROMBOCYTOSIS: ICD-10-CM

## 2024-06-06 RX ORDER — HYDROXYUREA 500 MG/1
CAPSULE ORAL
Qty: 60 CAPSULE | Refills: 11 | Status: SHIPPED | OUTPATIENT
Start: 2024-06-06

## 2024-06-09 ENCOUNTER — HOSPITAL ENCOUNTER (EMERGENCY)
Facility: HOSPITAL | Age: 89
Discharge: HOME | End: 2024-06-09
Attending: EMERGENCY MEDICINE
Payer: MEDICARE

## 2024-06-09 VITALS
OXYGEN SATURATION: 100 % | HEART RATE: 46 BPM | RESPIRATION RATE: 17 BRPM | TEMPERATURE: 98 F | DIASTOLIC BLOOD PRESSURE: 62 MMHG | SYSTOLIC BLOOD PRESSURE: 132 MMHG
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
## 2024-06-09 DIAGNOSIS — N30.00 ACUTE CYSTITIS WITHOUT HEMATURIA: ICD-10-CM

## 2024-06-09 LAB
ALBUMIN SERPL BCP-MCNC: 3.4 G/DL (ref 3.5–5.2)
ALP SERPL-CCNC: 58 U/L (ref 55–135)
ALT SERPL W/O P-5'-P-CCNC: 7 U/L (ref 10–44)
AMORPH CRY URNS QL MICRO: ABNORMAL
ANION GAP SERPL CALC-SCNC: 8 MMOL/L (ref 8–16)
AST SERPL-CCNC: 19 U/L (ref 10–40)
BACTERIA #/AREA URNS HPF: ABNORMAL /HPF
BASOPHILS # BLD AUTO: 0.09 K/UL (ref 0–0.2)
BASOPHILS NFR BLD: 0.8 % (ref 0–1.9)
BILIRUB SERPL-MCNC: 0.5 MG/DL (ref 0.1–1)
BILIRUB UR QL STRIP: NEGATIVE
BUN SERPL-MCNC: 20 MG/DL (ref 8–23)
CALCIUM SERPL-MCNC: 9.4 MG/DL (ref 8.7–10.5)
CHLORIDE SERPL-SCNC: 111 MMOL/L (ref 95–110)
CLARITY UR: ABNORMAL
CO2 SERPL-SCNC: 21 MMOL/L (ref 23–29)
COLOR UR: YELLOW
CREAT SERPL-MCNC: 0.9 MG/DL (ref 0.5–1.4)
DIFFERENTIAL METHOD BLD: ABNORMAL
DIGOXIN SERPL-MCNC: 0.5 NG/ML (ref 0.8–2)
EOSINOPHIL # BLD AUTO: 0.2 K/UL (ref 0–0.5)
EOSINOPHIL NFR BLD: 1.5 % (ref 0–8)
ERYTHROCYTE [DISTWIDTH] IN BLOOD BY AUTOMATED COUNT: 14.7 % (ref 11.5–14.5)
EST. GFR  (NO RACE VARIABLE): >60 ML/MIN/1.73 M^2
GLUCOSE SERPL-MCNC: 95 MG/DL (ref 70–110)
GLUCOSE UR QL STRIP: NEGATIVE
HCT VFR BLD AUTO: 33.5 % (ref 37–48.5)
HGB BLD-MCNC: 10.7 G/DL (ref 12–16)
HGB UR QL STRIP: NEGATIVE
HYALINE CASTS #/AREA URNS LPF: 0 /LPF
IMM GRANULOCYTES # BLD AUTO: 0.04 K/UL (ref 0–0.04)
IMM GRANULOCYTES NFR BLD AUTO: 0.4 % (ref 0–0.5)
KETONES UR QL STRIP: NEGATIVE
LEUKOCYTE ESTERASE UR QL STRIP: ABNORMAL
LYMPHOCYTES # BLD AUTO: 2.3 K/UL (ref 1–4.8)
LYMPHOCYTES NFR BLD: 20.8 % (ref 18–48)
MCH RBC QN AUTO: 36 PG (ref 27–31)
MCHC RBC AUTO-ENTMCNC: 31.9 G/DL (ref 32–36)
MCV RBC AUTO: 113 FL (ref 82–98)
MICROSCOPIC COMMENT: ABNORMAL
MONOCYTES # BLD AUTO: 0.9 K/UL (ref 0.3–1)
MONOCYTES NFR BLD: 7.7 % (ref 4–15)
NEUTROPHILS # BLD AUTO: 7.7 K/UL (ref 1.8–7.7)
NEUTROPHILS NFR BLD: 68.8 % (ref 38–73)
NITRITE UR QL STRIP: NEGATIVE
NRBC BLD-RTO: 0 /100 WBC
PH UR STRIP: 8 [PH] (ref 5–8)
PLATELET # BLD AUTO: 878 K/UL (ref 150–450)
PMV BLD AUTO: 10.2 FL (ref 9.2–12.9)
POTASSIUM SERPL-SCNC: 4.7 MMOL/L (ref 3.5–5.1)
PROT SERPL-MCNC: 6.2 G/DL (ref 6–8.4)
PROT UR QL STRIP: ABNORMAL
RBC # BLD AUTO: 2.97 M/UL (ref 4–5.4)
RBC #/AREA URNS HPF: 9 /HPF (ref 0–4)
SODIUM SERPL-SCNC: 140 MMOL/L (ref 136–145)
SP GR UR STRIP: 1.02 (ref 1–1.03)
SQUAMOUS #/AREA URNS HPF: 1 /HPF
TRI-PHOS CRY URNS QL MICRO: ABNORMAL
URN SPEC COLLECT METH UR: ABNORMAL
UROBILINOGEN UR STRIP-ACNC: NEGATIVE EU/DL
WBC # BLD AUTO: 11.18 K/UL (ref 3.9–12.7)
WBC #/AREA URNS HPF: 38 /HPF (ref 0–5)

## 2024-06-09 PROCEDURE — 93010 ELECTROCARDIOGRAM REPORT: CPT | Mod: ,,, | Performed by: INTERNAL MEDICINE

## 2024-06-09 PROCEDURE — 80162 ASSAY OF DIGOXIN TOTAL: CPT | Performed by: EMERGENCY MEDICINE

## 2024-06-09 PROCEDURE — 81000 URINALYSIS NONAUTO W/SCOPE: CPT | Performed by: EMERGENCY MEDICINE

## 2024-06-09 PROCEDURE — 85025 COMPLETE CBC W/AUTO DIFF WBC: CPT | Performed by: EMERGENCY MEDICINE

## 2024-06-09 PROCEDURE — 93005 ELECTROCARDIOGRAM TRACING: CPT

## 2024-06-09 PROCEDURE — 80053 COMPREHEN METABOLIC PANEL: CPT | Performed by: EMERGENCY MEDICINE

## 2024-06-09 PROCEDURE — 87086 URINE CULTURE/COLONY COUNT: CPT | Performed by: EMERGENCY MEDICINE

## 2024-06-09 PROCEDURE — 25000003 PHARM REV CODE 250: Performed by: EMERGENCY MEDICINE

## 2024-06-09 PROCEDURE — 99285 EMERGENCY DEPT VISIT HI MDM: CPT | Mod: 25

## 2024-06-09 RX ORDER — CEPHALEXIN 500 MG/1
500 CAPSULE ORAL 4 TIMES DAILY
Qty: 20 CAPSULE | Refills: 0 | Status: SHIPPED | OUTPATIENT
Start: 2024-06-09 | End: 2024-06-09

## 2024-06-09 RX ORDER — CEPHALEXIN 500 MG/1
500 CAPSULE ORAL 4 TIMES DAILY
Qty: 20 CAPSULE | Refills: 0 | Status: SHIPPED | OUTPATIENT
Start: 2024-06-09 | End: 2024-06-14

## 2024-06-09 RX ORDER — CEPHALEXIN 500 MG/1
500 CAPSULE ORAL
Status: COMPLETED | OUTPATIENT
Start: 2024-06-09 | End: 2024-06-09

## 2024-06-09 RX ADMIN — CEPHALEXIN 500 MG: 500 CAPSULE ORAL at 10:06

## 2024-06-09 NOTE — ED PROVIDER NOTES
Encounter Date: 2024       History     Chief Complaint   Patient presents with    Fall     Unwitnessed fall at nursing home. Staff found patient on floor on side of bed. Patient states she slipped out of bed. Denies injury. Patient is bedbound on memory care unit.        The patient is an 89-year-old female who was found on the floor this morning at her nursing home.  The patient has a history of dementia and is baseline confused.  She denies any pain anywhere.      Review of patient's allergies indicates:  No Known Allergies  Past Medical History:   Diagnosis Date    Atrial fibrillation     with rapid ventricular rate    Breast cancer     XRT    Chronic bronchitis     CKD (chronic kidney disease), stage III     Dementia     Squamous cell carcinoma      Past Surgical History:   Procedure Laterality Date    BREAST SURGERY  2011    right     Family History   Problem Relation Name Age of Onset    Heart disease Father      Melanoma Neg Hx       Social History     Tobacco Use    Smoking status: Former     Current packs/day: 0.00     Types: Cigarettes     Quit date: 1982     Years since quittin.4    Smokeless tobacco: Never    Tobacco comments:     pt was social smoker   Substance Use Topics    Alcohol use: No     Alcohol/week: 0.0 standard drinks of alcohol    Drug use: No     Review of Systems   Unable to perform ROS: Dementia       Physical Exam     Initial Vitals [24 0755]   BP Pulse Resp Temp SpO2   (!) 124/45 (!) 45 17 98 °F (36.7 °C) 98 %      MAP       --         Physical Exam    Nursing note and vitals reviewed.  Constitutional: She appears well-developed and well-nourished.   HENT:   Head: Normocephalic and atraumatic.     No scalp hematomas, no lacerations.  No neck tenderness with palpation,  no crepitance or swelling to the neck   Neck: Neck supple. No JVD present.   Normal range of motion.  Cardiovascular:  Normal rate and normal heart sounds.            bradycardia   Pulmonary/Chest:  Breath sounds normal. She exhibits no tenderness.   Abdominal: Abdomen is soft. She exhibits no distension. There is no abdominal tenderness.   Musculoskeletal:         General: No tenderness. Normal range of motion.      Cervical back: Normal range of motion and neck supple.      Comments:  She has no tenderness with pelvic rocking.  She has full range of motion of her hips and knees.  No contusions or swelling to her extremities     Neurological: She is alert and oriented to person, place, and time.   Skin: Skin is warm and dry.   Psychiatric: She has a normal mood and affect. Her behavior is normal. Judgment and thought content normal.         ED Course   Procedures  Labs Reviewed   CBC W/ AUTO DIFFERENTIAL - Abnormal; Notable for the following components:       Result Value    RBC 2.97 (*)     Hemoglobin 10.7 (*)     Hematocrit 33.5 (*)      (*)     MCH 36.0 (*)     MCHC 31.9 (*)     RDW 14.7 (*)     Platelets 878 (*)     All other components within normal limits   COMPREHENSIVE METABOLIC PANEL - Abnormal; Notable for the following components:    Chloride 111 (*)     CO2 21 (*)     Albumin 3.4 (*)     ALT 7 (*)     All other components within normal limits   DIGOXIN LEVEL - Abnormal; Notable for the following components:    Digoxin Lvl 0.5 (*)     All other components within normal limits   URINALYSIS, REFLEX TO URINE CULTURE - Abnormal; Notable for the following components:    Appearance, UA Hazy (*)     Protein, UA 1+ (*)     Leukocytes, UA 2+ (*)     All other components within normal limits    Narrative:     Specimen Source->Urine   URINALYSIS MICROSCOPIC - Abnormal; Notable for the following components:    RBC, UA 9 (*)     WBC, UA 38 (*)     All other components within normal limits    Narrative:     Specimen Source->Urine   CULTURE, URINE     EKG Readings: (Independently Interpreted)   Initial: 0817. Rhythm: Sinus Bradycardia. Heart Rate: 44. Ectopy: No Ectopy. Conduction: Normal. ST Segments:  Normal ST Segments. T Waves: Normal. Clinical Impression: Normal Sinus Rhythm       Imaging Results              CT Cervical Spine Without Contrast (Final result)  Result time 06/09/24 09:32:09      Final result by Shirin Hall MD (06/09/24 09:32:09)                   Impression:      Multilevel degenerative changes of the cervical spine without fracture or acute subluxation.      Electronically signed by: Shirin Hall MD  Date:    06/09/2024  Time:    09:32               Narrative:    EXAMINATION:  CT CERVICAL SPINE WITHOUT CONTRAST    CLINICAL HISTORY:  Neck trauma (Age >= 65y);    TECHNIQUE:  Low dose axial images, sagittal and coronal reformations were performed though the cervical spine.  Contrast was not administered.    COMPARISON:  03/18/2024    FINDINGS:  The incidentally visualized soft tissue structures of the neck show calcifications of the extracranial carotid vasculature.  Biapical pleuroparenchymal scarring.  Prevertebral soft tissues appear normal.    Reversal of the normal cervical lordosis.  Vertebral body heights are maintained.  There are multilevel degenerative changes with disc space narrowing, endplate sclerosis, marginal osteophytosis and facet arthropathy.  No fracture or subluxation is seen.  Suspected posterior disc osteophyte complexes at the C3-4 through C6-7 levels resulting in at least mild central canal stenosis, similar to before.  There is also likely multilevel neural foraminal narrowing.                                       CT Head Without Contrast (Final result)  Result time 06/09/24 09:17:41      Final result by Shirin Hall MD (06/09/24 09:17:41)                   Impression:      No acute intracranial findings.    Advanced generalized cerebral volume loss with moderate patchy decreased attenuation supratentorial white matter while nonspecific suggestive for chronic ischemic change.    No evidence for acute intracranial hemorrhage.  Clinical correlation and  further evaluation as warranted.    Extensive opacification of the left maxillary antra extending into the left ethmoid air cells.  Stable.      Electronically signed by: Shirin Hall MD  Date:    06/09/2024  Time:    09:17               Narrative:    EXAMINATION:  CT HEAD WITHOUT CONTRAST    CLINICAL HISTORY:  Head trauma, minor (Age >= 65y);Mental status change, unknown cause;    TECHNIQUE:  Multiple sequential 5 mm axial images of the head without contrast.  Coronal and sagittal reformatted imaging from the axial acquisition.    COMPARISON:  03/18/2024    FINDINGS:  There is age advanced generalized cerebral volume loss.  Compensatory enlargement of the ventricle sulci and cisterns without hydrocephalus.  There is no midline shift or mass effect.  There is moderate ill-defined decreased attenuation in the supratentorial white matter while nonspecific suggestive for chronic ischemic change.  There is no evidence for acute intracranial hemorrhage or sulcal effacement.  There is no midline shift or mass effect.  Prominent vascular calcifications.  Extensive opacification of the left maxillary antra extending into the left ethmoid air cells.  Remaining visualized paranasal sinuses and mastoid air cells are clear..                                       Medications   cephALEXin capsule 500 mg (has no administration in time range)     Medical Decision Making  Head injury including subdural hematoma, epidural hematoma, subarachnoid hemorrhage or intracranial hemorrhage, cervical fracture, cervical strain, lumbar strain, lumbar fracture, thoracic spine fracture, rib fracture, rib contusions., chest wall contusion, sternal fracture, pneumothorax, pulmonary contusion, abdominal trauma including splenic laceration, liver laceration, intraperitoneal bleeding, hip fracture, other long bone fractures     MDM:  the patient is an 89-year-old female who was found on the floor this morning.  She has no evidence of trauma on her  body, she has dementia and is unable to given an appropriate history.   The patient is bradycardic and is on digoxin.  I will check a digoxin level along with other labs, CT head and c spine.    1029:   Labs have resulted and are within normal limits.  Patient has a mild UTI and will be started on Keflex.  CT head and neck are negative.    Amount and/or Complexity of Data Reviewed  Labs: ordered. Decision-making details documented in ED Course.  Radiology: ordered. Decision-making details documented in ED Course.    Risk  Prescription drug management.               ED Course as of 06/09/24 1030   Sun Jun 09, 2024   1006 CBC auto differential(!) [ST]   1006 Urinalysis, Reflex to Urine Culture Urine, Catheterized(!) [ST]   1006 Urinalysis Microscopic(!) [ST]   1006 Comprehensive metabolic panel(!) [ST]   1006 Digoxin level(!) [ST]   1007 CT Cervical Spine Without Contrast  Impression:     Multilevel degenerative changes of the cervical spine without fracture or acute subluxation.   [ST]   1007 CT Head Without Contrast  Impression:     No acute intracranial findings.     Advanced generalized cerebral volume loss with moderate patchy decreased attenuation supratentorial white matter while nonspecific suggestive for chronic ischemic change.     No evidence for acute intracranial hemorrhage.  Clinical correlation and further evaluation as warranted.     Extensive opacification of the left maxillary antra extending into the left ethmoid air cells.  Stable.   [ST]      ED Course User Index  [ST] Zeny Kingsley MD                           Clinical Impression:  Final diagnoses:  [W19.XXXA] Fall, initial encounter (Primary)  [N30.00] Acute cystitis without hematuria          ED Disposition Condition    Discharge Stable          ED Prescriptions       Medication Sig Dispense Start Date End Date Auth. Provider    cephALEXin (KEFLEX) 500 MG capsule  (Status: Discontinued) Take 1 capsule (500 mg total) by mouth 4 (four) times  daily. for 5 days 20 capsule 6/9/2024 6/9/2024 Zeny Kingsley MD    cephALEXin (KEFLEX) 500 MG capsule Take 1 capsule (500 mg total) by mouth 4 (four) times daily. for 5 days 20 capsule 6/9/2024 6/14/2024 Zeny Kingsley MD          Follow-up Information       Follow up With Specialties Details Why Contact Info    Alfie Oconnell MD Internal Medicine  As needed [address]  [place] [address]  [phone]               Zeny Kingsley MD  06/09/24 4890

## 2024-06-09 NOTE — LETTER
June 19, 2024    Cat Garcia  [address] [address]                   [address]  [place] [address]  Phone: [phone]  Fax: [phone]   Dear Mrs. Cat Garcia:    We have attempted to call to review some test results. Please call the ED when you receive this letter.         Sincerely,        Jazmine Gunn NP

## 2024-06-09 NOTE — ED NOTES
Patient able to stand and pivot to wheelchair with assistance. Son came to pick patient up to take her back to [facility]. Patient able to stand and pivot into vehicle with assistance. Son has her discharge paperwork and her prescription.

## 2024-06-10 ENCOUNTER — PATIENT MESSAGE (OUTPATIENT)
Dept: INTERNAL MEDICINE | Facility: CLINIC | Age: 89
End: 2024-06-10
Payer: MEDICARE

## 2024-06-10 LAB
BACTERIA UR CULT: ABNORMAL
OHS QRS DURATION: 80 MS
OHS QTC CALCULATION: 388 MS

## 2024-06-17 RX ORDER — ATORVASTATIN CALCIUM 10 MG/1
TABLET, FILM COATED ORAL
Qty: 90 TABLET | Refills: 1 | Status: SHIPPED | OUTPATIENT
Start: 2024-06-17

## 2024-06-17 NOTE — TELEPHONE ENCOUNTER
Refill Routing Note   Medication(s) are not appropriate for processing by [facility] for the following reason(s):        Required labs outdated  ED/Hospital Visit since last OV with provider    [facility] action(s):  Defer               Appointments  past 12m or future 3m with PCP    Date Provider   Last Visit   3/11/2024 Alfie Oconnell MD   Next Visit   Visit date not found Alfie Oconnell MD   ED visits in past 90 days: 1        Note composed:2:06 PM 06/17/2024

## 2024-06-17 NOTE — TELEPHONE ENCOUNTER
No care due was identified.  [facility] Embedded Care Due Messages. Reference number: [id number].   6/17/2024 8:56:01 AM CDT

## 2024-08-12 DIAGNOSIS — I48.20 ATRIAL FIBRILLATION, CHRONIC: Chronic | ICD-10-CM

## 2024-08-12 RX ORDER — DONEPEZIL HYDROCHLORIDE 10 MG/1
TABLET, FILM COATED ORAL
Qty: 90 TABLET | Refills: 2 | Status: SHIPPED | OUTPATIENT
Start: 2024-08-12

## 2024-08-12 NOTE — TELEPHONE ENCOUNTER
Refill Routing Note   Medication(s) are not appropriate for processing by [facility] for the following reason(s):        Outside of protocol    [facility] action(s):  Route               Appointments  past 12m or future 3m with PCP    Date Provider   Last Visit   3/11/2024 Alfie Oconnell MD   Next Visit   Visit date not found Alfie Oconnell MD   ED visits in past 90 days: 1        Note composed:3:03 PM 08/12/2024

## 2024-08-29 ENCOUNTER — HOSPITAL ENCOUNTER (INPATIENT)
Facility: HOSPITAL | Age: 89
LOS: 3 days | Discharge: HOME | DRG: 872 | End: 2024-09-01
Attending: EMERGENCY MEDICINE | Admitting: HOSPITALIST
Payer: MEDICARE

## 2024-08-29 DIAGNOSIS — R07.9 CHEST PAIN: ICD-10-CM

## 2024-08-29 DIAGNOSIS — R06.02 SHORTNESS OF BREATH: ICD-10-CM

## 2024-08-29 DIAGNOSIS — N39.0 URINARY TRACT INFECTION WITHOUT HEMATURIA, SITE UNSPECIFIED: Primary | ICD-10-CM

## 2024-08-29 DIAGNOSIS — R09.02 HYPOXIA: ICD-10-CM

## 2024-08-29 DIAGNOSIS — I50.9 CHF (CONGESTIVE HEART FAILURE): ICD-10-CM

## 2024-08-29 PROBLEM — A41.9 SEVERE SEPSIS: Status: ACTIVE | Noted: 2024-08-29

## 2024-08-29 PROBLEM — R65.20 SEVERE SEPSIS: Status: ACTIVE | Noted: 2024-08-29

## 2024-08-29 LAB
ALBUMIN SERPL BCP-MCNC: 3.5 G/DL (ref 3.5–5.2)
ALLENS TEST: ABNORMAL
ALP SERPL-CCNC: 56 U/L (ref 55–135)
ALT SERPL W/O P-5'-P-CCNC: 11 U/L (ref 10–44)
ANION GAP SERPL CALC-SCNC: 12 MMOL/L (ref 8–16)
AST SERPL-CCNC: 25 U/L (ref 10–40)
BACTERIA #/AREA URNS AUTO: ABNORMAL /HPF
BASOPHILS # BLD AUTO: 0.06 K/UL (ref 0–0.2)
BASOPHILS NFR BLD: 0.3 % (ref 0–1.9)
BILIRUB SERPL-MCNC: 0.8 MG/DL (ref 0.1–1)
BILIRUB UR QL STRIP: NEGATIVE
BNP SERPL-MCNC: 332 PG/ML (ref 0–99)
BUN SERPL-MCNC: 26 MG/DL (ref 8–23)
CALCIUM SERPL-MCNC: 9.6 MG/DL (ref 8.7–10.5)
CHLORIDE SERPL-SCNC: 109 MMOL/L (ref 95–110)
CLARITY UR REFRACT.AUTO: ABNORMAL
CO2 SERPL-SCNC: 22 MMOL/L (ref 23–29)
COLOR UR AUTO: YELLOW
CREAT SERPL-MCNC: 1.3 MG/DL (ref 0.5–1.4)
D DIMER PPP IA.FEU-MCNC: 0.41 MG/L FEU
DIFFERENTIAL METHOD BLD: ABNORMAL
DIGOXIN SERPL-MCNC: 1.6 NG/ML (ref 0.8–2)
EOSINOPHIL # BLD AUTO: 0 K/UL (ref 0–0.5)
EOSINOPHIL NFR BLD: 0 % (ref 0–8)
ERYTHROCYTE [DISTWIDTH] IN BLOOD BY AUTOMATED COUNT: 14.6 % (ref 11.5–14.5)
EST. GFR  (NO RACE VARIABLE): 39.8 ML/MIN/1.73 M^2
GLUCOSE SERPL-MCNC: 120 MG/DL (ref 70–110)
GLUCOSE UR QL STRIP: NEGATIVE
HCT VFR BLD AUTO: 34 % (ref 37–48.5)
HGB BLD-MCNC: 10.9 G/DL (ref 12–16)
HGB UR QL STRIP: NEGATIVE
HYALINE CASTS UR QL AUTO: 8 /LPF
IMM GRANULOCYTES # BLD AUTO: 0.23 K/UL (ref 0–0.04)
IMM GRANULOCYTES NFR BLD AUTO: 1.1 % (ref 0–0.5)
INFLUENZA A, MOLECULAR: NEGATIVE
INFLUENZA B, MOLECULAR: NEGATIVE
KETONES UR QL STRIP: ABNORMAL
LDH SERPL L TO P-CCNC: 2.31 MMOL/L (ref 0.5–2.2)
LEUKOCYTE ESTERASE UR QL STRIP: ABNORMAL
LYMPHOCYTES # BLD AUTO: 1.2 K/UL (ref 1–4.8)
LYMPHOCYTES NFR BLD: 5.8 % (ref 18–48)
MAGNESIUM SERPL-MCNC: 2.2 MG/DL (ref 1.6–2.6)
MCH RBC QN AUTO: 36 PG (ref 27–31)
MCHC RBC AUTO-ENTMCNC: 32.1 G/DL (ref 32–36)
MCV RBC AUTO: 112 FL (ref 82–98)
MICROSCOPIC COMMENT: ABNORMAL
MONOCYTES # BLD AUTO: 1.8 K/UL (ref 0.3–1)
MONOCYTES NFR BLD: 8.2 % (ref 4–15)
NEUTROPHILS # BLD AUTO: 18 K/UL (ref 1.8–7.7)
NEUTROPHILS NFR BLD: 84.6 % (ref 38–73)
NITRITE UR QL STRIP: NEGATIVE
NRBC BLD-RTO: 0 /100 WBC
PH UR STRIP: 6 [PH] (ref 5–8)
PLATELET # BLD AUTO: 955 K/UL (ref 150–450)
PMV BLD AUTO: 10.1 FL (ref 9.2–12.9)
POTASSIUM SERPL-SCNC: 4.7 MMOL/L (ref 3.5–5.1)
PROT SERPL-MCNC: 6.6 G/DL (ref 6–8.4)
PROT UR QL STRIP: ABNORMAL
RBC # BLD AUTO: 3.03 M/UL (ref 4–5.4)
RBC #/AREA URNS AUTO: 86 /HPF (ref 0–4)
SAMPLE: ABNORMAL
SARS-COV-2 RDRP RESP QL NAA+PROBE: NEGATIVE
SITE: ABNORMAL
SODIUM SERPL-SCNC: 143 MMOL/L (ref 136–145)
SP GR UR STRIP: >1.03 (ref 1–1.03)
SPECIMEN SOURCE: NORMAL
SQUAMOUS #/AREA URNS AUTO: 13 /HPF
TROPONIN I SERPL DL<=0.01 NG/ML-MCNC: 0.03 NG/ML (ref 0–0.03)
URN SPEC COLLECT METH UR: ABNORMAL
WBC # BLD AUTO: 21.32 K/UL (ref 3.9–12.7)
WBC #/AREA URNS AUTO: >100 /HPF (ref 0–5)

## 2024-08-29 PROCEDURE — 12000002 HC ACUTE/MED SURGE SEMI-PRIVATE ROOM

## 2024-08-29 PROCEDURE — 93010 ELECTROCARDIOGRAM REPORT: CPT | Mod: ,,, | Performed by: INTERNAL MEDICINE

## 2024-08-29 PROCEDURE — 84300 ASSAY OF URINE SODIUM: CPT | Performed by: EMERGENCY MEDICINE

## 2024-08-29 PROCEDURE — 85025 COMPLETE CBC W/AUTO DIFF WBC: CPT | Performed by: EMERGENCY MEDICINE

## 2024-08-29 PROCEDURE — 84484 ASSAY OF TROPONIN QUANT: CPT | Performed by: EMERGENCY MEDICINE

## 2024-08-29 PROCEDURE — 80162 ASSAY OF DIGOXIN TOTAL: CPT | Performed by: EMERGENCY MEDICINE

## 2024-08-29 PROCEDURE — 25000242 PHARM REV CODE 250 ALT 637 W/ HCPCS: Performed by: EMERGENCY MEDICINE

## 2024-08-29 PROCEDURE — 82570 ASSAY OF URINE CREATININE: CPT | Performed by: EMERGENCY MEDICINE

## 2024-08-29 PROCEDURE — 87040 BLOOD CULTURE FOR BACTERIA: CPT | Performed by: EMERGENCY MEDICINE

## 2024-08-29 PROCEDURE — 99285 EMERGENCY DEPT VISIT HI MDM: CPT | Mod: 25

## 2024-08-29 PROCEDURE — P9612 CATHETERIZE FOR URINE SPEC: HCPCS

## 2024-08-29 PROCEDURE — 80053 COMPREHEN METABOLIC PANEL: CPT | Performed by: EMERGENCY MEDICINE

## 2024-08-29 PROCEDURE — 99900035 HC TECH TIME PER 15 MIN (STAT)

## 2024-08-29 PROCEDURE — 87086 URINE CULTURE/COLONY COUNT: CPT | Performed by: EMERGENCY MEDICINE

## 2024-08-29 PROCEDURE — 87502 INFLUENZA DNA AMP PROBE: CPT | Performed by: EMERGENCY MEDICINE

## 2024-08-29 PROCEDURE — 87088 URINE BACTERIA CULTURE: CPT | Performed by: EMERGENCY MEDICINE

## 2024-08-29 PROCEDURE — 87186 SC STD MICRODIL/AGAR DIL: CPT | Performed by: EMERGENCY MEDICINE

## 2024-08-29 PROCEDURE — 94640 AIRWAY INHALATION TREATMENT: CPT

## 2024-08-29 PROCEDURE — U0002 COVID-19 LAB TEST NON-CDC: HCPCS | Performed by: EMERGENCY MEDICINE

## 2024-08-29 PROCEDURE — 93005 ELECTROCARDIOGRAM TRACING: CPT

## 2024-08-29 PROCEDURE — 96365 THER/PROPH/DIAG IV INF INIT: CPT

## 2024-08-29 PROCEDURE — 83880 ASSAY OF NATRIURETIC PEPTIDE: CPT | Performed by: EMERGENCY MEDICINE

## 2024-08-29 PROCEDURE — 84156 ASSAY OF PROTEIN URINE: CPT | Performed by: EMERGENCY MEDICINE

## 2024-08-29 PROCEDURE — 94761 N-INVAS EAR/PLS OXIMETRY MLT: CPT

## 2024-08-29 PROCEDURE — 83605 ASSAY OF LACTIC ACID: CPT

## 2024-08-29 PROCEDURE — 85379 FIBRIN DEGRADATION QUANT: CPT | Performed by: EMERGENCY MEDICINE

## 2024-08-29 PROCEDURE — 83735 ASSAY OF MAGNESIUM: CPT | Performed by: EMERGENCY MEDICINE

## 2024-08-29 PROCEDURE — 63600175 PHARM REV CODE 636 W HCPCS: Performed by: EMERGENCY MEDICINE

## 2024-08-29 PROCEDURE — 27000221 HC OXYGEN, UP TO 24 HOURS

## 2024-08-29 PROCEDURE — 25000003 PHARM REV CODE 250: Performed by: EMERGENCY MEDICINE

## 2024-08-29 PROCEDURE — 81001 URINALYSIS AUTO W/SCOPE: CPT | Performed by: EMERGENCY MEDICINE

## 2024-08-29 PROCEDURE — 96367 TX/PROPH/DG ADDL SEQ IV INF: CPT

## 2024-08-29 RX ORDER — ONDANSETRON HYDROCHLORIDE 2 MG/ML
4 INJECTION, SOLUTION INTRAVENOUS EVERY 8 HOURS PRN
Status: DISCONTINUED | OUTPATIENT
Start: 2024-08-29 | End: 2024-09-01 | Stop reason: HOSPADM

## 2024-08-29 RX ORDER — DONEPEZIL HYDROCHLORIDE 10 MG/1
10 TABLET, FILM COATED ORAL NIGHTLY
Status: DISCONTINUED | OUTPATIENT
Start: 2024-08-29 | End: 2024-09-01 | Stop reason: HOSPADM

## 2024-08-29 RX ORDER — HYDROXYUREA 500 MG/1
1000 CAPSULE ORAL DAILY
Status: DISCONTINUED | OUTPATIENT
Start: 2024-08-30 | End: 2024-09-01 | Stop reason: HOSPADM

## 2024-08-29 RX ORDER — IPRATROPIUM BROMIDE AND ALBUTEROL SULFATE 2.5; .5 MG/3ML; MG/3ML
3 SOLUTION RESPIRATORY (INHALATION)
Status: COMPLETED | OUTPATIENT
Start: 2024-08-29 | End: 2024-08-29

## 2024-08-29 RX ORDER — TALC
6 POWDER (GRAM) TOPICAL NIGHTLY PRN
Status: DISCONTINUED | OUTPATIENT
Start: 2024-08-29 | End: 2024-09-01 | Stop reason: HOSPADM

## 2024-08-29 RX ORDER — SODIUM CHLORIDE 0.9 % (FLUSH) 0.9 %
10 SYRINGE (ML) INJECTION EVERY 12 HOURS PRN
Status: DISCONTINUED | OUTPATIENT
Start: 2024-08-29 | End: 2024-09-01 | Stop reason: HOSPADM

## 2024-08-29 RX ORDER — ATORVASTATIN CALCIUM 10 MG/1
10 TABLET, FILM COATED ORAL DAILY
Status: DISCONTINUED | OUTPATIENT
Start: 2024-08-30 | End: 2024-09-01 | Stop reason: HOSPADM

## 2024-08-29 RX ORDER — NALOXONE HCL 0.4 MG/ML
0.02 VIAL (ML) INJECTION
Status: DISCONTINUED | OUTPATIENT
Start: 2024-08-29 | End: 2024-09-01 | Stop reason: HOSPADM

## 2024-08-29 RX ORDER — DIGOXIN 125 MCG
0.12 TABLET ORAL DAILY
Status: DISCONTINUED | OUTPATIENT
Start: 2024-08-30 | End: 2024-09-01 | Stop reason: HOSPADM

## 2024-08-29 RX ORDER — IBUPROFEN 200 MG
16 TABLET ORAL
Status: DISCONTINUED | OUTPATIENT
Start: 2024-08-29 | End: 2024-09-01 | Stop reason: HOSPADM

## 2024-08-29 RX ORDER — GLUCAGON 1 MG
1 KIT INJECTION
Status: DISCONTINUED | OUTPATIENT
Start: 2024-08-29 | End: 2024-09-01 | Stop reason: HOSPADM

## 2024-08-29 RX ORDER — ACETAMINOPHEN 500 MG
1000 TABLET ORAL EVERY 8 HOURS PRN
Status: DISCONTINUED | OUTPATIENT
Start: 2024-08-29 | End: 2024-09-01 | Stop reason: HOSPADM

## 2024-08-29 RX ORDER — IBUPROFEN 200 MG
24 TABLET ORAL
Status: DISCONTINUED | OUTPATIENT
Start: 2024-08-29 | End: 2024-09-01 | Stop reason: HOSPADM

## 2024-08-29 RX ORDER — ACETAMINOPHEN 325 MG/1
650 TABLET ORAL EVERY 6 HOURS PRN
Status: DISCONTINUED | OUTPATIENT
Start: 2024-08-29 | End: 2024-08-29

## 2024-08-29 RX ADMIN — IPRATROPIUM BROMIDE AND ALBUTEROL SULFATE 3 ML: 2.5; .5 SOLUTION RESPIRATORY (INHALATION) at 09:08

## 2024-08-29 RX ADMIN — PIPERACILLIN SODIUM AND TAZOBACTAM SODIUM 4.5 G: 4; .5 INJECTION, POWDER, FOR SOLUTION INTRAVENOUS at 06:08

## 2024-08-29 RX ADMIN — VANCOMYCIN HYDROCHLORIDE 1000 MG: 1 INJECTION, POWDER, LYOPHILIZED, FOR SOLUTION INTRAVENOUS at 07:08

## 2024-08-29 NOTE — Clinical Note
Diagnosis: Urinary tract infection without hematuria, site unspecified [[id number]]   Future Attending Provider: MARVIN WAGNER [[id number]]   Reason for IP Medical Treatment  (Clinical interventions that can only be accomplished in the IP setting? ) :: Management of urinary tract infection and respiratory infection with hypoxia   Plans for Post-Acute care--if anticipated (pick the single best option):: A. No post acute care anticipated at this time

## 2024-08-29 NOTE — ED PROVIDER NOTES
Emergency Department Provider Note    Cat Garcia   89 y.o. female   [id number]      2024       History     This history was obtained from the patient's son, Dr. Ruiz, who is one of our anesthesiologist and was at the bedside when I evaluated the patient.  She presented by ambulance from her assisted living facility.  History was limited by the patient's dementia.    She is an 89-year-old with the below past medical history.  She developed cough, shortness of breath, and hypoxia with pulse oximetry readings in the 80s earlier today.         Past Medical History:   Diagnosis Date    Atrial fibrillation     with rapid ventricular rate    Breast cancer     XRT    Chronic bronchitis     Chronic combined systolic and diastolic CHF, NYHA class 3 2013    CKD (chronic kidney disease), stage III     Dementia     Dyslipidemia 12/15/2013    Essential hypertension 12/15/2013    Squamous cell carcinoma     Vascular parkinsonism 2016      Past Surgical History:   Procedure Laterality Date    BREAST SURGERY  2011    right      Family History   Problem Relation Name Age of Onset    Heart disease Father      Melanoma Neg Hx        Social History     Socioeconomic History    Marital status:    Tobacco Use    Smoking status: Former     Current packs/day: 0.00     Types: Cigarettes     Quit date: 1982     Years since quittin.6    Smokeless tobacco: Never    Tobacco comments:     pt was social smoker   Substance and Sexual Activity    Alcohol use: No     Alcohol/week: 0.0 standard drinks of alcohol    Drug use: No    Sexual activity: Never   Social History Narrative    Lives alone. husb  .    4 kids.    1 store home. No falls     Social Determinants of Health     Financial Resource Strain: Patient Declined (2024)    Overall Financial Resource Strain (CARDIA)     Difficulty of Paying Living Expenses: Patient declined   Food Insecurity: Patient Declined (2024)    Hunger Vital Sign      Worried About Running Out of Food in the Last Year: Patient declined     Ran Out of Food in the Last Year: Patient declined   Transportation Needs: Patient Declined (4/2/2024)    PRAPARE - Transportation     Lack of Transportation (Medical): Patient declined     Lack of Transportation (Non-Medical): Patient declined   Physical Activity: Unknown (4/2/2024)    Exercise Vital Sign     Days of Exercise per Week: 0 days   Stress: No Stress Concern Present (4/2/2024)    [employer] - Occupational Stress Questionnaire     Feeling of Stress : Not at all   Housing Stability: Unknown (4/2/2024)    Housing Stability Vital Sign     Unable to Pay for Housing in the Last Year: No     Unstable Housing in the Last Year: No      Review of patient's allergies indicates:  No Known Allergies        Physical Examination     Initial Vitals [08/29/24 1117]   BP Pulse Resp Temp SpO2   108/62 63 19 97.8 °F (36.6 °C) 98 %      MAP       --           Physical Exam    Nursing note and vitals reviewed.  Constitutional: She is not diaphoretic. No distress.   HENT:   Mouth/Throat: Mucous membranes are normal.   Eyes: Conjunctivae are normal. No scleral icterus.   Cardiovascular:  Normal rate, regular rhythm and normal heart sounds.     Exam reveals no gallop and no friction rub.       No murmur heard.  Pulmonary/Chest: No stridor. No respiratory distress. She has no wheezes. She has no rhonchi. She has no rales.   Abdominal: Abdomen is soft. She exhibits no distension. There is no abdominal tenderness.     Neurological: She is alert. She is disoriented. GCS eye subscore is 4. GCS verbal subscore is 4. GCS motor subscore is 6.   Skin: Skin is warm and dry. No pallor.   Psychiatric: She is not actively hallucinating. She is attentive.            Labs     Labs Reviewed   CBC W/ AUTO DIFFERENTIAL - Abnormal       Result Value    WBC 21.32 (*)     RBC 3.03 (*)     Hemoglobin 10.9 (*)     Hematocrit 34.0 (*)      (*)      MCH 36.0 (*)     MCHC 32.1      RDW 14.6 (*)     Platelets 955 (*)     MPV 10.1      Immature Granulocytes 1.1 (*)     Gran # (ANC) 18.0 (*)     Immature Grans (Abs) 0.23 (*)     Lymph # 1.2      Mono # 1.8 (*)     Eos # 0.0      Baso # 0.06      nRBC 0      Gran % 84.6 (*)     Lymph % 5.8 (*)     Mono % 8.2      Eosinophil % 0.0      Basophil % 0.3      Differential Method Automated     COMPREHENSIVE METABOLIC PANEL - Abnormal    Sodium 143      Potassium 4.7      Chloride 109      CO2 22 (*)     Glucose 120 (*)     BUN 26 (*)     Creatinine 1.3      Calcium 9.6      Total Protein 6.6      Albumin 3.5      Total Bilirubin 0.8      Alkaline Phosphatase 56      AST 25      ALT 11      eGFR 39.8 (*)     Anion Gap 12     B-TYPE NATRIURETIC PEPTIDE - Abnormal     (*)    URINALYSIS, REFLEX TO URINE CULTURE - Abnormal    Specimen UA Urine, Catheterized      Color, UA Yellow      Appearance, UA Hazy (*)     pH, UA 6.0      Specific Gravity, UA >1.030 (*)     Protein, UA 1+ (*)     Glucose, UA Negative      Ketones, UA 1+ (*)     Bilirubin (UA) Negative      Occult Blood UA Negative      Nitrite, UA Negative      Leukocytes, UA 1+ (*)     Narrative:     Specimen Source->Urine   URINALYSIS MICROSCOPIC - Abnormal    RBC, UA 86 (*)     WBC, UA >100 (*)     Bacteria Many (*)     Squam Epithel, UA 13      Hyaline Casts, UA 8 (*)     Microscopic Comment SEE COMMENT      Narrative:     Specimen Source->Urine   INFLUENZA A & B BY MOLECULAR    Influenza A, Molecular Negative      Influenza B, Molecular Negative      Flu A & B Source Nasal swab     CULTURE, BLOOD   CULTURE, BLOOD   CULTURE, URINE   TROPONIN I    Troponin I 0.026     DIGOXIN LEVEL    Digoxin Lvl 1.6     SARS-COV-2 RNA AMPLIFICATION, QUAL    SARS-CoV-2 RNA, Amplification, Qual Negative     D DIMER, QUANTITATIVE    D-Dimer 0.41          Imaging     Imaging Results              X-Ray Chest AP Portable (Final result)  Result time 08/29/24 16:42:26      Final  result by Ester Coleman MD (08/29/24 16:42:26)                   Impression:      No acute finding      Electronically signed by: Ester Coleman MD  Date:    08/29/2024  Time:    16:42               Narrative:    EXAMINATION:  XR CHEST AP PORTABLE    CLINICAL HISTORY:  CHF;    TECHNIQUE:  Single frontal view of the chest was performed.    COMPARISON:  January 10, 2020    FINDINGS:  Patient is slightly rotated.  Cardiac size is mildly enlarged but similar to prior imaging.  No significant pleural effusion is present.  There is osseous demineralization and degenerative change.  Calcification is present along the wall of the aorta.  Lungs demonstrate no focal consolidation.                                        ED Course     The patient received the following medications:  Medications   albuterol-ipratropium 2.5 mg-0.5 mg/3 mL nebulizer solution 3 mL (has no administration in time range)   vancomycin (VANCOCIN) 1,000 mg in D5W 250 mL IVPB (admixture device) (0 mg Intravenous Stopped 8/29/24 2055)   piperacillin-tazobactam (ZOSYN) 4.5 g in D5W 100 mL IVPB (MB+) (0 g Intravenous Stopped 8/29/24 1906)       Procedures    ED Course as of 08/29/24 2105   Thu Aug 29, 2024   1647 CBC auto differential(!)  Below elevated white count and platelet count are concerning for an acute infectious process.  Sources unclear at this time.  [LP]   1647 WBC(!): 21.32 [LP]   1647 Hemoglobin(!): 10.9 [LP]   1647 Hematocrit(!): 34.0 [LP]   1647 Platelet Count(!): 955 [LP]   1647 Comprehensive metabolic panel(!) [LP]   1647 CO2(!): 22 [LP]   1647 Glucose(!): 120 [LP]   1647 BUN(!): 26 [LP]   1647 Troponin I: 0.026 [LP]   1647 BNP(!): 332 [LP]   1647 Digoxin Level: 1.6 [LP]   1647 X-Ray Chest AP Portable  Reviewed radiologist's report.  No acute processes. [LP]   1731 The patient is hypoxia and significant white count elevation.  This is concerning for infection.  Sources unclear.  Awaiting urinalysis, COVID-19, and influenza tests.  I  ordered vancomycin and Zosyn.  I am not ordering aggressive IV fluids due to her known severely depressed cardiac function. [LP]   1803 SARS-CoV-2 RNA, Amplification, Qual: Negative [LP]   1803 Influenza A, Molecular: Negative [LP]   1803 Influenza B, Molecular: Negative [LP]      ED Course User Index  [LP] Ta Trammell III, MD        Medical Decision Making     Chart review:   Last echocardiogram was performed in 2014.  There was severely depressed left ventricular systolic function (EF 15-20%), biatrial enlargement, moderate to severely depressed right ventricular systolic function, mild mitral regurgitation, trivial tricuspid regurgitation, trivial pericardial effusion.              Medical Decision Making  The patient underwent evaluation for acute cough with shortness of breath and hypoxia.  Differential diagnoses included pneumonia, COVID-19 virus infection, influenza, decompensated heart failure, pulmonary embolism, and other conditions.  EKG was negative for arrhythmia and acute ischemic changes.  Chest x-ray was negative for acute processes.  BNP was mildly elevated.  She has a history of CHF.  Troponin was normal.  White count was elevated.  The cause of this was unclear.  Broad-spectrum antibiotics were given early in her management.  Urine was obtained after a significant delay and she has a urinary tract infection.  She was admitted to Hospital Medicine for further evaluation and management.    Problems Addressed:  Hypoxia: acute illness or injury  Shortness of breath: acute illness or injury  Urinary tract infection without hematuria, site unspecified: acute illness or injury    Amount and/or Complexity of Data Reviewed  Labs: ordered. Decision-making details documented in ED Course.  Radiology:  Decision-making details documented in ED Course.    Risk  Decision regarding hospitalization.              Diagnoses       ICD-10-CM ICD-9-CM   1. Urinary tract infection without hematuria, site unspecified   N39.0 599.0   2. Shortness of breath  R06.02 786.05   3. Hypoxia  R09.02 799.02         Dispostion      ED Disposition Condition    Admit Stable             Ta Trammell III, MD  08/29/24 5751

## 2024-08-29 NOTE — FIRST PROVIDER EVALUATION
Medical screening examination initiated.  I have conducted a focused provider triage encounter, findings are as follows:    Brief history of present illness:  Evaluated in EMS woods, h/o limited.     Vitals:    08/29/24 1117   BP: 108/62   Pulse: 63   Resp: 19   Temp: 97.8 °F (36.6 °C)   TempSrc: Oral   SpO2: 98%       Pertinent physical exam:  Sleeping, but wakes to voice, coughing.     Brief workup plan:  labs ordered, adding viral swabs.     Preliminary workup initiated; this workup will be continued and followed by the physician or advanced practice provider that is assigned to the patient when roomed.

## 2024-08-29 NOTE — ED NOTES
Patient's son Abdon Ruiz [phone] to be contacted if patient moves to another area or gets bed assigned

## 2024-08-30 ENCOUNTER — EPISODE CHANGES (OUTPATIENT)
Dept: CARDIOLOGY | Facility: CLINIC | Age: 89
End: 2024-08-30

## 2024-08-30 PROBLEM — J96.01 ACUTE HYPOXEMIC RESPIRATORY FAILURE: Status: ACTIVE | Noted: 2024-08-30

## 2024-08-30 LAB
ALBUMIN SERPL BCP-MCNC: 3 G/DL (ref 3.5–5.2)
ALP SERPL-CCNC: 56 U/L (ref 55–135)
ALT SERPL W/O P-5'-P-CCNC: 21 U/L (ref 10–44)
ANION GAP SERPL CALC-SCNC: 9 MMOL/L (ref 8–16)
ASCENDING AORTA: 2.75 CM
AST SERPL-CCNC: 32 U/L (ref 10–40)
AV INDEX (PROSTH): 1.21
AV MEAN GRADIENT: 6 MMHG
AV PEAK GRADIENT: 10 MMHG
AV VALVE AREA BY VELOCITY RATIO: 3.63 CM²
AV VALVE AREA: 3.94 CM²
AV VELOCITY RATIO: 1.11
BASOPHILS # BLD AUTO: 0.05 K/UL (ref 0–0.2)
BASOPHILS NFR BLD: 0.4 % (ref 0–1.9)
BILIRUB SERPL-MCNC: 0.5 MG/DL (ref 0.1–1)
BSA FOR ECHO PROCEDURE: 1.55 M2
BUN SERPL-MCNC: 25 MG/DL (ref 8–23)
CALCIUM SERPL-MCNC: 9 MG/DL (ref 8.7–10.5)
CHLORIDE SERPL-SCNC: 110 MMOL/L (ref 95–110)
CO2 SERPL-SCNC: 19 MMOL/L (ref 23–29)
CREAT SERPL-MCNC: 1.1 MG/DL (ref 0.5–1.4)
CREAT UR-MCNC: 245 MG/DL (ref 15–325)
CV ECHO LV RWT: 0.48 CM
DIFFERENTIAL METHOD BLD: ABNORMAL
DOP CALC AO PEAK VEL: 1.6 M/S
DOP CALC AO VTI: 30.09 CM
DOP CALC LVOT AREA: 3.3 CM2
DOP CALC LVOT DIAMETER: 2.04 CM
DOP CALC LVOT PEAK VEL: 1.78 M/S
DOP CALC LVOT STROKE VOLUME: 118.46 CM3
DOP CALCLVOT PEAK VEL VTI: 36.26 CM
E WAVE DECELERATION TIME: 180.85 MSEC
E/A RATIO: 1.27
E/E' RATIO: 17.5 M/S
ECHO LV POSTERIOR WALL: 1.02 CM (ref 0.6–1.1)
EOSINOPHIL # BLD AUTO: 0 K/UL (ref 0–0.5)
EOSINOPHIL NFR BLD: 0.3 % (ref 0–8)
ERYTHROCYTE [DISTWIDTH] IN BLOOD BY AUTOMATED COUNT: 14.6 % (ref 11.5–14.5)
EST. GFR  (NO RACE VARIABLE): 48.6 ML/MIN/1.73 M^2
FRACTIONAL SHORTENING: 30 % (ref 28–44)
GLUCOSE SERPL-MCNC: 101 MG/DL (ref 70–110)
HCT VFR BLD AUTO: 30.6 % (ref 37–48.5)
HGB BLD-MCNC: 9.8 G/DL (ref 12–16)
IMM GRANULOCYTES # BLD AUTO: 0.11 K/UL (ref 0–0.04)
IMM GRANULOCYTES NFR BLD AUTO: 0.8 % (ref 0–0.5)
INTERVENTRICULAR SEPTUM: 0.75 CM (ref 0.6–1.1)
LA MAJOR: 4.62 CM
LA MINOR: 4.96 CM
LA WIDTH: 4.32 CM
LACTATE SERPL-SCNC: 1 MMOL/L (ref 0.5–2.2)
LEFT ATRIUM SIZE: 3.21 CM
LEFT ATRIUM VOLUME INDEX MOD: 34 ML/M2
LEFT ATRIUM VOLUME INDEX: 36.4 ML/M2
LEFT ATRIUM VOLUME MOD: 52.72 CM3
LEFT ATRIUM VOLUME: 56.39 CM3
LEFT INTERNAL DIMENSION IN SYSTOLE: 3.01 CM (ref 2.1–4)
LEFT VENTRICLE DIASTOLIC VOLUME INDEX: 52.75 ML/M2
LEFT VENTRICLE DIASTOLIC VOLUME: 81.76 ML
LEFT VENTRICLE MASS INDEX: 77 G/M2
LEFT VENTRICLE SYSTOLIC VOLUME INDEX: 22.8 ML/M2
LEFT VENTRICLE SYSTOLIC VOLUME: 35.29 ML
LEFT VENTRICULAR INTERNAL DIMENSION IN DIASTOLE: 4.27 CM (ref 3.5–6)
LEFT VENTRICULAR MASS: 119.16 G
LV LATERAL E/E' RATIO: 15 M/S
LV SEPTAL E/E' RATIO: 21 M/S
LYMPHOCYTES # BLD AUTO: 1.4 K/UL (ref 1–4.8)
LYMPHOCYTES NFR BLD: 9.7 % (ref 18–48)
MAGNESIUM SERPL-MCNC: 1.9 MG/DL (ref 1.6–2.6)
MCH RBC QN AUTO: 36.3 PG (ref 27–31)
MCHC RBC AUTO-ENTMCNC: 32 G/DL (ref 32–36)
MCV RBC AUTO: 113 FL (ref 82–98)
MONOCYTES # BLD AUTO: 1.2 K/UL (ref 0.3–1)
MONOCYTES NFR BLD: 8.1 % (ref 4–15)
MV PEAK A VEL: 0.83 M/S
MV PEAK E VEL: 1.05 M/S
MV STENOSIS PRESSURE HALF TIME: 52.45 MS
MV VALVE AREA P 1/2 METHOD: 4.19 CM2
NEUTROPHILS # BLD AUTO: 11.4 K/UL (ref 1.8–7.7)
NEUTROPHILS NFR BLD: 80.7 % (ref 38–73)
NRBC BLD-RTO: 0 /100 WBC
OHS QRS DURATION: 90 MS
OHS QTC CALCULATION: 402 MS
PHOSPHATE SERPL-MCNC: 2.3 MG/DL (ref 2.7–4.5)
PISA TR MAX VEL: 1.41 M/S
PLATELET # BLD AUTO: 847 K/UL (ref 150–450)
PMV BLD AUTO: 10.4 FL (ref 9.2–12.9)
POTASSIUM SERPL-SCNC: 4 MMOL/L (ref 3.5–5.1)
PROT SERPL-MCNC: 5.7 G/DL (ref 6–8.4)
PROT UR-MCNC: 46 MG/DL (ref 0–15)
RA MAJOR: 3.95 CM
RA PRESSURE ESTIMATED: 8 MMHG
RA WIDTH: 2.79 CM
RBC # BLD AUTO: 2.7 M/UL (ref 4–5.4)
RIGHT VENTRICLE DIASTOLIC BASEL DIMENSION: 3.4 CM
RV TB RVSP: 9 MMHG
SINUS: 2.93 CM
SODIUM SERPL-SCNC: 138 MMOL/L (ref 136–145)
SODIUM UR-SCNC: 32 MMOL/L (ref 20–250)
STJ: 2.57 CM
TDI LATERAL: 0.07 M/S
TDI SEPTAL: 0.05 M/S
TDI: 0.06 M/S
TR MAX PG: 8 MMHG
TRICUSPID ANNULAR PLANE SYSTOLIC EXCURSION: 0.87 CM
TV REST PULMONARY ARTERY PRESSURE: 16 MMHG
WBC # BLD AUTO: 14.16 K/UL (ref 3.9–12.7)
Z-SCORE OF LEFT VENTRICULAR DIMENSION IN END DIASTOLE: -0.5
Z-SCORE OF LEFT VENTRICULAR DIMENSION IN END SYSTOLE: 0.62

## 2024-08-30 PROCEDURE — 25000003 PHARM REV CODE 250: Performed by: NURSE PRACTITIONER

## 2024-08-30 PROCEDURE — 83735 ASSAY OF MAGNESIUM: CPT | Performed by: NURSE PRACTITIONER

## 2024-08-30 PROCEDURE — 83605 ASSAY OF LACTIC ACID: CPT | Performed by: NURSE PRACTITIONER

## 2024-08-30 PROCEDURE — 84100 ASSAY OF PHOSPHORUS: CPT | Performed by: NURSE PRACTITIONER

## 2024-08-30 PROCEDURE — 20600001 HC STEP DOWN PRIVATE ROOM

## 2024-08-30 PROCEDURE — 63600175 PHARM REV CODE 636 W HCPCS: Performed by: NURSE PRACTITIONER

## 2024-08-30 PROCEDURE — 85025 COMPLETE CBC W/AUTO DIFF WBC: CPT | Performed by: NURSE PRACTITIONER

## 2024-08-30 PROCEDURE — 80053 COMPREHEN METABOLIC PANEL: CPT | Performed by: NURSE PRACTITIONER

## 2024-08-30 PROCEDURE — 36415 COLL VENOUS BLD VENIPUNCTURE: CPT | Performed by: NURSE PRACTITIONER

## 2024-08-30 RX ORDER — SODIUM,POTASSIUM PHOSPHATES 280-250MG
2 POWDER IN PACKET (EA) ORAL ONCE
Status: CANCELLED | OUTPATIENT
Start: 2024-08-30 | End: 2024-08-30

## 2024-08-30 RX ORDER — IPRATROPIUM BROMIDE AND ALBUTEROL SULFATE 2.5; .5 MG/3ML; MG/3ML
3 SOLUTION RESPIRATORY (INHALATION) EVERY 4 HOURS PRN
Status: DISCONTINUED | OUTPATIENT
Start: 2024-08-30 | End: 2024-09-01 | Stop reason: HOSPADM

## 2024-08-30 RX ADMIN — DIGOXIN 0.12 MG: 125 TABLET ORAL at 09:08

## 2024-08-30 RX ADMIN — HYDROXYUREA 1000 MG: 500 CAPSULE ORAL at 09:08

## 2024-08-30 RX ADMIN — ATORVASTATIN CALCIUM 10 MG: 10 TABLET, FILM COATED ORAL at 09:08

## 2024-08-30 RX ADMIN — APIXABAN 2.5 MG: 2.5 TABLET, FILM COATED ORAL at 09:08

## 2024-08-30 RX ADMIN — DONEPEZIL HYDROCHLORIDE 10 MG: 10 TABLET ORAL at 09:08

## 2024-08-30 RX ADMIN — Medication 6 MG: at 09:08

## 2024-08-30 RX ADMIN — CEFTRIAXONE 1 G: 1 INJECTION, POWDER, FOR SOLUTION INTRAMUSCULAR; INTRAVENOUS at 12:08

## 2024-08-30 RX ADMIN — SODIUM CHLORIDE, POTASSIUM CHLORIDE, SODIUM LACTATE AND CALCIUM CHLORIDE 250 ML: 600; 310; 30; 20 INJECTION, SOLUTION INTRAVENOUS at 01:08

## 2024-08-30 RX ADMIN — METOPROLOL SUCCINATE 12.5 MG: 25 TABLET, EXTENDED RELEASE ORAL at 09:08

## 2024-08-30 RX ADMIN — ACETAMINOPHEN 1000 MG: 500 TABLET ORAL at 09:08

## 2024-08-30 NOTE — HOSPITAL COURSE
Patient admitted to medicine for sepsis, UTI. She was transitioned to CTX. Bolton placed in ED for retention. Voiding trial 8/31 successful, no residual volume noted on bladder scans. Hypoxia resolved. UCx grew EColi, transitioned to cefpodoxime for total 7 day course. Returned to [facility].

## 2024-08-30 NOTE — SUBJECTIVE & OBJECTIVE
Past Medical History:   Diagnosis Date    Atrial fibrillation     with rapid ventricular rate    Breast cancer     XRT    Chronic bronchitis     Chronic combined systolic and diastolic CHF, NYHA class 3 2013    CKD (chronic kidney disease), stage III     Dementia     Dyslipidemia 12/15/2013    Essential hypertension 12/15/2013    Squamous cell carcinoma     Vascular parkinsonism 2016       Past Surgical History:   Procedure Laterality Date    BREAST SURGERY  2011    right       Review of patient's allergies indicates:  No Known Allergies    No current facility-administered medications on file prior to encounter.     Current Outpatient Medications on File Prior to Encounter   Medication Sig    apixaban (ELIQUIS) 2.5 mg Tab TAKE ONE TABLET BY MOUTH TWICE DAILY. DO NOT TAKE UNTIL YOU HAVE HELD WARFARIN FOR 3 DAYS!! (Patient taking differently: Take 2.5 mg by mouth 2 (two) times daily.)    atorvastatin (LIPITOR) 10 MG tablet TAKE ONE TABLET BY MOUTH ONCE DAILY    digoxin (LANOXIN) 125 mcg tablet TAKE ONE TABLET BY MOUTH EVERY DAY    donepeziL (ARICEPT) 10 MG tablet TAKE ONE TABLET BY MOUTH EVERY EVENING    FLUAD QUAD -,65Y UP,,PF, 60 mcg (15 mcg x 4)/0.5 mL Syrg     hydroxyurea (HYDREA) 500 mg Cap TAKE TWO CAPSULES BY MOUTH ONCE DAILY    influenza (FLUZONE HIGH-DOSE) 180 mcg/0.5 mL vaccine Inject 0.5 mLs into the muscle. (Patient taking differently: Inject 0.5 mLs into the muscle once.)    latanoprost 0.005 % ophthalmic solution Place 1 drop into both eyes every evening.    metoprolol succinate (TOPROL-XL) 25 MG 24 hr tablet Take 0.5 tablets (12.5 mg total) by mouth once daily.     Family History       Problem Relation (Age of Onset)    Heart disease Father          Tobacco Use    Smoking status: Former     Current packs/day: 0.00     Types: Cigarettes     Quit date: 1982     Years since quittin.6    Smokeless tobacco: Never    Tobacco comments:     pt was social smoker   Substance and Sexual  Activity    Alcohol use: No     Alcohol/week: 0.0 standard drinks of alcohol    Drug use: No    Sexual activity: Never     Review of Systems   Unable to perform ROS: Dementia   Respiratory:  Positive for cough. Negative for shortness of breath.    Cardiovascular:  Negative for chest pain.     Objective:     Vital Signs (Most Recent):  Temp: 98.5 °F (36.9 °C) (08/29/24 1643)  Pulse: (!) 57 (08/29/24 2111)  Resp: (!) 24 (08/29/24 2111)  BP: (!) 116/56 (08/29/24 2032)  SpO2: (!) 94 % (08/29/24 2111) Vital Signs (24h Range):  Temp:  [97.8 °F (36.6 °C)-98.8 °F (37.1 °C)] 98.5 °F (36.9 °C)  Pulse:  [57-65] 57  Resp:  [19-24] 24  SpO2:  [92 %-98 %] 94 %  BP: (108-125)/(50-62) 116/56     Weight: 55.9 kg (123 lb 3.8 oz)  Body mass index is 21.15 kg/m².     Physical Exam  Vitals and nursing note reviewed.   Constitutional:       General: She is not in acute distress.     Appearance: She is not toxic-appearing or diaphoretic.   HENT:      Nose: Nose normal.      Mouth/Throat:      Mouth: Mucous membranes are dry.   Eyes:      Pupils: Pupils are equal, round, and reactive to light.   Cardiovascular:      Rate and Rhythm: Regular rhythm. Bradycardia present.   Pulmonary:      Effort: Pulmonary effort is normal. No respiratory distress.      Breath sounds: No wheezing, rhonchi or rales.      Comments: Intermittent congested cough noted during my exam. Currently on room air with SpO2 94-95%  Abdominal:      General: Bowel sounds are normal. There is no distension.      Palpations: Abdomen is soft.      Tenderness: There is no abdominal tenderness. There is no guarding.   Musculoskeletal:         General: Normal range of motion.      Cervical back: Normal range of motion.      Right lower leg: No edema.      Left lower leg: No edema.   Skin:     General: Skin is warm and dry.   Neurological:      Mental Status: She is alert. Mental status is at baseline.      Cranial Nerves: No dysarthria.      Motor: No weakness.      Comments:  Patient alert and oriented to person only. Following and commands and moving all 4 extremities.   Psychiatric:         Mood and Affect: Mood normal.         Behavior: Behavior normal.              CRANIAL NERVES     CN III, IV, VI   Pupils are equal, round, and reactive to light.       Significant Labs: All pertinent labs within the past 24 hours have been reviewed.  CBC:   Recent Labs   Lab 08/29/24  1328   WBC 21.32*   HGB 10.9*   HCT 34.0*   *     CMP:   Recent Labs   Lab 08/29/24  1328      K 4.7      CO2 22*   *   BUN 26*   CREATININE 1.3   CALCIUM 9.6   PROT 6.6   ALBUMIN 3.5   BILITOT 0.8   ALKPHOS 56   AST 25   ALT 11   ANIONGAP 12     Cardiac Markers:   Recent Labs   Lab 08/29/24  1328   *     Troponin:   Recent Labs   Lab 08/29/24  1328   TROPONINI 0.026       Significant Imaging: I have reviewed all pertinent imaging results/findings within the past 24 hours.  Imaging Results              X-Ray Chest AP Portable (Final result)  Result time 08/29/24 16:42:26      Final result by Ester Coleman MD (08/29/24 16:42:26)                   Impression:      No acute finding      Electronically signed by: Ester Coleman MD  Date:    08/29/2024  Time:    16:42               Narrative:    EXAMINATION:  XR CHEST AP PORTABLE    CLINICAL HISTORY:  CHF;    TECHNIQUE:  Single frontal view of the chest was performed.    COMPARISON:  January 10, 2020    FINDINGS:  Patient is slightly rotated.  Cardiac size is mildly enlarged but similar to prior imaging.  No significant pleural effusion is present.  There is osseous demineralization and degenerative change.  Calcification is present along the wall of the aorta.  Lungs demonstrate no focal consolidation.

## 2024-08-30 NOTE — ED TRIAGE NOTES
Patient comes into the emergency department by EMS with complaints of cough. Per family member, patient has been having a productive cough along with SOB and CP. Pt is AAOx2. Pt states her chest hurts and is non radiating.

## 2024-08-30 NOTE — ASSESSMENT & PLAN NOTE
Patient is identified as having Combined Systolic and Diastolic heart failure that is Chronic. CHF is currently controlled. Latest ECHO from 2014 with EF 15%.  . Continue Beta Blocker and monitor clinical status closely. Monitor on telemetry. Patient is off CHF pathway.  Monitor strict Is&Os and daily weights.  Cardiology has not been consulted. Continue to stress to patient importance of self efficacy and  on diet for CHF. Last BNP reviewed- and noted below   Recent Labs   Lab 08/29/24  1328   *   -Pt appears euvolemic although hypoxic with elevated BNP.  -ECHO pending.

## 2024-08-30 NOTE — ASSESSMENT & PLAN NOTE
Long term current use of anticoagulant therapy   Patient with Long standing persistent (>12 months) atrial fibrillation which is controlled currently with Beta Blocker and Digoxin. Patient is currently in sinus rhythm.[id number] Score: 3. Anticoagulation indicated. Anticoagulation done with eliquis .

## 2024-08-30 NOTE — HPI
Cat Garcia is a 89 y.o. female with a PMHx of dyslipidemia, CHF (EF 15%), dementia, CKD3, HTN, and a fib on eliquis who presents to the ED from NH for evaluation of cough, shortness of breath, and hypoxia noted this morning. HPI and ROS limited secondary to patient's baseline dementia. She endorses cough but otherwise denies any other complaints. I called and spoke with her son, he states her nursing facility called reporting that she developed cough, shortness of breath, and hypoxia with an SpO2 in the mid to upper 80s. He states requested that she be sent to the ED for evaluation. Per son, patient ambulates with a walker at baseline.    In the ED, patient mildly tachypneic otherwise vitals stable, afebrile. SpO2 91-92% on room air but improved to 95-96% on 2L O2 via NC. WBC 21.32k. CBC with stable anemia and thrombocytosis. Bicarb 22. Glucose 120. Cr 1.3 (bl 0.9-1.1). Dig level 1.6. . Troponin 0.026. D dimer 0.41. POC lactate 2.31. Blood cxs in process. EKG shows SR w/ non specific T wave abnormality, 61 bpm, no acute ischemic changes. CXR with no acute findings. Flu/COVID negative. UA with 1+ leukocytes, >100 WBCS, and many bacteria. The patient received vancomycin, zosyn, and duo neb treatment in the ED.

## 2024-08-30 NOTE — PLAN OF CARE
Problem: Infection  Goal: Absence of Infection Signs and Symptoms  Outcome: Progressing     Problem: Adult Inpatient Plan of Care  Goal: Plan of Care Review  Outcome: Progressing  Goal: Patient-Specific Goal (Individualized)  Outcome: Progressing  Goal: Absence of Hospital-Acquired Illness or Injury  Outcome: Progressing  Goal: Optimal Comfort and Wellbeing  Outcome: Progressing  Goal: Readiness for Transition of Care  Outcome: Progressing     Problem: Sepsis/Septic Shock  Goal: Optimal Coping  Outcome: Progressing  Goal: Absence of Bleeding  Outcome: Progressing  Goal: Blood Glucose Level Within Targeted Range  Outcome: Progressing  Goal: Absence of Infection Signs and Symptoms  Outcome: Progressing  Goal: Optimal Nutrition Intake  Outcome: Progressing     Problem: Skin Injury Risk Increased  Goal: Skin Health and Integrity  Outcome: Progressing     Problem: Fall Injury Risk  Goal: Absence of Fall and Fall-Related Injury  Outcome: Progressing  Patient alert and cooperative with care. Medicated as ordered. Son in room off and on this shift. Appetite poor. Patient resting comfortably at this time

## 2024-08-30 NOTE — PLAN OF CARE
Nurses Note -- 4 Eyes      8/30/2024   8:08 AM      Skin assessed during: Admit      [x] No Altered Skin Integrity Present    []Prevention Measures Documented      [] Yes- Altered Skin Integrity Present or Discovered   [] LDA Added if Not in Epic (Describe Wound)   [] New Altered Skin Integrity was Present on Admit and Documented in LDA   [] Wound Image Taken    Wound Care Consulted? No    Attending Nurse:  Raudel Fox RN/Staff Member:   Isis Timmons RN                       Problem: Infection  Goal: Absence of Infection Signs and Symptoms  Outcome: Progressing     Problem: Adult Inpatient Plan of Care  Goal: Plan of Care Review  Outcome: Progressing  Goal: Patient-Specific Goal (Individualized)  Outcome: Progressing  Goal: Absence of Hospital-Acquired Illness or Injury  Outcome: Progressing  Goal: Optimal Comfort and Wellbeing  Outcome: Progressing  Goal: Readiness for Transition of Care  Outcome: Progressing     Problem: Sepsis/Septic Shock  Goal: Optimal Coping  Outcome: Progressing  Goal: Absence of Bleeding  Outcome: Progressing  Goal: Blood Glucose Level Within Targeted Range  Outcome: Progressing  Goal: Absence of Infection Signs and Symptoms  Outcome: Progressing  Goal: Optimal Nutrition Intake  Outcome: Progressing     Problem: Skin Injury Risk Increased  Goal: Skin Health and Integrity  Outcome: Progressing     Problem: Fall Injury Risk  Goal: Absence of Fall and Fall-Related Injury  Outcome: Progressing

## 2024-08-30 NOTE — SUBJECTIVE & OBJECTIVE
Interval History: Patient pleasantly confused. No family at bedside, attempted to call son but no answer.    Review of Systems   Unable to perform ROS: Acuity of condition     Objective:     Vital Signs (Most Recent):  Temp: 99.2 °F (37.3 °C) (nurse notified) (08/30/24 1115)  Pulse: (!) 59 (08/30/24 1453)  Resp: 18 (08/30/24 1119)  BP: (!) 116/55 (08/30/24 1119)  SpO2: (!) 94 % (08/30/24 1300) Vital Signs (24h Range):  Temp:  [97.7 °F (36.5 °C)-99.9 °F (37.7 °C)] 99.2 °F (37.3 °C)  Pulse:  [55-74] 59  Resp:  [18-24] 18  SpO2:  [92 %-96 %] 94 %  BP: (114-141)/(55-61) 116/55     Weight: 55 kg (121 lb 4.1 oz)  Body mass index is 22.18 kg/m².    Intake/Output Summary (Last 24 hours) at 8/30/2024 1520  Last data filed at 8/30/2024 1207  Gross per 24 hour   Intake 346.67 ml   Output 300 ml   Net 46.67 ml         Physical Exam  Vitals and nursing note reviewed.   Constitutional:       Appearance: She is well-developed.   Eyes:      Pupils: Pupils are equal, round, and reactive to light.   Cardiovascular:      Rate and Rhythm: Normal rate and regular rhythm.   Pulmonary:      Effort: Pulmonary effort is normal.      Breath sounds: Normal breath sounds.   Abdominal:      Palpations: Abdomen is soft.      Tenderness: There is no abdominal tenderness.   Musculoskeletal:         General: No tenderness.   Skin:     General: Skin is warm and dry.   Neurological:      Mental Status: She is alert and oriented to person, place, and time.   Psychiatric:         Behavior: Behavior normal.             Significant Labs: All pertinent labs within the past 24 hours have been reviewed.    Significant Imaging: I have reviewed all pertinent imaging results/findings within the past 24 hours.

## 2024-08-30 NOTE — ASSESSMENT & PLAN NOTE
This patient does have evidence of infective focus  My overall impression is sepsis.  Source: Urinary Tract  Antibiotics given-   Antibiotics (72h ago, onward)      Start     Stop Route Frequency Ordered    08/30/24 0100  cefTRIAXone (Rocephin) 1 g in D5W 100 mL IVPB (MB+)         -- IV Every 24 hours (non-standard times) 08/29/24 2202          Latest lactate reviewed-  Recent Labs   Lab 08/29/24  2107   POCLAC 2.31*     Organ dysfunction indicated by Acute respiratory failure    Fluid challenge Not needed - patient is not hypotensive      Post- resuscitation assessment No - Post resuscitation assessment not needed   -Blood cxs in process.  -Urine cx in process.  Source control achieved by: Rocephin

## 2024-08-30 NOTE — PLAN OF CARE
[facility] - [facility] ([facility]  Initial Discharge Assessment       Primary Care Provider: Alfie Oconnell MD    Admission Diagnosis: Shortness of breath [R06.02]  CHF (congestive heart failure) [I50.9]  Hypoxia [R09.02]  Chest pain [R07.9]  Urinary tract infection without hematuria, site unspecified [N39.0]    Admission Date: 8/29/2024  Expected Discharge Date: 9/2/2024    Transition of Care Barriers: None    Payor: [employer] [facility] [facility] / Plan: [employer] / Product Type: Medicare Advantage /     Extended Emergency Contact Information  Primary Emergency Contact: [address]   [facility]  Home Phone: [phone]  Mobile Phone: [phone]  Relation: Son  Secondary Emergency Contact: Abhijit Ruiz   [place]  Mobile Phone: [phone]  Relation: Son    Discharge Plan A: Assisted Living  Discharge Plan B: Assisted Living      [facility] - [place], [place] - [address] [facility] [address]  Phone: [phone] Fax: [phone]      Initial Assessment (most recent)       Adult Discharge Assessment - 08/30/24 1404          Discharge Assessment    Assessment Type Discharge Planning Assessment     Confirmed/corrected address, phone number and insurance Yes     Confirmed Demographics Correct on Facesheet     Source of Information family     Communicated MARIA DEL CARMEN with patient/caregiver Date not available/Unable to determine     Reason For Admission Severe sepsis     People in Home facility resident   The patient lives at [facility] in [place] in the memory care unit.    Do you expect to return to your current living situation? Yes     Do you have help at home or someone to help you manage your care at home? Yes     Who are your caregiver(s) and their phone number(s)? The staff at the assisted living facility will be providing assistance to the patient upon discharge.     Walking or Climbing Stairs Difficulty yes     Walking or Climbing Stairs ambulation  difficulty, requires equipment     Equipment Currently Used at Home walker, rolling;wheelchair     Patient currently being followed by outpatient case management? No     Do you currently have service(s) that help you manage your care at home? Yes     Name and Contact number of agency The patient receives in-house Pt and Ot at [facility].     Do you take prescription medications? Yes     Do you have prescription coverage? Yes     Coverage Payor: [employer] [facility] -     Is the patient taking medications as prescribed? yes     Are you on dialysis? No     Do you take coumadin? No     Discharge Plan A Assisted Living     Discharge Plan B Assisted Living     DME Needed Upon Discharge  other (see comments)   TBD    Discharge Plan discussed with: Adult children     Transition of Care Barriers None        Physical Activity    On average, how many days per week do you engage in moderate to strenuous exercise (like a brisk walk)? Patient unable to answer     On average, how many minutes do you engage in exercise at this level? Patient unable to answer        Financial Resource Strain    How hard is it for you to pay for the very basics like food, housing, medical care, and heating? Patient unable to answer        Housing Stability    In the last 12 months, was there a time when you were not able to pay the mortgage or rent on time? Patient unable to answer     At any time in the past 12 months, were you homeless or living in a shelter (including now)? Patient unable to answer        Transportation Needs    Has the lack of transportation kept you from medical appointments, meetings, work or from getting things needed for daily living? Patient unable to answer        Food Insecurity    Within the past 12 months, you worried that your food would run out before you got the money to buy more. Patient unable to answer     Within the past 12 months, the food you bought just didn't  last and you didn't have money to get more. Patient unable to answer        Stress    Do you feel stress - tense, restless, nervous, or anxious, or unable to sleep at night because your mind is troubled all the time - these days? Patient unable to answer        Social Isolation    How often do you feel lonely or isolated from those around you?  Patient unable to answer        Alcohol Use    Q1: How often do you have a drink containing alcohol? Patient unable to answer     Q2: How many drinks containing alcohol do you have on a typical day when you are drinking? Patient unable to answer     Q3: How often do you have six or more drinks on one occasion? Patient unable to answer        Utilities    In the past 12 months has the electric, gas, oil, or water company threatened to shut off services in your home? Patient unable to answer        Health Literacy    How often do you need to have someone help you when you read instructions, pamphlets, or other written material from your doctor or pharmacy? Patient unable to respond                   The SW spoke with the patient's son  Joseph to complete the patient's DPA. The patient has a walker and wheelchair. The patient lives in a assisted living facility - [facility] in their memory care unit. The patient receives assistance from the staff at [facility]. The patient is not on Coumadin or Dialysis. The patient receives in-house PT and OT at [facility].       Maday Peters LCSW  Case Management [facility]

## 2024-08-30 NOTE — ASSESSMENT & PLAN NOTE
Patient is chronically on statin.will continue for now. Monitor clinically. Last LDL was   Lab Results   Component Value Date    LDLCALC 74.0 04/18/2023

## 2024-08-30 NOTE — PROGRESS NOTES
[facility] - [facility] ([place] [id number])  [facility] Medicine  Progress Note    Patient Name: Cat Garcia  MRN: [id number]  Patient Class: IP- Inpatient   Admission Date: 8/29/2024  Length of Stay: 1 days  Attending Physician: Tena Blanca MD  Primary Care Provider: Alfie Oconnell MD        Subjective:     Principal Problem:Severe sepsis        HPI:  Cat Garcia is a 89 y.o. female with a PMHx of dyslipidemia, CHF (EF 15%), dementia, CKD3, HTN, and a fib on eliquis who presents to the ED from NH for evaluation of cough, shortness of breath, and hypoxia noted this morning. HPI and ROS limited secondary to patient's baseline dementia. She endorses cough but otherwise denies any other complaints. I called and spoke with her son, he states her nursing facility called reporting that she developed cough, shortness of breath, and hypoxia with an SpO2 in the mid to upper 80s. He states requested that she be sent to the ED for evaluation. Per son, patient ambulates with a walker at baseline.    In the ED, patient mildly tachypneic otherwise vitals stable, afebrile. SpO2 91-92% on room air but improved to 95-96% on 2L O2 via NC. WBC 21.32k. CBC with stable anemia and thrombocytosis. Bicarb 22. Glucose 120. Cr 1.3 (bl 0.9-1.1). Dig level 1.6. . Troponin 0.026. D dimer 0.41. POC lactate 2.31. Blood cxs in process. EKG shows SR w/ non specific T wave abnormality, 61 bpm, no acute ischemic changes. CXR with no acute findings. Flu/COVID negative. UA with 1+ leukocytes, >100 WBCS, and many bacteria. The patient received vancomycin, zosyn, and duo neb treatment in the ED.    Overview/Hospital Course:  Patient admitted to medicine for sepsis. She was started on antibiotics.     Interval History: Patient pleasantly confused. No family at bedside, attempted to call son but no answer.    Review of Systems   Unable to perform ROS: Acuity of condition     Objective:     Vital Signs (Most Recent):  Temp: 99.2 °F (37.3  °C) (nurse notified) (08/30/24 1115)  Pulse: (!) 59 (08/30/24 1453)  Resp: 18 (08/30/24 1119)  BP: (!) 116/55 (08/30/24 1119)  SpO2: (!) 94 % (08/30/24 1300) Vital Signs (24h Range):  Temp:  [97.7 °F (36.5 °C)-99.9 °F (37.7 °C)] 99.2 °F (37.3 °C)  Pulse:  [55-74] 59  Resp:  [18-24] 18  SpO2:  [92 %-96 %] 94 %  BP: (114-141)/(55-61) 116/55     Weight: 55 kg (121 lb 4.1 oz)  Body mass index is 22.18 kg/m².    Intake/Output Summary (Last 24 hours) at 8/30/2024 1520  Last data filed at 8/30/2024 1207  Gross per 24 hour   Intake 346.67 ml   Output 300 ml   Net 46.67 ml         Physical Exam  Vitals and nursing note reviewed.   Constitutional:       Appearance: She is well-developed.   Eyes:      Pupils: Pupils are equal, round, and reactive to light.   Cardiovascular:      Rate and Rhythm: Normal rate and regular rhythm.   Pulmonary:      Effort: Pulmonary effort is normal.      Breath sounds: Normal breath sounds.   Abdominal:      Palpations: Abdomen is soft.      Tenderness: There is no abdominal tenderness.   Musculoskeletal:         General: No tenderness.   Skin:     General: Skin is warm and dry.   Neurological:      Mental Status: She is alert and oriented to person, place, and time.   Psychiatric:         Behavior: Behavior normal.             Significant Labs: All pertinent labs within the past 24 hours have been reviewed.    Significant Imaging: I have reviewed all pertinent imaging results/findings within the past 24 hours.    Assessment/Plan:      * Severe sepsis  This patient does have evidence of infective focus  My overall impression is sepsis.  Source: Urinary Tract  Antibiotics given-   Antibiotics (72h ago, onward)      Start     Stop Route Frequency Ordered    08/30/24 0100  cefTRIAXone (Rocephin) 1 g in D5W 100 mL IVPB (MB+)         -- IV Every 24 hours (non-standard times) 08/29/24 2202          Latest lactate reviewed-  Recent Labs   Lab 08/29/24 2107   POCLAC 2.31*     Organ dysfunction indicated  by Acute respiratory failure    Fluid challenge Not needed - patient is not hypotensive      Post- resuscitation assessment No - Post resuscitation assessment not needed   -Blood cxs in process.  -Urine cx in process.  Source control achieved by: Rocephin    Acute hypoxemic respiratory failure  Patient with Hypoxic Respiratory failure which is Acute.  she is not on home oxygen. Supplemental oxygen was provided and noted-      .   Signs/symptoms of respiratory failure include- tachypnea. Contributing diagnoses includes -  Sepsis  Labs and images were reviewed. Patient Has not had a recent ABG. Will treat underlying causes and adjust management of respiratory failure as follows-   -Continue to wean supplemental oxygen as tolerated.  -PRN duo nebs    DNR (do not resuscitate)  Advance Care Planning    Date: 08/30/2024    ACP Reviewed/No Changes  Voluntary advance care planning discussion had today with adult child/children. Previously completed POLST in electronic medical record is current, no changes made. DNR order placed in chart.    Acute cystitis without hematuria  -Afebrile, WBC 21.32k.  -POC lactate 2.31, repeat lactate pending.  -UA reviewed, follow cx.  -The patient received zosyn and vancomycin in the ED, will change to rocephin 1g.  -Previous urine cx reviewed:  AEROCOCCUS URINAE     Essential thrombocytosis  -Chronic, stable.  -Follows with heme onc  -Continue hydroxyurea    Chronic combined systolic and diastolic CHF, NYHA class 3  Patient is identified as having Combined Systolic and Diastolic heart failure that is Chronic. CHF is currently controlled. Latest ECHO from 2014 with EF 15%.  . Continue Beta Blocker and monitor clinical status closely. Monitor on telemetry. Patient is off CHF pathway.  Monitor strict Is&Os and daily weights.  Cardiology has not been consulted. Continue to stress to patient importance of self efficacy and  on diet for CHF. Last BNP reviewed- and noted below   Recent Labs    Lab 08/29/24  1328   *   -Pt appears euvolemic although hypoxic with elevated BNP.  -ECHO pending.    CKD (chronic kidney disease), stage III  Creatine stable for now. BMP reviewed- noted Estimated Creatinine Clearance: 26.3 mL/min (based on SCr of 1.3 mg/dL). according to latest data. Based on current GFR, CKD stage is stage 3 - GFR 30-59.  Monitor UOP and serial BMP and adjust therapy as needed. Renally dose meds. Avoid nephrotoxic medications and procedures.    Mixed Alzheimer's and vascular dementia  Patient with dementia with likely etiology of alzheimer's dementia. Dementia is moderate. The patient does not have signs of behavioral disturbance. Home dementia medications are Held or Continued: continued.. Continue non-pharmacologic interventions to prevent delirium (Activity during day, opening blinds, providing glasses/hearing aids, and up in chair during daytime). Will avoid narcotics and benzos unless absolutely necessary. PRN anti-psychotics are not prescribed to avoid self harm behaviors.    Atrial fibrillation, chronic  Long term current use of anticoagulant therapy   Patient with Long standing persistent (>12 months) atrial fibrillation which is controlled currently with Beta Blocker and Digoxin. Patient is currently in sinus rhythm.[id number] Score: 3. Anticoagulation indicated. Anticoagulation done with eliquis .    Essential hypertension  Chronic, controlled. Latest blood pressure and vitals reviewed-     Temp:  [97.8 °F (36.6 °C)-98.8 °F (37.1 °C)]   Pulse:  [57-65]   Resp:  [19-24]   BP: (108-125)/(50-62)   SpO2:  [92 %-98 %] .   Home meds for hypertension were reviewed and noted below.   Hypertension Medications               metoprolol succinate (TOPROL-XL) 25 MG 24 hr tablet Take 0.5 tablets (12.5 mg total) by mouth once daily.            While in the hospital, will manage blood pressure as follows; Continue home antihypertensive regimen    Will utilize p.r.n. blood pressure medication  only if patient's blood pressure greater than 180/110 and she develops symptoms such as worsening chest pain or shortness of breath.    Dyslipidemia   Patient is chronically on statin.will continue for now. Monitor clinically. Last LDL was   Lab Results   Component Value Date    LDLCALC 74.0 04/18/2023         VTE Risk Mitigation (From admission, onward)           Ordered     IP VTE HIGH RISK PATIENT  Once         08/30/24 0206     Place sequential compression device  Until discontinued         08/30/24 0206     apixaban tablet 2.5 mg  2 times daily         08/29/24 2202                    Discharge Planning   MARIA DEL CARMEN: 9/2/2024     Code Status: DNR   Is the patient medically ready for discharge?:     Reason for patient still in hospital (select all that apply): Patient trending condition  Discharge Plan A: Assisted Living                  Senia Kerr PA-C  Department of Hospital Medicine   [facility] - [facility] ([place])

## 2024-08-30 NOTE — ASSESSMENT & PLAN NOTE
Patient with dementia with likely etiology of alzheimer's dementia. Dementia is moderate. The patient does not have signs of behavioral disturbance. Home dementia medications are Held or Continued: continued.. Continue non-pharmacologic interventions to prevent delirium (Activity during day, opening blinds, providing glasses/hearing aids, and up in chair during daytime). Will avoid narcotics and benzos unless absolutely necessary. PRN anti-psychotics are not prescribed to avoid self harm behaviors.

## 2024-08-30 NOTE — ASSESSMENT & PLAN NOTE
Patient with Hypoxic Respiratory failure which is Acute.  she is not on home oxygen. Supplemental oxygen was provided and noted-      .   Signs/symptoms of respiratory failure include- tachypnea. Contributing diagnoses includes -  Sepsis  Labs and images were reviewed. Patient Has not had a recent ABG. Will treat underlying causes and adjust management of respiratory failure as follows-   -Continue to wean supplemental oxygen as tolerated.  -PRN duo nebs

## 2024-08-30 NOTE — ASSESSMENT & PLAN NOTE
-Afebrile, WBC 21.32k.  -POC lactate 2.31, repeat lactate pending.  -UA reviewed, follow cx.  -The patient received zosyn and vancomycin in the ED, will change to rocephin 1g.  -Previous urine cx reviewed:  AEROCOCCUS URINAE

## 2024-08-30 NOTE — ASSESSMENT & PLAN NOTE
Chronic, controlled. Latest blood pressure and vitals reviewed-     Temp:  [97.8 °F (36.6 °C)-98.8 °F (37.1 °C)]   Pulse:  [57-65]   Resp:  [19-24]   BP: (108-125)/(50-62)   SpO2:  [92 %-98 %] .   Home meds for hypertension were reviewed and noted below.   Hypertension Medications               metoprolol succinate (TOPROL-XL) 25 MG 24 hr tablet Take 0.5 tablets (12.5 mg total) by mouth once daily.            While in the hospital, will manage blood pressure as follows; Continue home antihypertensive regimen    Will utilize p.r.n. blood pressure medication only if patient's blood pressure greater than 180/110 and she develops symptoms such as worsening chest pain or shortness of breath.

## 2024-08-30 NOTE — ASSESSMENT & PLAN NOTE
Advance Care Planning     Date: 08/30/2024    ACP Reviewed/No Changes  Voluntary advance care planning discussion had today with adult child/children. Previously completed POLST in electronic medical record is current, no changes made. DNR order placed in chart.

## 2024-08-30 NOTE — H&P
[facility] - Emergency Dept  [facility] Medicine  History & Physical    Patient Name: Cat Garcia  MRN: [id number]  Patient Class: IP- Inpatient  Admission Date: 8/29/2024  Attending Physician: Delta Gomez MD   Primary Care Provider: Alfie Oconnell MD         Patient information was obtained from relative(s), past medical records, and ER records.     Subjective:     Principal Problem:Severe sepsis    Chief Complaint:   Chief Complaint   Patient presents with    Cough     Coarse productive cough, low O2        HPI: Cat Garcia is a 89 y.o. female with a PMHx of dyslipidemia, CHF (EF 15%), dementia, CKD3, HTN, and a fib on eliquis who presents to the ED from NH for evaluation of cough, shortness of breath, and hypoxia noted this morning. HPI and ROS limited secondary to patient's baseline dementia. She endorses cough but otherwise denies any other complaints. I called and spoke with her son, he states her nursing facility called reporting that she developed cough, shortness of breath, and hypoxia with an SpO2 in the mid to upper 80s. He states requested that she be sent to the ED for evaluation. Per son, patient ambulates with a walker at baseline.    In the ED, patient mildly tachypneic otherwise vitals stable, afebrile. SpO2 91-92% on room air but improved to 95-96% on 2L O2 via NC. WBC 21.32k. CBC with stable anemia and thrombocytosis. Bicarb 22. Glucose 120. Cr 1.3 (bl 0.9-1.1). Dig level 1.6. . Troponin 0.026. D dimer 0.41. POC lactate 2.31. Blood cxs in process. EKG shows SR w/ non specific T wave abnormality, 61 bpm, no acute ischemic changes. CXR with no acute findings. Flu/COVID negative. UA with 1+ leukocytes, >100 WBCS, and many bacteria. The patient received vancomycin, zosyn, and duo neb treatment in the ED.      Past Medical History:   Diagnosis Date    Atrial fibrillation     with rapid ventricular rate    Breast cancer     XRT    Chronic bronchitis     Chronic combined systolic and  diastolic CHF, NYHA class 3 2013    CKD (chronic kidney disease), stage III     Dementia     Dyslipidemia 12/15/2013    Essential hypertension 12/15/2013    Squamous cell carcinoma     Vascular parkinsonism 2016       Past Surgical History:   Procedure Laterality Date    BREAST SURGERY  2011    right       Review of patient's allergies indicates:  No Known Allergies    No current facility-administered medications on file prior to encounter.     Current Outpatient Medications on File Prior to Encounter   Medication Sig    apixaban (ELIQUIS) 2.5 mg Tab TAKE ONE TABLET BY MOUTH TWICE DAILY. DO NOT TAKE UNTIL YOU HAVE HELD WARFARIN FOR 3 DAYS!! (Patient taking differently: Take 2.5 mg by mouth 2 (two) times daily.)    atorvastatin (LIPITOR) 10 MG tablet TAKE ONE TABLET BY MOUTH ONCE DAILY    digoxin (LANOXIN) 125 mcg tablet TAKE ONE TABLET BY MOUTH EVERY DAY    donepeziL (ARICEPT) 10 MG tablet TAKE ONE TABLET BY MOUTH EVERY EVENING    FLUAD QUAD -,65Y UP,,PF, 60 mcg (15 mcg x 4)/0.5 mL Syrg     hydroxyurea (HYDREA) 500 mg Cap TAKE TWO CAPSULES BY MOUTH ONCE DAILY    influenza (FLUZONE HIGH-DOSE) 180 mcg/0.5 mL vaccine Inject 0.5 mLs into the muscle. (Patient taking differently: Inject 0.5 mLs into the muscle once.)    latanoprost 0.005 % ophthalmic solution Place 1 drop into both eyes every evening.    metoprolol succinate (TOPROL-XL) 25 MG 24 hr tablet Take 0.5 tablets (12.5 mg total) by mouth once daily.     Family History       Problem Relation (Age of Onset)    Heart disease Father          Tobacco Use    Smoking status: Former     Current packs/day: 0.00     Types: Cigarettes     Quit date: 1982     Years since quittin.6    Smokeless tobacco: Never    Tobacco comments:     pt was social smoker   Substance and Sexual Activity    Alcohol use: No     Alcohol/week: 0.0 standard drinks of alcohol    Drug use: No    Sexual activity: Never     Review of Systems   Unable to perform ROS: Dementia    Respiratory:  Positive for cough. Negative for shortness of breath.    Cardiovascular:  Negative for chest pain.     Objective:     Vital Signs (Most Recent):  Temp: 98.5 °F (36.9 °C) (08/29/24 1643)  Pulse: (!) 57 (08/29/24 2111)  Resp: (!) 24 (08/29/24 2111)  BP: (!) 116/56 (08/29/24 2032)  SpO2: (!) 94 % (08/29/24 2111) Vital Signs (24h Range):  Temp:  [97.8 °F (36.6 °C)-98.8 °F (37.1 °C)] 98.5 °F (36.9 °C)  Pulse:  [57-65] 57  Resp:  [19-24] 24  SpO2:  [92 %-98 %] 94 %  BP: (108-125)/(50-62) 116/56     Weight: 55.9 kg (123 lb 3.8 oz)  Body mass index is 21.15 kg/m².     Physical Exam  Vitals and nursing note reviewed.   Constitutional:       General: She is not in acute distress.     Appearance: She is not toxic-appearing or diaphoretic.   HENT:      Nose: Nose normal.      Mouth/Throat:      Mouth: Mucous membranes are dry.   Eyes:      Pupils: Pupils are equal, round, and reactive to light.   Cardiovascular:      Rate and Rhythm: Regular rhythm. Bradycardia present.   Pulmonary:      Effort: Pulmonary effort is normal. No respiratory distress.      Breath sounds: No wheezing, rhonchi or rales.      Comments: Intermittent congested cough noted during my exam. Currently on room air with SpO2 94-95%  Abdominal:      General: Bowel sounds are normal. There is no distension.      Palpations: Abdomen is soft.      Tenderness: There is no abdominal tenderness. There is no guarding.   Musculoskeletal:         General: Normal range of motion.      Cervical back: Normal range of motion.      Right lower leg: No edema.      Left lower leg: No edema.   Skin:     General: Skin is warm and dry.   Neurological:      Mental Status: She is alert. Mental status is at baseline.      Cranial Nerves: No dysarthria.      Motor: No weakness.      Comments: Patient alert and oriented to person only. Following and commands and moving all 4 extremities.   Psychiatric:         Mood and Affect: Mood normal.         Behavior: Behavior  normal.              CRANIAL NERVES     CN III, IV, VI   Pupils are equal, round, and reactive to light.       Significant Labs: All pertinent labs within the past 24 hours have been reviewed.  CBC:   Recent Labs   Lab 08/29/24  1328   WBC 21.32*   HGB 10.9*   HCT 34.0*   *     CMP:   Recent Labs   Lab 08/29/24  1328      K 4.7      CO2 22*   *   BUN 26*   CREATININE 1.3   CALCIUM 9.6   PROT 6.6   ALBUMIN 3.5   BILITOT 0.8   ALKPHOS 56   AST 25   ALT 11   ANIONGAP 12     Cardiac Markers:   Recent Labs   Lab 08/29/24  1328   *     Troponin:   Recent Labs   Lab 08/29/24  1328   TROPONINI 0.026       Significant Imaging: I have reviewed all pertinent imaging results/findings within the past 24 hours.  Imaging Results              X-Ray Chest AP Portable (Final result)  Result time 08/29/24 16:42:26      Final result by Ester Coleman MD (08/29/24 16:42:26)                   Impression:      No acute finding      Electronically signed by: Ester Coleman MD  Date:    08/29/2024  Time:    16:42               Narrative:    EXAMINATION:  XR CHEST AP PORTABLE    CLINICAL HISTORY:  CHF;    TECHNIQUE:  Single frontal view of the chest was performed.    COMPARISON:  January 10, 2020    FINDINGS:  Patient is slightly rotated.  Cardiac size is mildly enlarged but similar to prior imaging.  No significant pleural effusion is present.  There is osseous demineralization and degenerative change.  Calcification is present along the wall of the aorta.  Lungs demonstrate no focal consolidation.                                      Assessment/Plan:     * Severe sepsis  This patient does have evidence of infective focus  My overall impression is sepsis.  Source: Urinary Tract  Antibiotics given-   Antibiotics (72h ago, onward)      Start     Stop Route Frequency Ordered    08/30/24 0100  cefTRIAXone (Rocephin) 1 g in D5W 100 mL IVPB (MB+)         -- IV Every 24 hours (non-standard times) 08/29/24  2202          Latest lactate reviewed-  Recent Labs   Lab 08/29/24 2107   POCLAC 2.31*     Organ dysfunction indicated by Acute respiratory failure    Fluid challenge Not needed - patient is not hypotensive      Post- resuscitation assessment No - Post resuscitation assessment not needed   -Blood cxs in process.  -Urine cx in process.  Source control achieved by: Rocephin    Acute cystitis without hematuria  -Afebrile, WBC 21.32k.  -POC lactate 2.31, repeat lactate pending.  -UA reviewed, follow cx.  -The patient received zosyn and vancomycin in the ED, will change to rocephin 1g.  -Previous urine cx reviewed:  AEROCOCCUS URINAE     Acute hypoxemic respiratory failure  Patient with Hypoxic Respiratory failure which is Acute.  she is not on home oxygen. Supplemental oxygen was provided and noted-      .   Signs/symptoms of respiratory failure include- tachypnea. Contributing diagnoses includes -  Sepsis  Labs and images were reviewed. Patient Has not had a recent ABG. Will treat underlying causes and adjust management of respiratory failure as follows-   -Continue to wean supplemental oxygen as tolerated.  -PRN duo nebs    DNR (do not resuscitate)  Advance Care Planning    Date: 08/30/2024    ACP Reviewed/No Changes  Voluntary advance care planning discussion had today with adult child/children. Previously completed POLST in electronic medical record is current, no changes made. DNR order placed in chart.    Essential thrombocytosis  -Chronic, stable.  -Follows with heme onc  -Continue hydroxyurea    Chronic combined systolic and diastolic CHF, NYHA class 3  Patient is identified as having Combined Systolic and Diastolic heart failure that is Chronic. CHF is currently controlled. Latest ECHO from 2014 with EF 15%.  . Continue Beta Blocker and monitor clinical status closely. Monitor on telemetry. Patient is off CHF pathway.  Monitor strict Is&Os and daily weights.  Cardiology has not been consulted. Continue to  stress to patient importance of self efficacy and  on diet for CHF. Last BNP reviewed- and noted below   Recent Labs   Lab 08/29/24  1328   *   -Pt appears euvolemic although hypoxic with elevated BNP.  -ECHO pending.    CKD (chronic kidney disease), stage III  Creatine stable for now. BMP reviewed- noted Estimated Creatinine Clearance: 26.3 mL/min (based on SCr of 1.3 mg/dL). according to latest data. Based on current GFR, CKD stage is stage 3 - GFR 30-59.  Monitor UOP and serial BMP and adjust therapy as needed. Renally dose meds. Avoid nephrotoxic medications and procedures.    Mixed Alzheimer's and vascular dementia  Patient with dementia with likely etiology of alzheimer's dementia. Dementia is moderate. The patient does not have signs of behavioral disturbance. Home dementia medications are Held or Continued: continued.. Continue non-pharmacologic interventions to prevent delirium (Activity during day, opening blinds, providing glasses/hearing aids, and up in chair during daytime). Will avoid narcotics and benzos unless absolutely necessary. PRN anti-psychotics are not prescribed to avoid self harm behaviors.    Atrial fibrillation, chronic  Long term current use of anticoagulant therapy   Patient with Long standing persistent (>12 months) atrial fibrillation which is controlled currently with Beta Blocker and Digoxin. Patient is currently in sinus rhythm.[id number] Score: 3. Anticoagulation indicated. Anticoagulation done with eliquis .    Essential hypertension  Chronic, controlled. Latest blood pressure and vitals reviewed-     Temp:  [97.8 °F (36.6 °C)-98.8 °F (37.1 °C)]   Pulse:  [57-65]   Resp:  [19-24]   BP: (108-125)/(50-62)   SpO2:  [92 %-98 %] .   Home meds for hypertension were reviewed and noted below.   Hypertension Medications               metoprolol succinate (TOPROL-XL) 25 MG 24 hr tablet Take 0.5 tablets (12.5 mg total) by mouth once daily.            While in the hospital, will  manage blood pressure as follows; Continue home antihypertensive regimen    Will utilize p.r.n. blood pressure medication only if patient's blood pressure greater than 180/110 and she develops symptoms such as worsening chest pain or shortness of breath.    Dyslipidemia   Patient is chronically on statin.will continue for now. Monitor clinically. Last LDL was   Lab Results   Component Value Date    LDLCALC 74.0 04/18/2023         VTE Risk Mitigation (From admission, onward)           Ordered     IP VTE HIGH RISK PATIENT  Once         08/30/24 0206     Place sequential compression device  Until discontinued         08/30/24 0206     apixaban tablet 2.5 mg  2 times daily         08/29/24 2202                                    Colleen Mckeon NP  Department of Hospital Medicine  [facility] - Emergency Dept

## 2024-08-30 NOTE — PLAN OF CARE
11:06am: SW went to pt room, pt was sleeping, no family at bedside currently. CM dept will reach out to pt son- Dr. Ruiz to complete DPA.       KIMBERLY Fountain   [facility]    Case Management Dept  [phone]

## 2024-08-30 NOTE — ASSESSMENT & PLAN NOTE
Creatine stable for now. BMP reviewed- noted Estimated Creatinine Clearance: 26.3 mL/min (based on SCr of 1.3 mg/dL). according to latest data. Based on current GFR, CKD stage is stage 3 - GFR 30-59.  Monitor UOP and serial BMP and adjust therapy as needed. Renally dose meds. Avoid nephrotoxic medications and procedures.

## 2024-08-31 LAB
ALBUMIN SERPL BCP-MCNC: 2.8 G/DL (ref 3.5–5.2)
ALP SERPL-CCNC: 67 U/L (ref 55–135)
ALT SERPL W/O P-5'-P-CCNC: 16 U/L (ref 10–44)
ANION GAP SERPL CALC-SCNC: 10 MMOL/L (ref 8–16)
ANION GAP SERPL CALC-SCNC: 7 MMOL/L (ref 8–16)
AST SERPL-CCNC: 24 U/L (ref 10–40)
BASOPHILS # BLD AUTO: 0.07 K/UL (ref 0–0.2)
BASOPHILS # BLD AUTO: 0.08 K/UL (ref 0–0.2)
BASOPHILS NFR BLD: 0.5 % (ref 0–1.9)
BASOPHILS NFR BLD: 0.6 % (ref 0–1.9)
BILIRUB SERPL-MCNC: 0.5 MG/DL (ref 0.1–1)
BUN SERPL-MCNC: 21 MG/DL (ref 8–23)
BUN SERPL-MCNC: 23 MG/DL (ref 8–23)
CALCIUM SERPL-MCNC: 8.7 MG/DL (ref 8.7–10.5)
CALCIUM SERPL-MCNC: 8.8 MG/DL (ref 8.7–10.5)
CHLORIDE SERPL-SCNC: 110 MMOL/L (ref 95–110)
CHLORIDE SERPL-SCNC: 112 MMOL/L (ref 95–110)
CO2 SERPL-SCNC: 19 MMOL/L (ref 23–29)
CO2 SERPL-SCNC: 20 MMOL/L (ref 23–29)
CREAT SERPL-MCNC: 1 MG/DL (ref 0.5–1.4)
CREAT SERPL-MCNC: 1.1 MG/DL (ref 0.5–1.4)
DIFFERENTIAL METHOD BLD: ABNORMAL
DIFFERENTIAL METHOD BLD: ABNORMAL
EOSINOPHIL # BLD AUTO: 0.1 K/UL (ref 0–0.5)
EOSINOPHIL # BLD AUTO: 0.1 K/UL (ref 0–0.5)
EOSINOPHIL NFR BLD: 0.6 % (ref 0–8)
EOSINOPHIL NFR BLD: 0.6 % (ref 0–8)
ERYTHROCYTE [DISTWIDTH] IN BLOOD BY AUTOMATED COUNT: 14.4 % (ref 11.5–14.5)
ERYTHROCYTE [DISTWIDTH] IN BLOOD BY AUTOMATED COUNT: 14.4 % (ref 11.5–14.5)
EST. GFR  (NO RACE VARIABLE): 48.6 ML/MIN/1.73 M^2
EST. GFR  (NO RACE VARIABLE): 54.5 ML/MIN/1.73 M^2
GLUCOSE SERPL-MCNC: 89 MG/DL (ref 70–110)
GLUCOSE SERPL-MCNC: 94 MG/DL (ref 70–110)
HCT VFR BLD AUTO: 34.3 % (ref 37–48.5)
HCT VFR BLD AUTO: 34.5 % (ref 37–48.5)
HGB BLD-MCNC: 10.6 G/DL (ref 12–16)
HGB BLD-MCNC: 10.7 G/DL (ref 12–16)
IMM GRANULOCYTES # BLD AUTO: 0.13 K/UL (ref 0–0.04)
IMM GRANULOCYTES # BLD AUTO: 0.15 K/UL (ref 0–0.04)
IMM GRANULOCYTES NFR BLD AUTO: 1 % (ref 0–0.5)
IMM GRANULOCYTES NFR BLD AUTO: 1.2 % (ref 0–0.5)
LYMPHOCYTES # BLD AUTO: 1.6 K/UL (ref 1–4.8)
LYMPHOCYTES # BLD AUTO: 1.6 K/UL (ref 1–4.8)
LYMPHOCYTES NFR BLD: 12.7 % (ref 18–48)
LYMPHOCYTES NFR BLD: 12.9 % (ref 18–48)
MAGNESIUM SERPL-MCNC: 2.1 MG/DL (ref 1.6–2.6)
MAGNESIUM SERPL-MCNC: 2.1 MG/DL (ref 1.6–2.6)
MCH RBC QN AUTO: 35.1 PG (ref 27–31)
MCH RBC QN AUTO: 35.2 PG (ref 27–31)
MCHC RBC AUTO-ENTMCNC: 30.7 G/DL (ref 32–36)
MCHC RBC AUTO-ENTMCNC: 31.2 G/DL (ref 32–36)
MCV RBC AUTO: 113 FL (ref 82–98)
MCV RBC AUTO: 114 FL (ref 82–98)
MONOCYTES # BLD AUTO: 0.9 K/UL (ref 0.3–1)
MONOCYTES # BLD AUTO: 0.9 K/UL (ref 0.3–1)
MONOCYTES NFR BLD: 6.8 % (ref 4–15)
MONOCYTES NFR BLD: 6.9 % (ref 4–15)
NEUTROPHILS # BLD AUTO: 10.1 K/UL (ref 1.8–7.7)
NEUTROPHILS # BLD AUTO: 9.8 K/UL (ref 1.8–7.7)
NEUTROPHILS NFR BLD: 77.9 % (ref 38–73)
NEUTROPHILS NFR BLD: 78.3 % (ref 38–73)
NRBC BLD-RTO: 0 /100 WBC
NRBC BLD-RTO: 0 /100 WBC
PHOSPHATE SERPL-MCNC: 3.3 MG/DL (ref 2.7–4.5)
PHOSPHATE SERPL-MCNC: 3.3 MG/DL (ref 2.7–4.5)
PLATELET # BLD AUTO: 822 K/UL (ref 150–450)
PLATELET # BLD AUTO: 844 K/UL (ref 150–450)
PMV BLD AUTO: 10.2 FL (ref 9.2–12.9)
PMV BLD AUTO: 10.3 FL (ref 9.2–12.9)
POTASSIUM SERPL-SCNC: 3.9 MMOL/L (ref 3.5–5.1)
POTASSIUM SERPL-SCNC: 4.8 MMOL/L (ref 3.5–5.1)
PROT SERPL-MCNC: 5.7 G/DL (ref 6–8.4)
RBC # BLD AUTO: 3.02 M/UL (ref 4–5.4)
RBC # BLD AUTO: 3.04 M/UL (ref 4–5.4)
SODIUM SERPL-SCNC: 138 MMOL/L (ref 136–145)
SODIUM SERPL-SCNC: 140 MMOL/L (ref 136–145)
WBC # BLD AUTO: 12.63 K/UL (ref 3.9–12.7)
WBC # BLD AUTO: 12.89 K/UL (ref 3.9–12.7)

## 2024-08-31 PROCEDURE — 83735 ASSAY OF MAGNESIUM: CPT | Performed by: HOSPITALIST

## 2024-08-31 PROCEDURE — 85025 COMPLETE CBC W/AUTO DIFF WBC: CPT | Performed by: HOSPITALIST

## 2024-08-31 PROCEDURE — 84100 ASSAY OF PHOSPHORUS: CPT | Mod: 91 | Performed by: NURSE PRACTITIONER

## 2024-08-31 PROCEDURE — 84100 ASSAY OF PHOSPHORUS: CPT | Performed by: HOSPITALIST

## 2024-08-31 PROCEDURE — 51701 INSERT BLADDER CATHETER: CPT

## 2024-08-31 PROCEDURE — 36415 COLL VENOUS BLD VENIPUNCTURE: CPT | Performed by: NURSE PRACTITIONER

## 2024-08-31 PROCEDURE — 80048 BASIC METABOLIC PNL TOTAL CA: CPT | Mod: XB | Performed by: HOSPITALIST

## 2024-08-31 PROCEDURE — 80053 COMPREHEN METABOLIC PANEL: CPT | Performed by: NURSE PRACTITIONER

## 2024-08-31 PROCEDURE — 36415 COLL VENOUS BLD VENIPUNCTURE: CPT | Performed by: HOSPITALIST

## 2024-08-31 PROCEDURE — 63600175 PHARM REV CODE 636 W HCPCS: Performed by: NURSE PRACTITIONER

## 2024-08-31 PROCEDURE — 25000003 PHARM REV CODE 250: Performed by: HOSPITALIST

## 2024-08-31 PROCEDURE — 51798 US URINE CAPACITY MEASURE: CPT

## 2024-08-31 PROCEDURE — 85025 COMPLETE CBC W/AUTO DIFF WBC: CPT | Mod: 91 | Performed by: NURSE PRACTITIONER

## 2024-08-31 PROCEDURE — 83735 ASSAY OF MAGNESIUM: CPT | Mod: 91 | Performed by: NURSE PRACTITIONER

## 2024-08-31 PROCEDURE — 25000003 PHARM REV CODE 250: Performed by: NURSE PRACTITIONER

## 2024-08-31 PROCEDURE — 20600001 HC STEP DOWN PRIVATE ROOM

## 2024-08-31 RX ORDER — TAMSULOSIN HYDROCHLORIDE 0.4 MG/1
0.4 CAPSULE ORAL DAILY
Status: DISCONTINUED | OUTPATIENT
Start: 2024-08-31 | End: 2024-09-01 | Stop reason: HOSPADM

## 2024-08-31 RX ADMIN — CEFTRIAXONE 1 G: 1 INJECTION, POWDER, FOR SOLUTION INTRAMUSCULAR; INTRAVENOUS at 01:08

## 2024-08-31 RX ADMIN — TAMSULOSIN HYDROCHLORIDE 0.4 MG: 0.4 CAPSULE ORAL at 01:08

## 2024-08-31 RX ADMIN — APIXABAN 2.5 MG: 2.5 TABLET, FILM COATED ORAL at 08:08

## 2024-08-31 RX ADMIN — DONEPEZIL HYDROCHLORIDE 10 MG: 10 TABLET ORAL at 08:08

## 2024-08-31 RX ADMIN — METOPROLOL SUCCINATE 12.5 MG: 25 TABLET, EXTENDED RELEASE ORAL at 08:08

## 2024-08-31 RX ADMIN — HYDROXYUREA 1000 MG: 500 CAPSULE ORAL at 08:08

## 2024-08-31 RX ADMIN — ACETAMINOPHEN 1000 MG: 500 TABLET ORAL at 08:08

## 2024-08-31 RX ADMIN — ATORVASTATIN CALCIUM 10 MG: 10 TABLET, FILM COATED ORAL at 08:08

## 2024-08-31 RX ADMIN — Medication 6 MG: at 08:08

## 2024-08-31 RX ADMIN — DIGOXIN 0.12 MG: 125 TABLET ORAL at 08:08

## 2024-08-31 NOTE — SUBJECTIVE & OBJECTIVE
Interval History: Patient without complaints. UCx GNR's. Remove mcleod with voiding trial. Sats 94-96% on room air. Son updated via phone.     Review of Systems   Unable to perform ROS: Dementia     Objective:     Vital Signs (Most Recent):  Temp: 98.6 °F (37 °C) (08/31/24 1120)  Pulse: 89 (08/31/24 1309)  Resp: 18 (08/31/24 1120)  BP: 114/63 (08/31/24 1120)  SpO2: 96 % (08/31/24 1300) Vital Signs (24h Range):  Temp:  [97.6 °F (36.4 °C)-99.9 °F (37.7 °C)] 98.6 °F (37 °C)  Pulse:  [] 89  Resp:  [16-18] 18  SpO2:  [89 %-96 %] 96 %  BP: (104-114)/(51-70) 114/63     Weight: 47.4 kg (104 lb 8 oz)  Body mass index is 19.11 kg/m².    Intake/Output Summary (Last 24 hours) at 8/31/2024 1504  Last data filed at 8/31/2024 1120  Gross per 24 hour   Intake 140 ml   Output 650 ml   Net -510 ml         Physical Exam  Vitals and nursing note reviewed.   Constitutional:       Appearance: She is well-developed.   Eyes:      Pupils: Pupils are equal, round, and reactive to light.   Cardiovascular:      Rate and Rhythm: Normal rate and regular rhythm.   Pulmonary:      Effort: Pulmonary effort is normal.      Breath sounds: Normal breath sounds.   Abdominal:      Palpations: Abdomen is soft.      Tenderness: There is no abdominal tenderness.   Musculoskeletal:         General: No tenderness.   Skin:     General: Skin is warm and dry.   Neurological:      Mental Status: She is alert and oriented to person, place, and time.   Psychiatric:         Behavior: Behavior normal.             Significant Labs: All pertinent labs within the past 24 hours have been reviewed.    Significant Imaging: I have reviewed all pertinent imaging results/findings within the past 24 hours.

## 2024-08-31 NOTE — PLAN OF CARE
Problem: Infection  Goal: Absence of Infection Signs and Symptoms  Outcome: Progressing     Problem: Adult Inpatient Plan of Care  Goal: Plan of Care Review  Outcome: Progressing  Goal: Patient-Specific Goal (Individualized)  Outcome: Progressing  Goal: Absence of Hospital-Acquired Illness or Injury  Outcome: Progressing  Intervention: Prevent Skin Injury  Flowsheets (Taken 8/31/2024 0142)  Body Position: right  Skin Protection: incontinence pads utilized  Device Skin Pressure Protection: tubing/devices free from skin contact  Goal: Optimal Comfort and Wellbeing  Outcome: Progressing  Intervention: Provide Person-Centered Care  Flowsheets (Taken 8/31/2024 0142)  Trust Relationship/Rapport:   care explained   empathic listening provided  Goal: Readiness for Transition of Care  Outcome: Progressing     Problem: Sepsis/Septic Shock  Goal: Optimal Coping  Outcome: Progressing  Intervention: Optimize Psychosocial Adjustment to Illness  Flowsheets (Taken 8/31/2024 0142)  Supportive Measures: active listening utilized  Goal: Absence of Bleeding  Outcome: Progressing  Goal: Blood Glucose Level Within Targeted Range  Outcome: Progressing  Intervention: Optimize Glycemic Control  Flowsheets (Taken 8/31/2024 0142)  Glycemic Management: blood glucose monitored  Goal: Absence of Infection Signs and Symptoms  Outcome: Progressing  Goal: Optimal Nutrition Intake  Outcome: Progressing     Problem: Skin Injury Risk Increased  Goal: Skin Health and Integrity  Outcome: Progressing  Intervention: Optimize Skin Protection  Flowsheets (Taken 8/31/2024 0142)  Pressure Reduction Techniques: frequent weight shift encouraged  Skin Protection: incontinence pads utilized  Activity Management: Rolling - L1  Head of Bed (HOB) Positioning: HOB at 20 degrees     Problem: Fall Injury Risk  Goal: Absence of Fall and Fall-Related Injury  Outcome: Progressing

## 2024-08-31 NOTE — PROGRESS NOTES
[facility] - [facility] ([place] [id number])  [facility]  Progress Note    Patient Name: Cat Garcia  MRN: [id number]  Patient Class: IP- Inpatient   Admission Date: 8/29/2024  Length of Stay: 2 days  Attending Physician: Kendal Anderson MD  Primary Care Provider: Alfie Oconnell MD        Subjective:     Principal Problem:Severe sepsis        HPI:  Cat Garcia is a 89 y.o. female with a PMHx of dyslipidemia, CHF (EF 15%), dementia, CKD3, HTN, and a fib on eliquis who presents to the ED from NH for evaluation of cough, shortness of breath, and hypoxia noted this morning. HPI and ROS limited secondary to patient's baseline dementia. She endorses cough but otherwise denies any other complaints. I called and spoke with her son, he states her nursing facility called reporting that she developed cough, shortness of breath, and hypoxia with an SpO2 in the mid to upper 80s. He states requested that she be sent to the ED for evaluation. Per son, patient ambulates with a walker at baseline.    In the ED, patient mildly tachypneic otherwise vitals stable, afebrile. SpO2 91-92% on room air but improved to 95-96% on 2L O2 via NC. WBC 21.32k. CBC with stable anemia and thrombocytosis. Bicarb 22. Glucose 120. Cr 1.3 (bl 0.9-1.1). Dig level 1.6. . Troponin 0.026. D dimer 0.41. POC lactate 2.31. Blood cxs in process. EKG shows SR w/ non specific T wave abnormality, 61 bpm, no acute ischemic changes. CXR with no acute findings. Flu/COVID negative. UA with 1+ leukocytes, >100 WBCS, and many bacteria. The patient received vancomycin, zosyn, and duo neb treatment in the ED.    Overview/Hospital Course:  Patient admitted to medicine for sepsis, UTI. She was transitioned to CTX. Mcleod placed in ED for retention. Voiding trial 8/31. Hypoxia resolved.     Interval History: Patient without complaints. UCx GNR's. Remove mcleod with voiding trial. Sats 94-96% on room air. Son updated via phone.     Review of Systems   Unable  to perform ROS: Dementia     Objective:     Vital Signs (Most Recent):  Temp: 98.6 °F (37 °C) (08/31/24 1120)  Pulse: 89 (08/31/24 1309)  Resp: 18 (08/31/24 1120)  BP: 114/63 (08/31/24 1120)  SpO2: 96 % (08/31/24 1300) Vital Signs (24h Range):  Temp:  [97.6 °F (36.4 °C)-99.9 °F (37.7 °C)] 98.6 °F (37 °C)  Pulse:  [] 89  Resp:  [16-18] 18  SpO2:  [89 %-96 %] 96 %  BP: (104-114)/(51-70) 114/63     Weight: 47.4 kg (104 lb 8 oz)  Body mass index is 19.11 kg/m².    Intake/Output Summary (Last 24 hours) at 8/31/2024 1504  Last data filed at 8/31/2024 1120  Gross per 24 hour   Intake 140 ml   Output 650 ml   Net -510 ml         Physical Exam  Vitals and nursing note reviewed.   Constitutional:       Appearance: She is well-developed.   Eyes:      Pupils: Pupils are equal, round, and reactive to light.   Cardiovascular:      Rate and Rhythm: Normal rate and regular rhythm.   Pulmonary:      Effort: Pulmonary effort is normal.      Breath sounds: Normal breath sounds.   Abdominal:      Palpations: Abdomen is soft.      Tenderness: There is no abdominal tenderness.   Musculoskeletal:         General: No tenderness.   Skin:     General: Skin is warm and dry.   Neurological:      Mental Status: She is alert and oriented to person, place, and time.   Psychiatric:         Behavior: Behavior normal.             Significant Labs: All pertinent labs within the past 24 hours have been reviewed.    Significant Imaging: I have reviewed all pertinent imaging results/findings within the past 24 hours.    Assessment/Plan:      * Severe sepsis  This patient does have evidence of infective focus  My overall impression is sepsis.  Source: Urinary Tract  Antibiotics given-   Antibiotics (72h ago, onward)      Start     Stop Route Frequency Ordered    08/30/24 0100  cefTRIAXone (Rocephin) 1 g in D5W 100 mL IVPB (MB+)         -- IV Every 24 hours (non-standard times) 08/29/24 2202          Latest lactate reviewed-  Recent Labs   Lab  08/29/24 2107 08/30/24  0037   LACTATE  --  1.0   POCLAC 2.31*  --        Organ dysfunction indicated by Acute respiratory failure    Fluid challenge Not needed - patient is not hypotensive      Post- resuscitation assessment No - Post resuscitation assessment not needed   -Blood cxs in process.  -Urine cx in process.  Source control achieved by: Rocephin    Acute hypoxemic respiratory failure  Patient with Hypoxic Respiratory failure which is Acute.  she is not on home oxygen. Supplemental oxygen was provided and noted-      .   Signs/symptoms of respiratory failure include- tachypnea. Contributing diagnoses includes -  Sepsis  Labs and images were reviewed. Patient Has not had a recent ABG. Will treat underlying causes and adjust management of respiratory failure as follows-   -Continue to wean supplemental oxygen as tolerated.  -PRN duo nebs  -Resolved    DNR (do not resuscitate)  Advance Care Planning    Date: 08/30/2024    ACP Reviewed/No Changes  Voluntary advance care planning discussion had today with adult child/children. Previously completed POLST in electronic medical record is current, no changes made. DNR order placed in chart.    Acute cystitis without hematuria  -Afebrile, WBC 21.32k.  -POC lactate 2.31, repeat nl  -UA reviewed, follow cx.  -The patient received zosyn and vancomycin in the ED, will change to rocephin 1g.  -Previous urine cx reviewed:  AEROCOCCUS URINAE     Essential thrombocytosis  -Chronic, stable.  -Follows with heme onc  -Continue hydroxyurea    Chronic combined systolic and diastolic CHF, NYHA class 3  Patient is identified as having Combined Systolic and Diastolic heart failure that is Chronic. CHF is currently controlled. Latest ECHO from 2014 with EF 15%.  . Continue Beta Blocker and monitor clinical status closely. Monitor on telemetry. Patient is off CHF pathway.  Monitor strict Is&Os and daily weights.  Cardiology has not been consulted. Continue to stress to patient  importance of self efficacy and  on diet for CHF. Last BNP reviewed- and noted below   Recent Labs   Lab 08/29/24  1328   *     -Pt appears euvolemic although hypoxic with elevated BNP.  -ECHO ordered  Echo    Result Date: 8/30/2024    Technically very difficult study with off-axis apical views. No IV   contrast given.    Left Ventricle: The left ventricle is normal in size. Ventricular mass   is normal. Normal wall thickness. There is concentric remodeling. Normal   wall motion. There is normal systolic function with a visually estimated   ejection fraction of 55 - 60%. Grade II diastolic dysfunction. Elevated   left ventricular filling pressure.    Right Ventricle: Normal right ventricular cavity size. Wall thickness   is normal. Right ventricle wall motion  is normal. Systolic function is   normal.    Left Atrium: Left atrium is mildly dilated.    Aorta: Aortic root is normal in size measuring 2.93 cm. Ascending aorta   is normal measuring 2.75 cm.    Pulmonary Artery: The estimated pulmonary artery systolic pressure is   16 mmHg.    IVC/SVC: Intermediate venous pressure at 8 mmHg.           CKD (chronic kidney disease), stage III  Creatine stable for now. BMP reviewed- noted Estimated Creatinine Clearance: 26.3 mL/min (based on SCr of 1.3 mg/dL). according to latest data. Based on current GFR, CKD stage is stage 3 - GFR 30-59.  Monitor UOP and serial BMP and adjust therapy as needed. Renally dose meds. Avoid nephrotoxic medications and procedures.    Mixed Alzheimer's and vascular dementia  Patient with dementia with likely etiology of alzheimer's dementia. Dementia is moderate. The patient does not have signs of behavioral disturbance. Home dementia medications are Held or Continued: continued.. Continue non-pharmacologic interventions to prevent delirium (Activity during day, opening blinds, providing glasses/hearing aids, and up in chair during daytime). Will avoid narcotics and benzos unless  absolutely necessary. PRN anti-psychotics are not prescribed to avoid self harm behaviors.    Atrial fibrillation, chronic  Long term current use of anticoagulant therapy   Patient with Long standing persistent (>12 months) atrial fibrillation which is controlled currently with Beta Blocker and Digoxin. Patient is currently in sinus rhythm.[id number] Score: 3. Anticoagulation indicated. Anticoagulation done with eliquis .    Essential hypertension  Chronic, controlled. Latest blood pressure and vitals reviewed-     Temp:  [97.6 °F (36.4 °C)-99.9 °F (37.7 °C)]   Pulse:  []   Resp:  [16-18]   BP: (104-114)/(51-70)   SpO2:  [89 %-96 %] .   Home meds for hypertension were reviewed and noted below.   Hypertension Medications               metoprolol succinate (TOPROL-XL) 25 MG 24 hr tablet Take 0.5 tablets (12.5 mg total) by mouth once daily.            While in the hospital, will manage blood pressure as follows; Continue home antihypertensive regimen    Will utilize p.r.n. blood pressure medication only if patient's blood pressure greater than 180/110 and she develops symptoms such as worsening chest pain or shortness of breath.    Dyslipidemia   Patient is chronically on statin.will continue for now. Monitor clinically. Last LDL was   Lab Results   Component Value Date    LDLCALC 74.0 04/18/2023         VTE Risk Mitigation (From admission, onward)           Ordered     IP VTE HIGH RISK PATIENT  Once         08/30/24 0206     Place sequential compression device  Until discontinued         08/30/24 0206     apixaban tablet 2.5 mg  2 times daily         08/29/24 2202                    Discharge Planning   MARIA DEL CARMEN: 8/31/2024     Code Status: DNR   Is the patient medically ready for discharge?:     Reason for patient still in hospital (select all that apply): Patient trending condition  Discharge Plan A: Assisted Living                  Kendal Anderson MD  Department of Hospital Medicine   [facility] - [facility]  [id number])

## 2024-08-31 NOTE — ASSESSMENT & PLAN NOTE
-Afebrile, WBC 21.32k.  -POC lactate 2.31, repeat nl  -UA reviewed, follow cx.  -The patient received zosyn and vancomycin in the ED, will change to rocephin 1g.  -Previous urine cx reviewed:  AEROCOCCUS URINAE

## 2024-08-31 NOTE — ASSESSMENT & PLAN NOTE
Patient is identified as having Combined Systolic and Diastolic heart failure that is Chronic. CHF is currently controlled. Latest ECHO from 2014 with EF 15%.  . Continue Beta Blocker and monitor clinical status closely. Monitor on telemetry. Patient is off CHF pathway.  Monitor strict Is&Os and daily weights.  Cardiology has not been consulted. Continue to stress to patient importance of self efficacy and  on diet for CHF. Last BNP reviewed- and noted below   Recent Labs   Lab 08/29/24  1328   *     -Pt appears euvolemic although hypoxic with elevated BNP.  -ECHO ordered  Echo    Result Date: 8/30/2024    Technically very difficult study with off-axis apical views. No IV   contrast given.    Left Ventricle: The left ventricle is normal in size. Ventricular mass   is normal. Normal wall thickness. There is concentric remodeling. Normal   wall motion. There is normal systolic function with a visually estimated   ejection fraction of 55 - 60%. Grade II diastolic dysfunction. Elevated   left ventricular filling pressure.    Right Ventricle: Normal right ventricular cavity size. Wall thickness   is normal. Right ventricle wall motion  is normal. Systolic function is   normal.    Left Atrium: Left atrium is mildly dilated.    Aorta: Aortic root is normal in size measuring 2.93 cm. Ascending aorta   is normal measuring 2.75 cm.    Pulmonary Artery: The estimated pulmonary artery systolic pressure is   16 mmHg.    IVC/SVC: Intermediate venous pressure at 8 mmHg.

## 2024-08-31 NOTE — ASSESSMENT & PLAN NOTE
Patient with Hypoxic Respiratory failure which is Acute.  she is not on home oxygen. Supplemental oxygen was provided and noted-      .   Signs/symptoms of respiratory failure include- tachypnea. Contributing diagnoses includes -  Sepsis  Labs and images were reviewed. Patient Has not had a recent ABG. Will treat underlying causes and adjust management of respiratory failure as follows-   -Continue to wean supplemental oxygen as tolerated.  -PRN duo nebs  -Resolved

## 2024-08-31 NOTE — NURSING
End of Shift Summary:  Patient stated at the beginning of the shift that she wanted to sleep tonight.  Minimized distractions and ensured that patient was able to rest.  Patient was bradycardiac during the night in the 40's.  However the patient was not symptomatic and maintained her oxygen saturation.  Patient received her antibiotic during the night.  Acetaminophen was given at the beginning of shift for 4/10 pain, however patient did not experience during the rest of the night.

## 2024-08-31 NOTE — PLAN OF CARE
Problem: Infection  Goal: Absence of Infection Signs and Symptoms  Outcome: Progressing     Problem: Adult Inpatient Plan of Care  Goal: Plan of Care Review  Outcome: Progressing  Goal: Patient-Specific Goal (Individualized)  Outcome: Progressing  Goal: Absence of Hospital-Acquired Illness or Injury  Outcome: Progressing  Goal: Optimal Comfort and Wellbeing  Outcome: Progressing  Goal: Readiness for Transition of Care  Outcome: Progressing     Problem: Sepsis/Septic Shock  Goal: Optimal Coping  Outcome: Progressing  Goal: Absence of Bleeding  Outcome: Progressing  Goal: Blood Glucose Level Within Targeted Range  Outcome: Progressing  Goal: Absence of Infection Signs and Symptoms  Outcome: Progressing  Goal: Optimal Nutrition Intake  Outcome: Progressing     Problem: Skin Injury Risk Increased  Goal: Skin Health and Integrity  Outcome: Progressing     Problem: Fall Injury Risk  Goal: Absence of Fall and Fall-Related Injury  Outcome: Progressing    Patient alert and cooperative with care. Medicated as ordered. Patient changes positions independently. Assisted with meals. Bolton removed this shift. Continue to encourage fluids. Bladder scan only showed 75cc. Will continue to encourage fluids. Family at bedside for visit. Patient is comfortable at this time

## 2024-08-31 NOTE — ASSESSMENT & PLAN NOTE
This patient does have evidence of infective focus  My overall impression is sepsis.  Source: Urinary Tract  Antibiotics given-   Antibiotics (72h ago, onward)    Start     Stop Route Frequency Ordered    08/30/24 0100  cefTRIAXone (Rocephin) 1 g in D5W 100 mL IVPB (MB+)         -- IV Every 24 hours (non-standard times) 08/29/24 2202        Latest lactate reviewed-  Recent Labs   Lab 08/29/24  2107 08/30/24  0037   LACTATE  --  1.0   POCLAC 2.31*  --        Organ dysfunction indicated by Acute respiratory failure    Fluid challenge Not needed - patient is not hypotensive      Post- resuscitation assessment No - Post resuscitation assessment not needed   -Blood cxs in process.  -Urine cx in process.  Source control achieved by: Rocephin

## 2024-08-31 NOTE — ASSESSMENT & PLAN NOTE
Chronic, controlled. Latest blood pressure and vitals reviewed-     Temp:  [97.6 °F (36.4 °C)-99.9 °F (37.7 °C)]   Pulse:  []   Resp:  [16-18]   BP: (104-114)/(51-70)   SpO2:  [89 %-96 %] .   Home meds for hypertension were reviewed and noted below.   Hypertension Medications               metoprolol succinate (TOPROL-XL) 25 MG 24 hr tablet Take 0.5 tablets (12.5 mg total) by mouth once daily.            While in the hospital, will manage blood pressure as follows; Continue home antihypertensive regimen    Will utilize p.r.n. blood pressure medication only if patient's blood pressure greater than 180/110 and she develops symptoms such as worsening chest pain or shortness of breath.

## 2024-09-01 VITALS
OXYGEN SATURATION: 98 % | RESPIRATION RATE: 19 BRPM | TEMPERATURE: 98 F | SYSTOLIC BLOOD PRESSURE: 125 MMHG | BODY MASS INDEX: 19.19 KG/M2 | DIASTOLIC BLOOD PRESSURE: 60 MMHG | WEIGHT: 104.25 LBS | HEART RATE: 80 BPM | HEIGHT: 62 IN

## 2024-09-01 LAB
ANION GAP SERPL CALC-SCNC: 8 MMOL/L (ref 8–16)
BACTERIA UR CULT: ABNORMAL
BASOPHILS # BLD AUTO: 0.08 K/UL (ref 0–0.2)
BASOPHILS NFR BLD: 0.6 % (ref 0–1.9)
BUN SERPL-MCNC: 22 MG/DL (ref 8–23)
CALCIUM SERPL-MCNC: 8.7 MG/DL (ref 8.7–10.5)
CHLORIDE SERPL-SCNC: 110 MMOL/L (ref 95–110)
CO2 SERPL-SCNC: 21 MMOL/L (ref 23–29)
CREAT SERPL-MCNC: 0.9 MG/DL (ref 0.5–1.4)
DIFFERENTIAL METHOD BLD: ABNORMAL
EOSINOPHIL # BLD AUTO: 0.1 K/UL (ref 0–0.5)
EOSINOPHIL NFR BLD: 0.7 % (ref 0–8)
ERYTHROCYTE [DISTWIDTH] IN BLOOD BY AUTOMATED COUNT: 13.9 % (ref 11.5–14.5)
EST. GFR  (NO RACE VARIABLE): >60 ML/MIN/1.73 M^2
GLUCOSE SERPL-MCNC: 112 MG/DL (ref 70–110)
HCT VFR BLD AUTO: 33.3 % (ref 37–48.5)
HGB BLD-MCNC: 11.2 G/DL (ref 12–16)
IMM GRANULOCYTES # BLD AUTO: 0.19 K/UL (ref 0–0.04)
IMM GRANULOCYTES NFR BLD AUTO: 1.5 % (ref 0–0.5)
LYMPHOCYTES # BLD AUTO: 1.9 K/UL (ref 1–4.8)
LYMPHOCYTES NFR BLD: 15 % (ref 18–48)
MAGNESIUM SERPL-MCNC: 2.2 MG/DL (ref 1.6–2.6)
MCH RBC QN AUTO: 36.6 PG (ref 27–31)
MCHC RBC AUTO-ENTMCNC: 33.6 G/DL (ref 32–36)
MCV RBC AUTO: 109 FL (ref 82–98)
MONOCYTES # BLD AUTO: 0.9 K/UL (ref 0.3–1)
MONOCYTES NFR BLD: 7.3 % (ref 4–15)
NEUTROPHILS # BLD AUTO: 9.6 K/UL (ref 1.8–7.7)
NEUTROPHILS NFR BLD: 74.9 % (ref 38–73)
NRBC BLD-RTO: 0 /100 WBC
PHOSPHATE SERPL-MCNC: 3.8 MG/DL (ref 2.7–4.5)
PLATELET # BLD AUTO: 871 K/UL (ref 150–450)
PMV BLD AUTO: 10.4 FL (ref 9.2–12.9)
POTASSIUM SERPL-SCNC: 3.9 MMOL/L (ref 3.5–5.1)
RBC # BLD AUTO: 3.06 M/UL (ref 4–5.4)
SODIUM SERPL-SCNC: 139 MMOL/L (ref 136–145)
WBC # BLD AUTO: 12.87 K/UL (ref 3.9–12.7)

## 2024-09-01 PROCEDURE — 83735 ASSAY OF MAGNESIUM: CPT | Performed by: HOSPITALIST

## 2024-09-01 PROCEDURE — 63600175 PHARM REV CODE 636 W HCPCS: Performed by: NURSE PRACTITIONER

## 2024-09-01 PROCEDURE — 80048 BASIC METABOLIC PNL TOTAL CA: CPT | Performed by: HOSPITALIST

## 2024-09-01 PROCEDURE — 84100 ASSAY OF PHOSPHORUS: CPT | Performed by: NURSE PRACTITIONER

## 2024-09-01 PROCEDURE — 85025 COMPLETE CBC W/AUTO DIFF WBC: CPT | Performed by: NURSE PRACTITIONER

## 2024-09-01 PROCEDURE — 25000003 PHARM REV CODE 250: Performed by: HOSPITALIST

## 2024-09-01 PROCEDURE — 51798 US URINE CAPACITY MEASURE: CPT

## 2024-09-01 PROCEDURE — 25000003 PHARM REV CODE 250: Performed by: NURSE PRACTITIONER

## 2024-09-01 RX ORDER — CEFPODOXIME PROXETIL 100 MG/1
100 TABLET, FILM COATED ORAL 2 TIMES DAILY
Qty: 6 TABLET | Refills: 0 | Status: SHIPPED | OUTPATIENT
Start: 2024-09-01 | End: 2024-09-05

## 2024-09-01 RX ORDER — CEFPODOXIME PROXETIL 100 MG/1
100 TABLET, FILM COATED ORAL 2 TIMES DAILY
Start: 2024-09-01 | End: 2024-09-01

## 2024-09-01 RX ORDER — CEFPODOXIME PROXETIL 100 MG/1
100 TABLET, FILM COATED ORAL 2 TIMES DAILY
Qty: 6 TABLET | Refills: 0 | Status: SHIPPED | OUTPATIENT
Start: 2024-09-01 | End: 2024-09-01

## 2024-09-01 RX ADMIN — DIGOXIN 0.12 MG: 125 TABLET ORAL at 08:09

## 2024-09-01 RX ADMIN — HYDROXYUREA 1000 MG: 500 CAPSULE ORAL at 08:09

## 2024-09-01 RX ADMIN — TAMSULOSIN HYDROCHLORIDE 0.4 MG: 0.4 CAPSULE ORAL at 08:09

## 2024-09-01 RX ADMIN — ATORVASTATIN CALCIUM 10 MG: 10 TABLET, FILM COATED ORAL at 08:09

## 2024-09-01 RX ADMIN — METOPROLOL SUCCINATE 12.5 MG: 25 TABLET, EXTENDED RELEASE ORAL at 08:09

## 2024-09-01 RX ADMIN — APIXABAN 2.5 MG: 2.5 TABLET, FILM COATED ORAL at 08:09

## 2024-09-01 RX ADMIN — CEFTRIAXONE 1 G: 1 INJECTION, POWDER, FOR SOLUTION INTRAMUSCULAR; INTRAVENOUS at 12:09

## 2024-09-01 NOTE — PLAN OF CARE
Problem: Infection  Goal: Absence of Infection Signs and Symptoms  Outcome: Progressing  Intervention: Prevent or Manage Infection  Flowsheets (Taken 9/1/2024 0247)  Fever Reduction/Comfort Measures: lightweight bedding  Infection Management: aseptic technique maintained     Problem: Adult Inpatient Plan of Care  Goal: Plan of Care Review  Outcome: Progressing  Goal: Patient-Specific Goal (Individualized)  Outcome: Progressing  Goal: Absence of Hospital-Acquired Illness or Injury  Outcome: Progressing  Intervention: Identify and Manage Fall Risk  Flowsheets (Taken 9/1/2024 0247)  Safety Promotion/Fall Prevention:   pulse ox   room near unit station   side rails raised x 3  Intervention: Prevent Skin Injury  Flowsheets (Taken 9/1/2024 0247)  Skin Protection: incontinence pads utilized  Device Skin Pressure Protection: absorbent pad utilized/changed  Goal: Optimal Comfort and Wellbeing  Outcome: Progressing  Intervention: Monitor Pain and Promote Comfort  Flowsheets (Taken 9/1/2024 0247)  Pain Management Interventions:   care clustered   quiet environment facilitated  Goal: Readiness for Transition of Care  Outcome: Progressing     Problem: Sepsis/Septic Shock  Goal: Optimal Coping  Outcome: Progressing  Intervention: Optimize Psychosocial Adjustment to Illness  Flowsheets (Taken 9/1/2024 0247)  Supportive Measures: active listening utilized  Goal: Absence of Bleeding  Outcome: Progressing  Goal: Blood Glucose Level Within Targeted Range  Outcome: Progressing  Goal: Absence of Infection Signs and Symptoms  Outcome: Progressing  Goal: Optimal Nutrition Intake  Outcome: Progressing  Intervention: Optimize Nutrition Delivery  Flowsheets (Taken 9/1/2024 0247)  Nutrition Interventions: supplemental drinks provided     Problem: Skin Injury Risk Increased  Goal: Skin Health and Integrity  Outcome: Progressing  Intervention: Optimize Skin Protection  Flowsheets (Taken 9/1/2024 0247)  Pressure Reduction Techniques: frequent  weight shift encouraged  Pressure Reduction Devices: foam padding utilized  Skin Protection: incontinence pads utilized  Activity Management: Rolling - L1  Head of Bed (HOB) Positioning: HOB at 20-30 degrees     Problem: Fall Injury Risk  Goal: Absence of Fall and Fall-Related Injury  Outcome: Progressing

## 2024-09-01 NOTE — PLAN OF CARE
NURSING HOME ORDERS    09/01/2024  [facility]  [facility] - STEPDOWN FLEX ([place] [place])  [address]  [facility] [address]  Dept: [phone]  Loc: [phone]     Admit to Assisted Living Facility:  Home or Self Care    Diagnoses:  Active Hospital Problems    Diagnosis  POA    *Severe sepsis [A41.9, R65.20]  Yes    Acute hypoxemic respiratory failure [J96.01]  Yes    DNR (do not resuscitate) [Z66]  Yes    Acute cystitis without hematuria [N30.00]  Yes    Essential thrombocytosis [D47.3]  Yes    Mixed Alzheimer's and vascular dementia [G30.9, F01.50, F02.80]  Yes    Long term current use of anticoagulant therapy [Z79.01]  Not Applicable    Chronic combined systolic and diastolic CHF, NYHA class 3 [I50.42]  Yes     Chronic    Atrial fibrillation, chronic [I48.20]  Yes     Chronic    CKD (chronic kidney disease), stage III [N18.30]  Yes     Chronic    Essential hypertension [I10]  Yes     Chronic    Dyslipidemia [E78.5]  Yes     Chronic      Resolved Hospital Problems   No resolved problems to display.       Patient is homebound due to:  Severe sepsis    Allergies:Review of patient's allergies indicates:  No Known Allergies    Vitals:  Routine    Diet: cardiac diet    Activities:   Activity as tolerated    Goals of Care Treatment Preferences:  Code Status: DNR       LaPOST: Yes             Nursing Precautions:  Fall and Pressure ulcer prevention      Medications: Discontinue all previous medication orders, if any. See new list below.     Medication List        START taking these medications      cefpodoxime 100 MG tablet  Commonly known as: VANTIN  Take 1 tablet (100 mg total) by mouth 2 (two) times daily. for 3 days            CHANGE how you take these medications      ELIQUIS 2.5 mg Tab  Generic drug: apixaban  TAKE ONE TABLET BY MOUTH TWICE DAILY. DO NOT TAKE UNTIL YOU HAVE HELD WARFARIN FOR 3 DAYS!!  What changed: See the new instructions.            CONTINUE taking these  medications      atorvastatin 10 MG tablet  Commonly known as: LIPITOR  TAKE ONE TABLET BY MOUTH ONCE DAILY     digoxin 125 mcg tablet  Commonly known as: LANOXIN  TAKE ONE TABLET BY MOUTH EVERY DAY     donepeziL 10 MG tablet  Commonly known as: ARICEPT  TAKE ONE TABLET BY MOUTH EVERY EVENING     FLUAD QUAD 2023-24(65Y UP)(PF) 60 mcg (15 mcg x 4)/0.5 mL Syrg  Generic drug: flu vac 2023 65up-[id number](PF)     FLUZONE HIGH-DOSE 2018-19 (PF) 180 mcg/0.5 mL vaccine  Generic drug: influenza  Inject 0.5 mLs into the muscle.     hydroxyurea 500 mg Cap  Commonly known as: HYDREA  TAKE TWO CAPSULES BY MOUTH ONCE DAILY     latanoprost 0.005 % ophthalmic solution  Place 1 drop into both eyes every evening.     metoprolol succinate 25 MG 24 hr tablet  Commonly known as: TOPROL-XL  Take 0.5 tablets (12.5 mg total) by mouth once daily.                Immunizations Administered as of 9/1/2024       Name Date Dose VIS Date Route Exp Date    COVID-19, MRNA, LN-S, PF (Moderna) 1/29/2021 0.5 mL 10/6/2009 Intramuscular --    Site: Left arm     : Moderna [employer]     Lot: [id number]     Comment: Adminis           _________________________________  Kendal Anderson MD  09/01/2024

## 2024-09-01 NOTE — DISCHARGE SUMMARY
[facility] - Stepdown Flex ([place] [address])  [facility] Medicine  Discharge Summary      Patient Name: Cat Garcia  MRN: [id number]  MESERET: [id number]  Patient Class: IP- Inpatient  Admission Date: 8/29/2024  Hospital Length of Stay: 3 days  Discharge Date and Time:  09/01/2024 11:39 AM  Attending Physician: Kendal Anderson MD   Discharging Provider: Kendal Anderson MD  Primary Care Provider: Alfie Oconnell MD  [facility] Team: [facility] MED O Kendal Anderson MD  Primary Care Team: [facility] O    HPI:   Cat Garcia is a 89 y.o. female with a PMHx of dyslipidemia, CHF (EF 15%), dementia, CKD3, HTN, and a fib on eliquis who presents to the ED from NH for evaluation of cough, shortness of breath, and hypoxia noted this morning. HPI and ROS limited secondary to patient's baseline dementia. She endorses cough but otherwise denies any other complaints. I called and spoke with her son, he states her nursing facility called reporting that she developed cough, shortness of breath, and hypoxia with an SpO2 in the mid to upper 80s. He states requested that she be sent to the ED for evaluation. Per son, patient ambulates with a walker at baseline.    In the ED, patient mildly tachypneic otherwise vitals stable, afebrile. SpO2 91-92% on room air but improved to 95-96% on 2L O2 via NC. WBC 21.32k. CBC with stable anemia and thrombocytosis. Bicarb 22. Glucose 120. Cr 1.3 (bl 0.9-1.1). Dig level 1.6. . Troponin 0.026. D dimer 0.41. POC lactate 2.31. Blood cxs in process. EKG shows SR w/ non specific T wave abnormality, 61 bpm, no acute ischemic changes. CXR with no acute findings. Flu/COVID negative. UA with 1+ leukocytes, >100 WBCS, and many bacteria. The patient received vancomycin, zosyn, and duo neb treatment in the ED.    * No surgery found *      Hospital Course:   Patient admitted to medicine for sepsis, UTI. She was transitioned to CTX. Bolton placed in ED for retention. Voiding trial 8/31 successful, no  residual volume noted on bladder scans. Hypoxia resolved. UCx grew EColi, transitioned to cefpodoxime for total 7 day course. Returned to [facility].     Goals of Care Treatment Preferences:  Code Status: DNR       LaPOST: Yes           SDOH Screening:  The patient was unable to be screened for utility difficulties, food insecurity, transport difficulties, housing insecurity, and interpersonal safety, so no concerns could be identified this admission.     Consults:     No new Assessment & Plan notes have been filed under this hospital service since the last note was generated.  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.50, F02.80] 11/03/2016 Yes    Long term current use of anticoagulant therapy [Z79.01] 12/30/2013 Not Applicable    Chronic combined systolic and diastolic CHF, NYHA class 3 [I50.42] 12/18/2013 Yes     Chronic    Atrial fibrillation, chronic [I48.20] 12/15/2013 Yes     Chronic    CKD (chronic kidney disease), stage III [N18.30] 12/15/2013 Yes     Chronic    Essential hypertension [I10] 12/15/2013 Yes     Chronic    Dyslipidemia [E78.5] 12/15/2013 Yes     Chronic      Problems Resolved During this Admission:       Discharged Condition: stable    Disposition: Home or Self Care    Follow Up:   Follow-up Information       Alfie Oconnell MD. Schedule an appointment as soon as possible for a visit.    Specialty: Internal Medicine  Contact information:  [address] [facility] [address] [phone]                           Patient Instructions:      Diet Cardiac     Notify your health care provider if you experience any of  the following:  temperature >100.4     Notify your health care provider if you experience any of the following:  severe uncontrolled pain     Notify your health care provider if you experience any of the following:  increased confusion or weakness     Activity as tolerated       Significant Diagnostic Studies: N/A    Pending Diagnostic Studies:       None           Medications:  Reconciled Home Medications:      Medication List        START taking these medications      cefpodoxime 100 MG tablet  Commonly known as: VANTIN  Take 1 tablet (100 mg total) by mouth 2 (two) times daily. for 3 days            CHANGE how you take these medications      ELIQUIS 2.5 mg Tab  Generic drug: apixaban  TAKE ONE TABLET BY MOUTH TWICE DAILY. DO NOT TAKE UNTIL YOU HAVE HELD WARFARIN FOR 3 DAYS!!  What changed: See the new instructions.            CONTINUE taking these medications      atorvastatin 10 MG tablet  Commonly known as: LIPITOR  TAKE ONE TABLET BY MOUTH ONCE DAILY     digoxin 125 mcg tablet  Commonly known as: LANOXIN  TAKE ONE TABLET BY MOUTH EVERY DAY     donepeziL 10 MG tablet  Commonly known as: ARICEPT  TAKE ONE TABLET BY MOUTH EVERY EVENING     FLUAD QUAD 2023-24(65Y UP)(PF) 60 mcg (15 mcg x 4)/0.5 mL Syrg  Generic drug: flu vac 2023 65up-[id number](PF)     FLUZONE HIGH-DOSE 2018-19 (PF) 180 mcg/0.5 mL vaccine  Generic drug: influenza  Inject 0.5 mLs into the muscle.     hydroxyurea 500 mg Cap  Commonly known as: HYDREA  TAKE TWO CAPSULES BY MOUTH ONCE DAILY     latanoprost 0.005 % ophthalmic solution  Place 1 drop into both eyes every evening.     metoprolol succinate 25 MG 24 hr tablet  Commonly known as: TOPROL-XL  Take 0.5 tablets (12.5 mg total) by mouth once daily.              Indwelling Lines/Drains at time of discharge:   Lines/Drains/Airways       None                   Time spent on the discharge of patient: 35 minutes of time spent on discharge, including examining the patient, providing discharge  instructions, arranging follow up, and documentation.            Kendal Anderson MD  Department of Hospital Medicine  [facility] - Stepdown Flex ([place])

## 2024-09-01 NOTE — PLAN OF CARE
Problem: Infection  Goal: Absence of Infection Signs and Symptoms  Outcome: Adequate for Care Transition     Problem: Adult Inpatient Plan of Care  Goal: Plan of Care Review  Outcome: Adequate for Care Transition  Goal: Patient-Specific Goal (Individualized)  Outcome: Adequate for Care Transition  Goal: Absence of Hospital-Acquired Illness or Injury  Outcome: Adequate for Care Transition  Goal: Optimal Comfort and Wellbeing  Outcome: Adequate for Care Transition  Goal: Readiness for Transition of Care  Outcome: Adequate for Care Transition     Problem: Sepsis/Septic Shock  Goal: Optimal Coping  Outcome: Adequate for Care Transition  Goal: Absence of Bleeding  Outcome: Adequate for Care Transition  Goal: Blood Glucose Level Within Targeted Range  Outcome: Adequate for Care Transition  Goal: Absence of Infection Signs and Symptoms  Outcome: Adequate for Care Transition  Goal: Optimal Nutrition Intake  Outcome: Adequate for Care Transition     Problem: Skin Injury Risk Increased  Goal: Skin Health and Integrity  Outcome: Adequate for Care Transition     Problem: Fall Injury Risk  Goal: Absence of Fall and Fall-Related Injury  Outcome: Adequate for Care Transition     Patient transferred back to [facility]. Discharge papers reviewed with son. Iv removed with tip intact. Tele removed. Patient belongings with son. Patient escorted by nurse in wheelchair to [employer] car.

## 2024-09-01 NOTE — PLAN OF CARE
Discharge Plan A and Plan B have been determined by review of patient's clinical status, future medical and therapeutic needs, and coverage/benefits for post-acute care in coordination with multidisciplinary team members.    09/01/24 1246   Post-Acute Status   Post-Acute Authorization Placement   Post-Acute Placement Status Set-up Complete/Auth obtained   Coverage [facility] MGD [facility] - [facility] CHOICES -   Discharge Plan   Discharge Plan A Assisted Living  ([facility] [place])   Discharge Plan B Assisted Living     OTTO recieved confirmation from attending MD Dr. Anderson that patient ready for dc Today. Patient to dc to [facility]. OTTO phoned [facility] [phone], spoke w/ Tessie (nurse). Tessie confirmed that patient is able to return to [facility] today, orders to be faxed. Tessie states that any new medication would need to be given to patient at discharge since [facility] Tuesday after holiday. OTTO faxed dc orders over to Tessie via hard fax to [phone] as requested. OTTO notified bedside nurse and attending MD of pharmacy/medication information provided by Tessie.    Son Abdon to provide transport upon dc home Today.  No additional post-acute needs.                    Tony Knapp, KIMBERLY, LMSW  [facility]  Case Management  Ext. [phone]

## 2024-09-01 NOTE — NURSING
End of Shift Summary:  Patient's IV was inadvertently removed prior to night shift.  Contacted provider about switching over to oral medicine, however provider wanted the patient to have 3 total IV doses of Rocephin.  IV reinserted into right forearm and patient received IV antibiotic.  Patient appeared more alert last night than the previous night.  Patient was more talkative.  At midnight she stated that she was hungry.  Patient ate a bowl of honey nut cheerios with whole milk, an applesauce, half of a vanilla pudding and half of a chocolate pudding.  She took some sips of a protein shake.  Patient was incontinent of a large amount of urine twice after the mcleod catheter was removed.  Patient follows commands and turns well in the bed.  Patient's weight trended down from 47.4 kg yesterday to 47.3 kg today.  AM labs have not resulted at this time.  Patient's only complaint was that she was cold during the night and requested additional blankets.

## 2024-09-02 NOTE — PLAN OF CARE
[facility] - Stepdown Flex ([place])  Discharge Final Note    Primary Care Provider: Alfie Oconnell MD    Expected Discharge Date: 9/1/2024    Final Discharge Note (most recent)       Final Note - 09/02/24 0813          Final Note    Assessment Type Final Discharge Note (P)      Anticipated Discharge Disposition Home or Self Care (P)      What phone number can be called within the next 1-3 days to see how you are doing after discharge? [phone] (P)         Post-Acute Status    Post-Acute Authorization Placement (P)      Post-Acute Placement Status Set-up Complete/Auth obtained (P)    [facility]    Coverage [facility] MGD [facility] - [facility] CHOICES - (P)                      Important Message from Medicare             Contact Info       Alfie Oconnell MD   Specialty: Internal Medicine   Relationship: PCP - General    [address]  [facility] [address]   Phone: [phone]       Next Steps: Schedule an appointment as soon as possible for a visit          Patient discharged to [facility] via personal vehicle w/ family.                  KIMBERLY Limon, LMSW  [facility]  Case Management  Ext. [phone]

## 2024-09-03 LAB
BACTERIA BLD CULT: NORMAL
BACTERIA BLD CULT: NORMAL

## 2024-09-04 ENCOUNTER — PATIENT OUTREACH (OUTPATIENT)
Dept: ADMINISTRATIVE | Facility: CLINIC | Age: 89
End: 2024-09-04
Payer: MEDICARE

## 2024-09-05 ENCOUNTER — PATIENT MESSAGE (OUTPATIENT)
Dept: INTERNAL MEDICINE | Facility: CLINIC | Age: 89
End: 2024-09-05
Payer: MEDICARE
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
## 2024-09-05 DIAGNOSIS — R53.1 WEAKNESS: ICD-10-CM

## 2024-09-05 DIAGNOSIS — F01.50 VASCULAR DEMENTIA WITHOUT BEHAVIORAL DISTURBANCE: Primary | ICD-10-CM

## 2024-09-06 ENCOUNTER — PATIENT MESSAGE (OUTPATIENT)
Dept: INTERNAL MEDICINE | Facility: CLINIC | Age: 89
End: 2024-09-06
Payer: MEDICARE

## 2024-09-06 DIAGNOSIS — R53.1 WEAKNESS: ICD-10-CM
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
## 2024-09-06 DIAGNOSIS — F01.50 VASCULAR DEMENTIA WITHOUT BEHAVIORAL DISTURBANCE: Primary | ICD-10-CM

## 2024-09-06 NOTE — TELEPHONE ENCOUNTER
Hi, please call the son, Dr. Ruiz, and ask if he has a fax number where we can send the home health order to, this is for [facility].    Also please help the son in scheduling a video visit with me and this patient.  Thank you, Alfie Oconnell

## 2024-09-06 NOTE — TELEPHONE ENCOUNTER
Jayden Sosa, will you please fax this one  more time to  The fax # is [phone], thanks.     I did not include occupational therapy the 1st time.    Thank you, Alfie Oconnell

## 2024-09-12 ENCOUNTER — TELEPHONE (OUTPATIENT)
Dept: INTERNAL MEDICINE | Facility: CLINIC | Age: 89
End: 2024-09-12
Payer: MEDICARE

## 2024-09-12 DIAGNOSIS — R21 RASH AND NONSPECIFIC SKIN ERUPTION: Primary | ICD-10-CM

## 2024-09-12 RX ORDER — EAR PLUGS
1 EACH OTIC (EAR) DAILY
Qty: 397 G | Refills: 1 | Status: SHIPPED | OUTPATIENT
Start: 2024-09-12

## 2024-09-12 NOTE — TELEPHONE ENCOUNTER
----- Message from Silvina Loza MA sent at 9/12/2024  1:28 PM CDT -----  Contact: [facility]     ----- Message -----  From: Mariposa Saavedra  Sent: 9/12/2024   1:21 PM CDT  To: Anish Judge Staff    [facility] Dr Oconnell can right for boudreauxs butt paste for the pt. Pt has had a lot of diarrhea and her butt and private is red. Madyson stated they also need a written order to be faxed over to the facility. FAX# [phone]  Please call and advise. Thank you.         [facility] - [place], [place] - [address] [facility] [address]  Phone: [phone] Fax: [phone]

## 2024-09-12 NOTE — PHYSICIAN QUERY
Due to the conflicting clinical picture, please clinically validate the diagnosis of Acute Hypoxic Respiratory Failure.   If validated, please provide additional clinical support for the diagnosis.    The respiratory condition has been ruled out

## 2024-09-12 NOTE — TELEPHONE ENCOUNTER
Hi, I have sent in:  Orders Placed This Encounter    zinc oxide (BOUDREAUXS BUTT PASTE) 40 % Oint     Please also fax in this telephone encounter (I will printed out) so that the nursing home can apply this paste.    Thank you, Alfie Oconnell

## 2024-09-24 ENCOUNTER — OFFICE VISIT (OUTPATIENT)
Dept: INTERNAL MEDICINE | Facility: CLINIC | Age: 89
End: 2024-09-24
Payer: MEDICARE

## 2024-09-24 VITALS
SYSTOLIC BLOOD PRESSURE: 110 MMHG | DIASTOLIC BLOOD PRESSURE: 62 MMHG | BODY MASS INDEX: 19.96 KG/M2 | HEART RATE: 45 BPM | WEIGHT: 108.44 LBS | OXYGEN SATURATION: 99 % | HEIGHT: 62 IN

## 2024-09-24 DIAGNOSIS — G21.4 VASCULAR PARKINSONISM: ICD-10-CM

## 2024-09-24 DIAGNOSIS — G30.9 MIXED ALZHEIMER'S AND VASCULAR DEMENTIA: ICD-10-CM

## 2024-09-24 DIAGNOSIS — I73.9 PERIPHERAL VASCULAR DISEASE, UNSPECIFIED: ICD-10-CM

## 2024-09-24 DIAGNOSIS — I48.20 ATRIAL FIBRILLATION, CHRONIC: ICD-10-CM

## 2024-09-24 DIAGNOSIS — D47.3 ESSENTIAL THROMBOCYTOSIS: ICD-10-CM
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
## 2024-09-24 DIAGNOSIS — Z66 DNR (DO NOT RESUSCITATE): ICD-10-CM

## 2024-09-24 DIAGNOSIS — F01.50 MIXED ALZHEIMER'S AND VASCULAR DEMENTIA: ICD-10-CM

## 2024-09-24 DIAGNOSIS — F02.80 MIXED ALZHEIMER'S AND VASCULAR DEMENTIA: ICD-10-CM

## 2024-09-24 DIAGNOSIS — F01.50 VASCULAR DEMENTIA WITHOUT BEHAVIORAL DISTURBANCE: Primary | ICD-10-CM

## 2024-09-24 PROCEDURE — 99999 PR PBB SHADOW E&M-EST. PATIENT-LVL IV: CPT | Mod: PBBFAC,,, | Performed by: INTERNAL MEDICINE

## 2024-09-24 NOTE — PROGRESS NOTES
Subjective:       Patient ID: Cat Garcia is a 89 y.o. female.    Chief Complaint: Annual Exam    Patient is here for followup for chronic conditions/hosp f/u.    She does have frequ UTIs, on incont program at NH.  Patient is unable to report personally if she has any burning with urination due to her dementia.    A few falls with last 6 months, once [facility] needed stitches.    At noc has boost [facility] for her dementia related weight loss.    Patient has no complaints today due to her dementia.  Her son has no new complaints at this time as well.      Review of Systems   Constitutional:  Positive for fatigue. Negative for activity change, appetite change, fever and unexpected weight change.   HENT:  Negative for trouble swallowing.    Eyes:  Negative for visual disturbance.   Respiratory:  Positive for shortness of breath (stable). Negative for chest tightness and wheezing.    Cardiovascular:  Negative for chest pain, palpitations and leg swelling.   Gastrointestinal:  Negative for abdominal distention, abdominal pain and constipation.   Endocrine: Negative for cold intolerance, heat intolerance, polydipsia and polyuria.   Genitourinary:  Negative for difficulty urinating.   Musculoskeletal:  Positive for gait problem.   Skin:  Negative for rash.   Neurological:  Negative for tremors and syncope.        Chronic imbalance       Hematological:  Negative for adenopathy. Does not bruise/bleed easily.   Psychiatric/Behavioral:  Positive for confusion. Negative for dysphoric mood.            Objective:      Physical Exam  Constitutional:       General: She is not in acute distress.     Appearance: Normal appearance. She is well-developed. She is not ill-appearing, toxic-appearing or diaphoretic.      Comments: Son speaks for her in general due to her dementia.  Has eyes closed unless I engage her directly   HENT:      Head: Normocephalic and atraumatic.      Mouth/Throat:      Pharynx: No oropharyngeal exudate.   Eyes:       General: No scleral icterus.     Conjunctiva/sclera: Conjunctivae normal.      Pupils: Pupils are equal, round, and reactive to light.   Neck:      Thyroid: No thyromegaly.      Comments: No neck mass palpated today  Cardiovascular:      Rate and Rhythm: Normal rate.      Heart sounds: Normal heart sounds. No murmur heard.     No friction rub. No gallop.      Comments: Irregularly irregular      Pulmonary:      Effort: Pulmonary effort is normal. No respiratory distress.      Breath sounds: Normal breath sounds. No wheezing or rales.   Abdominal:      General: Bowel sounds are normal. There is no distension.      Palpations: Abdomen is soft. There is no mass.      Tenderness: There is no abdominal tenderness. There is no guarding or rebound.   Musculoskeletal:         General: No tenderness. Normal range of motion.      Cervical back: Normal range of motion and neck supple.      Right lower leg: No edema.      Left lower leg: No edema.   Lymphadenopathy:      Cervical: No cervical adenopathy.   Neurological:      Mental Status: She is alert. She is disoriented.      Motor: No abnormal muscle tone.      Comments: Engages minimally   Psychiatric:         Speech: Speech normal.         Behavior: Behavior normal.      Comments: Calm         Assessment:       1. Vascular dementia without behavioral disturbance    2. Peripheral vascular disease, unspecified    3. Mixed Alzheimer's and vascular dementia    4. Vascular parkinsonism    5. Fall, initial encounter    6. Atrial fibrillation, chronic    7. Essential thrombocytosis        Plan:       Cat was seen today for annual exam.    Diagnoses and all orders for this visit:    Vascular dementia without behavioral disturbance  Peripheral vascular disease, unspecified  Mixed Alzheimer's and vascular dementia  Vascular parkinsonism  Patient lives in a nursing home has the right amount of care.  Nutritional supplement shake is medically necessary for her,Letter  written.    Fall, initial encounter  She uses a walker in the nursing home but still does have falls.She will continue sat PT and OT at the nursing home.    Atrial fibrillation, chronic  She is on a low dose of anticoagulation due to her frequent falls.  So far her falls have not cause any serious bleeding problem.  She has normal rate control.    Essential thrombocytosis  She is on hydroxyurea from Hematology, her most recent platelet counts are under 1,000,000.        Health Maintenance         Date Due Completion Date    TETANUS VACCINE Never done ---    RSV Vaccine (Age 60+ and Pregnant patients) (1 - 1-dose 60+ series) Never done ---    Shingles Vaccine (1 of 2) 09/06/2016 7/12/2016    DEXA Scan 03/12/2020 3/12/2018    COVID-19 Vaccine (6 - 2023-24 season) 09/01/2024 10/24/2023    Lipid Panel 04/18/2028 4/18/2023   Son declines COVID vaccine at this time.         Visit today included increased complexity associated with the care of the episodic problem  addressed and managing the longitudinal care of the patient due to the serious and/or complex managed problem(s) .    Follow up in about 6 months (around 3/24/2025).    No future appointments.

## 2024-09-24 NOTE — LETTER
September 24, 2024    Cat Garcia  [address] [place] [address] [address] [address]             [facility]  [address]  [place] [address]  Phone: [phone]  Fax: [phone] Dear Ms. Garcia:    This letter is to certify that I am the above named patient's primary care doctor.    It is medically necessary that Ms. Garcia be on a daily nutritional shake supplement due to her chronic medical problems.  This has been medically necessary since 2020.      If you have any questions or concerns, please don't hesitate to call.    Sincerely,      MD Anish Khan Evan L., MD

## 2024-12-05 DIAGNOSIS — I50.42 CHRONIC COMBINED SYSTOLIC (CONGESTIVE) AND DIASTOLIC (CONGESTIVE) HEART FAILURE: ICD-10-CM

## 2024-12-05 DIAGNOSIS — I48.21 PERMANENT ATRIAL FIBRILLATION: ICD-10-CM

## 2024-12-05 DIAGNOSIS — I48.20 ATRIAL FIBRILLATION, CHRONIC: Chronic | ICD-10-CM

## 2024-12-05 DIAGNOSIS — F01.50 VASCULAR DEMENTIA WITHOUT BEHAVIORAL DISTURBANCE: Chronic | ICD-10-CM

## 2024-12-05 DIAGNOSIS — D47.3 ESSENTIAL THROMBOCYTOSIS: ICD-10-CM

## 2024-12-05 NOTE — TELEPHONE ENCOUNTER
Care Due:                  Date            Visit Type   Department     Provider  --------------------------------------------------------------------------------                                MYCHART                              ANNUAL                              CHECKUP/PHY  [facility] INTERNAL  Last Visit: 09-      [facility]       Alfie Oconnell  Next Visit: None Scheduled  None         None Found                                                            Last  Test          Frequency    Reason                     Performed    Due Date  --------------------------------------------------------------------------------    Lipid Panel.  12 months..  atorvastatin.............  04- 04-    [facility] Embedded Care Due Messages. Reference number: [id number].   12/05/2024 2:54:53 PM CST

## 2024-12-06 NOTE — TELEPHONE ENCOUNTER
Refill Routing Note   Medication(s) are not appropriate for processing by [facility] for the following reason(s):        Required labs outdated  Required vitals abnormal  Outside of protocol  No active prescription written by provider    [facility] action(s):  Route  Defer   Requires labs : Yes             Appointments  past 12m or future 3m with PCP    Date Provider   Last Visit   9/24/2024 Alfie Oconnell MD   Next Visit   Visit date not found Alfie Oconnell MD   ED visits in past 90 days: 0        Note composed:7:56 PM 12/05/2024

## 2024-12-09 ENCOUNTER — TELEPHONE (OUTPATIENT)
Dept: INTERNAL MEDICINE | Facility: CLINIC | Age: 89
End: 2024-12-09
Payer: MEDICARE

## 2024-12-09 NOTE — TELEPHONE ENCOUNTER
"Hi, please call her son Dr. Ruiz and review this email I sent over [facility], I do not think he saw the e-mail yet.  "Hi, I received a med refill request to have your mother's meds sent to a [place] pharmacy. I wanted to confirm with you this is correct.  Alfie Oconnell"    Please let me know,  Thank you, Alfie Oconnell    "

## 2024-12-09 NOTE — TELEPHONE ENCOUNTER
----- Message from Renée sent at 12/9/2024  3:20 PM CST -----  Contact: Yael @[facility]  [phone]  Requesting an RX refill or new RX.    Is this a refill or new RX: refill     RX name and strength (copy/paste from chart):  donepeziL (ARICEPT) 10 MG tablet, atorvastatin (LIPITOR) 10 MG tablet, digoxin (LANOXIN) 125 mcg tablet, hydroxyurea (HYDREA) 500 mg Cap, metoprolol succinate (TOPROL-XL) 25 MG 24 hr tablet and latanoprost 0.005 % ophthalmic solution      Is this a 30 day or 90 day RX: 90    Pharmacy name and phone # (copy/paste from chart):      [facility] [id number]  Phone: [phone] Fax: [phone]

## 2024-12-13 RX ORDER — METOPROLOL SUCCINATE 25 MG/1
TABLET, EXTENDED RELEASE ORAL
Qty: 90 TABLET | Refills: 11 | Status: SHIPPED | OUTPATIENT
Start: 2024-12-13

## 2024-12-13 RX ORDER — DONEPEZIL HYDROCHLORIDE 10 MG/1
TABLET, FILM COATED ORAL
Qty: 90 TABLET | Refills: 11 | Status: SHIPPED | OUTPATIENT
Start: 2024-12-13

## 2024-12-13 RX ORDER — ATORVASTATIN CALCIUM 10 MG/1
TABLET, FILM COATED ORAL
Qty: 90 TABLET | Refills: 11 | Status: SHIPPED | OUTPATIENT
Start: 2024-12-13

## 2024-12-13 RX ORDER — DIGOXIN 125 MCG
TABLET ORAL
Qty: 90 TABLET | Refills: 11 | Status: SHIPPED | OUTPATIENT
Start: 2024-12-13

## 2024-12-13 RX ORDER — LATANOPROST 50 UG/ML
1 SOLUTION/ DROPS OPHTHALMIC NIGHTLY
Qty: 2.5 ML | Refills: 11 | OUTPATIENT
Start: 2024-12-13

## 2024-12-17 RX ORDER — HYDROXYUREA 500 MG/1
CAPSULE ORAL
Qty: 60 CAPSULE | Refills: 11 | Status: SHIPPED | OUTPATIENT
Start: 2024-12-17

## 2025-02-21 ENCOUNTER — TELEPHONE (OUTPATIENT)
Dept: INTERNAL MEDICINE | Facility: CLINIC | Age: 89
End: 2025-02-21
Payer: MEDICARE

## 2025-02-27 NOTE — PROGRESS NOTES
Admitted 12/30 after fall at home. She was found to have UTI and wll d/c on  Amox/clav through 1/9. Calendar updated with inpatient INRs/warfarin doses. Planning for HH, will f/u which service. See calendar for dose plan.       645

## 2025-03-06 ENCOUNTER — TELEPHONE (OUTPATIENT)
Dept: INTERNAL MEDICINE | Facility: CLINIC | Age: 89
End: 2025-03-06
Payer: MEDICARE

## 2025-03-06 NOTE — TELEPHONE ENCOUNTER
----- Message from Jaymie sent at 3/6/2025  8:53 AM CST -----  Contact: [phone]  [email or web address] Patient is calling for Medical Advice regarding: pt takes digoxen for congestive heart failure, pulse 37-45, pt is tired all of the time, taking long breathsHow long has patient had these symptoms: 1-2 daysPatient wants a call back or thru [facility]: callComments:Please advise patient replies from provider may take up to 48 hours.

## 2025-03-11 ENCOUNTER — HOSPITAL ENCOUNTER (INPATIENT)
Facility: HOSPITAL | Age: 89
LOS: 2 days | Discharge: HOME | DRG: 309 | End: 2025-03-14
Attending: EMERGENCY MEDICINE | Admitting: INTERNAL MEDICINE
Payer: MEDICARE

## 2025-03-11 DIAGNOSIS — Z79.01 LONG TERM CURRENT USE OF ANTICOAGULANT THERAPY: ICD-10-CM

## 2025-03-11 DIAGNOSIS — D53.9 MACROCYTIC ANEMIA: ICD-10-CM

## 2025-03-11 DIAGNOSIS — N39.0 URINARY TRACT INFECTION WITHOUT HEMATURIA, SITE UNSPECIFIED: Primary | ICD-10-CM

## 2025-03-11 DIAGNOSIS — R00.1 BRADYCARDIA: ICD-10-CM

## 2025-03-11 DIAGNOSIS — N30.00 ACUTE CYSTITIS WITHOUT HEMATURIA: ICD-10-CM

## 2025-03-11 DIAGNOSIS — I50.42 CHRONIC COMBINED SYSTOLIC AND DIASTOLIC CHF, NYHA CLASS 3: Chronic | ICD-10-CM

## 2025-03-11 DIAGNOSIS — R00.1 SYMPTOMATIC BRADYCARDIA: ICD-10-CM

## 2025-03-11 DIAGNOSIS — I42.0 DILATED CARDIOMYOPATHY: ICD-10-CM

## 2025-03-11 DIAGNOSIS — I48.20 ATRIAL FIBRILLATION, CHRONIC: Chronic | ICD-10-CM

## 2025-03-11 LAB
ALBUMIN SERPL BCP-MCNC: 3.5 G/DL (ref 3.5–5.2)
ALP SERPL-CCNC: 56 U/L (ref 40–150)
ALT SERPL W/O P-5'-P-CCNC: 9 U/L (ref 10–44)
ANION GAP SERPL CALC-SCNC: 9 MMOL/L (ref 8–16)
ANISOCYTOSIS BLD QL SMEAR: SLIGHT
AST SERPL-CCNC: 24 U/L (ref 10–40)
BACTERIA #/AREA URNS HPF: ABNORMAL /HPF
BASOPHILS # BLD AUTO: 0.08 K/UL (ref 0–0.2)
BASOPHILS NFR BLD: 0.8 % (ref 0–1.9)
BILIRUB SERPL-MCNC: 0.4 MG/DL (ref 0.1–1)
BILIRUB UR QL STRIP: NEGATIVE
BNP SERPL-MCNC: 107 PG/ML (ref 0–99)
BUN SERPL-MCNC: 20 MG/DL (ref 8–23)
CALCIUM SERPL-MCNC: 8.7 MG/DL (ref 8.7–10.5)
CHLORIDE SERPL-SCNC: 109 MMOL/L (ref 95–110)
CLARITY UR: ABNORMAL
CO2 SERPL-SCNC: 20 MMOL/L (ref 23–29)
COLOR UR: YELLOW
CREAT SERPL-MCNC: 1 MG/DL (ref 0.5–1.4)
DIFFERENTIAL METHOD BLD: ABNORMAL
DIGOXIN SERPL-MCNC: 1.1 NG/ML (ref 0.8–2)
EOSINOPHIL # BLD AUTO: 0.1 K/UL (ref 0–0.5)
EOSINOPHIL NFR BLD: 1.3 % (ref 0–8)
ERYTHROCYTE [DISTWIDTH] IN BLOOD BY AUTOMATED COUNT: 16 % (ref 11.5–14.5)
EST. GFR  (NO RACE VARIABLE): 55 ML/MIN/1.73 M^2
GLUCOSE SERPL-MCNC: 91 MG/DL (ref 70–110)
GLUCOSE UR QL STRIP: NEGATIVE
HCT VFR BLD AUTO: 30.7 % (ref 37–48.5)
HGB BLD-MCNC: 9.7 G/DL (ref 12–16)
HGB UR QL STRIP: NEGATIVE
IMM GRANULOCYTES # BLD AUTO: 0.07 K/UL (ref 0–0.04)
IMM GRANULOCYTES NFR BLD AUTO: 0.7 % (ref 0–0.5)
KETONES UR QL STRIP: ABNORMAL
LACTATE SERPL-SCNC: 1.3 MMOL/L (ref 0.5–2.2)
LEUKOCYTE ESTERASE UR QL STRIP: ABNORMAL
LYMPHOCYTES # BLD AUTO: 2.4 K/UL (ref 1–4.8)
LYMPHOCYTES NFR BLD: 23.9 % (ref 18–48)
MCH RBC QN AUTO: 34.8 PG (ref 27–31)
MCHC RBC AUTO-ENTMCNC: 31.6 G/DL (ref 32–36)
MCV RBC AUTO: 110 FL (ref 82–98)
MICROSCOPIC COMMENT: ABNORMAL
MONOCYTES # BLD AUTO: 0.9 K/UL (ref 0.3–1)
MONOCYTES NFR BLD: 9.1 % (ref 4–15)
NEUTROPHILS # BLD AUTO: 6.3 K/UL (ref 1.8–7.7)
NEUTROPHILS NFR BLD: 64.2 % (ref 38–73)
NITRITE UR QL STRIP: POSITIVE
NRBC BLD-RTO: 0 /100 WBC
PH UR STRIP: 6 [PH] (ref 5–8)
PLATELET # BLD AUTO: 1010 K/UL (ref 150–450)
PLATELET BLD QL SMEAR: ABNORMAL
PMV BLD AUTO: 10.4 FL (ref 9.2–12.9)
POTASSIUM SERPL-SCNC: 4.4 MMOL/L (ref 3.5–5.1)
PROT SERPL-MCNC: 6.4 G/DL (ref 6–8.4)
PROT UR QL STRIP: ABNORMAL
RBC # BLD AUTO: 2.79 M/UL (ref 4–5.4)
RBC #/AREA URNS HPF: 7 /HPF (ref 0–4)
SODIUM SERPL-SCNC: 138 MMOL/L (ref 136–145)
SP GR UR STRIP: 1.02 (ref 1–1.03)
SQUAMOUS #/AREA URNS HPF: 2 /HPF
TROPONIN I SERPL DL<=0.01 NG/ML-MCNC: <0.006 NG/ML (ref 0–0.03)
URN SPEC COLLECT METH UR: ABNORMAL
UROBILINOGEN UR STRIP-ACNC: ABNORMAL EU/DL
WBC # BLD AUTO: 9.87 K/UL (ref 3.9–12.7)
WBC #/AREA URNS HPF: 37 /HPF (ref 0–5)
WBC CLUMPS URNS QL MICRO: ABNORMAL

## 2025-03-11 PROCEDURE — 87088 URINE BACTERIA CULTURE: CPT | Performed by: EMERGENCY MEDICINE

## 2025-03-11 PROCEDURE — 85025 COMPLETE CBC W/AUTO DIFF WBC: CPT | Performed by: EMERGENCY MEDICINE

## 2025-03-11 PROCEDURE — 83880 ASSAY OF NATRIURETIC PEPTIDE: CPT | Performed by: EMERGENCY MEDICINE

## 2025-03-11 PROCEDURE — 87086 URINE CULTURE/COLONY COUNT: CPT | Performed by: EMERGENCY MEDICINE

## 2025-03-11 PROCEDURE — 96374 THER/PROPH/DIAG INJ IV PUSH: CPT

## 2025-03-11 PROCEDURE — 80162 ASSAY OF DIGOXIN TOTAL: CPT | Performed by: EMERGENCY MEDICINE

## 2025-03-11 PROCEDURE — 83605 ASSAY OF LACTIC ACID: CPT | Performed by: EMERGENCY MEDICINE

## 2025-03-11 PROCEDURE — 93005 ELECTROCARDIOGRAM TRACING: CPT

## 2025-03-11 PROCEDURE — G0378 HOSPITAL OBSERVATION PER HR: HCPCS

## 2025-03-11 PROCEDURE — 87040 BLOOD CULTURE FOR BACTERIA: CPT | Performed by: EMERGENCY MEDICINE

## 2025-03-11 PROCEDURE — 99285 EMERGENCY DEPT VISIT HI MDM: CPT | Mod: 25

## 2025-03-11 PROCEDURE — P9612 CATHETERIZE FOR URINE SPEC: HCPCS

## 2025-03-11 PROCEDURE — 25000003 PHARM REV CODE 250

## 2025-03-11 PROCEDURE — 63600175 PHARM REV CODE 636 W HCPCS: Performed by: EMERGENCY MEDICINE

## 2025-03-11 PROCEDURE — 84484 ASSAY OF TROPONIN QUANT: CPT | Performed by: EMERGENCY MEDICINE

## 2025-03-11 PROCEDURE — 87186 SC STD MICRODIL/AGAR DIL: CPT | Performed by: EMERGENCY MEDICINE

## 2025-03-11 PROCEDURE — 93010 ELECTROCARDIOGRAM REPORT: CPT | Mod: ,,, | Performed by: INTERNAL MEDICINE

## 2025-03-11 PROCEDURE — 80053 COMPREHEN METABOLIC PANEL: CPT | Performed by: EMERGENCY MEDICINE

## 2025-03-11 PROCEDURE — 81000 URINALYSIS NONAUTO W/SCOPE: CPT | Performed by: EMERGENCY MEDICINE

## 2025-03-11 RX ORDER — DONEPEZIL HYDROCHLORIDE 5 MG/1
10 TABLET, FILM COATED ORAL NIGHTLY
Status: DISCONTINUED | OUTPATIENT
Start: 2025-03-11 | End: 2025-03-12

## 2025-03-11 RX ORDER — CEFTRIAXONE 1 G/1
1 INJECTION, POWDER, FOR SOLUTION INTRAMUSCULAR; INTRAVENOUS
Status: DISCONTINUED | OUTPATIENT
Start: 2025-03-12 | End: 2025-03-14 | Stop reason: HOSPADM

## 2025-03-11 RX ORDER — LATANOPROST 50 UG/ML
1 SOLUTION/ DROPS OPHTHALMIC NIGHTLY
Status: DISCONTINUED | OUTPATIENT
Start: 2025-03-11 | End: 2025-03-14 | Stop reason: HOSPADM

## 2025-03-11 RX ORDER — ATORVASTATIN CALCIUM 10 MG/1
10 TABLET, FILM COATED ORAL DAILY
Status: DISCONTINUED | OUTPATIENT
Start: 2025-03-12 | End: 2025-03-14 | Stop reason: HOSPADM

## 2025-03-11 RX ORDER — CEFTRIAXONE 1 G/1
1 INJECTION, POWDER, FOR SOLUTION INTRAMUSCULAR; INTRAVENOUS
Status: COMPLETED | OUTPATIENT
Start: 2025-03-11 | End: 2025-03-11

## 2025-03-11 RX ORDER — HYDROXYUREA 500 MG/1
1000 CAPSULE ORAL NIGHTLY
Status: DISCONTINUED | OUTPATIENT
Start: 2025-03-11 | End: 2025-03-14 | Stop reason: HOSPADM

## 2025-03-11 RX ADMIN — LATANOPROST 1 DROP: 50 SOLUTION OPHTHALMIC at 08:03

## 2025-03-11 RX ADMIN — APIXABAN 2.5 MG: 2.5 TABLET, FILM COATED ORAL at 10:03

## 2025-03-11 RX ADMIN — DONEPEZIL HYDROCHLORIDE 10 MG: 5 TABLET ORAL at 10:03

## 2025-03-11 RX ADMIN — CEFTRIAXONE SODIUM 1 G: 1 INJECTION, POWDER, FOR SOLUTION INTRAMUSCULAR; INTRAVENOUS at 05:03

## 2025-03-11 RX ADMIN — HYDROXYUREA 1000 MG: 500 CAPSULE ORAL at 08:03

## 2025-03-11 NOTE — ED NOTES
Pt sitting up , respirations even/unlabored. NAD noted. Side rails upx2, call bell within reach. Will continue to monitor

## 2025-03-11 NOTE — ED NOTES
Pt. Arrives via ems drom [facility] assisted living facility with reported low hr-40's and low bp-sbp-90's. The patient is awake, alert oriented to person place, not time/date. She states she does not feel bad and did not want to come to hospital. She denies  uri symptoms, sore throat, cough, dysuria. She states she did not eat breakfast but is hungry now and wants something to eat, drink. Irritable.  No visible signs of distress.

## 2025-03-12 LAB
ALBUMIN SERPL BCP-MCNC: 3.2 G/DL (ref 3.5–5.2)
ALP SERPL-CCNC: 50 U/L (ref 40–150)
ALT SERPL W/O P-5'-P-CCNC: 8 U/L (ref 10–44)
ANION GAP SERPL CALC-SCNC: 8 MMOL/L (ref 8–16)
AST SERPL-CCNC: 19 U/L (ref 10–40)
BASOPHILS # BLD AUTO: 0.08 K/UL (ref 0–0.2)
BASOPHILS NFR BLD: 0.9 % (ref 0–1.9)
BILIRUB SERPL-MCNC: 0.4 MG/DL (ref 0.1–1)
BSA FOR ECHO PROCEDURE: 1.47 M2
BUN SERPL-MCNC: 16 MG/DL (ref 8–23)
CALCIUM SERPL-MCNC: 8.4 MG/DL (ref 8.7–10.5)
CHLORIDE SERPL-SCNC: 112 MMOL/L (ref 95–110)
CO2 SERPL-SCNC: 19 MMOL/L (ref 23–29)
CREAT SERPL-MCNC: 0.9 MG/DL (ref 0.5–1.4)
CV ECHO LV RWT: 0.44 CM
DIFFERENTIAL METHOD BLD: ABNORMAL
DOP CALC LVOT AREA: 3.1 CM2
DOP CALC LVOT DIAMETER: 2 CM
ECHO LV POSTERIOR WALL: 1 CM (ref 0.6–1.1)
EOSINOPHIL # BLD AUTO: 0.2 K/UL (ref 0–0.5)
EOSINOPHIL NFR BLD: 2.1 % (ref 0–8)
ERYTHROCYTE [DISTWIDTH] IN BLOOD BY AUTOMATED COUNT: 16.2 % (ref 11.5–14.5)
EST. GFR  (NO RACE VARIABLE): >60 ML/MIN/1.73 M^2
FERRITIN SERPL-MCNC: 104 NG/ML (ref 20–300)
FOLATE SERPL-MCNC: 16.5 NG/ML (ref 4–24)
FRACTIONAL SHORTENING: 28.9 % (ref 28–44)
GLUCOSE SERPL-MCNC: 87 MG/DL (ref 70–110)
HCT VFR BLD AUTO: 30.8 % (ref 37–48.5)
HGB BLD-MCNC: 9.8 G/DL (ref 12–16)
IMM GRANULOCYTES # BLD AUTO: 0.08 K/UL (ref 0–0.04)
IMM GRANULOCYTES NFR BLD AUTO: 0.9 % (ref 0–0.5)
INTERVENTRICULAR SEPTUM: 0.9 CM (ref 0.6–1.1)
IRON SERPL-MCNC: 31 UG/DL (ref 30–160)
LEFT INTERNAL DIMENSION IN SYSTOLE: 3.2 CM (ref 2.1–4)
LEFT VENTRICLE DIASTOLIC VOLUME INDEX: 63.27 ML/M2
LEFT VENTRICLE DIASTOLIC VOLUME: 93 ML
LEFT VENTRICLE MASS INDEX: 97.2 G/M2
LEFT VENTRICLE SYSTOLIC VOLUME INDEX: 28.6 ML/M2
LEFT VENTRICLE SYSTOLIC VOLUME: 42 ML
LEFT VENTRICULAR INTERNAL DIMENSION IN DIASTOLE: 4.5 CM (ref 3.5–6)
LEFT VENTRICULAR MASS: 142.9 G
LVED V (TEICH): 93.47 ML
LVES V (TEICH): 42.29 ML
LYMPHOCYTES # BLD AUTO: 2.2 K/UL (ref 1–4.8)
LYMPHOCYTES NFR BLD: 24.3 % (ref 18–48)
MCH RBC QN AUTO: 34.5 PG (ref 27–31)
MCHC RBC AUTO-ENTMCNC: 31.8 G/DL (ref 32–36)
MCV RBC AUTO: 109 FL (ref 82–98)
MONOCYTES # BLD AUTO: 0.9 K/UL (ref 0.3–1)
MONOCYTES NFR BLD: 9.7 % (ref 4–15)
NEUTROPHILS # BLD AUTO: 5.6 K/UL (ref 1.8–7.7)
NEUTROPHILS NFR BLD: 62.1 % (ref 38–73)
NRBC BLD-RTO: 0 /100 WBC
PISA TR MAX VEL: 2.2 M/S
PLATELET # BLD AUTO: 943 K/UL (ref 150–450)
PMV BLD AUTO: 10.2 FL (ref 9.2–12.9)
POTASSIUM SERPL-SCNC: 4.4 MMOL/L (ref 3.5–5.1)
PROT SERPL-MCNC: 5.8 G/DL (ref 6–8.4)
RBC # BLD AUTO: 2.84 M/UL (ref 4–5.4)
SATURATED IRON: 12 % (ref 20–50)
SINUS: 3.01 CM
SODIUM SERPL-SCNC: 139 MMOL/L (ref 136–145)
STJ: 2.9 CM
T4 FREE SERPL-MCNC: 0.95 NG/DL (ref 0.71–1.51)
TOTAL IRON BINDING CAPACITY: 252 UG/DL (ref 250–450)
TR MAX PG: 20 MMHG
TRANSFERRIN SERPL-MCNC: 170 MG/DL (ref 200–375)
TSH SERPL DL<=0.005 MIU/L-ACNC: 5.3 UIU/ML (ref 0.4–4)
VIT B12 SERPL-MCNC: 332 PG/ML (ref 210–950)
WBC # BLD AUTO: 9.06 K/UL (ref 3.9–12.7)
Z-SCORE OF LEFT VENTRICULAR DIMENSION IN END DIASTOLE: 0.22
Z-SCORE OF LEFT VENTRICULAR DIMENSION IN END SYSTOLE: 1.25

## 2025-03-12 PROCEDURE — 99223 1ST HOSP IP/OBS HIGH 75: CPT | Mod: ,,, | Performed by: NURSE PRACTITIONER

## 2025-03-12 PROCEDURE — 11000001 HC ACUTE MED/SURG PRIVATE ROOM

## 2025-03-12 PROCEDURE — 82746 ASSAY OF FOLIC ACID SERUM: CPT

## 2025-03-12 PROCEDURE — 25000003 PHARM REV CODE 250

## 2025-03-12 PROCEDURE — 84466 ASSAY OF TRANSFERRIN: CPT

## 2025-03-12 PROCEDURE — 63600175 PHARM REV CODE 636 W HCPCS

## 2025-03-12 PROCEDURE — 84443 ASSAY THYROID STIM HORMONE: CPT

## 2025-03-12 PROCEDURE — 80053 COMPREHEN METABOLIC PANEL: CPT

## 2025-03-12 PROCEDURE — 82607 VITAMIN B-12: CPT

## 2025-03-12 PROCEDURE — 96376 TX/PRO/DX INJ SAME DRUG ADON: CPT

## 2025-03-12 PROCEDURE — 85025 COMPLETE CBC W/AUTO DIFF WBC: CPT

## 2025-03-12 PROCEDURE — 84439 ASSAY OF FREE THYROXINE: CPT

## 2025-03-12 PROCEDURE — 82728 ASSAY OF FERRITIN: CPT

## 2025-03-12 RX ORDER — IBUPROFEN 200 MG
16 TABLET ORAL
Status: DISCONTINUED | OUTPATIENT
Start: 2025-03-12 | End: 2025-03-14 | Stop reason: HOSPADM

## 2025-03-12 RX ORDER — IBUPROFEN 200 MG
24 TABLET ORAL
Status: DISCONTINUED | OUTPATIENT
Start: 2025-03-12 | End: 2025-03-14 | Stop reason: HOSPADM

## 2025-03-12 RX ORDER — NALOXONE HCL 0.4 MG/ML
0.02 VIAL (ML) INJECTION
Status: DISCONTINUED | OUTPATIENT
Start: 2025-03-12 | End: 2025-03-14 | Stop reason: HOSPADM

## 2025-03-12 RX ORDER — SODIUM CHLORIDE 0.9 % (FLUSH) 0.9 %
10 SYRINGE (ML) INJECTION EVERY 12 HOURS PRN
Status: DISCONTINUED | OUTPATIENT
Start: 2025-03-12 | End: 2025-03-14 | Stop reason: HOSPADM

## 2025-03-12 RX ORDER — GLUCAGON 1 MG
1 KIT INJECTION
Status: DISCONTINUED | OUTPATIENT
Start: 2025-03-12 | End: 2025-03-14 | Stop reason: HOSPADM

## 2025-03-12 RX ORDER — ACETAMINOPHEN 325 MG/1
650 TABLET ORAL EVERY 6 HOURS PRN
Status: DISCONTINUED | OUTPATIENT
Start: 2025-03-12 | End: 2025-03-14 | Stop reason: HOSPADM

## 2025-03-12 RX ADMIN — CEFTRIAXONE SODIUM 1 G: 1 INJECTION, POWDER, FOR SOLUTION INTRAMUSCULAR; INTRAVENOUS at 08:03

## 2025-03-12 RX ADMIN — HYDROXYUREA 1000 MG: 500 CAPSULE ORAL at 08:03

## 2025-03-12 RX ADMIN — APIXABAN 2.5 MG: 2.5 TABLET, FILM COATED ORAL at 08:03

## 2025-03-12 RX ADMIN — ATORVASTATIN CALCIUM 10 MG: 10 TABLET, FILM COATED ORAL at 08:03

## 2025-03-12 NOTE — ACP (ADVANCE CARE PLANNING)
Inpatient Upgrade Note     Cat Garcia has warranted treatment spanning two or more midnights of hospital level care for the management of bradycardia and UTI. She continues to require IV antibiotics, monitoring of vital signs, medication adjustments, and further evaluation by consultants. Her condition is also complicated by the following comorbidities: Hypertension, Chronic kidney disease, and a-fib.

## 2025-03-12 NOTE — SUBJECTIVE & OBJECTIVE
Past Medical History:   Diagnosis Date    Atrial fibrillation     with rapid ventricular rate    Breast cancer     XRT    Chronic bronchitis     Chronic combined systolic and diastolic CHF, NYHA class 3 2013    CKD (chronic kidney disease), stage III     Dementia     Dyslipidemia 12/15/2013    Essential hypertension 12/15/2013    Squamous cell carcinoma     Vascular parkinsonism 2016       Past Surgical History:   Procedure Laterality Date    BREAST SURGERY  2011    right       Review of patient's allergies indicates:  No Known Allergies    No current facility-administered medications on file prior to encounter.     Current Outpatient Medications on File Prior to Encounter   Medication Sig    apixaban (ELIQUIS) 2.5 mg Tab Take 1 tablet (2.5 mg total) by mouth 2 (two) times daily.    atorvastatin (LIPITOR) 10 MG tablet ONE TABLET BY MOUTH ONCE DAILY DX: HYPERLIPIDEMIA    digoxin (LANOXIN) 125 mcg tablet ONE TABLET BY MOUTH ONCE DAILY DX: HF    donepeziL (ARICEPT) 10 MG tablet ONE TABLET BY MOUTH EVERY EVENING FOR DEMENTIA    hydroxyurea (HYDREA) 500 mg Cap 2 CAPSULES (1000MG) BY MOUTH EVERY EVENING DX: THROMBOCYTOSIS    metoprolol succinate (TOPROL-XL) 25 MG 24 hr tablet ONE TABLET BY MOUTH ONCE DAILY DX: HF    FLUAD QUAD -,65Y UP,,PF, 60 mcg (15 mcg x 4)/0.5 mL Syrg     influenza (FLUZONE HIGH-DOSE) 180 mcg/0.5 mL vaccine Inject 0.5 mLs into the muscle. (Patient taking differently: Inject 0.5 mLs into the muscle once.)    latanoprost 0.005 % ophthalmic solution Place 1 drop into both eyes every evening.    zinc oxide (BOUDREAUXS BUTT PASTE) 40 % Oint Apply 1 Application topically Daily. Skyler's Butt Paste maximum strength     Family History       Problem Relation (Age of Onset)    Heart disease Father          Tobacco Use    Smoking status: Former     Current packs/day: 0.00     Types: Cigarettes     Quit date: 1982     Years since quittin.2    Smokeless tobacco: Never    Tobacco  comments:     pt was social smoker   Substance and Sexual Activity    Alcohol use: No     Alcohol/week: 0.0 standard drinks of alcohol    Drug use: No    Sexual activity: Never     Review of Systems   Unable to perform ROS: Dementia     Objective:     Vital Signs (Most Recent):  Temp: 97.7 °F (36.5 °C) (03/12/25 1229)  Pulse: (!) 37 (03/12/25 1238)  Resp: 17 (03/12/25 1229)  BP: 134/67 (03/12/25 1229)  SpO2: 99 % (03/12/25 1229) Vital Signs (24h Range):  Temp:  [97.7 °F (36.5 °C)-98.2 °F (36.8 °C)] 97.7 °F (36.5 °C)  Pulse:  [37-59] 37  Resp:  [15-22] 17  SpO2:  [95 %-100 %] 99 %  BP: (104-168)/(56-78) 134/67     Weight: 50.2 kg (110 lb 10.7 oz) (take by bedside nurse rick)  Body mass index is 20.91 kg/m².    SpO2: 99 %         Intake/Output Summary (Last 24 hours) at 3/12/2025 1341  Last data filed at 3/12/2025 1334  Gross per 24 hour   Intake 120 ml   Output --   Net 120 ml       Lines/Drains/Airways       Drain  Duration             Female External Urinary Catheter w/ Suction 03/12/25 1000 <1 day              Peripheral Intravenous Line  Duration                  Peripheral IV - Single Lumen 03/12/25 1153 22 G Posterior;Right Hand <1 day                     Physical Exam  Constitutional:       General: She is not in acute distress.     Appearance: She is not diaphoretic.   HENT:      Head: Atraumatic.   Eyes:      General:         Right eye: No discharge.         Left eye: No discharge.   Cardiovascular:      Rate and Rhythm: Regular rhythm. Bradycardia present.   Pulmonary:      Effort: Pulmonary effort is normal.      Breath sounds: Normal breath sounds.   Abdominal:      General: Bowel sounds are normal.      Palpations: Abdomen is soft.   Skin:     General: Skin is warm and dry.   Neurological:      Mental Status: She is alert. Mental status is at baseline.          Significant Labs: BMP:   Recent Labs   Lab 03/11/25  1603 03/12/25  0408   GLU 91 87    139   K 4.4 4.4    112*   CO2 20* 19*   BUN 20  "16   CREATININE 1.0 0.9   CALCIUM 8.7 8.4*   , CMP   Recent Labs   Lab 03/11/25  1603 03/12/25  0408    139   K 4.4 4.4    112*   CO2 20* 19*   GLU 91 87   BUN 20 16   CREATININE 1.0 0.9   CALCIUM 8.7 8.4*   PROT 6.4 5.8*   ALBUMIN 3.5 3.2*   BILITOT 0.4 0.4   ALKPHOS 56 50   AST 24 19   ALT 9* 8*   ANIONGAP 9 8   , CBC   Recent Labs   Lab 03/11/25  1603 03/12/25  0408   WBC 9.87 9.06   HGB 9.7* 9.8*   HCT 30.7* 30.8*   PLT 1,010* 943*   , INR No results for input(s): "INR", "PROTIME" in the last 48 hours., Lipid Panel No results for input(s): "CHOL", "HDL", "LDLCALC", "TRIG", "CHOLHDL" in the last 48 hours., Troponin No results for input(s): "TROPONINIHS" in the last 48 hours., and All pertinent lab results from the last 24 hours have been reviewed.    Significant Imaging: Echocardiogram: Transthoracic echo (TTE) complete (Cupid Only):   Results for orders placed or performed during the hospital encounter of 03/11/25   Echo *Canceled*    Narrative    The following orders were created for panel order Echo.  Procedure                               Abnormality         Status                     ---------                               -----------         ------                       Please view results for these tests on the individual orders.     "

## 2025-03-12 NOTE — ASSESSMENT & PLAN NOTE
February 9, 2024       Jaime San MD  [address] [place] [id number]  Via Fax: [phone]      Patient: Rob Ocampo   YOB: 1951   Date of Visit: 2/7/2024       Dear Dr. San:    Thank you for referring Rob Ocampo to me for evaluation. Below are my notes for this visit with him.    If you have questions, please do not hesitate to call me. I look forward to following your patient along with you.      Sincerely,        Nicole Crespo DO        CC: No Recipients  Nicole Crespo DO  2/9/2024  6:43 PM  Signed  Cardiology Office Visit    Chief Complaint/Reason for Visit:   Chief Complaint   Patient presents with   • Office Visit   • Follow-up         HISTORY OF PRESENT ILLNESS: Rob Ocampo is a 72 year old male with chronic HFimpEF, nonischemic cardiomyopathy, persistent atrial fibrillation and hypertension who presents for follow-up visit.     Today, patient is doing well overall. He denies any chest pain, shortness of breath, palpitations, dizziness. He reports some lower leg edema. His energy level has been good. Patient reports that he was diagnosed with prostate cancer. He is complaint with his medication and having no adverse effects.      REVIEW OF SYSTEMS:  A full 12 point review of systems was conducted by me personally and is negative except as noted above in HPI.      PAST MEDICAL HISTORY:   Past Medical History:   Diagnosis Date   • Atrial fibrillation (CMD)    • Cardiomyopathy (CMD)    • Cerebrovascular accident (CMD)    • CHF (congestive heart failure) (CMD)    • Colon polyp    • Current use of long term anticoagulation    • Diverticulosis    • Esophageal achalasia    • Essential hypertension 01/29/2021   • Hypercholesteremia    • Hypothyroidism    • Prostate cancer (CMD)    • Rectal bleeding        PAST SURGICAL HISTORY:   Past Surgical History:   Procedure Laterality Date   • Cerebral angiogram     • Colonoscopy      2018 & 2021   • Esophagus surgery     • Hernia  Patient currently SB without blocks or pauses on tele  Hold AV abad blockers given bradycardia  Continue DOAC   repair     • Left atrial appendage closure  2021    Watchman       FAMILY HISTORY:   Family History   Problem Relation Age of Onset   • Diabetes Mother    • Heart disease Father    • Kidney disease Sister    • Heart disease Brother    • Cancer Maternal Grandmother    • Stroke Maternal Grandfather    • Heart disease Paternal Grandmother    • Heart disease Paternal Grandfather        SOCIAL HISTORY:   Social History     Tobacco Use   • Smoking status: Never   • Smokeless tobacco: Never   Substance Use Topics   • Alcohol use: No   • Drug use: No       Drug Use:    No                ALLERGIES:   ALLERGIES:  Not on File      MEDICATIONS:   Current Outpatient Medications   Medication Sig Dispense Refill   • losartan (COZAAR) 25 MG tablet Take 1 tablet by mouth daily. 90 tablet 3   • aspirin (ECOTRIN) 325 MG EC tablet TAKE 1 TABLET BY MOUTH DAILY 90 tablet 3   • metoPROLOL succinate (TOPROL-XL) 25 MG 24 hr tablet Take 0.5 tablets by mouth daily. Do not start before October 26, 2021. 30 tablet 0   • levothyroxine 50 MCG tablet Take 1 tablet by mouth daily (before breakfast). Do not start before October 26, 2021. 30 tablet 11   • furosemide (LASIX) 20 MG tablet Take 2 tablets by mouth daily. (Patient taking differently: Take 20 mg by mouth daily.) 30 tablet 11   • acetaminophen (TYLENOL) 325 MG tablet Take 2 tablets by mouth every 6 hours as needed for Pain. 30 tablet 0   • spironolactone (ALDACTONE) 25 MG tablet Take 1 tablet by mouth daily. (Patient not taking: Reported on 2/7/2024) 30 tablet 11   • atorvastatin (LIPITOR) 20 MG tablet Take 20 mg by mouth daily.     • tamsulosin (FLOMAX) 0.4 MG Cap Take 0.4 mg by mouth daily after a meal.        No current facility-administered medications for this visit.       PHYSICAL  EXAMINATION  Visit Vitals  /60 (BP Location: LUE - Left upper extremity, Patient Position: Sitting, Cuff Size: Regular)   Pulse 73   Wt 84.5 kg (186 lb 4.6 oz)   BMI 29.18 kg/m²       Physical  Exam  Constitutional:       Appearance: He is well-developed.   HENT:      Head: Normocephalic and atraumatic.   Eyes:      Conjunctiva/sclera: Conjunctivae normal.   Cardiovascular:      Rate and Rhythm: Normal rate. Rhythm irregular.      Heart sounds: Normal heart sounds.   Pulmonary:      Effort: Pulmonary effort is normal.      Breath sounds: Normal breath sounds.   Abdominal:      General: Bowel sounds are normal.      Palpations: Abdomen is soft.   Musculoskeletal:         General: Normal range of motion.   Skin:     General: Skin is warm and dry.   Neurological:      Mental Status: He is alert and oriented to person, place, and time.   Psychiatric:         Behavior: Behavior normal.         Testing: The following tests were personally visualized/interpreted and/or reports were reviewed by me:      TTE 12/04/2023  STUDY CONCLUSIONS  SUMMARY:     1. Left ventricle: The cavity size is normal. Wall thickness is mildly to     moderately increased. Systolic function is severely reduced. The ejection     fraction was measured by visual estimation. The ejection fraction is 30%.  2. Regional wall motion: There is hypokinesis of the basal-mid anteroseptal,     basal-mid inferoseptal, and apical septal walls.  3. Left atrium: The atrium is severely dilated.  4. Right ventricle: The cavity size is normal. Systolic function is normal.      AURELIA from 7/21/2021  SUMMARY:     1. Left ventricle: Systolic function is reduced. The ejection fraction was     measured by visual estimation.  2. Left atrium: The atrium is dilated. Follow-up Watchman: There is no     evidence of thrombus in the atrial cavity or attached to the Watchman     occluder device. There is no evidence of residual flow around the     Watchman occluder device.  Impressions:  Well seated left atrial appendage occlusion device. No  significant vikash-device leak noted. Recommend stopping oral  anticoagulation. Start aspirin 325mg daily as well as plavix 75mg  daily.      TTE from 5/25/2021  SUMMARY:     1. Left ventricle: The cavity size is normal. Wall thickness is mildly     increased. The ejection fraction was measured by biplane method of     disks. The ejection fraction is 55%.  2. Left atrium: The atrium is severely dilated.  3. Right ventricle: Systolic pressure is increased. The estimated peak     pressure is 50mm Hg.  4. Right atrium: The atrium is dilated.  5. Tricuspid valve: Mild-moderate regurgitation.  6. Pericardium, extracardiac: A trivial pericardial effusion is     identified.  7. Baseline ECG: Atrial fibrillation.    EKG from office 4/5/2021 Atrial fibrillation, nonspecific intraventricular conduction delay, nonspecific T wave abnormality      No results found for this or any previous visit (from the past 4464 hour(s)).    Results for orders placed in visit on 12/30/20   TRANSTHORACIC ECHO(TTE) COMPLETE W/ W/O IMAGING AGENT    Impression SUMMARY:    1. Left ventricle: The cavity size is normal. Wall thickness is normal.     The ejection fraction was measured by visual estimation. Akinesis of     the apical myocardium. Hypokinesis of the anterior myocardium.     Hypokinesis of the anterolateral myocardium. The ejection fraction is     35%.  2. Left atrium: The atrium is moderately to severely dilated.  3. Right ventricle: The cavity size is mildly to moderately dilated. Wall     thickness is normal.  4. Right atrium: The atrium is moderately dilated.  5. Tricuspid valve: Moderate regurgitation.  6. Pulmonary arteries: Systolic pressure is mildly to moderately     increased, estimated to be 56mm Hg.  7. Pericardium, extracardiac: A trivial pericardial effusion is     identified.  8. Baseline ECG: Atrial fibrillation.         Results for orders placed or performed during the hospital encounter of 01/04/21   Cath/PV Case    Narrative    Cardiac Cath Lab    This patient was referred for catheterization by Dr. Nicole Crespo.  The   patient has history of atrial  fibrillation, cardiomyopathy and is also   followed by Dr. Sukhjinder Burns.  He presented to the hospital with acute   anemia with GI bleeding with hemoglobin into the high 6 range.  He did not   have chest pain but had an elevated troponin to about 1.65 and is now   referred for catheter evaluation    I discussed the risks, benefits, and alternatives of angiography and   revascularization.   The risks include, but are not limited to:  stroke,   heart attack, death, arrhythmia, renal failure, bleeding, transfusion,   site infection, arterial pseudoaneurysm formation, arterio-venous fistula   formation, limb and organ ischemia, peripheral embolization and the like.    I also discussed the possible outcomes including:  normal findings,   disease treated with medical therapy alone, disease amenable to   percutaneous  intervention, and disease needing surgical    revascularization.  The use of bare metal stents and drug eluting   stents/balloons was discussed, including the rationale and length of   treatment with antiplatelet medications.  The patient was informed that   additional, unanticipated procedures may be necessary during the   procedure, based on my clinical judgement.  I discussed the possibility of   using a closure device to close the arteriotomy if a femoral artery   approach is used.  I outlined the risks associated with closure device   use, including, but not limited to:  bleeding, site infection, arterial   pseudoaneurysm formation, arterio-venous fistula formation, arterial   injury, and clot formation.  I provided the patient with an opportunity to   ask questions.  The patient gave informed, written consent.  Based on   procedural findings, I will make further recommendations.        Procedure performed:  1.  Ultrasound-guided right transradial artery access  2.  Left heart catheterization with diagnostic coronary angiography      Findings:  1.  No significant obstructive coronary disease  2.  Left  ventricular end-diastolic pressure 25 mmHg without a gradient   across aortic valve      Recommendations:  1.  Further optimization of medical therapy by Almaz Burns and Cherie      The procedure was performed using standard technique usinig ultrasound   guidance to facilitate arterial access.  Angiography showed patent vessels with MERARY grade III flow in all segments   without evidence of significant obstructive disease.  Left ventricular   end-diastolic pressure was approximately 25 mmHg without a gradient across   the aortic valve on pullback.        The results were reviewed with the referring physicians.  I left a message   by phone with his wife Nara.         Labs:    Sodium (mmol/L)   Date Value   01/23/2024 138   10/25/2021 140     Potassium (mmol/L)   Date Value   01/23/2024 4.8   10/25/2021 4.7     Chloride (mmol/L)   Date Value   01/23/2024 109   10/25/2021 110 (H)     Carbon Dioxide (mmol/L)   Date Value   01/23/2024 29   10/25/2021 25     BUN (mg/dL)   Date Value   01/23/2024 20   10/25/2021 27 (H)     Creatinine (mg/dL)   Date Value   01/23/2024 1.30 (H)   10/25/2021 1.25 (H)     Glucose (mg/dL)   Date Value   01/23/2024 91   10/25/2021 89       No results found for: \"HGBA1C\"    Cholesterol (mg/dL)   Date Value   01/23/2024 151     HDL (mg/dL)   Date Value   01/23/2024 68     Triglycerides (mg/dL)   Date Value   01/23/2024 62     LDL (mg/dL)   Date Value   01/23/2024 71       TSH (mcUnits/mL)   Date Value   04/06/2023 2.924       INR   Date Value   02/12/2021 1.1   01/04/2021 1.4 sec       WBC (K/mcL)   Date Value   04/06/2023 8.6     RBC (mil/mcL)   Date Value   04/06/2023 3.92 (L)     HCT (%)   Date Value   04/06/2023 38.0 (L)     HGB (g/dL)   Date Value   04/06/2023 11.8 (L)     PLT (K/mcL)   Date Value   04/06/2023 333       The ASCVD Risk score (Michelle DK, et al., 2019) failed to calculate for the following reasons:    The patient has a prior MI or stroke diagnosis      IMPRESSION/PLAN:    Chronic  HFimpEF (heart failure with improved ejection fraction) (CMS/HCC)  Nonischemic cardiomyopathy (CMS/HCC)  -Patient with NYHA class I-II symptoms, stage C  -Cath from 1/4/2021 showed no significant obstructive CAD  -Echocardiogram from 12/30/2020 with EF 35% and regional wall motion abnormalities in the anterior and anterolateral walls  -TTE from 5/25/2021 showed normalized LV function with EF of 55%. There is mild to moderate tricuspid regurgitation and the left atrium is severely dilated    -TTE from 12/04/2023 showed EF 30% with hypokinesis of the basal-mid anteroseptal, basal-mid inferoseptal, and apical septal walls.  -Continue Lasix 20 mg daily. Can take as needed   -Continue GDMT with metoprolol succinate 12.5 mg daily, and losartan 25 mg daily  -Patient not able to tolerate Spironolactone    Persistent atrial fibrillation (CMS/HCC)  Watchman implanted in 5/2021  -Rates are controlled  -[id number] Score: 3, with an estimated annual risk of stroke of 4.3%  -HAS-BLED Score: 2   Yearly Major Bleeding Risk (%): 1.88  -Watchman was implanted on 5/25/2021 so not on Eliquis   -AURELIA from 7/21/2021 showed no evidence of thrombus in the atrial cavity or attached to the Watchman device  -Continue metoprolol   -Patient had previous cardioversion in 2015 at which time he failed to cardiovert with electrical cardioversion, but was ultimately able to be converted to sinus rhythm with ibutilide  -Following with EP Dr. uBrns    Essential hypertension  -Blood pressure is excellently controlled today  -Continue metoprolol, losartan,and furosemide    Mixed dyslipidemia  -Lipid panel reviewed, well controlled   -Continue atorvastatin  -Advised patient to send me a copy of his blood work when he completes it       Orders Placed This Encounter   • losartan (COZAAR) 25 MG tablet         Follow up in 6 months(s)      2/7/2024      Nicole Crespo DO, [facility]    Scribe Attestation: Entered by Cecilia Westfall, acting  as scribe for Dr. Nicole Crespo DO, FACC      Provider Attestation: The documentation recorded by the scribe accurately reflects the service I personally performed and the decisions made by me, Dr. Nicole Crespo DO, FACC. I have personally reviewed and made corrections to the text.

## 2025-03-12 NOTE — NURSING
Received report from ED nurse. Pt arrived on unit, VS taken, VN informed. Purwick, fall band and socks applied. Skin assessed, new gown placed on pt. Tele monitor applied. Call bell within reach. Bed alarm on for safety.

## 2025-03-12 NOTE — NURSING
Rapid Response Nurse Note     Chart review completed including VS, telemetry, notes, orders, and labs. Telemetry reviewed, sinus bradycardia rates 30-40s. BP remains stable. Cardiology following. Holding home BB and digoxin.     Please call Rapid Response RN, Sandra NIETO with any questions or concerns at [phone]

## 2025-03-12 NOTE — ED NOTES
Patient  laying down in bed comfortably, patient arouse to voice, oriented to self and place, patient has hx of dementia, patient reports no chest pain or any other distress at this time, connected to the monitor, Asymptomatic bradycardia on the monitor, respirations even and unlabored, no distress noticed, all vitals stable, will continue to monitor.

## 2025-03-12 NOTE — PLAN OF CARE
VN cued into pt's room for introduction pt confused. VN called pts son via telephone to complete admission questions.  VN role explained and informed that VN would be working with bedside nurse and the rest of the care team.   Allowed time for questions. NAD noted.  Will cont to be available as needed.        03/12/25 1406   Admission   Initial VN Admission Questions Complete   Safety/Activity   Safety Promotion/Fall Prevention Fall Risk reviewed with patient/family;instructed to call staff for mobility

## 2025-03-12 NOTE — HPI
HPI retrieved from chart, patient confused and no family at bedside. 89 y.o. with a PMHx of A fib, stage III CKD, dementia, HLD, essential HTN, and essential thrombocytosis who presents to the emergency department today after an episode of bradycardia, hypotension, and decreased mentation at outside facility. Admitted to [facility] internal medicine for concern for symptomatic bradycardia. Patient noted to be on Digoxin and Toprol as OP. EKG and tele reviewed- SB without blocks or pauses. AV abad blockers on hold. Patient noted + UTI and documented DNR. BP is stable.

## 2025-03-12 NOTE — ED NOTES
Patient provided new linen. Pt is awake and alert and oriented to self. Pt has no c/o's at this time. Assisted MD for skin break down eval. Pt has no redness present and everything is blanchable

## 2025-03-12 NOTE — PLAN OF CARE
SW met with pt at bedside to discuss DCA. Per pt her son Abdon [phone], sw will follow up with family. Pt stated that she's at [facility] and that her son will likely be the one to help transport her home at time of dc. Pt expressed that she does not use any hme at home. Pt stated that she does not use any HH or HD services. White board updated with CM name and contact information.  Discharge brochure provided.  Pt encouraged to call with any questions or concerns.  Cm will continue to follow pt through transitions of care and assist with any discharge needs.    Iraj Travis, MSPATRICIA  [phone]    Future Appointments   Date Time Provider Department Center   3/12/2025  3:15 PM CARDIOLOGY, [facility] C[facility]        03/12/25 6437   Discharge Assessment   Assessment Type Discharge Planning Assessment   Confirmed/corrected address, phone number and insurance Yes   Confirmed Demographics Correct on Facesheet   Source of Information patient   When was your last doctors appointment?   (pt was not sure)   Communicated MARIA DEL CARMEN with patient/caregiver Yes   Reason For Admission Symptomatic bradycardia   People in Home facility resident   Facility Arrived From: [facility]   Do you expect to return to your current living situation? Yes   Do you have help at home or someone to help you manage your care at home? Yes   Prior to hospitilization cognitive status: Alert/Oriented   Current cognitive status: Alert/Oriented   Walking or Climbing Stairs Difficulty no   Dressing/Bathing Difficulty no   Do you have any problems with: Errands/Grocery   Home Accessibility wheelchair accessible   Equipment Currently Used at Home none   Readmission within 30 days? No   Patient currently being followed by outpatient case management? No   Do you currently have service(s) that help you manage your care at home? No   Do you take prescription medications? Yes   Do you have prescription coverage? Yes   Coverage PHN   Do you have any  problems affording any of your prescribed medications? No   Is the patient taking medications as prescribed? yes   Who is going to help you get home at discharge? son Abdon [phone]   Are you on dialysis? No   Do you take coumadin? No   Discharge Plan A Home with family   DME Needed Upon Discharge  none   Discharge Plan discussed with: Patient   Transition of Care Barriers None   Health Literacy   How often do you need to have someone help you when you read instructions, pamphlets, or other written material from your doctor or pharmacy? Often   OTHER   Name(s) of People in Home Yan Coppola [phone]

## 2025-03-12 NOTE — HOSPITAL COURSE
03/12/2025 Per HPI   03/13/2025 Remains SB on tele without blocks/pauses. BB and Digoxin remain on hold. BP stable. Continues to appear asymptomatic.

## 2025-03-12 NOTE — PROGRESS NOTES
"[facility] Medicine Progress Note      Subjective/Interval History      No acute events overnight. HR remains in upper 30s-40s. Patient resting comfortably in bed this AM. No complaints voiced.      Objective     Physical Examination:  /69 (BP Location: Right arm, Patient Position: Lying)   Pulse (!) 45   Temp 98.2 °F (36.8 °C) (Oral)   Resp 20   Ht 5' 1" (1.549 m)   Wt 45.4 kg (100 lb)   SpO2 98%   BMI 18.89 kg/m²    General appearance: alert, appears stated age, and cooperative  Head: Normocephalic, without obvious abnormality, atraumatic  Eyes: conjunctivae/corneas clear. PERRL, EOM's intact. Fundi benign.  Ears: normal TM's and external ear canals both ears  Back: symmetric, no curvature. ROM normal. No CVA tenderness.  Lungs: clear to auscultation bilaterally  Heart: S1, S2 normal and bradycardic   Abdomen: soft, non-tender; bowel sounds normal; no masses,  no organomegaly  Extremities: extremities normal, atraumatic, no cyanosis or edema  Skin: Skin color, texture, turgor normal. No rashes or lesions  Neurologic: Mental status: alertness: alert, orientation: person, hospital, not year      Intake/Output:  No intake or output data in the 24 hours ending 03/12/25 0639  Net IO Since Admission: No IO data has been entered for this period [03/12/25 0639]    Laboratory data: reviewed     Microbiology data: reviewed     EKG data: reviewed     Radiology data: reviewed      Current Medications:     Infusions:       Scheduled:   apixaban  2.5 mg Oral BID    atorvastatin  10 mg Oral Daily    cefTRIAXone (Rocephin) IV (PEDS and ADULTS)  1 g Intravenous Q24H    donepeziL  10 mg Oral QHS    hydroxyurea  1,000 mg Oral QHS    latanoprost  1 drop Both Eyes QHS        PRN:        Assessment/Plan:     Cat Garcia is a 89 y.o. with a PMHx of A fib, stage III CKD, dementia, HLD, essential HTN, and essential thrombocytosis who presents to the emergency department today after an episode of bradycardia, " hypotension, and decreased mentation at outside facility. Admitted to [facility] internal medicine for concern for symptomatic bradycardia. Will observe overnight given possibly symptomatic at nursing facility.     Symptomatic bradycardia   - presenting to ED from NH due to bradycardia in 30s with episode of hypotension and decreased mentation  - resolved on arrival to ED, pt without any complaints    - TSH 5.3, free T4 0.95   - digoxin level 1.1, not toxic range   - cardiology consulted in ED, recommended admission to monitor bradycardia   - holding home digoxin and metoprolol   - monitor on telemetry      Atrial fibrillation  - hx of atrial fibrillation on eliquis   - TTE from 8/2024: GIIDD, mildly dilated LA, EF 55-60%   - , trop <0.006   - digoxin level 1.1, not toxic range   - home meds: Eliquis 2.5 mg BID, Digoxin 10 mg, Metoprolol 12.5 qdaily  - continue eliquis 2.5 mg BID      Urinary tract infection  - UA showing positive nitrites, 1+ LE, 37 WBC, many bacteria   - given decreased mentation while at NH, will treat with abx  - Ucx pending   - continue ctx      Macrocytic Anemia   - hgb 9.7,    - folate, b12, iron studies ordered, ferritin 104     Subclinical hypothyroidism   - TSH 5.3, free T4 0.95   - evaluate outpatient      CKD stage III  - Cr 1.0, GFR 55, at baseline   - monitor kidney function  - avoid nephrotoxins, hypotension      HLD   - lipid panel: chol 151, hdl 56, trigs 105, LDL 74   - continue Atorvastatin 10mg     Dementia   - continue Donepezil 10mg qhs     Essential thrombocytosis   - plt elevated to 1,010   - hx of thrombocytosis   - continue home med: Hydroxyurea 1000 mg qdaily        Healthcare maintenance   -primary care provider is Alfie Oconnell MD      Diet: cardiac   VTE PPx: eliquis 2.5 mg BID   Code Status: DNR  MPOA/NOK: son- Dr. Burnsian Joseph ([phone])    Estimated LOS: 1-2 days        Tena Hector MD  [facility] PGY-1

## 2025-03-12 NOTE — ASSESSMENT & PLAN NOTE
Does not appear to be symptomatic - pleasantly demented, alert without complaint, BP appropriate   + UTI  No blocks or pauses on tele  DNR noted  Hold home BB and Digoxin- 72 hour washout  No plans for PPM, no indication for TVP.   Monitor on tele

## 2025-03-12 NOTE — CONSULTS
[facility] - Telemetry  Cardiology  Consult Note    Patient Name: Cat Garcia  MRN: [id number]  Admission Date: 3/11/2025  Hospital Length of Stay: 0 days  Code Status: DNR   Attending Provider: Blaire Cunha MD   Consulting Provider: Yoel Go NP  Primary Care Physician: Alfie Oconnell MD  Principal Problem:Symptomatic bradycardia    Patient information was obtained from past medical records and ER records.     [facility]  Consult performed by: Yoel Go NP  Consult ordered by: Tena Hector MD        Subjective:     Chief Complaint:  UTI, bradycardia      HPI:   HPI retrieved from chart, patient confused and no family at bedside. 89 y.o. with a PMHx of A fib, stage III CKD, dementia, HLD, essential HTN, and essential thrombocytosis who presents to the emergency department today after an episode of bradycardia, hypotension, and decreased mentation at outside facility. Admitted to [facility] internal medicine for concern for symptomatic bradycardia. Patient noted to be on Digoxin and Toprol as OP. EKG and tele reviewed- SB without blocks or pauses. AV abad blockers on hold. Patient noted + UTI and documented DNR. BP is stable.      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     Current Outpatient Medications on File Prior to Encounter   Medication Sig    apixaban (ELIQUIS) 2.5 mg Tab Take  1 tablet (2.5 mg total) by mouth 2 (two) times daily.    atorvastatin (LIPITOR) 10 MG tablet ONE TABLET BY MOUTH ONCE DAILY DX: HYPERLIPIDEMIA    digoxin (LANOXIN) 125 mcg tablet ONE TABLET BY MOUTH ONCE DAILY DX: HF    donepeziL (ARICEPT) 10 MG tablet ONE TABLET BY MOUTH EVERY EVENING FOR DEMENTIA    hydroxyurea (HYDREA) 500 mg Cap 2 CAPSULES (1000MG) BY MOUTH EVERY EVENING DX: THROMBOCYTOSIS    metoprolol succinate (TOPROL-XL) 25 MG 24 hr tablet ONE TABLET BY MOUTH ONCE DAILY DX: HF    FLUAD QUAD ,65Y UP,,PF, 60 mcg (15 mcg x 4)/0.5 mL Syrg     influenza (FLUZONE HIGH-DOSE) 180 mcg/0.5 mL vaccine Inject 0.5 mLs into the muscle. (Patient taking differently: Inject 0.5 mLs into the muscle once.)    latanoprost 0.005 % ophthalmic solution Place 1 drop into both eyes every evening.    zinc oxide (BOUDREAUXS BUTT PASTE) 40 % Oint Apply 1 Application topically Daily. Skyler's Butt Paste maximum strength     Family History       Problem Relation (Age of Onset)    Heart disease Father          Tobacco Use    Smoking status: Former     Current packs/day: 0.00     Types: Cigarettes     Quit date: 1982     Years since quittin.2    Smokeless tobacco: Never    Tobacco comments:     pt was social smoker   Substance and Sexual Activity    Alcohol use: No     Alcohol/week: 0.0 standard drinks of alcohol    Drug use: No    Sexual activity: Never     Review of Systems   Unable to perform ROS: Dementia     Objective:     Vital Signs (Most Recent):  Temp: 97.7 °F (36.5 °C) (25 1229)  Pulse: (!) 37 (25 1238)  Resp: 17 (25 1229)  BP: 134/67 (25 1229)  SpO2: 99 % (25 1229) Vital Signs (24h Range):  Temp:  [97.7 °F (36.5 °C)-98.2 °F (36.8 °C)] 97.7 °F (36.5 °C)  Pulse:  [37-59] 37  Resp:  [15-22] 17  SpO2:  [95 %-100 %] 99 %  BP: (104-168)/(56-78) 134/67     Weight: 50.2 kg (110 lb 10.7 oz) (take by bedside nurse rick)  Body mass index is 20.91 kg/m².    SpO2: 99 %         Intake/Output  "Summary (Last 24 hours) at 3/12/2025 1341  Last data filed at 3/12/2025 1334  Gross per 24 hour   Intake 120 ml   Output --   Net 120 ml       Lines/Drains/Airways       Drain  Duration             Female External Urinary Catheter w/ Suction 03/12/25 1000 <1 day              Peripheral Intravenous Line  Duration                  Peripheral IV - Single Lumen 03/12/25 1153 22 G Posterior;Right Hand <1 day                     Physical Exam  Constitutional:       General: She is not in acute distress.     Appearance: She is not diaphoretic.   HENT:      Head: Atraumatic.   Eyes:      General:         Right eye: No discharge.         Left eye: No discharge.   Cardiovascular:      Rate and Rhythm: Regular rhythm. Bradycardia present.   Pulmonary:      Effort: Pulmonary effort is normal.      Breath sounds: Normal breath sounds.   Abdominal:      General: Bowel sounds are normal.      Palpations: Abdomen is soft.   Skin:     General: Skin is warm and dry.   Neurological:      Mental Status: She is alert. Mental status is at baseline.          Significant Labs: BMP:   Recent Labs   Lab 03/11/25  1603 03/12/25  0408   GLU 91 87    139   K 4.4 4.4    112*   CO2 20* 19*   BUN 20 16   CREATININE 1.0 0.9   CALCIUM 8.7 8.4*   , CMP   Recent Labs   Lab 03/11/25  1603 03/12/25  0408    139   K 4.4 4.4    112*   CO2 20* 19*   GLU 91 87   BUN 20 16   CREATININE 1.0 0.9   CALCIUM 8.7 8.4*   PROT 6.4 5.8*   ALBUMIN 3.5 3.2*   BILITOT 0.4 0.4   ALKPHOS 56 50   AST 24 19   ALT 9* 8*   ANIONGAP 9 8   , CBC   Recent Labs   Lab 03/11/25  1603 03/12/25  0408   WBC 9.87 9.06   HGB 9.7* 9.8*   HCT 30.7* 30.8*   PLT 1,010* 943*   , INR No results for input(s): "INR", "PROTIME" in the last 48 hours., Lipid Panel No results for input(s): "CHOL", "HDL", "LDLCALC", "TRIG", "CHOLHDL" in the last 48 hours., Troponin No results for input(s): "TROPONINIHS" in the last 48 hours., and All pertinent lab results from the last 24 " hours have been reviewed.    Significant Imaging: Echocardiogram: Transthoracic echo (TTE) complete (Cupid Only):   Results for orders placed or performed during the hospital encounter of 03/11/25   Echo *Canceled*    Narrative    The following orders were created for panel order Echo.  Procedure                               Abnormality         Status                     ---------                               -----------         ------                       Please view results for these tests on the individual orders.     Assessment and Plan:     * Symptomatic bradycardia  Does not appear to be symptomatic - pleasantly demented, alert without complaint, BP appropriate   + UTI  No blocks or pauses on tele  DNR noted  Hold home BB and Digoxin- 72 hour washout  No plans for PPM, no indication for TVP.   Monitor on tele     UTI (urinary tract infection)  Management per primary     Atrial fibrillation, chronic  Patient currently SB without blocks or pauses on tele  Hold AV abad blockers given bradycardia  Continue DOAC        VTE Risk Mitigation (From admission, onward)           Ordered     apixaban tablet 2.5 mg  2 times daily         03/11/25 2066                    Thank you for your consult. I will follow-up with patient. Please contact us if you have any additional questions.    Yoel Go NP  Cardiology   [place] - Telemetry

## 2025-03-12 NOTE — ED PROVIDER NOTES
Encounter Date: 3/11/2025       History     Chief Complaint   Patient presents with    Bradycardia     Arrives via ems from [facility] assisted living for evaluation of bradycardia, HR-40's. On EMS arrival pt. Denies feeling bad or complaints. On arrival she denies complaints still stating she is hungry and thirsty. Denies cough, cp, uri symptoms urinary symptoms. Accucheck-221 per ems.      Nursing home resident with dementia, history of atrial fibrillation on anticoagulation, CHF combined systolic and diastolic class 3, chronic kidney disease stage 3 who has not LA post that indicates comfort care sent in by nursing home because she is now unless responsive, bradycardic in the 30s with oxygen saturations of 87-88%.  Patient arrives here alert but confused.  History limited by patient's history dementia.  I spoke with the patient's son who is an anesthesiologist at [employer] who feels last time this happened she had urosepsis.  Review of nursing home record shows the patient is on digoxin and metoprolol.  Patient able to identify that she is hungry and that she is cold.      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  Constitutional: She appears well-developed and well-nourished.   Eyes: EOM are normal. Pupils are equal, round, and reactive to light.   Neck: Neck supple. No thyromegaly present. No JVD present.   Normal range of motion.  Cardiovascular:            Bradycardia heart rate of 40   Pulmonary/Chest: Breath sounds normal. No respiratory distress. She has no wheezes. She has no rales.   Abdominal: Abdomen is soft. Bowel sounds are normal. There is no abdominal tenderness.   Musculoskeletal:         General: No tenderness or edema. Normal range of motion.      Cervical back: Normal range of motion and neck supple.     Neurological: She is alert.   GCS of 14.  Minus one for confusion.   Skin: Skin is warm and dry.         ED Course   Procedures  Labs Reviewed   CBC W/ AUTO DIFFERENTIAL - Abnormal       Result Value    WBC 9.87      RBC 2.79 (*)     Hemoglobin 9.7 (*)     Hematocrit 30.7 (*)      (*)     MCH 34.8 (*)     MCHC 31.6 (*)     RDW 16.0 (*)     Platelets 1,010 (*)     MPV 10.4      Immature Granulocytes 0.7 (*)     Gran # (ANC) 6.3      Immature Grans (Abs) 0.07 (*)     Lymph # 2.4      Mono # 0.9      Eos # 0.1      Baso # 0.08      nRBC 0      Gran % 64.2      Lymph % 23.9      Mono % 9.1      Eosinophil % 1.3      Basophil % 0.8      Platelet Estimate Increased (*)     Aniso Slight      Differential Method Automated      Narrative:      Platelet count  critical result(s) called and verbal readback   obtained from  [facility].RN. at 16:59 on 03/11/2025 by MACK   03/11/2025 18:14   COMPREHENSIVE METABOLIC PANEL - Abnormal    Sodium 138      Potassium 4.4      Chloride 109      CO2 20 (*)     Glucose 91      BUN 20      Creatinine 1.0      Calcium 8.7      Total Protein 6.4      Albumin 3.5      Total Bilirubin 0.4      Alkaline Phosphatase 56      AST 24      ALT 9 (*)     eGFR 55 (*)     Anion Gap 9     B-TYPE NATRIURETIC PEPTIDE - Abnormal     (*)     URINALYSIS, REFLEX TO URINE CULTURE - Abnormal    Specimen UA Urine, Catheterized      Color, UA Yellow      Appearance, UA Hazy (*)     pH, UA 6.0      Specific Gravity, UA 1.025      Protein, UA Trace (*)     Glucose, UA Negative      Ketones, UA Trace (*)     Bilirubin (UA) Negative      Occult Blood UA Negative      Nitrite, UA Positive (*)     Urobilinogen, UA 2.0-3.0 (*)     Leukocytes, UA 1+ (*)     Narrative:     Specimen Source->Urine   URINALYSIS MICROSCOPIC - Abnormal    RBC, UA 7 (*)     WBC, UA 37 (*)     WBC Clumps, UA Few (*)     Bacteria Many (*)     Squam Epithel, UA 2      Microscopic Comment SEE COMMENT      Narrative:     Specimen Source->Urine   CBC W/ AUTO DIFFERENTIAL - Abnormal    WBC 9.06      RBC 2.84 (*)     Hemoglobin 9.8 (*)     Hematocrit 30.8 (*)      (*)     MCH 34.5 (*)     MCHC 31.8 (*)     RDW 16.2 (*)     Platelets 943 (*)     MPV 10.2      Immature Granulocytes 0.9 (*)     Gran # (ANC) 5.6      Immature Grans (Abs) 0.08 (*)     Lymph # 2.2      Mono # 0.9      Eos # 0.2      Baso # 0.08      nRBC 0      Gran % 62.1      Lymph % 24.3      Mono % 9.7      Eosinophil % 2.1      Basophil % 0.9      Differential Method Automated     COMPREHENSIVE METABOLIC PANEL - Abnormal    Sodium 139      Potassium 4.4      Chloride 112 (*)     CO2 19 (*)     Glucose 87      BUN 16      Creatinine 0.9      Calcium 8.4 (*)     Total Protein 5.8 (*)     Albumin 3.2 (*)     Total Bilirubin 0.4      Alkaline Phosphatase 50      AST 19      ALT 8 (*)     eGFR >60      Anion Gap 8     TSH - Abnormal    TSH 5.300 (*)    CULTURE, BLOOD    Blood Culture, Routine No Growth to date     CULTURE, BLOOD    Blood Culture, Routine No Growth to date     CULTURE, URINE   TROPONIN I    Troponin I <0.006     LACTIC ACID, PLASMA    Lactate (Lactic Acid) 1.3     DIGOXIN LEVEL    Digoxin Lvl 1.1     FERRITIN    Ferritin 104     T4, FREE    Free T4 0.95     VITAMIN B12   FOLATE   IRON AND TIBC     EKG Readings:  (Independently Interpreted)   Initial Reading: No STEMI. Ectopy: No Ectopy.   Sinus bradycardia rate of 38       Imaging Results              X-Ray Chest AP Portable (Final result)  Result time 03/11/25 16:50:39      Final result by Peña Taveras MD (03/11/25 16:50:39)                   Impression:      No acute abnormality.      Electronically signed by: Peña Taveras MD  Date:    03/11/2025  Time:    16:50               Narrative:    EXAMINATION:  XR CHEST AP PORTABLE    CLINICAL HISTORY:  Chest Pain;    TECHNIQUE:  Single frontal view of the chest was performed.    COMPARISON:  08/29/2024    FINDINGS:  The lungs are clear, with normal appearance of pulmonary vasculature and no pleural effusion or pneumothorax.    The cardiac silhouette is is mildly prominent similar to prior exam.  Atherosclerosis of the aortic arch.  The hilar and mediastinal contours are unremarkable.    Bones are intact.                                       Medications   apixaban tablet 2.5 mg (2.5 mg Oral Given 3/12/25 0847)   atorvastatin tablet 10 mg (10 mg Oral Given 3/12/25 0846)   donepeziL tablet 10 mg (10 mg Oral Given 3/11/25 2211)   hydroxyurea capsule 1,000 mg (1,000 mg Oral Given 3/11/25 2055)   latanoprost 0.005 % ophthalmic solution 1 drop (1 drop Both Eyes Given 3/11/25 2055)   cefTRIAXone injection 1 g (1 g Intravenous Given 3/12/25 0835)   cefTRIAXone injection 1 g (1 g Intravenous Given 3/11/25 1730)     Medical Decision Making  Amount and/or Complexity of Data Reviewed  Labs: ordered.  Radiology: ordered.    Risk  Prescription drug management.    Patient has maintained being bradycardic.  However she shows no signs of hypoperfusion.  There was no evidence of CHF.  There was no lactate elevation.  There was no elevated white cell count.  Patient does have a UTI.  Given Rocephin.  Blood cultures pending.  Patient is anemic but this is consistent with prior labs.  Despite history of chronic kidney disease patient's  creatinine is 0.9 giving her a GFR even at her age of greater than 62.  Discussed with Cardiology Dr. Glover who feels the patient should be observed overnight.  Recommend stopping digoxin.  Digoxin level is therapeutic at 1.1 no evidence of digoxin toxicity.  Discuss with Hospital Medicine who accepted the patient.                                  Clinical Impression:  Final diagnoses:  [R00.1] Bradycardia  [N39.0] Urinary tract infection without hematuria, site unspecified (Primary)  [R00.1] Symptomatic bradycardia          ED Disposition Condition    Observation                   [1]   Social History  Tobacco Use    Smoking status: Former     Current packs/day: 0.00     Types: Cigarettes     Quit date: 1982     Years since quittin.2    Smokeless tobacco: Never    Tobacco comments:     pt was social smoker   Substance Use Topics    Alcohol use: No     Alcohol/week: 0.0 standard drinks of alcohol    Drug use: No        Delta Molina MD  [phone]

## 2025-03-12 NOTE — H&P
[facility] Medicine H&P Note     Admitting Team: [facility] Hospitalist Team A  Attending Physician: Blaire Cunha MD  Resident: Justino  Intern: Krunal    Date of Admit: 3/11/2025    Chief Complaint     Reported bradycardia at assisted living facility with hypotension x 1 episode     Subjective:      History of Present Illness:  Cat Garcia is a 89 y.o. with a PMHx of A fib, stage III CKD, dementia, HLD, essential HTN, and essential thrombocytosis who presents to the emergency department today after an episode of bradycardia at outside facility.    Per reports from the son, the patient has baseline dementia and her mental status waxes and wanes. The assisted care facility had called him this evening saying that Ms. Garcia appeared more lethargic. When they took her vitals she was bradycardic to the 30-40's and SBP's in the 90's. By the time the patient arrived to the ED her heart rate was in the 40-50's. Per the son she was mentating at what he perceived to be her best (able to converse appropriately but not oriented to place or time). Her BP had improved to SBP>100 as well.    The patient herself is pleasantly demented and had no complaints. The son denied any knowledge of fevers/chills/or shortness of breathe. The patient denied any pain or discomfort with urination. Patient denied any chest pain, abdominal pain, nausea, vomiting, difficulty breathing.     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 All other systems are reviewed and are negative.    Health Maintaince :   Primary Care Physician: Dr. Oconnell    Immunizations:   Flu UTD   Pna UTD       Objective:   Last 24 Hour Vital Signs:  BP  Min: 104/56  Max: 168/74  Temp  Av.7 °F (36.5 °C)  Min: 97.7 °F (36.5 °C)  Max: 97.7 °F (36.5 °C)  Pulse  Av.3  Min: 39  Max: 53  Resp  Av  Min: 16  Max: 22  SpO2  Av.3 %  Min: 95 %  Max: 100 %  Height  Av' 1" (154.9 cm)  Min: 5' 1" (154.9 cm)  Max: 5' 1" (154.9 cm)  Weight  Av.4 kg (100 lb)  Min: 45.4 kg (100 lb)  Max: 45.4 kg (100 lb)  Body mass index is 18.89 kg/m².  No intake/output data recorded.    Physical Examination:  BP (!) 165/72   Pulse (!) 43   Temp 97.7 °F (36.5 °C) (Oral)   Resp 16   Ht 5' 1" (1.549 m)   Wt 45.4 kg (100 lb)   SpO2 100%   BMI 18.89 kg/m²   General appearance: alert, appears stated age, and cooperative  Head: Normocephalic, without obvious abnormality, atraumatic  Eyes: conjunctivae/corneas clear. PERRL, EOM's intact. Fundi benign.  Ears: normal TM's and external ear canals both ears  Back: symmetric, no curvature. ROM normal. No CVA tenderness.  Lungs: clear to auscultation bilaterally  Heart: S1, S2 normal and bradycardic   Abdomen: soft, non-tender; bowel sounds normal; no masses,  no organomegaly  Extremities: extremities normal, atraumatic, no cyanosis or edema  Skin: Skin color, texture, turgor normal. No rashes or lesions  Neurologic: Mental status: alertness: alert, orientation: person, hospital, not year        Laboratory:  Most Recent Data:  Trended Lab Data:  Recent Labs   Lab 25  1603   WBC 9.87   HGB 9.7*   HCT " 30.7*   PLT 1,010*   *   RDW 16.0*      K 4.4      CO2 20*   BUN 20   CREATININE 1.0   GLU 91   PROT 6.4   ALBUMIN 3.5   BILITOT 0.4   AST 24   ALKPHOS 56   ALT 9*       Trended Cardiac Data:  Recent Labs   Lab 03/11/25  1603   TROPONINI <0.006   *       Microbiology Data:  Reviewed     Other Results:  EKG (my interpretation): normal EKG, normal sinus rhythm, unchanged from previous tracings, sinus bradycardia.    Radiology:  Imaging Results              X-Ray Chest AP Portable (Final result)  Result time 03/11/25 16:50:39      Final result by Peña Taveras MD (03/11/25 16:50:39)                   Impression:      No acute abnormality.      Electronically signed by: Peña Taveras MD  Date:    03/11/2025  Time:    16:50               Narrative:    EXAMINATION:  XR CHEST AP PORTABLE    CLINICAL HISTORY:  Chest Pain;    TECHNIQUE:  Single frontal view of the chest was performed.    COMPARISON:  08/29/2024    FINDINGS:  The lungs are clear, with normal appearance of pulmonary vasculature and no pleural effusion or pneumothorax.    The cardiac silhouette is is mildly prominent similar to prior exam.  Atherosclerosis of the aortic arch.  The hilar and mediastinal contours are unremarkable.    Bones are intact.                                         Assessment:     Cat Garcia is a 89 y.o. with a PMHx of A fib, stage III CKD, dementia, HLD, essential HTN, and essential thrombocytosis who presents to the emergency department today after an episode of bradycardia, hypotension, and decreased mentation at outside facility. Admitted to [facility] internal medicine for concern for symptomatic bradycardia. Will observe overnight given possibly symptomatic at nursing facility.     Plan:     Symptomatic bradycardia   - presenting to ED from NH due to bradycardia in 30s with episode of hypotension and decreased mentation  - resolved on arrival to ED   - TSH ordered  - digoxin level 1.1, not toxic  range   - cardiology consulted in ED, recommended admission to monitor bradycardia   - holding home digoxin and metoprolol   - monitor on telemetry     Atrial fibrillation  - TTE from 2024: GIIDD, mildly dilated LA, EF 55-60%   - , trop <0.006   - digoxin level 1.1, not toxic range   - home meds: Eliquis 2.5 mg BID, Digoxin 10 mg, Metoprolol 12.5 qdaily  - continue eliquis 2.5 mg BID     Urinary tract infection  - UA showing positive nitrites, 1+ LE, 37 WBC, many bacteria   - given decreased mentation while at NH, will treat with abx  - Ucx pending   - continue ctx     Macrocytic Anemia   - hgb 9.7,    - folate, b12, iron studies ordered     CKD stage III  - Cr 1.0, GFR 55, at baseline   - monitor kidney function  - avoid nephrotoxins, hypotension     HLD   - lipid panel: chol 151, hdl 56, trigs 105, LDL 74   - continue Atorvastatin 10mg    Dementia   - continue Donepezil 10mg qhs    Essential thrombocytosis   - plt elevated to 1,010   - hx of thrombocytosis   - continue home med: Hydroxyurea 1000 mg qdaily      Code Status:   DNR     Tena Hector MD  [facility] Internal Medicine HO-1    [facility] Medicine Hospitalist Pager numbers:   [facility] Hospitalist Medicine Team A (Guerline/Skyler): [phone]  [facility] Hospitalist Medicine Team B (Nathanael/Neto):  [phone]              [1]   Social History  Tobacco Use    Smoking status: Former     Current packs/day: 0.00     Types: Cigarettes     Quit date: 1982     Years since quittin.2    Smokeless tobacco: Never    Tobacco comments:     pt was social smoker   Substance Use Topics    Alcohol use: No     Alcohol/week: 0.0 standard drinks of alcohol    Drug use: No

## 2025-03-13 LAB
ALBUMIN SERPL BCP-MCNC: 3.3 G/DL (ref 3.5–5.2)
ALP SERPL-CCNC: 49 U/L (ref 40–150)
ALT SERPL W/O P-5'-P-CCNC: 11 U/L (ref 10–44)
ANION GAP SERPL CALC-SCNC: 8 MMOL/L (ref 8–16)
AST SERPL-CCNC: 35 U/L (ref 10–40)
BACTERIA UR CULT: ABNORMAL
BASOPHILS # BLD AUTO: 0.1 K/UL (ref 0–0.2)
BASOPHILS NFR BLD: 1.1 % (ref 0–1.9)
BILIRUB SERPL-MCNC: 0.4 MG/DL (ref 0.1–1)
BUN SERPL-MCNC: 14 MG/DL (ref 8–23)
CALCIUM SERPL-MCNC: 8.9 MG/DL (ref 8.7–10.5)
CHLORIDE SERPL-SCNC: 113 MMOL/L (ref 95–110)
CO2 SERPL-SCNC: 17 MMOL/L (ref 23–29)
CREAT SERPL-MCNC: 0.9 MG/DL (ref 0.5–1.4)
DIFFERENTIAL METHOD BLD: ABNORMAL
EOSINOPHIL # BLD AUTO: 0.2 K/UL (ref 0–0.5)
EOSINOPHIL NFR BLD: 2.3 % (ref 0–8)
ERYTHROCYTE [DISTWIDTH] IN BLOOD BY AUTOMATED COUNT: 16.4 % (ref 11.5–14.5)
EST. GFR  (NO RACE VARIABLE): >60 ML/MIN/1.73 M^2
GLUCOSE SERPL-MCNC: 83 MG/DL (ref 70–110)
HCT VFR BLD AUTO: 32.8 % (ref 37–48.5)
HGB BLD-MCNC: 10 G/DL (ref 12–16)
IMM GRANULOCYTES # BLD AUTO: 0.11 K/UL (ref 0–0.04)
IMM GRANULOCYTES NFR BLD AUTO: 1.2 % (ref 0–0.5)
LYMPHOCYTES # BLD AUTO: 2.1 K/UL (ref 1–4.8)
LYMPHOCYTES NFR BLD: 22.1 % (ref 18–48)
MAGNESIUM SERPL-MCNC: 2 MG/DL (ref 1.6–2.6)
MCH RBC QN AUTO: 34.5 PG (ref 27–31)
MCHC RBC AUTO-ENTMCNC: 30.5 G/DL (ref 32–36)
MCV RBC AUTO: 113 FL (ref 82–98)
MONOCYTES # BLD AUTO: 0.8 K/UL (ref 0.3–1)
MONOCYTES NFR BLD: 8.6 % (ref 4–15)
NEUTROPHILS # BLD AUTO: 6 K/UL (ref 1.8–7.7)
NEUTROPHILS NFR BLD: 64.7 % (ref 38–73)
NRBC BLD-RTO: 0 /100 WBC
PLATELET # BLD AUTO: 809 K/UL (ref 150–450)
PMV BLD AUTO: 10.8 FL (ref 9.2–12.9)
POTASSIUM SERPL-SCNC: 4.9 MMOL/L (ref 3.5–5.1)
PROT SERPL-MCNC: 6 G/DL (ref 6–8.4)
RBC # BLD AUTO: 2.9 M/UL (ref 4–5.4)
SODIUM SERPL-SCNC: 138 MMOL/L (ref 136–145)
WBC # BLD AUTO: 9.26 K/UL (ref 3.9–12.7)

## 2025-03-13 PROCEDURE — 25000003 PHARM REV CODE 250

## 2025-03-13 PROCEDURE — 83735 ASSAY OF MAGNESIUM: CPT

## 2025-03-13 PROCEDURE — 80053 COMPREHEN METABOLIC PANEL: CPT

## 2025-03-13 PROCEDURE — 85025 COMPLETE CBC W/AUTO DIFF WBC: CPT

## 2025-03-13 PROCEDURE — 63600175 PHARM REV CODE 636 W HCPCS

## 2025-03-13 PROCEDURE — 99233 SBSQ HOSP IP/OBS HIGH 50: CPT | Mod: ,,, | Performed by: NURSE PRACTITIONER

## 2025-03-13 PROCEDURE — 36415 COLL VENOUS BLD VENIPUNCTURE: CPT

## 2025-03-13 PROCEDURE — 11000001 HC ACUTE MED/SURG PRIVATE ROOM

## 2025-03-13 RX ADMIN — ATORVASTATIN CALCIUM 10 MG: 10 TABLET, FILM COATED ORAL at 09:03

## 2025-03-13 RX ADMIN — APIXABAN 2.5 MG: 2.5 TABLET, FILM COATED ORAL at 08:03

## 2025-03-13 RX ADMIN — CEFTRIAXONE SODIUM 1 G: 1 INJECTION, POWDER, FOR SOLUTION INTRAMUSCULAR; INTRAVENOUS at 09:03

## 2025-03-13 RX ADMIN — HYDROXYUREA 1000 MG: 500 CAPSULE ORAL at 08:03

## 2025-03-13 RX ADMIN — LATANOPROST 1 DROP: 50 SOLUTION OPHTHALMIC at 08:03

## 2025-03-13 RX ADMIN — APIXABAN 2.5 MG: 2.5 TABLET, FILM COATED ORAL at 09:03

## 2025-03-13 NOTE — SUBJECTIVE & OBJECTIVE
Past Medical History:   Diagnosis Date    Atrial fibrillation     with rapid ventricular rate    Breast cancer     XRT    Chronic bronchitis     Chronic combined systolic and diastolic CHF, NYHA class 3 2013    CKD (chronic kidney disease), stage III     Dementia     Dyslipidemia 12/15/2013    Essential hypertension 12/15/2013    Squamous cell carcinoma     Vascular parkinsonism 2016       Past Surgical History:   Procedure Laterality Date    BREAST SURGERY  2011    right       Review of patient's allergies indicates:  No Known Allergies    No current facility-administered medications on file prior to encounter.     Current Outpatient Medications on File Prior to Encounter   Medication Sig    apixaban (ELIQUIS) 2.5 mg Tab Take 1 tablet (2.5 mg total) by mouth 2 (two) times daily.    atorvastatin (LIPITOR) 10 MG tablet ONE TABLET BY MOUTH ONCE DAILY DX: HYPERLIPIDEMIA    digoxin (LANOXIN) 125 mcg tablet ONE TABLET BY MOUTH ONCE DAILY DX: HF    donepeziL (ARICEPT) 10 MG tablet ONE TABLET BY MOUTH EVERY EVENING FOR DEMENTIA    hydroxyurea (HYDREA) 500 mg Cap 2 CAPSULES (1000MG) BY MOUTH EVERY EVENING DX: THROMBOCYTOSIS    metoprolol succinate (TOPROL-XL) 25 MG 24 hr tablet ONE TABLET BY MOUTH ONCE DAILY DX: HF    FLUAD QUAD -,65Y UP,,PF, 60 mcg (15 mcg x 4)/0.5 mL Syrg     influenza (FLUZONE HIGH-DOSE) 180 mcg/0.5 mL vaccine Inject 0.5 mLs into the muscle. (Patient taking differently: Inject 0.5 mLs into the muscle once.)    latanoprost 0.005 % ophthalmic solution Place 1 drop into both eyes every evening.    zinc oxide (BOUDREAUXS BUTT PASTE) 40 % Oint Apply 1 Application topically Daily. Skyler's Butt Paste maximum strength     Family History       Problem Relation (Age of Onset)    Heart disease Father          Tobacco Use    Smoking status: Former     Current packs/day: 0.00     Types: Cigarettes     Quit date: 1982     Years since quittin.2    Smokeless tobacco: Never    Tobacco  comments:     pt was social smoker   Substance and Sexual Activity    Alcohol use: No     Alcohol/week: 0.0 standard drinks of alcohol    Drug use: No    Sexual activity: Never     Review of Systems   Unable to perform ROS: Dementia     Objective:     Vital Signs (Most Recent):  Temp: 97.7 °F (36.5 °C) (03/13/25 0830)  Pulse: (!) 45 (03/13/25 0830)  Resp: 17 (03/13/25 0830)  BP: (!) 114/57 (03/13/25 0830)  SpO2: 98 % (03/13/25 0830) Vital Signs (24h Range):  Temp:  [97.5 °F (36.4 °C)-98.6 °F (37 °C)] 97.7 °F (36.5 °C)  Pulse:  [37-49] 45  Resp:  [17-18] 17  SpO2:  [96 %-100 %] 98 %  BP: (114-147)/(57-67) 114/57     Weight: 49.9 kg (110 lb)  Body mass index is 20.78 kg/m².    SpO2: 98 %         Intake/Output Summary (Last 24 hours) at 3/13/2025 1006  Last data filed at 3/12/2025 1930  Gross per 24 hour   Intake 300 ml   Output 350 ml   Net -50 ml       Lines/Drains/Airways       Drain  Duration             Female External Urinary Catheter w/ Suction 03/12/25 1000 1 day              Peripheral Intravenous Line  Duration                  Peripheral IV - Single Lumen 03/13/25 0010 22 G Posterior;Right Forearm <1 day                     Physical Exam  Constitutional:       General: She is not in acute distress.     Appearance: She is not diaphoretic.   HENT:      Head: Atraumatic.   Eyes:      General:         Right eye: No discharge.         Left eye: No discharge.   Cardiovascular:      Rate and Rhythm: Regular rhythm. Bradycardia present.   Pulmonary:      Effort: Pulmonary effort is normal.      Breath sounds: Normal breath sounds.   Abdominal:      General: Bowel sounds are normal.      Palpations: Abdomen is soft.   Skin:     General: Skin is warm and dry.   Neurological:      Mental Status: She is alert. Mental status is at baseline.          Significant Labs: BMP:   Recent Labs   Lab 03/11/25  1603 03/12/25  0408 03/13/25  0545   GLU 91 87 83    139 138   K 4.4 4.4 4.9    112* 113*   CO2 20* 19* 17*  "  BUN 20 16 14   CREATININE 1.0 0.9 0.9   CALCIUM 8.7 8.4* 8.9   MG  --   --  2.0   , CMP   Recent Labs   Lab 03/11/25  1603 03/12/25  0408 03/13/25  0545    139 138   K 4.4 4.4 4.9    112* 113*   CO2 20* 19* 17*   GLU 91 87 83   BUN 20 16 14   CREATININE 1.0 0.9 0.9   CALCIUM 8.7 8.4* 8.9   PROT 6.4 5.8* 6.0   ALBUMIN 3.5 3.2* 3.3*   BILITOT 0.4 0.4 0.4   ALKPHOS 56 50 49   AST 24 19 35   ALT 9* 8* 11   ANIONGAP 9 8 8   , CBC   Recent Labs   Lab 03/11/25  1603 03/12/25  0408 03/13/25  0545   WBC 9.87 9.06 9.26   HGB 9.7* 9.8* 10.0*   HCT 30.7* 30.8* 32.8*   PLT 1,010* 943* 809*   , INR No results for input(s): "INR", "PROTIME" in the last 48 hours., Lipid Panel No results for input(s): "CHOL", "HDL", "LDLCALC", "TRIG", "CHOLHDL" in the last 48 hours., Troponin No results for input(s): "TROPONINIHS" in the last 48 hours., and All pertinent lab results from the last 24 hours have been reviewed.    Significant Imaging: Echocardiogram: Transthoracic echo (TTE) complete (Cupid Only):   Results for orders placed or performed during the hospital encounter of 03/11/25   Echo   Result Value Ref Range    Triscuspid Valve Regurgitation Peak Gradient 20 mmHg    LV Diastolic Volume 93 mL    LV Systolic Volume 42 mL    TR Max Kole 2.2 m/s    LVOT diameter 2.0 cm    STJ 2.90 cm    Sinus 3.01 cm    LVIDs 3.2 2.1 - 4.0 cm    PW 1.0 0.6 - 1.1 cm    IVS 0.9 0.6 - 1.1 cm    LVIDd 4.5 3.5 - 6.0 cm    Left Ventricular End Systolic Volume by Teichholz Method 42.29 mL    Left Ventricular End Diastolic Volume by Teichholz Method 93.47 mL    FS 28.9 28 - 44 %    LV mass 142.9 g    ZLVIDD 0.22     ZLVIDS 1.25     Left Ventricle Relative Wall Thickness 0.44 cm    LVOT area 3.1 cm2    LV Systolic Volume Index 28.6 mL/m2    LV Diastolic Volume Index 63.27 mL/m2    LV Mass Index 97.2 g/m2    BSA 1.47 m2    Narrative      Left Ventricle: The left ventricle is normal in size. There is   concentric hypertrophy. Unable to assess wall " motion. There is normal   systolic function with a visually estimated ejection fraction of 55 - 60%.    Right Ventricle: Right ventricle was not well visualized due to poor   acoustic window.    Left Atrium: Left atrium was not well visualized.    Aortic Valve: There is mild aortic valve sclerosis.    Overall the study is inadequate.  Patient refused complete study

## 2025-03-13 NOTE — PROGRESS NOTES
[facility] - Telemetry  Cardiology  Progress Note    Patient Name: Cat Garcia  MRN: [id number]  Admission Date: 3/11/2025  Hospital Length of Stay: 1 days  Code Status: DNR   Attending Physician: Blaire Cunha MD   Primary Care Physician: Alfie Oconnell MD  Expected Discharge Date:   Principal Problem:Symptomatic bradycardia    Subjective:     Hospital Course:   03/12/2025 Per HPI   03/13/2025 Remains SB on tele without blocks/pauses. BB and Digoxin remain on hold. BP stable. Continues to appear asymptomatic.     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     Current Outpatient Medications on File Prior to Encounter   Medication Sig    apixaban (ELIQUIS) 2.5 mg Tab Take 1 tablet (2.5 mg total) by mouth 2 (two) times daily.    atorvastatin (LIPITOR) 10 MG tablet ONE TABLET BY MOUTH ONCE DAILY DX: HYPERLIPIDEMIA    digoxin (LANOXIN) 125 mcg tablet ONE TABLET BY MOUTH ONCE DAILY DX: HF    donepeziL (ARICEPT) 10 MG tablet ONE TABLET BY MOUTH EVERY EVENING FOR DEMENTIA    hydroxyurea (HYDREA) 500 mg Cap 2 CAPSULES (1000MG) BY MOUTH EVERY EVENING DX: THROMBOCYTOSIS    metoprolol succinate (TOPROL-XL) 25 MG 24 hr tablet ONE TABLET BY MOUTH ONCE DAILY DX: HF    FLUAD QUAD 2023-24,65Y UP,,PF, 60 mcg (15 mcg x 4)/0.5 mL Syrg     influenza (FLUZONE HIGH-DOSE) 180 mcg/0.5 mL vaccine Inject 0.5 mLs into the muscle. (Patient taking differently:  Inject 0.5 mLs into the muscle once.)    latanoprost 0.005 % ophthalmic solution Place 1 drop into both eyes every evening.    zinc oxide (BOUDREAUXS BUTT PASTE) 40 % Oint Apply 1 Application topically Daily. Skyler's Butt Paste maximum strength     Family History       Problem Relation (Age of Onset)    Heart disease Father          Tobacco Use    Smoking status: Former     Current packs/day: 0.00     Types: Cigarettes     Quit date: 1982     Years since quittin.2    Smokeless tobacco: Never    Tobacco comments:     pt was social smoker   Substance and Sexual Activity    Alcohol use: No     Alcohol/week: 0.0 standard drinks of alcohol    Drug use: No    Sexual activity: Never     Review of Systems   Unable to perform ROS: Dementia     Objective:     Vital Signs (Most Recent):  Temp: 97.7 °F (36.5 °C) (25)  Pulse: (!) 45 (25)  Resp: 17 (25)  BP: (!) 114/57 (25)  SpO2: 98 % (25) Vital Signs (24h Range):  Temp:  [97.5 °F (36.4 °C)-98.6 °F (37 °C)] 97.7 °F (36.5 °C)  Pulse:  [37-49] 45  Resp:  [17-18] 17  SpO2:  [96 %-100 %] 98 %  BP: (114-147)/(57-67) 114/57     Weight: 49.9 kg (110 lb)  Body mass index is 20.78 kg/m².    SpO2: 98 %         Intake/Output Summary (Last 24 hours) at 3/13/2025 1006  Last data filed at 3/12/2025 1930  Gross per 24 hour   Intake 300 ml   Output 350 ml   Net -50 ml       Lines/Drains/Airways       Drain  Duration             Female External Urinary Catheter w/ Suction 25 1000 1 day              Peripheral Intravenous Line  Duration                  Peripheral IV - Single Lumen 25 0010 22 G Posterior;Right Forearm <1 day                     Physical Exam  Constitutional:       General: She is not in acute distress.     Appearance: She is not diaphoretic.   HENT:      Head: Atraumatic.   Eyes:      General:         Right eye: No discharge.         Left eye: No discharge.   Cardiovascular:      Rate and Rhythm:  "Regular rhythm. Bradycardia present.   Pulmonary:      Effort: Pulmonary effort is normal.      Breath sounds: Normal breath sounds.   Abdominal:      General: Bowel sounds are normal.      Palpations: Abdomen is soft.   Skin:     General: Skin is warm and dry.   Neurological:      Mental Status: She is alert. Mental status is at baseline.          Significant Labs: BMP:   Recent Labs   Lab 03/11/25  1603 03/12/25  0408 03/13/25  0545   GLU 91 87 83    139 138   K 4.4 4.4 4.9    112* 113*   CO2 20* 19* 17*   BUN 20 16 14   CREATININE 1.0 0.9 0.9   CALCIUM 8.7 8.4* 8.9   MG  --   --  2.0   , CMP   Recent Labs   Lab 03/11/25  1603 03/12/25  0408 03/13/25  0545    139 138   K 4.4 4.4 4.9    112* 113*   CO2 20* 19* 17*   GLU 91 87 83   BUN 20 16 14   CREATININE 1.0 0.9 0.9   CALCIUM 8.7 8.4* 8.9   PROT 6.4 5.8* 6.0   ALBUMIN 3.5 3.2* 3.3*   BILITOT 0.4 0.4 0.4   ALKPHOS 56 50 49   AST 24 19 35   ALT 9* 8* 11   ANIONGAP 9 8 8   , CBC   Recent Labs   Lab 03/11/25  1603 03/12/25  0408 03/13/25  0545   WBC 9.87 9.06 9.26   HGB 9.7* 9.8* 10.0*   HCT 30.7* 30.8* 32.8*   PLT 1,010* 943* 809*   , INR No results for input(s): "INR", "PROTIME" in the last 48 hours., Lipid Panel No results for input(s): "CHOL", "HDL", "LDLCALC", "TRIG", "CHOLHDL" in the last 48 hours., Troponin No results for input(s): "TROPONINIHS" in the last 48 hours., and All pertinent lab results from the last 24 hours have been reviewed.    Significant Imaging: Echocardiogram: Transthoracic echo (TTE) complete (Cupid Only):   Results for orders placed or performed during the hospital encounter of 03/11/25   Echo   Result Value Ref Range    Triscuspid Valve Regurgitation Peak Gradient 20 mmHg    LV Diastolic Volume 93 mL    LV Systolic Volume 42 mL    TR Max Kole 2.2 m/s    LVOT diameter 2.0 cm    STJ 2.90 cm    Sinus 3.01 cm    LVIDs 3.2 2.1 - 4.0 cm    PW 1.0 0.6 - 1.1 cm    IVS 0.9 0.6 - 1.1 cm    LVIDd 4.5 3.5 - 6.0 cm    Left " Ventricular End Systolic Volume by Teichholz Method 42.29 mL    Left Ventricular End Diastolic Volume by Teichholz Method 93.47 mL    FS 28.9 28 - 44 %    LV mass 142.9 g    ZLVIDD 0.22     ZLVIDS 1.25     Left Ventricle Relative Wall Thickness 0.44 cm    LVOT area 3.1 cm2    LV Systolic Volume Index 28.6 mL/m2    LV Diastolic Volume Index 63.27 mL/m2    LV Mass Index 97.2 g/m2    BSA 1.47 m2    Narrative      Left Ventricle: The left ventricle is normal in size. There is   concentric hypertrophy. Unable to assess wall motion. There is normal   systolic function with a visually estimated ejection fraction of 55 - 60%.    Right Ventricle: Right ventricle was not well visualized due to poor   acoustic window.    Left Atrium: Left atrium was not well visualized.    Aortic Valve: There is mild aortic valve sclerosis.    Overall the study is inadequate.  Patient refused complete study       Assessment and Plan:     Brief HPI: Patient seen this morning on rounds, remains confused at baseline. HR 30s-50s, BB and digoxin washout in progress. BP is stable. Appears asymptomatic from bradycardia standpoint.     * Symptomatic bradycardia  Does not appear to be symptomatic - pleasantly demented, alert without complaint, BP appropriate   + UTI  No blocks or pauses on tele  DNR noted  Hold home BB and Digoxin- 72 hour washout  No plans for PPM, no indication for TVP.   Monitor on tele   BP appropriate      UTI (urinary tract infection)  Management per primary     Atrial fibrillation, chronic  Patient currently SB without blocks or pauses on tele  Hold AV abad blockers given bradycardia  Continue DOAC          VTE Risk Mitigation (From admission, onward)           Ordered     apixaban tablet 2.5 mg  2 times daily         03/11/25 1946                    Yoel oG NP  Cardiology  [facility] - Telemetry

## 2025-03-13 NOTE — NURSING
Patient was in Bradycardia in her 40s, notified Dr. Alexia Mcclain and checked if I should be placing pacer pads on her chest. Continued to monitor the patient.

## 2025-03-13 NOTE — ASSESSMENT & PLAN NOTE
Does not appear to be symptomatic - pleasantly demented, alert without complaint, BP appropriate   + UTI  No blocks or pauses on tele  DNR noted  Hold home BB and Digoxin- 72 hour washout  No plans for PPM, no indication for TVP.   Monitor on tele   BP appropriate

## 2025-03-13 NOTE — PLAN OF CARE
Recommendation:  1. Add Boost Plus BID.   2. Encourage intake at meals as tolerated. 3. Monitor weight/labs.   4. RD to follow to monitor po intake.    Goals:  Pt will tolerate diet with at least 50% intake at meals by RD follow up  Nutrition Goal Status: new

## 2025-03-13 NOTE — NURSING
Upon entering the room patient arouses to voice, denies symptoms related to bradycardia, HR on telemetry strip in 40s, MD Krunal notified of above, no new orders at this time, RN to perform frequent checks on patient and VS

## 2025-03-13 NOTE — PROGRESS NOTES
[facility] - Telemetry  Adult Nutrition  Progress Note    SUMMARY       Recommendations    Recommendation:  1. Add Boost Plus BID.   2. Encourage intake at meals as tolerated. 3. Monitor weight/labs.   4. RD to follow to monitor po intake.    Goals:  Pt will tolerate diet with at least 50% intake at meals by RD follow up  Nutrition Goal Status: new  Communication of RD Recs: reviewed with RN    Nutrition Discharge Planning  Nutrition Discharge Planning: Therapeutic diet (comments)  Therapeutic diet (comments): Cardiac    Assessment and Plan  Nutrition Problem  Inadequate energy intake    Related to (etiology):   Advanced age    Signs and Symptoms (as evidenced by):   Weight loss, 25% intake at meals     Interventions:  Collaboration with other providers  Repunch beverage    Nutrition Diagnosis Status:   New      Malnutrition Assessment  Weight Loss (Malnutrition): greater than 10% in 6 months (10% x 7 months)       Reason for Assessment  Reason For Assessment: identified at risk by screening criteria ([facility])  Diagnosis:  (symptomatic bradycardia)  General Information Comments: Pt [facility] with bradycardia and hypotension. Pt on Cardiac diet with ~25% intake at meals. Pt was asleep at visit-unable to assess NFPE. No family at bedside. Noted pt consumed 25% of her breakfast tray. Tim 20-skin intact. Noted 14lb weight loss x 7 months.    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        Nutrition/Diet History  Food Preferences: no Protestant or cultural food prefs identified  Factors Affecting Nutritional Intake: other (see comments) (advanced  "age)    Anthropometrics  Height: 5' 1" (154.9 cm)  Height (inches): 61 in  Weight: 49.9 kg (110 lb 0.2 oz)  Weight (lb): 110.01 lb  Weight Method: Bed Scale  Ideal Body Weight (IBW), Female: 105 lb  % Ideal Body Weight, Female (lb): 104.77 %  BMI (Calculated): 20.8  BMI Grade: 18.5-24.9 - normal  Usual Body Weight (UBW), k.9 kg ()  % Usual Body Weight: 89.45  % Weight Change From Usual Weight: -10.73 %     Lab/Procedures/Meds  Pertinent Labs Reviewed: reviewed  Pertinent Labs Comments: Alb 3.3L  Pertinent Medications Reviewed: reviewed    Estimated/Assessed Needs  Weight Used For Calorie Calculations: 49.9 kg (110 lb 0.2 oz)  Energy Calorie Requirements (kcal): 1497 (30 kcal/kg)  Energy Need Method: Kcal/kg  Protein Requirements: 49g (1.0g/kg)  Weight Used For Protein Calculations: 49.9 kg (110 lb 0.2 oz)  Estimated Fluid Requirement Method: RDA Method  RDA Method (mL): 1497     Nutrition Prescription Ordered  Current Diet Order: Cardiac    Evaluation of Received Nutrient/Fluid Intake  I/O: 300/350  Energy Calories Required: not meeting needs  Protein Required: not meeting needs  Fluid Required: not meeting needs  Comments: LBM 3/9  % Intake of Estimated Energy Needs: 0 - 25 %  % Meal Intake: 0 - 25 %    Nutrition Risk  Level of Risk/Frequency of Follow-up:  (2xweekly)     Monitor and Evaluation  Monitor and Evaluation: Food and beverage intake     Nutrition Related Social Determinants of Health: SDOH: Other:  resides in NH    Nutrition Follow-Up  RD Follow-up?: Yes      "

## 2025-03-13 NOTE — PLAN OF CARE
Problem: Fall Injury Risk  Goal: Absence of Fall and Fall-Related Injury  Outcome: Progressing     Problem: Dysrhythmia  Goal: Normalized Cardiac Rhythm  Outcome: Progressing

## 2025-03-13 NOTE — ASSESSMENT & PLAN NOTE
Patient currently SB without blocks or pauses on tele  Hold AV abad blockers given bradycardia  Continue DOAC

## 2025-03-13 NOTE — NURSING
Patient's HR dropped down to 38/mt for a bit and came back up to 41/mt. Checked on the patient and she was asleep. Notified MD

## 2025-03-13 NOTE — PROGRESS NOTES
"[facility] Medicine Progress Note      Subjective/Interval History      No acute events overnight. HR remains in upper 30s-40s. Patient resting comfortably in bed this AM. No complaints voiced.      Objective     Physical Examination:  /60 (BP Location: Right arm, Patient Position: Lying)   Pulse (!) 49   Temp 98.6 °F (37 °C) (Oral)   Resp 18   Ht 5' 1" (1.549 m)   Wt 49.9 kg (110 lb)   SpO2 96%   Breastfeeding No   BMI 20.78 kg/m²    General appearance: alert, appears stated age, and cooperative  Head: Normocephalic, without obvious abnormality, atraumatic  Eyes: conjunctivae/corneas clear. PERRL, EOM's intact. Fundi benign.  Ears: normal TM's and external ear canals both ears  Back: symmetric, no curvature. ROM normal. No CVA tenderness.  Lungs: clear to auscultation bilaterally  Heart: S1, S2 normal and bradycardic   Abdomen: soft, non-tender; bowel sounds normal; no masses,  no organomegaly  Extremities: extremities normal, atraumatic, no cyanosis or edema  Skin: Skin color, texture, turgor normal. No rashes or lesions  Neurologic: Mental status: alertness: alert, orientation: person, hospital, not year      Intake/Output:    Intake/Output Summary (Last 24 hours) at 3/13/2025 0657  Last data filed at 3/12/2025 1930  Gross per 24 hour   Intake 300 ml   Output 350 ml   Net -50 ml     Net IO Since Admission: -50 mL [03/13/25 0657]    Laboratory data: reviewed     Microbiology data: reviewed     EKG data: reviewed     Radiology data: reviewed      Current Medications:     Infusions:       Scheduled:   apixaban  2.5 mg Oral BID    atorvastatin  10 mg Oral Daily    cefTRIAXone (Rocephin) IV (PEDS and ADULTS)  1 g Intravenous Q24H    hydroxyurea  1,000 mg Oral QHS    latanoprost  1 drop Both Eyes QHS        PRN:    Current Facility-Administered Medications:     acetaminophen, 650 mg, Oral, Q6H PRN    dextrose 50%, 12.5 g, Intravenous, PRN    dextrose 50%, 25 g, Intravenous, PRN    glucagon (human " recombinant), 1 mg, Intramuscular, PRN    glucose, 16 g, Oral, PRN    glucose, 24 g, Oral, PRN    naloxone, 0.02 mg, Intravenous, PRN    pneumoc 20-iván conj-dip cr(PF), 0.5 mL, Intramuscular, Once PRN    sodium chloride 0.9%, 10 mL, Intravenous, Q12H PRN      Assessment/Plan:     Cat Garcia is a 89 y.o. with a PMHx of A fib, stage III CKD, dementia, HLD, essential HTN, and essential thrombocytosis who presents to the emergency department today after an episode of bradycardia, hypotension, and decreased mentation at outside facility. Admitted to [facility] internal medicine for concern for symptomatic bradycardia. Will observe overnight given possibly symptomatic at nursing facility.     Symptomatic bradycardia   - presenting to ED from NH due to bradycardia in 30s with episode of hypotension and decreased mentation  - resolved on arrival to ED, pt without any complaints    - TSH 5.3, free T4 0.95   - digoxin level 1.1, not toxic range   - cardiology consulted in ED, recommended admission to monitor bradycardia   - holding home digoxin and metoprolol for 72 hr washout, no plans for PPM, no indication for TVP   - TTE: concentric LVH, EF 55-60%, inadequate study  - monitor on telemetry      Atrial fibrillation  - hx of atrial fibrillation on eliquis   - TTE from 8/2024: GIIDD, mildly dilated LA, EF 55-60%   - , trop <0.006   - digoxin level 1.1, not toxic range   - home meds: Eliquis 2.5 mg BID, Digoxin 10 mg, Metoprolol 12.5 qdaily  - continue eliquis 2.5 mg BID      Urinary tract infection  - UA showing positive nitrites, 1+ LE, 37 WBC, many bacteria   - given decreased mentation while at NH, will treat with abx  - Ucx GNR, pending sensitivities   - continue ctx      Macrocytic Anemia   - hgb 9.7,    - folate 16.5, b12 332, iron studies 31, TIBC 252, TSAT 12, ferritin 104     Subclinical hypothyroidism   - TSH 5.3, free T4 0.95   - evaluate outpatient      CKD stage III  - Cr 1.0, GFR 55, at baseline   -  monitor kidney function  - avoid nephrotoxins, hypotension      HLD   - lipid panel: chol 151, hdl 56, trigs 105, LDL 74   - continue Atorvastatin 10mg     Dementia   - holding home Donepezil 10mg qhs due to bradycardic effect      Essential thrombocytosis   - plt elevated to 1,010   - hx of thrombocytosis   - continue home med: Hydroxyurea 1000 mg qdaily        Healthcare maintenance   -primary care provider is Alfie Oconnell MD      Diet: cardiac   VTE PPx: eliquis 2.5 mg BID   Code Status: DNR  MPOA/NOK: son- Dr. Abdon Ruiz ([phone])    Estimated LOS: 1-2 days        Tena Hector MD  [facility] PGY-1

## 2025-03-13 NOTE — PLAN OF CARE
Sw met with pt at bedside at bedside this AM to discuss dc planning. Pt stated that she was having 0/10 pain and was in a pleasant mood throughout assessment. Pt reported that she plans on having her son Abdon help transport her back to her assisted living facility at time of dc. Pt did not have any additional questions or concerns for sw at this time. Cm will continue to follow pt through transitions of care and assist with any discharge needs.    KIMBERLY Guo  [phone]    Future Appointments   Date Time Provider Department Center   3/26/2025  8:30 AM Kirsten Acevedo MD [facility]        03/13/25 1434   Post-Acute Status   Coverage PHN   Discharge Delays None known at this time   Discharge Plan   Discharge Plan A Assisted Living

## 2025-03-13 NOTE — PLAN OF CARE
"  Problem: Adult Inpatient Plan of Care  Goal: Plan of Care Review  Outcome: Progressing     Problem: Adult Inpatient Plan of Care  Goal: Optimal Comfort and Wellbeing  Outcome: Progressing     Problem: Dysrhythmia  Goal: Normalized Cardiac Rhythm  Outcome: Progressing     Problem: Fall Injury Risk  Goal: Absence of Fall and Fall-Related Injury  Outcome: Progressing     .Plan of care reviewed with the patient. Scheduled medicines given and the patient tolerated well. Fall and safety precautions taken and the standard interventions are in place. On Telemetry monitoring with Sinus Bradycardia, no true "red alarms' noted, no acute distress reported either on the shift. Advised the patient to call for assistance. Continued monitoring the patient.   "

## 2025-03-14 VITALS
BODY MASS INDEX: 20.77 KG/M2 | WEIGHT: 110 LBS | HEIGHT: 61 IN | DIASTOLIC BLOOD PRESSURE: 53 MMHG | RESPIRATION RATE: 18 BRPM | HEART RATE: 51 BPM | SYSTOLIC BLOOD PRESSURE: 137 MMHG | TEMPERATURE: 98 F | OXYGEN SATURATION: 99 %

## 2025-03-14 PROBLEM — R00.1 BRADYCARDIA: Status: ACTIVE | Noted: 2025-03-14

## 2025-03-14 PROBLEM — R00.1 SYMPTOMATIC BRADYCARDIA: Status: RESOLVED | Noted: 2025-03-11 | Resolved: 2025-03-14

## 2025-03-14 LAB
ALBUMIN SERPL BCP-MCNC: 3.2 G/DL (ref 3.5–5.2)
ALP SERPL-CCNC: 51 U/L (ref 40–150)
ALT SERPL W/O P-5'-P-CCNC: 9 U/L (ref 10–44)
ANION GAP SERPL CALC-SCNC: 5 MMOL/L (ref 8–16)
AST SERPL-CCNC: 22 U/L (ref 10–40)
BASOPHILS # BLD AUTO: 0.07 K/UL (ref 0–0.2)
BASOPHILS NFR BLD: 0.7 % (ref 0–1.9)
BILIRUB SERPL-MCNC: 0.3 MG/DL (ref 0.1–1)
BUN SERPL-MCNC: 13 MG/DL (ref 8–23)
CALCIUM SERPL-MCNC: 8.8 MG/DL (ref 8.7–10.5)
CHLORIDE SERPL-SCNC: 112 MMOL/L (ref 95–110)
CO2 SERPL-SCNC: 23 MMOL/L (ref 23–29)
CREAT SERPL-MCNC: 0.9 MG/DL (ref 0.5–1.4)
DIFFERENTIAL METHOD BLD: ABNORMAL
EOSINOPHIL # BLD AUTO: 0.2 K/UL (ref 0–0.5)
EOSINOPHIL NFR BLD: 1.9 % (ref 0–8)
ERYTHROCYTE [DISTWIDTH] IN BLOOD BY AUTOMATED COUNT: 16.4 % (ref 11.5–14.5)
EST. GFR  (NO RACE VARIABLE): >60 ML/MIN/1.73 M^2
GLUCOSE SERPL-MCNC: 96 MG/DL (ref 70–110)
HCT VFR BLD AUTO: 30.2 % (ref 37–48.5)
HGB BLD-MCNC: 9.6 G/DL (ref 12–16)
IMM GRANULOCYTES # BLD AUTO: 0.08 K/UL (ref 0–0.04)
IMM GRANULOCYTES NFR BLD AUTO: 0.8 % (ref 0–0.5)
LYMPHOCYTES # BLD AUTO: 1.9 K/UL (ref 1–4.8)
LYMPHOCYTES NFR BLD: 20 % (ref 18–48)
MAGNESIUM SERPL-MCNC: 2 MG/DL (ref 1.6–2.6)
MCH RBC QN AUTO: 34.4 PG (ref 27–31)
MCHC RBC AUTO-ENTMCNC: 31.8 G/DL (ref 32–36)
MCV RBC AUTO: 108 FL (ref 82–98)
MONOCYTES # BLD AUTO: 0.8 K/UL (ref 0.3–1)
MONOCYTES NFR BLD: 8.7 % (ref 4–15)
NEUTROPHILS # BLD AUTO: 6.4 K/UL (ref 1.8–7.7)
NEUTROPHILS NFR BLD: 67.9 % (ref 38–73)
NRBC BLD-RTO: 0 /100 WBC
PLATELET # BLD AUTO: 966 K/UL (ref 150–450)
PMV BLD AUTO: 10.1 FL (ref 9.2–12.9)
POTASSIUM SERPL-SCNC: 4.3 MMOL/L (ref 3.5–5.1)
PROT SERPL-MCNC: 5.7 G/DL (ref 6–8.4)
RBC # BLD AUTO: 2.79 M/UL (ref 4–5.4)
SODIUM SERPL-SCNC: 140 MMOL/L (ref 136–145)
WBC # BLD AUTO: 9.5 K/UL (ref 3.9–12.7)

## 2025-03-14 PROCEDURE — 36415 COLL VENOUS BLD VENIPUNCTURE: CPT

## 2025-03-14 PROCEDURE — 80053 COMPREHEN METABOLIC PANEL: CPT

## 2025-03-14 PROCEDURE — 83735 ASSAY OF MAGNESIUM: CPT

## 2025-03-14 PROCEDURE — 25000003 PHARM REV CODE 250

## 2025-03-14 PROCEDURE — 63600175 PHARM REV CODE 636 W HCPCS

## 2025-03-14 PROCEDURE — 85025 COMPLETE CBC W/AUTO DIFF WBC: CPT

## 2025-03-14 RX ORDER — NITROFURANTOIN 25; 75 MG/1; MG/1
100 CAPSULE ORAL 2 TIMES DAILY
Qty: 6 CAPSULE | Refills: 0 | Status: SHIPPED | OUTPATIENT
Start: 2025-03-14 | End: 2025-03-17

## 2025-03-14 RX ORDER — NITROFURANTOIN 25; 75 MG/1; MG/1
100 CAPSULE ORAL 2 TIMES DAILY
Start: 2025-03-14 | End: 2025-03-14

## 2025-03-14 RX ADMIN — ATORVASTATIN CALCIUM 10 MG: 10 TABLET, FILM COATED ORAL at 09:03

## 2025-03-14 RX ADMIN — APIXABAN 2.5 MG: 2.5 TABLET, FILM COATED ORAL at 09:03

## 2025-03-14 RX ADMIN — CEFTRIAXONE SODIUM 1 G: 1 INJECTION, POWDER, FOR SOLUTION INTRAMUSCULAR; INTRAVENOUS at 09:03

## 2025-03-14 NOTE — PLAN OF CARE
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, F02.80]  Yes    Debility [R53.81]  Yes    Long term current use of anticoagulant therapy [Z79.01]  Not Applicable    Generalized weakness [R53.1]  Yes    Atrial fibrillation, chronic [I48.20]  Yes     Chronic    CKD (chronic kidney disease), stage III [N18.30]  Yes     Chronic      Resolved Hospital Problems    Diagnosis Date Resolved POA    Symptomatic bradycardia [R00.1] 03/14/2025 Yes       Allergies:Review of patient's allergies indicates:  No Known Allergies    Diet: cardiac diet    Activities: Activity as tolerated    Goals of Care Treatment Preferences:  Code Status: DNR       LaPOST: Yes           Nursing: monitor vital signs qshift for development of tachycardia in known atrial fibrillation      Labs: none     CONSULTS:  no new consults     MISCELLANEOUS CARE:  Routine Skin for Bedridden Patients: Apply moisture barrier cream to all skin folds and wet areas in perineal area daily and after baths and all bowel movements.    WOUND CARE ORDERS  None    Medications: Review discharge medications with patient and family and provide education.         Medication List        START taking these medications      nitrofurantoin (macrocrystal-monohydrate) 100 MG capsule  Commonly known as: MACROBID  Take 1 capsule (100 mg total) by mouth 2 (two) times daily. for 3 days            CONTINUE taking these medications      apixaban  2.5 mg Tab  Commonly known as: ELIQUIS  Take 1 tablet (2.5 mg total) by mouth 2 (two) times daily.     atorvastatin 10 MG tablet  Commonly known as: LIPITOR  ONE TABLET BY MOUTH ONCE DAILY DX: HYPERLIPIDEMIA     hydroxyurea 500 mg Cap  Commonly known as: HYDREA  2 CAPSULES (1000MG) BY MOUTH EVERY EVENING DX: THROMBOCYTOSIS     latanoprost 0.005 % ophthalmic solution  Place 1 drop into both eyes every evening.     zinc oxide 40 % Oint  Commonly known as: BOUDREAUXS BUTT PASTE  Apply 1 Application topically Daily. Skyler's Butt Paste maximum strength            STOP taking these medications      digoxin 125 mcg tablet  Commonly known as: LANOXIN     donepeziL 10 MG tablet  Commonly known as: ARICEPT     FLUAD QUAD 2023-24(65Y UP)(PF) 60 mcg (15 mcg x 4)/0.5 mL Syrg  Generic drug: flu vac 2023 [id number](PF)     FLUZONE HIGH-DOSE 2018-19 (PF) 180 mcg/0.5 mL vaccine  Generic drug: influenza     metoprolol succinate 25 MG 24 hr tablet  Commonly known as: TOPROL-XL                Immunizations Administered as of 3/14/2025       Name Date Dose VIS Date Route Exp Date    COVID-19, MRNA, LN-S, PF (Moderna) 1/29/2021 0.5 mL 10/6/2009 Intramuscular --    Site: Left arm     : Modern[employer]     Lot: [id number]     Comment: Adminis             _________________________________  Tena Hector MD  03/14/2025

## 2025-03-14 NOTE — PLAN OF CARE
VN note: VN completed AVS and attachments and notified bedside nurse, Jonh. Will cont to be available and intervene prn.

## 2025-03-14 NOTE — PROGRESS NOTES
"[facility] Medicine Progress Note      Subjective/Interval History      No acute events overnight. HR improved to 50s. Patient resting comfortably in bed this AM. Eating breakfast.      Objective     Physical Examination:  BP (!) 135/58 (BP Location: Right arm, Patient Position: Lying)   Pulse 98   Temp 97.3 °F (36.3 °C)   Resp 18   Ht 5' 1" (1.549 m)   Wt 49.9 kg (110 lb 0.2 oz)   SpO2 95%   Breastfeeding No   BMI 20.79 kg/m²    General appearance: alert, appears stated age, and cooperative  Head: Normocephalic, without obvious abnormality, atraumatic  Eyes: conjunctivae/corneas clear. PERRL, EOM's intact. Fundi benign.  Ears: normal TM's and external ear canals both ears  Back: symmetric, no curvature. ROM normal. No CVA tenderness.  Lungs: clear to auscultation bilaterally  Heart: S1, S2 normal and bradycardic   Abdomen: soft, non-tender; bowel sounds normal; no masses,  no organomegaly  Extremities: extremities normal, atraumatic, no cyanosis or edema  Skin: Skin color, texture, turgor normal. No rashes or lesions  Neurologic: Mental status: alertness: alert, orientation: person, hospital, not year      Intake/Output:    Intake/Output Summary (Last 24 hours) at 3/14/2025 0836  Last data filed at 3/14/2025 0521  Gross per 24 hour   Intake 360 ml   Output 350 ml   Net 10 ml     Net IO Since Admission: -40 mL [03/14/25 0836]    Laboratory data: reviewed     Microbiology data: reviewed     EKG data: reviewed     Radiology data: reviewed      Current Medications:     Infusions:       Scheduled:   apixaban  2.5 mg Oral BID    atorvastatin  10 mg Oral Daily    cefTRIAXone (Rocephin) IV (PEDS and ADULTS)  1 g Intravenous Q24H    hydroxyurea  1,000 mg Oral QHS    latanoprost  1 drop Both Eyes QHS        PRN:    Current Facility-Administered Medications:     acetaminophen, 650 mg, Oral, Q6H PRN    dextrose 50%, 12.5 g, Intravenous, PRN    dextrose 50%, 25 g, Intravenous, PRN    glucagon (human recombinant), 1 " mg, Intramuscular, PRN    glucose, 16 g, Oral, PRN    glucose, 24 g, Oral, PRN    naloxone, 0.02 mg, Intravenous, PRN    pneumoc 20-iván conj-dip cr(PF), 0.5 mL, Intramuscular, Once PRN    sodium chloride 0.9%, 10 mL, Intravenous, Q12H PRN      Assessment/Plan:     Cat Garcia is a 89 y.o. with a PMHx of A fib, stage III CKD, dementia, HLD, essential HTN, and essential thrombocytosis who presents to the emergency department today after an episode of bradycardia, hypotension, and decreased mentation at outside facility. Admitted to [facility] internal medicine for concern for symptomatic bradycardia. Will observe overnight given possibly symptomatic at nursing facility.     Symptomatic bradycardia   - presenting to ED from NH due to bradycardia in 30s with episode of hypotension and decreased mentation  - resolved on arrival to ED, pt without any complaints    - TSH 5.3, free T4 0.95   - digoxin level 1.1, not toxic range   - cardiology consulted in ED, recommended admission to monitor bradycardia   - holding home digoxin and metoprolol for 72 hr washout, no plans for PPM, no indication for TVP   - TTE: concentric LVH, EF 55-60%, inadequate study  - monitor on telemetry   - HR improved to 50s     Atrial fibrillation  - hx of atrial fibrillation on eliquis   - TTE from 8/2024: GIIDD, mildly dilated LA, EF 55-60%   - , trop <0.006   - digoxin level 1.1, not toxic range   - home meds: Eliquis 2.5 mg BID, Digoxin 10 mg, Metoprolol 12.5 qdaily  - continue eliquis 2.5 mg BID      Urinary tract infection  - UA showing positive nitrites, 1+ LE, 37 WBC, many bacteria   - given decreased mentation while at NH, will treat with abx  - Ucx GNR, pending sensitivities   - continue ctx      Macrocytic Anemia   - hgb 9.7,    - folate 16.5, b12 332, iron studies 31, TIBC 252, TSAT 12, ferritin 104     Subclinical hypothyroidism   - TSH 5.3, free T4 0.95   - evaluate outpatient      CKD stage III  - Cr 1.0, GFR 55, at  baseline   - monitor kidney function  - avoid nephrotoxins, hypotension      HLD   - lipid panel: chol 151, hdl 56, trigs 105, LDL 74   - continue Atorvastatin 10mg     Dementia   - holding home Donepezil 10mg qhs due to bradycardic effect      Essential thrombocytosis   - plt elevated to 1,010   - hx of thrombocytosis   - continue home med: Hydroxyurea 1000 mg qdaily        Healthcare maintenance   -primary care provider is Alfie Oconnell MD      Diet: cardiac   VTE PPx: eliquis 2.5 mg BID   Code Status: DNR  MPOA/NOK: son- Dr. Abdon Ruiz ([phone])    Estimated LOS: discharge today       Tena Hector MD  [facility] PGY-1

## 2025-03-14 NOTE — DISCHARGE SUMMARY
[facility] Medicine Discharge Summary    Primary Team: [facility] Hospitalist Team A  Attending Physician: Blaire Cunha MD  Resident: Dr. Badillo  Intern: Dr. Hector    Date of Admit: 3/11/2025  Date of Discharge: 3/14/2025    Discharge to: Assisted Living Facility  Condition: Stable    Discharge Diagnoses     Problem List[1]    Consultants and Procedures     Consultants:  Cardiology     Procedures:   None     Brief History of Present Illness      Cat Garcia is a 89 y.o. with a PMHx of A fib, stage III CKD, dementia, HLD, essential HTN, and essential thrombocytosis who presents to the emergency department today after an episode of bradycardia at outside facility.     Per reports from the son, the patient has baseline dementia and her mental status waxes and wanes. The [facility] care facility had called him the evening of admission that Ms. Garcia appeared more lethargic. When they took her vitals she was bradycardic to the 30-40's and SBP's in the 90's. By the time the patient arrived to the ED her heart rate was in the 40-50's. Per the son she was mentating at what he perceived to be her best (able to converse appropriately but not oriented to place or time). Her BP had improved to SBP>100 as well.     The patient herself is pleasantly demented and had no complaints. The son denied any knowledge of fevers/chills/or shortness of breathe. The patient denied any pain or discomfort with urination. Patient denied any chest pain, abdominal pain, nausea, vomiting, difficulty breathing.     During admission, cardiology was consulted. Digoxin and metoprolol were held and patient monitored for 72 hr washout period. Patient remained asymptomatic throughout admission, cardiology without plans for pacemaker or transvenous placing. She was also treated for UTI with plan to complete course at facility. Patient was stable for discharge back to assisted living center.    For the full HPI please refer to the History & Physical  from this admission.    Hospital Course By Problem with Pertinent Findings     Symptomatic bradycardia   - presented to ED from NH due to bradycardia in 30s with episode of hypotension and decreased mentation  - resolved on arrival to ED, pt without any complaints    - TSH 5.3, free T4 0.95   - digoxin level 1.1, not toxic range   - cardiology consulted in ED, recommended admission to monitor bradycardia   - held home digoxin and metoprolol for 72 hr washout, no plans for PPM, no indication for TVP   - TTE: concentric LVH, EF 55-60%, inadequate study  - monitored on telemetry ; HR improved   - discontinued digoxin, metoprolol, and aricept on discharge      Atrial fibrillation  - hx of atrial fibrillation on eliquis   - TTE from 8/2024: GIIDD, mildly dilated LA, EF 55-60%   - , trop <0.006   - digoxin level 1.1, not toxic range   - home meds: Eliquis 2.5 mg BID, Digoxin 10 mg, Metoprolol 12.5 qdaily  - continue eliquis 2.5 mg BID   - monitor for tachycardia given discontinuation of digoxin and metoprolol      Urinary tract infection  - UA showing positive nitrites, 1+ LE, 37 WBC, many bacteria   - given decreased mentation while at NH, will treat with abx  - Ucx E coli, pansensitive  - continue macrobid 100 mg BID for 3 days to complete course      Macrocytic Anemia   - hgb 9.7,    - folate 16.5, b12 332, iron studies 31, TIBC 252, TSAT 12, ferritin 104      Subclinical hypothyroidism   - TSH 5.3, free T4 0.95   - evaluate outpatient      CKD stage III  - Cr 1.0, GFR 55, at baseline   - monitored kidney function  - avoided nephrotoxins, hypotension      HLD   - lipid panel: chol 151, hdl 56, trigs 105, LDL 74   - continue Atorvastatin 10mg     Dementia   - discontinued home Donepezil 10mg qhs due to bradycardic effect      Essential thrombocytosis   - plt elevated to 1,010   - hx of thrombocytosis   - continue home med: Hydroxyurea 1000 mg qdaily            Discharge vital signs   BP (!) 137/53 (BP  "Location: Right arm, Patient Position: Sitting)   Pulse (!) 51   Temp 97.8 °F (36.6 °C)   Resp 18   Ht 5' 1" (1.549 m)   Wt 49.9 kg (110 lb 0.2 oz)   SpO2 99%   Breastfeeding No   BMI 20.79 kg/m²      Discharge Medications        Medication List        START taking these medications      nitrofurantoin (macrocrystal-monohydrate) 100 MG capsule  Commonly known as: MACROBID  Take 1 capsule (100 mg total) by mouth 2 (two) times daily. for 3 days            CONTINUE taking these medications      apixaban 2.5 mg Tab  Commonly known as: ELIQUIS  Take 1 tablet (2.5 mg total) by mouth 2 (two) times daily.     atorvastatin 10 MG tablet  Commonly known as: LIPITOR  ONE TABLET BY MOUTH ONCE DAILY DX: HYPERLIPIDEMIA     hydroxyurea 500 mg Cap  Commonly known as: HYDREA  2 CAPSULES (1000MG) BY MOUTH EVERY EVENING DX: THROMBOCYTOSIS     latanoprost 0.005 % ophthalmic solution     zinc oxide 40 % Oint  Commonly known as: BOUDREAUXS BUTT PASTE  Apply 1 Application topically Daily. Skyler's Butt Paste maximum strength            STOP taking these medications      digoxin 125 mcg tablet  Commonly known as: LANOXIN     donepeziL 10 MG tablet  Commonly known as: ARICEPT     FLUAD QUAD 2023-24(65Y UP)(PF) 60 mcg (15 mcg x 4)/0.5 mL Syrg  Generic drug: flu vac 2023 65up-lasIX99E(PF)     FLUZONE HIGH-DOSE 2018-19 (PF) 180 mcg/0.5 mL vaccine  Generic drug: influenza     metoprolol succinate 25 MG 24 hr tablet  Commonly known as: TOPROL-XL               Where to Get Your Medications        Information about where to get these medications is not yet available    Ask your nurse or doctor about these medications  nitrofurantoin (macrocrystal-monohydrate) 100 MG capsule          Discharge Information:   Diet:  Cardiac     Physical Activity:  As tolerated             Instructions:  1. Take all medications as prescribed  2. Keep all follow-up appointments  3. Return to the hospital or call your primary care physicians if any worsening " symptoms such as fever, chest pain, shortness of breath, return of symptoms, or any other concerns.    Follow-Up Appointments:  PCP, cardiology      Tena Hector MD   [employer] [facility] PGY-1          [1]   Patient Active Problem List  Diagnosis    Dyslipidemia    Essential hypertension    Atrial fibrillation, chronic    Mixed Alzheimer's and vascular dementia    CKD (chronic kidney disease), stage III    Dilated cardiomyopathy    Chronic combined systolic and diastolic CHF, NYHA class 3    Generalized weakness    Long term current use of anticoagulant therapy    Debility    Vascular parkinsonism    Aortic atherosclerosis    Thrombocytosis    Essential thrombocytosis    At high risk for falls    Acute cystitis without hematuria    Hypophosphatemia    Leukocytosis    Fall at home, initial encounter    DNR (do not resuscitate)    Severe sepsis    Acute hypoxemic respiratory failure    Macrocytic anemia    UTI (urinary tract infection)    Bradycardia

## 2025-03-14 NOTE — PLAN OF CARE
Problem: Adult Inpatient Plan of Care  Goal: Plan of Care Review  Outcome: Progressing  Goal: Patient-Specific Goal (Individualized)  Outcome: Progressing  Goal: Absence of Hospital-Acquired Illness or Injury  Outcome: Progressing  Goal: Optimal Comfort and Wellbeing  Outcome: Progressing  Goal: Readiness for Transition of Care  Outcome: Progressing     Problem: Sepsis/Septic Shock  Goal: Optimal Coping  Outcome: Progressing  Goal: Absence of Bleeding  Outcome: Progressing  Goal: Blood Glucose Level Within Targeted Range  Outcome: Progressing  Goal: Absence of Infection Signs and Symptoms  Outcome: Progressing  Goal: Optimal Nutrition Intake  Outcome: Progressing     Problem: Fall Injury Risk  Goal: Absence of Fall and Fall-Related Injury  Outcome: Progressing     Problem: Dysrhythmia  Goal: Normalized Cardiac Rhythm  Outcome: Progressing     Problem: Skin Injury Risk Increased  Goal: Skin Health and Integrity  Outcome: Progressing

## 2025-03-17 LAB
BACTERIA BLD CULT: NORMAL
BACTERIA BLD CULT: NORMAL
OHS QRS DURATION: 86 MS
OHS QTC CALCULATION: 376 MS

## 2025-03-24 DIAGNOSIS — Z00.00 ENCOUNTER FOR MEDICARE ANNUAL WELLNESS EXAM: ICD-10-CM
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
## 2025-03-25 PROBLEM — A41.9 SEVERE SEPSIS: Status: RESOLVED | Noted: 2024-08-29 | Resolved: 2025-03-25

## 2025-03-25 PROBLEM — N30.00 ACUTE CYSTITIS WITHOUT HEMATURIA: Status: RESOLVED | Noted: 2019-12-30 | Resolved: 2025-03-25

## 2025-03-25 PROBLEM — R65.20 SEVERE SEPSIS: Status: RESOLVED | Noted: 2024-08-29 | Resolved: 2025-03-25

## 2025-03-25 PROBLEM — J96.01 ACUTE HYPOXEMIC RESPIRATORY FAILURE: Status: RESOLVED | Noted: 2024-08-30 | Resolved: 2025-03-25

## 2025-03-25 PROBLEM — D72.829 LEUKOCYTOSIS: Status: RESOLVED | Noted: 2019-12-30 | Resolved: 2025-03-25

## 2025-03-25 PROBLEM — B96.20 E. COLI UTI (URINARY TRACT INFECTION): Status: ACTIVE | Noted: 2025-03-11
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
## 2025-03-25 PROBLEM — E83.39 HYPOPHOSPHATEMIA: Status: RESOLVED | Noted: 2019-12-30 | Resolved: 2025-03-25

## 2025-03-25 NOTE — PROGRESS NOTES
[facility]   New Visit Progress Note   Recent Hospital Discharge     PRESENTING HISTORY     Chief Complaint/Reason for Admission:  Follow up Hospital Discharge   PCP: Alfie Oconnell MD    History of Present Illness:  Ms. Cat Garcia is a 89 y.o. female who was recently admitted to the hospital.    [facility] Medicine Discharge Summary  Primary Team: [facility] Hospitalist Team A  Attending Physician: Blaire Cunha MD  Resident: Dr. Badillo  Intern: Dr. Hector  Date of Admit: 3/11/2025  Date of Discharge: 3/14/2025  Discharge to: Assisted Living Facility  Condition: Stable  ___________________________________________________________________    Today:  Presents to [facility] for initial hospital follow up.  Recently hospitalized for evaluation of symptomatic bradycardia.  Complicated by dementia and E coli UTI.   Admitted to [facility] Medicine service with Cardiology consultation.  EKG and tele reviewed by Cardiology team-> sinus bradycardia without blocks or pauses.   Known hx A fib, on digoxin and metoprolol as outpatient-> these medications were discontinued.   Patient also on donepezil -> discontinued.   TTE: concentric LVH, EF 55-60%, inadequate study.   Macrobid prescribed for E coli UTI.  Patient discharged back to [facility].    Patient accompanied today by her son who is a physician.   In wheelchair presently- uses for long distances.  Ambulates with rolling walker for short distance.  Able to maintain upright position in wheelchair.   No falls since hospital discharge.  Incontinent of bowel and bladder- wears adult diaper.   Requires full assistance with ADL's including personal hygiene and dressing.  No wounds.   Able to feed self once meal is set up.   Tolerating meals but having trouble maintaining weight- supplementing with protein shakes.  No dysphagia.   Sleeps well.  Pleasant and cooperative with no reports of behavioral disturbances or wandering.  She is generally  "unable to identify her son by name but will occasionally do so- she called him by name this AM.   Living facility monitors her vital signs and manages/dispenses her medication.  She has PT several times a week at facility.   FAST score 7A.      Review of Systems- per son   General ROS: negative for chills, fever or weight loss  Psychological ROS: negative for hallucination, depression or suicidal ideation  Ophthalmic ROS: negative for blurry vision, photophobia or eye pain  ENT ROS: negative for epistaxis, sore throat or rhinorrhea  Respiratory ROS: no cough, shortness of breath, or wheezing  Cardiovascular ROS: no chest pain or dyspnea on exertion  Gastrointestinal ROS: no abdominal pain, change in bowel habits, or black/ bloody stools  Genito-Urinary ROS: no dysuria, trouble voiding, or hematuria  + bowel and bladder incontinent   Musculoskeletal ROS: + wheelchair and rolling walker dependent   Neurological ROS: no syncope or seizures; no ataxia  Dermatological ROS: negative for pruritis, rash and jaundice      PAST HISTORY:     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       Past Surgical History:   Procedure Laterality Date    BREAST SURGERY  2011    right       Family History   Problem Relation Name Age of Onset    Heart disease Father      Melanoma Neg Hx           MEDICATIONS & ALLERGIES:     Medications Ordered Prior to Encounter[1]     Review of patient's allergies indicates:  No Known Allergies    OBJECTIVE:     Vital Signs:  BP (!) 104/56 (BP Location: Left arm, Patient Position: Sitting)   Pulse 65   Ht 5' 1" (1.549 m)   Wt 51.6 kg (113 lb 12.1 oz)   " "BMI 21.49 kg/m²   Wt Readings from Last 3 Encounters:   03/26/25 0853 51.6 kg (113 lb 12.1 oz)   03/13/25 1330 49.9 kg (110 lb 0.2 oz)   03/12/25 1546 49.9 kg (110 lb)   03/12/25 1245 50.2 kg (110 lb 10.7 oz)   03/11/25 1440 45.4 kg (100 lb)   09/24/24 1158 49.2 kg (108 lb 7.5 oz)     Body mass index is 21.49 kg/m².        Physical Exam:  BP (!) 104/56 (BP Location: Left arm, Patient Position: Sitting)   Pulse 65   Ht 5' 1" (1.549 m)   Wt 51.6 kg (113 lb 12.1 oz)   BMI 21.49 kg/m²   General appearance: alert, cooperative, no distress  Constitutional:Oriented to self only   + appears well-developed and well-nourished.   HEENT: Normocephalic, atraumatic, neck symmetrical, no nasal discharge   Eyes: conjunctivae/corneas clear, PERRL, EOM's intact  Lungs: clear to auscultation bilaterally, no dullness to percussion bilaterally  Heart: regular rate and rhythm without rub; no displacement of the PMI   Abdomen: soft, non-tender; bowel sounds normoactive; no organomegaly  Extremities: extremities symmetric; no clubbing, cyanosis, or edema  Integument: + scattered bruises   Neurologic: Alert and oriented X 1, Limited verbal interaction, pleasantly confused, cooperative     Laboratory  Lab Results   Component Value Date    WBC 9.50 03/14/2025    HGB 9.6 (L) 03/14/2025    HCT 30.2 (L) 03/14/2025     (H) 03/14/2025     (H) 03/14/2025     BMP  Lab Results   Component Value Date     03/14/2025    K 4.3 03/14/2025     (H) 03/14/2025    CO2 23 03/14/2025    BUN 13 03/14/2025    CREATININE 0.9 03/14/2025    CALCIUM 8.8 03/14/2025    ANIONGAP 5 (L) 03/14/2025    EGFRNORACEVR >60 03/14/2025     Lab Results   Component Value Date    ALT 9 (L) 03/14/2025    AST 22 03/14/2025    ALKPHOS 51 03/14/2025    BILITOT 0.3 03/14/2025     Lab Results   Component Value Date    INR 2.4 02/12/2020    INR 1.7 (H) 01/29/2020    INR 2.2 01/29/2020     No results found for: "HGBA1C"    Diagnostic Results:    TTE 3/12/25:    " Left Ventricle: The left ventricle is normal in size. There is concentric hypertrophy. Unable to assess wall motion. There is normal systolic function with a visually estimated ejection fraction of 55 - 60%.    Right Ventricle: Right ventricle was not well visualized due to poor acoustic window.    Left Atrium: Left atrium was not well visualized.    Aortic Valve: There is mild aortic valve sclerosis.    Overall the study is inadequate.  Patient refused complete study      ASSESSMENT & PLAN:     Bradycardia  - recent hospitalization as above  - home dose digoxin and metoprolol discontinued with good response  - previously followed by [facility], Dr Kay but not seen since 2019 due to progression of dementia - family wanting to streamline clinic visits   - no indication to return to cardiology at this time  - remain OFF metoprolol and digoxin     Atrial fibrillation, chronic  Long term current use of anticoagulant therapy  - continue apixaban  - fall precautions     E. coli UTI (urinary tract infection)  - completed Macrobid as prescribed  - no urinary complaints at this time     Essential thrombocytosis  - [facility]- Dr Gannon   - continue Hydroxyurea   - repeat labs today    -     CBC Auto Differential; Future; Expected date: 03/27/2025    Severe dementia without behavioral disturbance, psychotic disturbance, mood disturbance, or anxiety, unspecified dementia type  - FAST 7A  - has full time care at [facility]   - disease is progressing and family favors a palliative approach  - exploring options to have care at facility and minimize travel to clinic visits   - will refer to Palliative Care team for HOME visit at facility- scheduled for 4/3/25 @ 10:15   - may also consider [facility] referral for visiting nurse practitioner   -     Ambulatory referral/consult to CLINIC Palliative Care; Future; Expected date: 04/02/2025    Patient will be released from hospital  follow up clinic.  Follow up as below.     Instructions for the patient:      Scheduled Follow-up :  Future Appointments   Date Time Provider Department Center   3/26/2025 11:40 AM APPOINTMENT [facility]   4/3/2025  8:00 AM Danica Jacinto MD [facility] Medicine [facility]       Post Visit Medication List:     Medication List            Accurate as of March 26, 2025 10:25 AM. If you have any questions, ask your nurse or doctor.                CONTINUE taking these medications      apixaban 2.5 mg Tab  Commonly known as: ELIQUIS  Take 1 tablet (2.5 mg total) by mouth 2 (two) times daily.     atorvastatin 10 MG tablet  Commonly known as: LIPITOR  ONE TABLET BY MOUTH ONCE DAILY DX: HYPERLIPIDEMIA     hydroxyurea 500 mg Cap  Commonly known as: HYDREA  2 CAPSULES (1000MG) BY MOUTH EVERY EVENING DX: THROMBOCYTOSIS     latanoprost 0.005 % ophthalmic solution     zinc oxide 40 % Oint  Commonly known as: BOUDREAUXS BUTT PASTE  Apply 1 Application topically Daily. Skyler's Butt Paste maximum strength              Signing Physician:  iKrsten Acevedo MD         [1]   Current Outpatient Medications on File Prior to Visit   Medication Sig Dispense Refill    apixaban (ELIQUIS) 2.5 mg Tab Take 1 tablet (2.5 mg total) by mouth 2 (two) times daily. 180 tablet 3    atorvastatin (LIPITOR) 10 MG tablet ONE TABLET BY MOUTH ONCE DAILY DX: HYPERLIPIDEMIA 90 tablet 11    hydroxyurea (HYDREA) 500 mg Cap 2 CAPSULES (1000MG) BY MOUTH EVERY EVENING DX: THROMBOCYTOSIS 60 capsule 11    latanoprost 0.005 % ophthalmic solution Place 1 drop into both eyes every evening.      zinc oxide (BOUDREAUXS BUTT PASTE) 40 % Oint Apply 1 Application topically Daily. Skyler's Butt Paste maximum strength 397 g 1     No current facility-administered medications on file prior to visit.

## 2025-03-26 ENCOUNTER — TELEPHONE (OUTPATIENT)
Dept: HEMATOLOGY/ONCOLOGY | Facility: CLINIC | Age: 89
End: 2025-03-26
Payer: MEDICARE

## 2025-03-26 ENCOUNTER — LAB VISIT (OUTPATIENT)
Dept: LAB | Facility: HOSPITAL | Age: 89
End: 2025-03-26
Attending: INTERNAL MEDICINE
Payer: MEDICARE

## 2025-03-26 ENCOUNTER — OFFICE VISIT (OUTPATIENT)
Dept: PRIMARY CARE CLINIC | Facility: CLINIC | Age: 89
End: 2025-03-26
Payer: MEDICARE

## 2025-03-26 ENCOUNTER — PATIENT MESSAGE (OUTPATIENT)
Dept: PRIMARY CARE CLINIC | Facility: CLINIC | Age: 89
End: 2025-03-26

## 2025-03-26 VITALS
HEIGHT: 61 IN | BODY MASS INDEX: 21.48 KG/M2 | DIASTOLIC BLOOD PRESSURE: 56 MMHG | SYSTOLIC BLOOD PRESSURE: 104 MMHG | WEIGHT: 113.75 LBS | HEART RATE: 65 BPM

## 2025-03-26 DIAGNOSIS — N39.0 E. COLI UTI (URINARY TRACT INFECTION): ICD-10-CM

## 2025-03-26 DIAGNOSIS — D47.3 ESSENTIAL THROMBOCYTOSIS: ICD-10-CM

## 2025-03-26 DIAGNOSIS — I48.20 ATRIAL FIBRILLATION, CHRONIC: Chronic | ICD-10-CM

## 2025-03-26 DIAGNOSIS — D47.3 ESSENTIAL THROMBOCYTOSIS: Primary | ICD-10-CM

## 2025-03-26 DIAGNOSIS — B96.20 E. COLI UTI (URINARY TRACT INFECTION): ICD-10-CM

## 2025-03-26 DIAGNOSIS — Z79.01 LONG TERM CURRENT USE OF ANTICOAGULANT THERAPY: ICD-10-CM

## 2025-03-26 DIAGNOSIS — R00.1 BRADYCARDIA: Primary | ICD-10-CM

## 2025-03-26 DIAGNOSIS — R79.89 ABNORMAL MORPHOLOGY OF PLATELETS: ICD-10-CM

## 2025-03-26 DIAGNOSIS — F03.C0 SEVERE DEMENTIA WITHOUT BEHAVIORAL DISTURBANCE, PSYCHOTIC DISTURBANCE, MOOD DISTURBANCE, OR ANXIETY, UNSPECIFIED DEMENTIA TYPE: ICD-10-CM

## 2025-03-26 LAB
ABSOLUTE EOSINOPHIL (OHS): 0.17 K/UL
ABSOLUTE MONOCYTE (OHS): 0.58 K/UL (ref 0.3–1)
ABSOLUTE NEUTROPHIL COUNT (OHS): 5.6 K/UL (ref 1.8–7.7)
BASOPHILS # BLD AUTO: 0.08 K/UL
BASOPHILS NFR BLD AUTO: 0.9 %
ERYTHROCYTE [DISTWIDTH] IN BLOOD BY AUTOMATED COUNT: 16.7 % (ref 11.5–14.5)
HCT VFR BLD AUTO: 34.5 % (ref 37–48.5)
HGB BLD-MCNC: 10.7 GM/DL (ref 12–16)
IMM GRANULOCYTES # BLD AUTO: 0.1 K/UL (ref 0–0.04)
IMM GRANULOCYTES NFR BLD AUTO: 1.2 % (ref 0–0.5)
LYMPHOCYTES # BLD AUTO: 1.93 K/UL (ref 1–4.8)
MCH RBC QN AUTO: 34 PG (ref 27–50)
MCHC RBC AUTO-ENTMCNC: 31 G/DL (ref 32–36)
MCV RBC AUTO: 110 FL (ref 82–98)
NUCLEATED RBC (/100WBC) (OHS): 0 /100 WBC
PLATELET # BLD AUTO: 1209 K/UL (ref 150–450)
PLATELET BLD QL SMEAR: ABNORMAL
PMV BLD AUTO: 10.1 FL (ref 9.2–12.9)
RBC # BLD AUTO: 3.15 M/UL (ref 4–5.4)
RELATIVE EOSINOPHIL (OHS): 2 %
RELATIVE LYMPHOCYTE (OHS): 22.8 % (ref 18–48)
RELATIVE MONOCYTE (OHS): 6.9 % (ref 4–15)
RELATIVE NEUTROPHIL (OHS): 66.2 % (ref 38–73)
SCHISTOCYTES BLD QL SMEAR: ABNORMAL
WBC # BLD AUTO: 8.46 K/UL (ref 3.9–12.7)

## 2025-03-26 PROCEDURE — 85025 COMPLETE CBC W/AUTO DIFF WBC: CPT

## 2025-03-26 PROCEDURE — 99999 PR PBB SHADOW E&M-EST. PATIENT-LVL III: CPT | Mod: PBBFAC,,, | Performed by: INTERNAL MEDICINE

## 2025-03-26 PROCEDURE — 36415 COLL VENOUS BLD VENIPUNCTURE: CPT

## 2025-03-26 RX ORDER — HYDROXYUREA 500 MG/1
1000 CAPSULE ORAL 2 TIMES DAILY
Qty: 120 CAPSULE | Refills: 11 | Status: SHIPPED | OUTPATIENT
Start: 2025-03-26 | End: 2026-03-26

## 2025-03-26 RX ORDER — HYDROXYUREA 500 MG/1
500 CAPSULE ORAL 2 TIMES DAILY
Qty: 60 CAPSULE | Refills: 11 | Status: CANCELLED | OUTPATIENT
Start: 2025-03-26

## 2025-03-26 NOTE — TELEPHONE ENCOUNTER
Spoke to pt's son Abdon. Informed pt's son that per Dr. Gannon, she would like for Ms. Garcia to take 2 capsules of the hydrea in the morning and nighttime. Abdon stated a new prescription with the new instructions would need to be sent to the living facility. Advised pt's son a new prescription will be sent over today. Informed pt's son that we would also like to get a set of labs on her in a month. Abdon stated they usually have them done through the facility and if not they can go to a lab location. Informed pt's son we will work on getting labs set up for her.

## 2025-04-03 ENCOUNTER — OFFICE VISIT (OUTPATIENT)
Dept: PALLIATIVE MEDICINE | Facility: CLINIC | Age: 89
End: 2025-04-03
Payer: MEDICARE

## 2025-04-03 VITALS
SYSTOLIC BLOOD PRESSURE: 140 MMHG | TEMPERATURE: 99 F | OXYGEN SATURATION: 100 % | DIASTOLIC BLOOD PRESSURE: 80 MMHG | HEART RATE: 64 BPM | RESPIRATION RATE: 18 BRPM

## 2025-04-03 DIAGNOSIS — F03.C0 SEVERE DEMENTIA WITHOUT BEHAVIORAL DISTURBANCE, PSYCHOTIC DISTURBANCE, MOOD DISTURBANCE, OR ANXIETY, UNSPECIFIED DEMENTIA TYPE: ICD-10-CM

## 2025-04-03 NOTE — PROGRESS NOTES
Palliative Medicine Home Visit Note - [facility]    Consult Requested By: Dr. Kirsten Acevedo    Primary Care Physician:   Alfie Oconnell MD    Reason for Consult: Advance care planning and symptom management in the setting of advanced dementia    ASSESSMENT/PLAN:     Ms. 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 Patient seen in their home at [facility] accompanied by son, Dr. Ruiz, anesthesiology provider at [facility].    She was recently [place] [facility] and was referred to my office by priority clinic provider Dr. Acevedo after followup there. She is now no longer on digoxin or metoprolol and is doing well. She remains on Eliquis BID, atorvastatin and hydroxyurea as she also has essential thrombocytosis, after discussion with Dr. Ruiz, benefit currently outweighs risk for him of anticoagulation.    Thankfully low symptom burden today. Due to her dementia and mobility issues, getting back and forth to appointments has become increasingly difficult. Dr. Ruiz is interested in having palliative follow her outpatient so that her access to measures like basic labwork for UTI workups, PT/OT orders for maintenance therapy are easier to access. We discussed that with her recent opportunistic infection and degree of dementia, she is eligible for hospice benefits. For now, Dr. Ruiz would like to hold off on this and continue with restorative/curative options including returning to the hospital for infection treatment if needed, for example.     Ms. Shelton seems quite happy and content on my visit today. I will continue following with her and Dr. Ruiz knows he can reach me with any concerns or changes in her function.     Plan/Recommendations:    Advance Care Planning   Advance Directives:   [place]: Yes  (Reviewed upon returning to clinic, will update electronically wtih son.)      Decision Making:  Family answered questions  Goals of Care: The family endorses that what is most important right now is to focus on avoiding the hospital, symptom/pain control, quality of life, even if it means sacrificing a little time, and improvement in condition but with limits to invasive therapies.    Accordingly, we have decided that the best plan to meet the patient's goals includes continuing with treatment, including her weekly PT/OT and symptom management with outpatient palliative follow-up.     After returning to clinic, reviewed most recent [place] in system from 2021 states DNR/Comfort measures only, based on our discussion today she may be more appropriate as DNR/Selective treatment, I have offered to update this and will do so if Dr. Ruiz wishes.     Understanding of Disease and Illness Trajectory:  Son   has  good understanding of her illness, they can benefit from continued education on what to expect in the future.     Follow up: With me in 3 months via home visit.    Plan discussed with patient present and with her son    SUBJECTIVE:     History of Present Illness / Interval History:  Discussed the role of palliative care, in that we can be helpful in helping patients feel better, aid in communication, and planning for future difficult decisions.     Review of Symptoms      Symptom Assessment (ESAS 0-10 Scale)  Unable to complete assessment due to Dementia     CAM / Delirium:  Negative  Constipation:  Negative  Diarrhea:  Negative      Bowel Management Plan (BMP):  Yes      Pain Assessment in Advanced Demential Scale (PAINAD)   Breathing - Independent of vocalization:  0  Negative vocalization:  0  Facial expression:  0  Body language:  0  Consolability:  0  Total:  0    Functional Assessment Scale (FAST):  6e    Living Arrangements:  Lives in nursing home ([facility])    Psychosocial/Cultural:   See  Palliative Psychosocial Note: No  Social Issues Identified: Coping deficit pt/family  Bereavement Risk: No  Caregiver Needs Discussed. Caregiver Distress: No: N/A  Cultural: no needs identified  **Primary  to Follow**  Palliative Care  Consult: No     Time-Based Charting:  Yes  Chart Review: 30 minutes  Face to Face: 30 minutes  Advance Care Plannin minutes    Total Time Spent: 80 minutes    Active Diagnoses & Disease History:  Advanced Alzheimer's and vascular dementia    Previous experience or exposure to a serious illness: Yes    Medications:  Current Medications[1]    External  database queried on 4/3/25 by Danica Jacinto.   The results reviewed and considered with the clinical data in the decision whether or not to prescribe a controlled substance.    Past Medical History:   Diagnosis Date    Atrial fibrillation     with rapid ventricular rate    Breast cancer     XRT    Chronic bronchitis     Chronic combined systolic and diastolic CHF, NYHA class 3 2013    CKD (chronic kidney disease), stage III     Dementia     Dyslipidemia 12/15/2013    Essential hypertension 12/15/2013    Squamous cell carcinoma     Vascular parkinsonism 2016     Past Surgical History:   Procedure Laterality Date    BREAST SURGERY      right     Family History   Problem Relation Name Age of Onset    Heart disease Father      Melanoma Neg Hx       Review of patient's allergies indicates:  No Known Allergies    OBJECTIVE:     Vitals: Temp: 98.8 °F (37.1 °C) (25 1110)  Pulse: 64 (25 1110)  Resp: 18 (25 1110)  BP: (!) 140/80 (25 1110)  SpO2: 100 % (25 1110)  Physical Exam  Constitutional:       General: She is not in acute distress.     Comments: Thin elderly woman seen in nursing facility, slow and unsteady gait with walker, requiring assistance to walk down the hallway to meeting room.   HENT:      Head: Normocephalic and atraumatic.      Nose: No congestion.       Mouth/Throat:      Mouth: Mucous membranes are moist.      Pharynx: No oropharyngeal exudate.   Eyes:      General: No scleral icterus.     Extraocular Movements: Extraocular movements intact.   Cardiovascular:      Rate and Rhythm: Normal rate and regular rhythm.      Heart sounds: No murmur heard.  Pulmonary:      Effort: Pulmonary effort is normal. No respiratory distress.      Breath sounds: Normal breath sounds.   Abdominal:      General: Abdomen is flat. There is no distension.   Musculoskeletal:      Right lower leg: No edema.      Left lower leg: No edema.   Skin:     General: Skin is dry.      Coloration: Skin is not jaundiced.      Findings: No bruising.   Neurological:      Mental Status: She is alert.      Comments: Pleasant, able to answer most questions about symptoms, not able to participate in discussions about more complex medical plans like goals of care.   Psychiatric:         Mood and Affect: Mood normal.     Labs:  CBC:   WBC   Date Value Ref Range Status   03/26/2025 8.46 3.90 - 12.70 K/uL Final     Hemoglobin   Date Value Ref Range Status   03/14/2025 9.6 (L) 12.0 - 16.0 g/dL Final     HGB   Date Value Ref Range Status   03/26/2025 10.7 (L) 12.0 - 16.0 gm/dL Final     POC Hematocrit   Date Value Ref Range Status   01/10/2020 36 36 - 54 %PCV Final     Hematocrit   Date Value Ref Range Status   03/14/2025 30.2 (L) 37.0 - 48.5 % Final     HCT   Date Value Ref Range Status   03/26/2025 34.5 (L) 37.0 - 48.5 % Final     MCV   Date Value Ref Range Status   03/26/2025 110 (H) 82 - 98 fL Final   03/14/2025 108 (H) 82 - 98 fL Final     Platelet Count   Date Value Ref Range Status   03/26/2025 1,209 (HH) 150 - 450 K/uL Final     Platelets   Date Value Ref Range Status   03/14/2025 966 (H) 150 - 450 K/uL Final     Lab Results   Component Value Date    CREATININE 0.9 03/14/2025    BUN 13 03/14/2025     03/14/2025    K 4.3 03/14/2025     (H) 03/14/2025    CO2 23 03/14/2025      LFT:   Lab  Results   Component Value Date    AST 22 03/14/2025    ALKPHOS 51 03/14/2025    BILITOT 0.3 03/14/2025     Albumin:   Albumin   Date Value Ref Range Status   03/14/2025 3.2 (L) 3.5 - 5.2 g/dL Final     Protein:   Total Protein   Date Value Ref Range Status   03/14/2025 5.7 (L) 6.0 - 8.4 g/dL Final     Radiology:  Echo    Left Ventricle: The left ventricle is normal in size. There is   concentric hypertrophy. Unable to assess wall motion. There is normal   systolic function with a visually estimated ejection fraction of 55 - 60%.    Right Ventricle: Right ventricle was not well visualized due to poor   acoustic window.    Left Atrium: Left atrium was not well visualized.    Aortic Valve: There is mild aortic valve sclerosis.    Overall the study is inadequate.  Patient refused complete study     Yuli Jacinto MD  Hospice and Palliative Medicine  [facility]       [1]   Current Outpatient Medications:     apixaban (ELIQUIS) 2.5 mg Tab, Take 1 tablet (2.5 mg total) by mouth 2 (two) times daily., Disp: 180 tablet, Rfl: 3    atorvastatin (LIPITOR) 10 MG tablet, ONE TABLET BY MOUTH ONCE DAILY DX: HYPERLIPIDEMIA, Disp: 90 tablet, Rfl: 11    hydroxyurea (HYDREA) 500 mg Cap, 2 CAPSULES (1000MG) BY MOUTH EVERY EVENING DX: THROMBOCYTOSIS, Disp: 60 capsule, Rfl: 11    hydroxyurea (HYDREA) 500 mg Cap, Take 2 capsules (1,000 mg total) by mouth 2 (two) times daily EVERY MORNING and EVENING.., Disp: 120 capsule, Rfl: 11    latanoprost 0.005 % ophthalmic solution, Place 1 drop into both eyes every evening., Disp: , Rfl:     zinc oxide (BOUDREAUXS BUTT PASTE) 40 % Oint, Apply 1 Application topically Daily. Skyler's Butt Paste maximum strength, Disp: 397 g, Rfl: 1

## 2025-04-14 RX ORDER — LATANOPROST 50 UG/ML
1 SOLUTION/ DROPS OPHTHALMIC NIGHTLY
Qty: 2.5 ML | Refills: 5 | Status: SHIPPED | OUTPATIENT
Start: 2025-04-14

## 2025-04-14 NOTE — TELEPHONE ENCOUNTER
Refill Routing Note   Medication(s) are not appropriate for processing by [facility] for the following reason(s):        Outside of protocol    [facility] action(s):  Route             Appointments  past 12m or future 3m with PCP    Date Provider   Last Visit   9/24/2024 Alfie Oconnell MD   Next Visit   Visit date not found Alfie Oconnell MD   ED visits in past 90 days: 0        Note composed:8:55 AM 04/14/2025

## 2025-04-25 ENCOUNTER — TELEPHONE (OUTPATIENT)
Dept: INTERNAL MEDICINE | Facility: CLINIC | Age: 89
End: 2025-04-25
Payer: MEDICARE

## 2025-05-26 NOTE — TELEPHONE ENCOUNTER
----- Message from Rayshawn Mora MA sent at 11/2/2023  2:06 PM CDT -----  Contact: [facility]/[phone]  Did you get those pictures? They sent it to your email [email or web address]  ----- Message -----  From: Radha Weston  Sent: 11/2/2023  12:09 PM CDT  To: Anish Judge Staff    [email or web address]     Patient is calling for Medical Advice regarding:wound to patient's forearm       Would like response via [place] [employer]:  Would like a return call     Comments: Essence is calling to  follow up on a wound to pt's forearm she stated that she sent pictures to the doctor and would like to know if he was able to view them . Please advise          
Called to speak with the nurse Essence. No answer. Unable to leave a voice message due to box being full   
Hi, please call back nurse Essence -- please ask if there is any drainage, any fluid coming out? Does it hurt. Id does not look infected to me on the pictures.    Patient can come in tomorrow to clinic to see someone else -- Dr Janes Paniagua, please offer an appt if the nurse feels it would be appropriate.    Thank you, Alfie Oconnell    
no

## 2025-07-03 ENCOUNTER — OFFICE VISIT (OUTPATIENT)
Dept: PALLIATIVE MEDICINE | Facility: CLINIC | Age: 89
End: 2025-07-03
Payer: MEDICARE

## 2025-07-03 VITALS — DIASTOLIC BLOOD PRESSURE: 80 MMHG | SYSTOLIC BLOOD PRESSURE: 116 MMHG

## 2025-07-03 DIAGNOSIS — F03.C0 SEVERE DEMENTIA WITHOUT BEHAVIORAL DISTURBANCE, PSYCHOTIC DISTURBANCE, MOOD DISTURBANCE, OR ANXIETY, UNSPECIFIED DEMENTIA TYPE: Primary | ICD-10-CM

## 2025-07-03 NOTE — PROGRESS NOTES
Palliative Medicine Home Visit Note - [facility]    Consult Requested By: Dr. Alfie Oconnell  Primary Care Physician:   Alfie Oconnell MD  Reason for Consult: Advance care planning and symptom management in the setting of advanced dementia    ASSESSMENT/PLAN:     Ms. 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 Patient seen in their home at [facility]. Son Soco Joseph unable to be present at this visit, obtained additional history from nursing and aide staff.    She continues to work with PT/OT at her facility. Nursing staff notes that she has a difficult time waking up most mornings and if left to her own choice, would stay in bed. They get her up every day to sit in the dining room or activity room, which is where I met her today. They do not have any concerns about her current care plan, she is doing well from their standpoint. She is unable to participate in review of symptoms due to advanced dementia and we were unable to have family present at visit today.    Plan/Recommendations:    Advance Care Planning   Advance Directives:   Living Will: No    [place]POST: Yes    Do Not Resuscitate Status: Yes    Agent's Name:  Abdon Ruiz Dr   Agent's Contact Number:  [phone]    Decision Making:  Family answered questions  Goals of Care: The family endorses that what is most important right now is to focus on avoiding the hospital, symptom/pain control, quality of life, even if it means sacrificing a little time, and improvement in condition but with limits to invasive therapies.    After last visit, Dr. Ruiz completed [place] post document with me reflecting wishes of DNR/selective treatment/no artificial nutrition by tube.  This is active in her [employer] account.    I did not conduct further discussions regarding goals of care  today due to the patient being unaccompanied at appointment, however prior to appointment I was able to reach Dr. Ruiz and stated that he had no active symptom concerns and his wishes remain the same as our last visit.    Understanding of Disease and Illness Trajectory: Son  has  good understanding of her illness, they can benefit from continued education on what to expect in the future.     Follow up: With me in 4 months via home visit vs virtual visit, depending on which would allow attendance by family member as well.    Plan discussed with son prior to appointment today, nursing staff. Unable to discuss with patient due to advanced dementia.    Yuli Jacinto MD  Outpatient Palliative Medicine  [facility]    SUBJECTIVE:     History of Present Illness / Interval History:  No major changes in healthcare or behavior since last visit. Reviewed her activity level and meds with nursing staff, they were able to tell me her regular daily routine, she usually does not have any agitated behaviors, is eating well, no issues with aspiration concerns. They state she likes to sleep most of the time, and if they didn't make her get out of bed every day, she would stay in bed.  Today when I saw her, she was sitting at the dining room table with her arms crossed in front of her on the table top in her head resting on her arms.  She was able to give me intermittent yes no answers to whether or not she was in pain, but other than that she did not speak with me. She is notably not on any sedating medications.     Review of Symptoms      Symptom Assessment (ESAS 0-10 Scale)  Pain:  0  Dyspnea:  0  Anxiety:  0  Nausea:  0  Depression:  0  Anorexia:  0  Fatigue:  0  Insomnia:  0  Restlessness:  0  Agitation:  0  Unable to complete assessment due to Dementia     CAM / Delirium:  Negative  Constipation:  Negative  Diarrhea:  Negative      Bowel Management Plan (BMP):  Yes      Pain Assessment in Advanced Demential Scale  (PAINAD)   Breathing - Independent of vocalization:  0  Negative vocalization:  0  Facial expression:  0  Body language:  0  Consolability:  0  Total:  0    Functional Assessment Scale (FAST):  7a    Living Arrangements:  Lives in nursing home ([facility])    Psychosocial/Cultural:   See Palliative Psychosocial Note: No  Social Issues Identified: Coping deficit pt/family  Bereavement Risk: No  Caregiver Needs Discussed. Caregiver Distress: No: N/A  Cultural: no needs identified  **Primary  to Follow**  Palliative Care  Consult: No     Time-Based Charting:  Yes  Chart Review: 30 minutes  Face to Face: 20 minutes    Total Time Spent: 50 minutes    Active Diagnoses & Disease History:  Advanced Alzheimer's and vascular dementia    Previous experience or exposure to a serious illness: Yes    Medications:  Current Medications[1]    External  database queried on 7/3/25 by Danica Jacinto.   The results reviewed and considered with the clinical data in the decision whether or not to prescribe a controlled substance.    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     Past Surgical History:   Procedure Laterality Date    BREAST SURGERY  2011    right     Family History   Problem Relation Name Age of Onset    Heart disease Father      Melanoma Neg Hx       Review of patient's allergies indicates:  No Known Allergies    OBJECTIVE:     Vitals: BP: 116/80 (07/03/25 1325) - was unable to get pulse ox or pulse on finger reader, fingers are cold. She was not able to participate in oral temperature.   Physical Exam  Constitutional:        General: She is not in acute distress.     Comments: Clean, well-dressed, thin elderly woman, sitting at dining room table, able to be aroused, but somewhat irritable. Does not participate in conversation past 1-2 yes/no questions.   HENT:      Head: Normocephalic and atraumatic.      Nose: No congestion.      Mouth/Throat:      Mouth: Mucous membranes are moist.      Pharynx: No oropharyngeal exudate.   Eyes:      General: No scleral icterus.     Extraocular Movements: Extraocular movements intact.   Cardiovascular:      Comments: Fingertips are cold, but remainder of extremities are warm, non-edematous.  Pulmonary:      Effort: Pulmonary effort is normal. No respiratory distress.   Abdominal:      General: Abdomen is flat. There is no distension.   Skin:     General: Skin is dry.      Coloration: Skin is not jaundiced.      Findings: No bruising.   Neurological:      Comments: Unable to participate in any of appointment today.     Labs:  CBC:   WBC   Date Value Ref Range Status   03/26/2025 8.46 3.90 - 12.70 K/uL Final     Hemoglobin   Date Value Ref Range Status   03/14/2025 9.6 (L) 12.0 - 16.0 g/dL Final     HGB   Date Value Ref Range Status   03/26/2025 10.7 (L) 12.0 - 16.0 gm/dL Final     POC Hematocrit   Date Value Ref Range Status   01/10/2020 36 36 - 54 %PCV Final     Hematocrit   Date Value Ref Range Status   03/14/2025 30.2 (L) 37.0 - 48.5 % Final     HCT   Date Value Ref Range Status   03/26/2025 34.5 (L) 37.0 - 48.5 % Final     MCV   Date Value Ref Range Status   03/26/2025 110 (H) 82 - 98 fL Final   03/14/2025 108 (H) 82 - 98 fL Final     Platelet Count   Date Value Ref Range Status   03/26/2025 1,209 (HH) 150 - 450 K/uL Final     Platelets   Date Value Ref Range Status   03/14/2025 966 (H) 150 - 450 K/uL Final     Lab Results   Component Value Date    CREATININE 0.9 03/14/2025    BUN 13 03/14/2025     03/14/2025    K 4.3 03/14/2025     (H) 03/14/2025    CO2 23 03/14/2025      LFT:   Lab  Results   Component Value Date    AST 22 03/14/2025    ALKPHOS 51 03/14/2025    BILITOT 0.3 03/14/2025     Albumin:   Albumin   Date Value Ref Range Status   03/14/2025 3.2 (L) 3.5 - 5.2 g/dL Final     Protein:   Total Protein   Date Value Ref Range Status   03/14/2025 5.7 (L) 6.0 - 8.4 g/dL Final     Radiology:  Echo    Left Ventricle: The left ventricle is normal in size. There is   concentric hypertrophy. Unable to assess wall motion. There is normal   systolic function with a visually estimated ejection fraction of 55 - 60%.    Right Ventricle: Right ventricle was not well visualized due to poor   acoustic window.    Left Atrium: Left atrium was not well visualized.    Aortic Valve: There is mild aortic valve sclerosis.    Overall the study is inadequate.  Patient refused complete study            [1]   Current Outpatient Medications:     apixaban (ELIQUIS) 2.5 mg Tab, Take 1 tablet (2.5 mg total) by mouth 2 (two) times daily., Disp: 180 tablet, Rfl: 3    atorvastatin (LIPITOR) 10 MG tablet, ONE TABLET BY MOUTH ONCE DAILY DX: HYPERLIPIDEMIA, Disp: 90 tablet, Rfl: 11    hydroxyurea (HYDREA) 500 mg Cap, 2 CAPSULES (1000MG) BY MOUTH EVERY EVENING DX: THROMBOCYTOSIS, Disp: 60 capsule, Rfl: 11    hydroxyurea (HYDREA) 500 mg Cap, Take 2 capsules (1,000 mg total) by mouth 2 (two) times daily EVERY MORNING and EVENING.., Disp: 120 capsule, Rfl: 11    latanoprost 0.005 % ophthalmic solution, INSTILL 1 DROP IN BOTH EYES EVERY EVENING, Disp: 2.5 mL, Rfl: 5    zinc oxide (BOUDREAUXS BUTT PASTE) 40 % Oint, Apply 1 Application topically Daily. Skyler's Butt Paste maximum strength, Disp: 397 g, Rfl: 1

## 2025-07-05 ENCOUNTER — HOSPITAL ENCOUNTER (INPATIENT)
Facility: HOSPITAL | Age: 89
LOS: 3 days | Discharge: HOME | DRG: 871 | End: 2025-07-08
Attending: EMERGENCY MEDICINE | Admitting: INTERNAL MEDICINE
Payer: MEDICARE

## 2025-07-05 DIAGNOSIS — A41.9 SEPSIS, DUE TO UNSPECIFIED ORGANISM, UNSPECIFIED WHETHER ACUTE ORGAN DYSFUNCTION PRESENT: ICD-10-CM

## 2025-07-05 DIAGNOSIS — I48.91 ATRIAL FIBRILLATION WITH RVR: Primary | ICD-10-CM

## 2025-07-05 DIAGNOSIS — A41.9 SEPSIS: ICD-10-CM

## 2025-07-05 DIAGNOSIS — N18.30 STAGE 3 CHRONIC KIDNEY DISEASE, UNSPECIFIED WHETHER STAGE 3A OR 3B CKD: ICD-10-CM

## 2025-07-05 DIAGNOSIS — I48.91 ATRIAL FIBRILLATION: ICD-10-CM

## 2025-07-05 DIAGNOSIS — R65.20 SEPSIS WITH ENCEPHALOPATHY WITHOUT SEPTIC SHOCK, DUE TO UNSPECIFIED ORGANISM: ICD-10-CM

## 2025-07-05 DIAGNOSIS — A41.9 SEPSIS WITH ENCEPHALOPATHY WITHOUT SEPTIC SHOCK, DUE TO UNSPECIFIED ORGANISM: ICD-10-CM

## 2025-07-05 DIAGNOSIS — F03.90 DEMENTIA, UNSPECIFIED DEMENTIA SEVERITY, UNSPECIFIED DEMENTIA TYPE, UNSPECIFIED WHETHER BEHAVIORAL, PSYCHOTIC, OR MOOD DISTURBANCE OR ANXIETY: ICD-10-CM

## 2025-07-05 DIAGNOSIS — R79.89 ELEVATED TROPONIN: ICD-10-CM

## 2025-07-05 DIAGNOSIS — N30.00 ACUTE CYSTITIS WITHOUT HEMATURIA: ICD-10-CM

## 2025-07-05 DIAGNOSIS — R53.83 FATIGUE: ICD-10-CM

## 2025-07-05 DIAGNOSIS — I95.9 HYPOTENSION, UNSPECIFIED HYPOTENSION TYPE: ICD-10-CM

## 2025-07-05 DIAGNOSIS — R07.9 CHEST PAIN: ICD-10-CM

## 2025-07-05 DIAGNOSIS — D47.3 ESSENTIAL THROMBOCYTOSIS: ICD-10-CM

## 2025-07-05 DIAGNOSIS — G93.41 SEPSIS WITH ENCEPHALOPATHY WITHOUT SEPTIC SHOCK, DUE TO UNSPECIFIED ORGANISM: ICD-10-CM

## 2025-07-05 PROBLEM — R06.02 SOB (SHORTNESS OF BREATH): Status: ACTIVE | Noted: 2025-07-05

## 2025-07-05 PROBLEM — N39.0 UTI (URINARY TRACT INFECTION): Status: ACTIVE | Noted: 2025-07-05

## 2025-07-05 LAB
ABSOLUTE EOSINOPHIL (OHS): 0 K/UL
ABSOLUTE MONOCYTE (OHS): 1.05 K/UL (ref 0.3–1)
ABSOLUTE NEUTROPHIL COUNT (OHS): 11.82 K/UL (ref 1.8–7.7)
ALBUMIN SERPL BCP-MCNC: 3 G/DL (ref 3.5–5.2)
ALP SERPL-CCNC: 45 UNIT/L (ref 40–150)
ALT SERPL W/O P-5'-P-CCNC: 23 UNIT/L (ref 10–44)
AMMONIA PLAS-SCNC: 31 UMOL/L (ref 10–50)
AMPHET UR QL SCN: NEGATIVE
ANION GAP (OHS): 9 MMOL/L (ref 8–16)
AST SERPL-CCNC: 58 UNIT/L (ref 11–45)
BACTERIA #/AREA URNS AUTO: ABNORMAL /HPF
BARBITURATE SCN PRESENT UR: NEGATIVE
BASOPHILS # BLD AUTO: 0.03 K/UL
BASOPHILS NFR BLD AUTO: 0.2 %
BENZODIAZ UR QL SCN: NEGATIVE
BILIRUB SERPL-MCNC: 0.4 MG/DL (ref 0.1–1)
BILIRUB UR QL STRIP.AUTO: NEGATIVE
BNP SERPL-MCNC: 279 PG/ML (ref 0–99)
BUN SERPL-MCNC: 50 MG/DL (ref 8–23)
CALCIUM SERPL-MCNC: 8.5 MG/DL (ref 8.7–10.5)
CANNABINOIDS UR QL SCN: NEGATIVE
CHLORIDE SERPL-SCNC: 114 MMOL/L (ref 95–110)
CK SERPL-CCNC: 686 U/L (ref 20–180)
CLARITY UR: ABNORMAL
CO2 SERPL-SCNC: 17 MMOL/L (ref 23–29)
COCAINE UR QL SCN: NEGATIVE
COLOR UR AUTO: YELLOW
CREAT SERPL-MCNC: 0.9 MG/DL (ref 0.5–1.4)
CREAT UR-MCNC: 145 MG/DL (ref 15–325)
ERYTHROCYTE [DISTWIDTH] IN BLOOD BY AUTOMATED COUNT: 17.2 % (ref 11.5–14.5)
GFR SERPLBLD CREATININE-BSD FMLA CKD-EPI: >60 ML/MIN/1.73/M2
GLUCOSE SERPL-MCNC: 118 MG/DL (ref 70–110)
GLUCOSE UR QL STRIP: NEGATIVE
HCT VFR BLD AUTO: 29.4 % (ref 37–48.5)
HGB BLD-MCNC: 10.2 GM/DL (ref 12–16)
HGB UR QL STRIP: ABNORMAL
HYALINE CASTS UR QL AUTO: 0 /LPF (ref 0–1)
IMM GRANULOCYTES # BLD AUTO: 0.19 K/UL (ref 0–0.04)
IMM GRANULOCYTES NFR BLD AUTO: 1.4 % (ref 0–0.5)
KETONES UR QL STRIP: NEGATIVE
LACTATE SERPL-SCNC: 2.4 MMOL/L (ref 0.5–2.2)
LDH SERPL L TO P-CCNC: 1.8 MMOL/L (ref 0.5–2.2)
LEUKOCYTE ESTERASE UR QL STRIP: ABNORMAL
LYMPHOCYTES # BLD AUTO: 0.87 K/UL (ref 1–4.8)
MAGNESIUM SERPL-MCNC: 2.1 MG/DL (ref 1.6–2.6)
MCH RBC QN AUTO: 40.8 PG (ref 27–31)
MCHC RBC AUTO-ENTMCNC: 34.7 G/DL (ref 32–36)
MCV RBC AUTO: 118 FL (ref 82–98)
METHADONE UR QL SCN: NEGATIVE
MICROSCOPIC COMMENT: ABNORMAL
NITRITE UR QL STRIP: NEGATIVE
NUCLEATED RBC (/100WBC) (OHS): 0 /100 WBC
OPIATES UR QL SCN: NEGATIVE
PCO2 BLDA: 39.1 MMHG (ref 35–45)
PCP UR QL: NEGATIVE
PH SMN: 7.41 [PH] (ref 7.35–7.45)
PH UR STRIP: 6 [PH]
PLATELET # BLD AUTO: 775 K/UL (ref 150–450)
PMV BLD AUTO: 10.8 FL (ref 9.2–12.9)
PO2 BLDA: 23.7 MMHG (ref 40–60)
POC BASE DEFICIT: 0.1 MMOL/L (ref -2–2)
POC HCO3: 24.7 MMOL/L (ref 24–28)
POC PERFORMED BY: ABNORMAL
POC SATURATED O2: 35.4 % (ref 95–100)
POCT GLUCOSE: 101 MG/DL (ref 70–110)
POCT GLUCOSE: 126 MG/DL (ref 70–110)
POTASSIUM SERPL-SCNC: 3.8 MMOL/L (ref 3.5–5.1)
PROCALCITONIN SERPL-MCNC: 0.43 NG/ML
PROT SERPL-MCNC: 6.3 GM/DL (ref 6–8.4)
PROT UR QL STRIP: ABNORMAL
RBC # BLD AUTO: 2.5 M/UL (ref 4–5.4)
RBC #/AREA URNS AUTO: 9 /HPF (ref 0–4)
RELATIVE EOSINOPHIL (OHS): 0 %
RELATIVE LYMPHOCYTE (OHS): 6.2 % (ref 18–48)
RELATIVE MONOCYTE (OHS): 7.5 % (ref 4–15)
RELATIVE NEUTROPHIL (OHS): 84.7 % (ref 38–73)
SODIUM SERPL-SCNC: 140 MMOL/L (ref 136–145)
SP GR UR STRIP: 1.03
SPECIMEN SOURCE: ABNORMAL
SQUAMOUS #/AREA URNS AUTO: 1 /HPF
T4 FREE SERPL-MCNC: 1.04 NG/DL (ref 0.71–1.51)
T4 FREE SERPL-MCNC: 1.04 NG/DL (ref 0.71–1.51)
TROPONIN I SERPL DL<=0.01 NG/ML-MCNC: 0.1 NG/ML
TROPONIN I SERPL DL<=0.01 NG/ML-MCNC: 0.11 NG/ML
TSH SERPL-ACNC: 1.68 UIU/ML (ref 0.4–4)
UROBILINOGEN UR STRIP-ACNC: NEGATIVE EU/DL
WBC # BLD AUTO: 13.96 K/UL (ref 3.9–12.7)
WBC #/AREA URNS AUTO: 34 /HPF (ref 0–5)

## 2025-07-05 PROCEDURE — 99291 CRITICAL CARE FIRST HOUR: CPT

## 2025-07-05 PROCEDURE — 84145 PROCALCITONIN (PCT): CPT | Performed by: EMERGENCY MEDICINE

## 2025-07-05 PROCEDURE — 63700000 PHARM REV CODE 250 ALT 637 W/O HCPCS

## 2025-07-05 PROCEDURE — 93010 ELECTROCARDIOGRAM REPORT: CPT | Mod: ,,, | Performed by: STUDENT IN AN ORGANIZED HEALTH CARE EDUCATION/TRAINING PROGRAM

## 2025-07-05 PROCEDURE — 83605 ASSAY OF LACTIC ACID: CPT | Performed by: INTERNAL MEDICINE

## 2025-07-05 PROCEDURE — 25000003 PHARM REV CODE 250

## 2025-07-05 PROCEDURE — 99900035 HC TECH TIME PER 15 MIN (STAT)

## 2025-07-05 PROCEDURE — 96374 THER/PROPH/DIAG INJ IV PUSH: CPT

## 2025-07-05 PROCEDURE — 99291 CRITICAL CARE FIRST HOUR: CPT | Mod: ,,, | Performed by: STUDENT IN AN ORGANIZED HEALTH CARE EDUCATION/TRAINING PROGRAM

## 2025-07-05 PROCEDURE — 63600175 PHARM REV CODE 636 W HCPCS: Performed by: EMERGENCY MEDICINE

## 2025-07-05 PROCEDURE — 83605 ASSAY OF LACTIC ACID: CPT

## 2025-07-05 PROCEDURE — 85025 COMPLETE CBC W/AUTO DIFF WBC: CPT | Performed by: EMERGENCY MEDICINE

## 2025-07-05 PROCEDURE — 36415 COLL VENOUS BLD VENIPUNCTURE: CPT

## 2025-07-05 PROCEDURE — 63600175 PHARM REV CODE 636 W HCPCS

## 2025-07-05 PROCEDURE — 82140 ASSAY OF AMMONIA: CPT | Performed by: EMERGENCY MEDICINE

## 2025-07-05 PROCEDURE — 84439 ASSAY OF FREE THYROXINE: CPT

## 2025-07-05 PROCEDURE — 84484 ASSAY OF TROPONIN QUANT: CPT | Performed by: EMERGENCY MEDICINE

## 2025-07-05 PROCEDURE — 87040 BLOOD CULTURE FOR BACTERIA: CPT | Performed by: EMERGENCY MEDICINE

## 2025-07-05 PROCEDURE — 80307 DRUG TEST PRSMV CHEM ANLYZR: CPT | Performed by: EMERGENCY MEDICINE

## 2025-07-05 PROCEDURE — 83880 ASSAY OF NATRIURETIC PEPTIDE: CPT

## 2025-07-05 PROCEDURE — 27000221 HC OXYGEN, UP TO 24 HOURS

## 2025-07-05 PROCEDURE — 82550 ASSAY OF CK (CPK): CPT

## 2025-07-05 PROCEDURE — 80053 COMPREHEN METABOLIC PANEL: CPT | Performed by: EMERGENCY MEDICINE

## 2025-07-05 PROCEDURE — 93005 ELECTROCARDIOGRAM TRACING: CPT

## 2025-07-05 PROCEDURE — 82962 GLUCOSE BLOOD TEST: CPT

## 2025-07-05 PROCEDURE — 63600175 PHARM REV CODE 636 W HCPCS: Performed by: STUDENT IN AN ORGANIZED HEALTH CARE EDUCATION/TRAINING PROGRAM

## 2025-07-05 PROCEDURE — 25000003 PHARM REV CODE 250: Performed by: EMERGENCY MEDICINE

## 2025-07-05 PROCEDURE — 83735 ASSAY OF MAGNESIUM: CPT | Performed by: EMERGENCY MEDICINE

## 2025-07-05 PROCEDURE — 87086 URINE CULTURE/COLONY COUNT: CPT | Performed by: EMERGENCY MEDICINE

## 2025-07-05 PROCEDURE — 20000000 HC ICU ROOM

## 2025-07-05 PROCEDURE — 96361 HYDRATE IV INFUSION ADD-ON: CPT

## 2025-07-05 PROCEDURE — 96376 TX/PRO/DX INJ SAME DRUG ADON: CPT

## 2025-07-05 PROCEDURE — 81003 URINALYSIS AUTO W/O SCOPE: CPT | Performed by: EMERGENCY MEDICINE

## 2025-07-05 PROCEDURE — 36415 COLL VENOUS BLD VENIPUNCTURE: CPT | Performed by: STUDENT IN AN ORGANIZED HEALTH CARE EDUCATION/TRAINING PROGRAM

## 2025-07-05 PROCEDURE — 82803 BLOOD GASES ANY COMBINATION: CPT

## 2025-07-05 PROCEDURE — 94761 N-INVAS EAR/PLS OXIMETRY MLT: CPT | Mod: XB

## 2025-07-05 PROCEDURE — 96375 TX/PRO/DX INJ NEW DRUG ADDON: CPT

## 2025-07-05 PROCEDURE — 84484 ASSAY OF TROPONIN QUANT: CPT

## 2025-07-05 RX ORDER — NALOXONE HCL 0.4 MG/ML
0.02 VIAL (ML) INJECTION
Status: DISCONTINUED | OUTPATIENT
Start: 2025-07-05 | End: 2025-07-08 | Stop reason: HOSPADM

## 2025-07-05 RX ORDER — DILTIAZEM HYDROCHLORIDE 5 MG/ML
0.25 INJECTION INTRAVENOUS ONCE
Status: DISCONTINUED | OUTPATIENT
Start: 2025-07-05 | End: 2025-07-05

## 2025-07-05 RX ORDER — IBUPROFEN 200 MG
24 TABLET ORAL
Status: DISCONTINUED | OUTPATIENT
Start: 2025-07-05 | End: 2025-07-08 | Stop reason: HOSPADM

## 2025-07-05 RX ORDER — DILTIAZEM HCL/D5W 125 MG/125
10 PLASTIC BAG, INJECTION (ML) INTRAVENOUS CONTINUOUS
Status: DISCONTINUED | OUTPATIENT
Start: 2025-07-05 | End: 2025-07-05

## 2025-07-05 RX ORDER — DILTIAZEM HYDROCHLORIDE 5 MG/ML
15 INJECTION INTRAVENOUS ONCE
Status: COMPLETED | OUTPATIENT
Start: 2025-07-05 | End: 2025-07-05

## 2025-07-05 RX ORDER — SODIUM CHLORIDE 0.9 % (FLUSH) 0.9 %
10 SYRINGE (ML) INJECTION EVERY 12 HOURS PRN
Status: DISCONTINUED | OUTPATIENT
Start: 2025-07-05 | End: 2025-07-08 | Stop reason: HOSPADM

## 2025-07-05 RX ORDER — AZITHROMYCIN 250 MG/1
500 TABLET, FILM COATED ORAL DAILY
Status: COMPLETED | OUTPATIENT
Start: 2025-07-05 | End: 2025-07-07

## 2025-07-05 RX ORDER — METOPROLOL TARTRATE 25 MG/1
25 TABLET, FILM COATED ORAL 2 TIMES DAILY
Status: DISCONTINUED | OUTPATIENT
Start: 2025-07-05 | End: 2025-07-07

## 2025-07-05 RX ORDER — ATORVASTATIN CALCIUM 10 MG/1
10 TABLET, FILM COATED ORAL DAILY
Status: DISCONTINUED | OUTPATIENT
Start: 2025-07-06 | End: 2025-07-08 | Stop reason: HOSPADM

## 2025-07-05 RX ORDER — METOPROLOL TARTRATE 1 MG/ML
5 INJECTION, SOLUTION INTRAVENOUS ONCE
Status: DISCONTINUED | OUTPATIENT
Start: 2025-07-05 | End: 2025-07-05

## 2025-07-05 RX ORDER — DILTIAZEM HYDROCHLORIDE 5 MG/ML
10 INJECTION INTRAVENOUS
Status: COMPLETED | OUTPATIENT
Start: 2025-07-05 | End: 2025-07-05

## 2025-07-05 RX ORDER — CEFTRIAXONE 1 G/1
1 INJECTION, POWDER, FOR SOLUTION INTRAMUSCULAR; INTRAVENOUS
Status: COMPLETED | OUTPATIENT
Start: 2025-07-05 | End: 2025-07-05

## 2025-07-05 RX ORDER — DILTIAZEM HYDROCHLORIDE 5 MG/ML
15 INJECTION INTRAVENOUS
Status: COMPLETED | OUTPATIENT
Start: 2025-07-05 | End: 2025-07-05

## 2025-07-05 RX ORDER — DILTIAZEM HCL 1 MG/ML
0-15 INJECTION, SOLUTION INTRAVENOUS CONTINUOUS
Status: DISCONTINUED | OUTPATIENT
Start: 2025-07-05 | End: 2025-07-05

## 2025-07-05 RX ORDER — GLUCAGON 1 MG
1 KIT INJECTION
Status: DISCONTINUED | OUTPATIENT
Start: 2025-07-05 | End: 2025-07-08 | Stop reason: HOSPADM

## 2025-07-05 RX ORDER — ACETAMINOPHEN 500 MG
1000 TABLET ORAL EVERY 6 HOURS PRN
Status: DISCONTINUED | OUTPATIENT
Start: 2025-07-05 | End: 2025-07-08 | Stop reason: HOSPADM

## 2025-07-05 RX ORDER — CEFTRIAXONE 1 G/1
1 INJECTION, POWDER, FOR SOLUTION INTRAMUSCULAR; INTRAVENOUS
Status: COMPLETED | OUTPATIENT
Start: 2025-07-06 | End: 2025-07-07

## 2025-07-05 RX ORDER — HYDROXYUREA 500 MG/1
500 CAPSULE ORAL 2 TIMES DAILY
Status: DISCONTINUED | OUTPATIENT
Start: 2025-07-05 | End: 2025-07-08 | Stop reason: HOSPADM

## 2025-07-05 RX ORDER — CEFTRIAXONE 1 G/1
1 INJECTION, POWDER, FOR SOLUTION INTRAMUSCULAR; INTRAVENOUS
Status: DISCONTINUED | OUTPATIENT
Start: 2025-07-05 | End: 2025-07-05

## 2025-07-05 RX ORDER — IPRATROPIUM BROMIDE AND ALBUTEROL SULFATE 2.5; .5 MG/3ML; MG/3ML
3 SOLUTION RESPIRATORY (INHALATION) EVERY 6 HOURS PRN
Status: DISCONTINUED | OUTPATIENT
Start: 2025-07-05 | End: 2025-07-08 | Stop reason: HOSPADM

## 2025-07-05 RX ORDER — MAGNESIUM SULFATE HEPTAHYDRATE 40 MG/ML
2 INJECTION, SOLUTION INTRAVENOUS ONCE
Status: COMPLETED | OUTPATIENT
Start: 2025-07-05 | End: 2025-07-05

## 2025-07-05 RX ORDER — IBUPROFEN 200 MG
16 TABLET ORAL
Status: DISCONTINUED | OUTPATIENT
Start: 2025-07-05 | End: 2025-07-08 | Stop reason: HOSPADM

## 2025-07-05 RX ADMIN — DILTIAZEM HYDROCHLORIDE 15 MG: 5 INJECTION, SOLUTION INTRAVENOUS at 05:07

## 2025-07-05 RX ADMIN — SODIUM CHLORIDE 500 ML: 9 INJECTION, SOLUTION INTRAVENOUS at 12:07

## 2025-07-05 RX ADMIN — SODIUM CHLORIDE, POTASSIUM CHLORIDE, SODIUM LACTATE AND CALCIUM CHLORIDE 1000 ML: 600; 310; 30; 20 INJECTION, SOLUTION INTRAVENOUS at 09:07

## 2025-07-05 RX ADMIN — DILTIAZEM HYDROCHLORIDE 10 MG: 5 INJECTION INTRAVENOUS at 02:07

## 2025-07-05 RX ADMIN — APIXABAN 2.5 MG: 2.5 TABLET, FILM COATED ORAL at 07:07

## 2025-07-05 RX ADMIN — CEFTRIAXONE SODIUM 1 G: 1 INJECTION, POWDER, FOR SOLUTION INTRAMUSCULAR; INTRAVENOUS at 12:07

## 2025-07-05 RX ADMIN — DILTIAZEM HYDROCHLORIDE 15 MG: 5 INJECTION, SOLUTION INTRAVENOUS at 11:07

## 2025-07-05 RX ADMIN — POTASSIUM BICARBONATE 20 MEQ: 391 TABLET, EFFERVESCENT ORAL at 07:07

## 2025-07-05 RX ADMIN — METOPROLOL TARTRATE 25 MG: 25 TABLET, FILM COATED ORAL at 07:07

## 2025-07-05 RX ADMIN — AZITHROMYCIN DIHYDRATE 500 MG: 250 TABLET ORAL at 07:07

## 2025-07-05 RX ADMIN — MAGNESIUM SULFATE HEPTAHYDRATE 2 G: 40 INJECTION, SOLUTION INTRAVENOUS at 07:07

## 2025-07-05 RX ADMIN — HYDROXYUREA 500 MG: 500 CAPSULE ORAL at 09:07

## 2025-07-05 RX ADMIN — SODIUM CHLORIDE, POTASSIUM CHLORIDE, SODIUM LACTATE AND CALCIUM CHLORIDE 1000 ML: 600; 310; 30; 20 INJECTION, SOLUTION INTRAVENOUS at 12:07

## 2025-07-05 RX ADMIN — Medication 5 MG/HR: at 05:07

## 2025-07-05 NOTE — ED NOTES
Pt presents to ED by ambulance for lethargy. Was afib with RVR and given 5mg Metoprolol in route by EMS.  On arrival, pt lethargic. Although answers general questions, she immediately closes her eyes again. Per son, Dr. Ruiz at , pt is not at her baseline and normally HR is controlled while in afib.

## 2025-07-05 NOTE — Clinical Note
Diagnosis: Sepsis [612708]   Future Attending Provider: RIMA MARRERO [[id number]]   Reason for IP Medical Treatment  (Clinical interventions that can only be accomplished in the IP setting? ) :: Sepsis   Plans for Post-Acute care--if anticipated (pick the single best option):: A. No post acute care anticipated at this time   Special Needs:: Fall Risk [15]

## 2025-07-05 NOTE — ED PROVIDER NOTES
"Encounter Date: 7/5/2025       History     Chief Complaint   Patient presents with    Fatigue     EMS activated for lethargy, generalized weakness. Found patient to be in Afib w/RVR, . Lopressor 5 mg IVP given with decreased rate to 80s. On arrival, continues lethargic. Patient is on memory care unit and unable to contribute to history.     89-year-old female brought to emergency department for evaluation of "lethargy".  Apparently patient was more sleepy than usual today.  EMS reports her heart rate was elevated EN route and she was given Lopressor with improvement in heart rate.  On arrival, patient states she is sleeping but denies any somatic complaints.  HPI an ROS limited secondary to dementia.      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  Constitutional: She appears well-developed and well-nourished. No distress.   HENT:   Head: Normocephalic and atraumatic.   Eyes: Conjunctivae and EOM are normal. Pupils are equal, round, and reactive to light.   Neck: Neck supple. No tracheal deviation present.   Normal range of " motion.  Cardiovascular:  Intact distal pulses.           Tachycardic, irregular rhythm   Pulmonary/Chest: No respiratory distress.   Abdominal: Abdomen is soft. She exhibits no distension. There is no abdominal tenderness.   Musculoskeletal:         General: No tenderness or edema. Normal range of motion.      Cervical back: Normal range of motion and neck supple.     Neurological: She is alert. She has normal strength. No cranial nerve deficit. GCS score is 15. GCS eye subscore is 4. GCS verbal subscore is 5. GCS motor subscore is 6.   Skin: Skin is warm and dry.         ED Course   Critical Care    Date/Time: 7/5/2025 2:47 PM    Performed by: Danny Flowers MD  Authorized by: Dnany Flowers MD  Direct patient critical care time: 15 minutes  Additional history critical care time: 15 minutes  Ordering / reviewing critical care time: 15 minutes  Documentation critical care time: 15 minutes  Consulting other physicians critical care time: 15 minutes  Consult with family critical care time: 10 minutes  Total critical care time (exclusive of procedural time) : 85 minutes  Critical care time was exclusive of separately billable procedures and treating other patients.  Critical care was necessary to treat or prevent imminent or life-threatening deterioration of the following conditions: sepsis.  Critical care was time spent personally by me on the following activities: development of treatment plan with patient or surrogate, interpretation of cardiac output measurements, evaluation of patient's response to treatment, examination of patient, obtaining history from patient or surrogate, ordering and performing treatments and interventions, ordering and review of laboratory studies, ordering and review of radiographic studies, pulse oximetry, re-evaluation of patient's condition and review of old charts.        Labs Reviewed   COMPREHENSIVE METABOLIC PANEL - Abnormal       Result Value    Sodium 140       Potassium 3.8      Chloride 114 (*)     CO2 17 (*)     Glucose 118 (*)     BUN 50 (*)     Creatinine 0.9      Calcium 8.5 (*)     Protein Total 6.3      Albumin 3.0 (*)     Bilirubin Total 0.4      ALP 45      AST 58 (*)     ALT 23      Anion Gap 9      eGFR >60     URINALYSIS - Abnormal    Color, UA Yellow      Appearance, UA Hazy (*)     pH, UA 6.0      Spec Grav UA 1.030      Protein, UA 1+ (*)     Glucose, UA Negative      Ketones, UA Negative      Bilirubin, UA Negative      Blood, UA Trace (*)     Nitrites, UA Negative      Urobilinogen, UA Negative      Leukocyte Esterase, UA 3+ (*)    TROPONIN I - Abnormal    Troponin-I 0.109 (*)    CBC WITH DIFFERENTIAL - Abnormal    WBC 13.96 (*)     RBC 2.50 (*)     HGB 10.2 (*)     HCT 29.4 (*)      (*)     MCH 40.8 (*)     MCHC 34.7      RDW 17.2 (*)     Platelet Count 775 (*)     MPV 10.8      Nucleated RBC 0      Neut % 84.7 (*)     Lymph % 6.2 (*)     Mono % 7.5      Eos % 0.0      Basophil % 0.2      Imm Grans % 1.4 (*)     Neut # 11.82 (*)     Lymph # 0.87 (*)     Mono # 1.05 (*)     Eos # 0.00      Baso # 0.03      Imm Grans # 0.19 (*)    URINALYSIS MICROSCOPIC - Abnormal    RBC, UA 9 (*)     WBC, UA 34 (*)     Bacteria, UA Many (*)     Squamous Epithelial Cells, UA 1      Hyaline Casts, UA 0      Microscopic Comment       PROCALCITONIN - Abnormal    Procalcitonin 0.43 (*)    POCT GLUCOSE - Abnormal    POCT Glucose 126 (*)    DRUG SCREEN PANEL, URINE EMERGENCY - Normal    Benzodiazepine, Urine Negative      Methadone, Urine Negative      Cocaine, Urine Negative      Opiates, Urine Negative      Barbiturates, Urine Negative      Amphetamines, Urine Negative      THC Negative      Phencyclidine, Urine Negative      Urine Creatinine 145.0      Narrative:     This screen includes the following classes of drugs at the listed cut-off:     Benzodiazepines:        200 ng/ml   Methadone:              300 ng/ml   Cocaine metabolite:     300 ng/ml   Opiates:            "     300 ng/ml   Barbiturates:           200 ng/ml   Amphetamines:           1000 ng/ml   Marijuana metabs (THC): 50 ng/ml   Phencyclidine (PCP):    25 ng/ml     This is a screening test. If results do not correlate with clinical presentation, then a confirmatory send out test is advised.    This report is intended for use in clinical monitoring and management of patients. It is not intended for use in employment related drug testing."   AMMONIA - Normal    Ammonia 31     MAGNESIUM - Normal    Magnesium  2.1     CULTURE, URINE   CULTURE, BLOOD   CULTURE, BLOOD   CBC W/ AUTO DIFFERENTIAL    Narrative:     The following orders were created for panel order CBC auto differential.  Procedure                               Abnormality         Status                     ---------                               -----------         ------                     CBC with Differential[[id number]]       Abnormal            Final result                 Please view results for these tests on the individual orders.   LACTIC ACID, PLASMA   POCT GLUCOSE MONITORING CONTINUOUS     EKG Readings: (Independently Interpreted)   Initial Reading: No STEMI. Previous EKG: Compared with most recent EKG Previous EKG Date: 3/11/2025 (Minimal change). Rhythm: Atrial Fibrillation. Heart Rate: 122. Ectopy: No Ectopy. ST Segments: Normal ST Segments. Axis: Left Axis Deviation. Clinical Impression: Atrial Fibrillation with RVR   EKG independently interpreted by me pending Cardiology review           X-Rays:   Independently Interpreted Readings:   Other Readings:  Chest x-ray independently interpreted by me pending radiology review: No acute infiltrate, no pneumothorax, no effusion    Medications   diltiaZEM injection 15 mg (15 mg Intravenous Given 7/5/25 1120)   sodium chloride 0.9% bolus 500 mL 500 mL (0 mLs Intravenous Stopped 7/5/25 1309)   cefTRIAXone injection 1 g (1 g Intravenous Given 7/5/25 1215)   lactated ringers bolus 1,000 mL (0 mLs " Intravenous Stopped 7/5/25 1343)   diltiaZEM injection 10 mg (10 mg Intravenous Given 7/5/25 1445)     Medical Decision Making  89-year-old female brought to the emergency department for evaluation of fatigue      Differential: Arrhythmia, dehydration, electrolyte dyscrasias, vasovagal, anemia, encephalopathy, UTI,      Patient met SIRS criteria at approximately 11:45 a.m. when her CBC came back with leukocytosis.  At that time the sepsis order set was used to order blood cultures, lactic acid.  Lactic acid was 1.8.  She was given 1.5 L, which is 30 mL/kg.  Most recent urine culture was pansensitive, hence Rocephin as the choice for her antibiotic.  I discussed her case with her son who was at bedside and informed him of plan to admit for sepsis with encephalopathy and he is comfortable with that plan at this time.  Discussed with [facility] medicine who will see and admit the patient for further evaluation and management.    Problems Addressed:  Acute cystitis without hematuria: acute illness or injury  Atrial fibrillation with RVR: acute illness or injury  Fatigue: acute illness or injury  Sepsis, due to unspecified organism, unspecified whether acute organ dysfunction present: acute illness or injury with systemic symptoms that poses a threat to life or bodily functions    Amount and/or Complexity of Data Reviewed  Independent Historian:      Details: Some provides much of the history due to patient's dementia  External Data Reviewed: ECG and notes.     Details: Reviewed most recent EKG for comparison     Reviewed most recent palliative care note documenting baseline medications and past medical history  Labs: ordered.     Details: CBC with leukocytosis, anemia similar to baseline; CMP with normal renal and liver function tests, normal electrolytes; UA concerning for UTI; troponin somewhat elevated, likely secondary to demand and afib with rvr;   Radiology: ordered and independent interpretation performed.  "Decision-making details documented in ED Course.  ECG/medicine tests: ordered and independent interpretation performed. Decision-making details documented in ED Course.  Discussion of management or test interpretation with external provider(s): Case discussed with [facility] internal Medicine regarding patient's past medical history, presentation, labs, imaging, interventions, plan to admit    Risk  OTC drugs.  Prescription drug management.  Parenteral controlled substances.  Decision regarding hospitalization.    Critical Care  Total time providing critical care: 85 minutes    Cat Garcia presents with sepsis with Encephalopathy secondary to Urinary Tract Infection.    Interventions include:    Antibiotics:         Fluid Resuscitation:   Actual Body Weight- The patient's actual body weight will be used to calculate the 30 ml/kg fluid bolus.     Labs and Imaging:   Recent Labs   Lab 25  1603 25  1204   POCLAC  --  1.8   LACTATE 1.3  --      No results found for: "CULTBLD"   Additional cultures were collected as indicated and imaging reviewed to identify infection source.    Hemodynamic Support and Monitoring:  Vasopressors were not needed     The patient was re-evaluated at Admission Time and Date: 2025 10:30 AM and patient and/or surrogate was updated on plan of care.    The following services were consulted:Hospital Medicine.                                    Clinical Impression:  Final diagnoses:  [R53.83] Fatigue  [I48.91] Atrial fibrillation with RVR (Primary)  [N30.00] Acute cystitis without hematuria  [A41.9] Sepsis, due to unspecified organism, unspecified whether acute organ dysfunction present                       [1]   Social History  Tobacco Use    Smoking status: Former     Current packs/day: 0.00     Types: Cigarettes     Quit date: 1982     Years since quittin.5    Smokeless tobacco: Never    Tobacco comments:     pt was social smoker   Substance Use Topics    Alcohol use: No "     Alcohol/week: 0.0 standard drinks of alcohol    Drug use: Danny Farrell MD  07/05/25 3751

## 2025-07-05 NOTE — H&P
[facility] Medicine H&P Note     Admitting Team: [facility] Hospitalist Team A  Attending Physician: Danny Flowers MD  Resident: Dr. Peralta  Intern: Dr. Callahan    Date of Admit: 7/5/2025    Chief Complaint     Chief Complaint   Patient presents with    Fatigue     EMS activated for lethargy, generalized weakness. Found patient to be in Afib w/RVR, . Lopressor 5 mg IVP given with decreased rate to 80s. On arrival, continues lethargic. Patient is on memory care unit and unable to contribute to history.       Subjective:      History of Present Illness:  Cat Garcia is a 89 y.o. White female with past medical history of afib (on eliquis), CKD 3, Dementia, HLD, HTN, thrombocytosis  who presented on 7/5/2025 for Fatigue (EMS activated for lethargy, generalized weakness. Found patient to be in Afib w/RVR, . Lopressor 5 mg IVP given with decreased rate to 80s. On arrival, continues lethargic. Patient is on memory care unit and unable to contribute to history.)    Per NH staff, The patient was at her baseline last night which consists of: sitting up in wheelchair but able to stand up with assistance, eating dinner, able to respond with one word answers, oriented to self only and somnolent but not falling asleep mid response until this AM when found to be hypersomnolent - falling asleep mid response, with crackles heard on aucultation and SOB. They could not get her to sit up in bed. She was afebrile with stable vitals at the nursing home per staff. They did not notice a cough, no n/v. She does normally have a poor appetite at baseline, requires boost. She has not had any diarrhea/constipation, urine output has been good without any complaints of dysuria or pain.    EMS gave her lopressor for tachycardia.     Upon arrival to the ED, afebrile, , RR 20, /63, SpO2 96% R  Review of Systems:  A comprehensive review of systems was negative unless otherwise stated in the HPI.     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 Last took home meds last night.  Current Outpatient Medications   Medication Instructions    apixaban (ELIQUIS) 2.5 mg, Oral, 2 times daily    atorvastatin (LIPITOR) 10 MG tablet ONE TABLET BY MOUTH ONCE DAILY DX: HYPERLIPIDEMIA    hydroxyurea (HYDREA) 500 mg Cap 2 CAPSULES (1000MG) BY MOUTH EVERY EVENING DX: THROMBOCYTOSIS    hydroxyurea (HYDREA) 1,000 mg, Oral, 2 times daily, EVERY MORNING and EVENING.    latanoprost 0.005 % ophthalmic solution 1 drop, Both Eyes, Nightly    zinc oxide (BOUDREAUXS BUTT PASTE) 40 % Oint 1 Application, Topical (Top), Daily, Skyler's Butt Paste maximum strength       Family History: Reviewed    Family History   Problem Relation Name Age of Onset    Heart disease Father      Melanoma Neg Hx         Social History: Reviewed  Alcohol use: None  Tobacco use: none  Illicits: none  Current living situation: Nursing Home  Social History[1]    Healthcare Maintaince :   Primary Care Physician:  Alfie Oconnell MD     Immunizations:   Currently on File:   Most Recent Immunizations   Administered Date(s) Administered    COVID-19 MRNA, LN-S PF (MODERNA HALF 0.25 ML DOSE) 11/10/2021    COVID-19, MRNA, LN-S, PF (MODERNA FULL 0.5 ML DOSE) 01/29/2021    COVID-19, mRNA, LNP-S, PF (Moderna) Ages 12+ 10/24/2023    COVID-19,  "mRNA, LNP-S, bivalent booster, PF (Moderna Omicron)12 + YEARS 12/16/2022    COVID-19, mRNA, LNP-S, bivalent booster, PF (PFIZER OMICRON) 10/18/2022    Influenza (FLUAD) - Quadrivalent - Adjuvanted - PF *Preferred* (65+) 09/23/2022    Influenza - Quadrivalent - High Dose - PF (65 years and older) 10/25/2021    Influenza - Trivalent - Fluzone High Dose - PF (65 years and older) 09/27/2018    PPD Test 01/02/2020    Pneumococcal Conjugate - 13 Valent 12/18/2015    Pneumococcal Polysaccharide - 23 Valent 11/11/2014    Zoster 07/12/2016       Objective     Vital signs reviewed.  Triage: BP: 126/63  Pulse: 103  Temp: 97.5 °F (36.4 °C)  Resp: 20  Height: 5' 5" (165.1 cm)  Weight: 52.2 kg (115 lb)  BMI (Calculated): 19.1  SpO2: 96 %  Exam: BP (!) 147/78   Pulse (!) 134   Temp 97.5 °F (36.4 °C) (Oral)   Resp 12   Ht 5' 5" (1.651 m)   Wt 52.2 kg (115 lb)   SpO2 95%   BMI 19.14 kg/m²     Wt Readings from Last 1 Encounters:   07/05/25 52.2 kg (115 lb)     Body mass index is 19.14 kg/m².     Intake/Output Summary (Last 24 hours) at 7/5/2025 1447  Last data filed at 7/5/2025 1343  Gross per 24 hour   Intake 1499 ml   Output --   Net 1499 ml       Physical Examination:  General: somnolent, no acute distress, non-toxic, resting comfortably in bed  HEENT:  EOMI, atraumatic, normocephalic, moist mucous membranes w/no erythema noted  Neck: Trachea midline, no masses, no lymphadenopathy  Cardiovascular: irregularly irregular, no extra heart sounds or murmurs appreciated   Chest wall: Non-tender to palpation, no gross deformities noted   Respiratory: Stable on room air, normal work of breathing, no accessory muscle use noted  Abdominal: Non-distended, soft,  non-tender to palpation, no guarding  Extremities: no edema  Skin: Non-diaphoretic, warm, dry.   Neurologic: alert to self,no gross FND,sensation grossly intact  Psychiatric: Normal mood and affect    Laboratory:  CBC:  Lab Results   Component Value Date    WBC 13.96 " "(H) 07/05/2025    HGB 10.2 (L) 07/05/2025     (H) 07/05/2025    MCH 40.8 (H) 07/05/2025    MCHC 34.7 07/05/2025    RDW 17.2 (H) 07/05/2025     (H) 07/05/2025    MPV 10.8 07/05/2025    PLTEST Clumped (A) 03/26/2025     BMP:   Lab Results   Component Value Date     07/05/2025    K 3.8 07/05/2025     (H) 07/05/2025    CO2 17 (L) 07/05/2025    BUN 50 (H) 07/05/2025    CREATININE 0.9 07/05/2025     (H) 07/05/2025    CALCIUM 8.5 (L) 07/05/2025    MG 2.1 07/05/2025    PHOS 3.8 09/01/2024     LFTs:   Lab Results   Component Value Date    PROT 6.3 07/05/2025    ALBUMIN 3.0 (L) 07/05/2025    AST 58 (H) 07/05/2025    ALKPHOS 45 07/05/2025    ALT 23 07/05/2025       Urine:  Lab Results   Component Value Date    LABURIN ESCHERICHIA COLI  >100,000 cfu/ml   (A) 03/11/2025    COLORU Yellow 07/05/2025    SPECGRAV 1.030 07/05/2025    NITRITE Negative 07/05/2025    KETONESU Trace (A) 03/11/2025    UROBILINOGEN Negative 07/05/2025      ABGs: No results found for: "PHART", "PO2ART", "ZQD7VTL"    All laboratory data reviewed    Microbiology Data:   Bc - in process  Urine culture- in process     EKG  Afib with rvr, ST wave abnormality in inferior leads       Imaging Data:   Chest xray  Chronic findings, no interstitial focal consolidations     CT Head without contrast  No acute findings        Assessment/Plan     Cat Garcia is a 89 y.o. White female with past medical history of afib with RVR (on eliquis), CKD 3, Dementia, HLD, HTN, thrombocytosis, and dementia who presented at [facility] on 7/5/2025 for acute encephalopathy from NH.  Patient is admitted to [facility] for AMS workup. Received 1L LR bolus, 10mg Dilt IV x 1 then 15mg X 1 in ED. Started on diltiazem infusion.    Acute on chronic encephalopathy  History of dementia  Sepsis  Per NH, not at baseline - increase in somnolence, weakness  Tachycardic, leukocytosis, lactacte 2.4, procal 0.43  CT head negative for acute findings  Chest x " ray - no focal consolidations  UA- wbc and leukocyte +  Urine culture - pending   Blood culture - pending  Covid and flu - pending   Given rocephin once  Plan:  Continue azithromycin    Afib with RVR  TTE from 2024: Grade II Diastolic dysfunction, mildly dilated LA, EF 55-60%  S/p IL LR, 10mg IV dilt x 15mg IV dilt, did become hypotensive briefly   EKG: afib with RVR  Trop .109 and downtrended  Bnp 276  UDS negative  Cardiology following and recommending discontinuing diltiazam drop and not starting amiodarone  Recommend lopressor 25mg PO TID and digoxin If she goes back into RVR  Continue eliquis 2.5 BID  Continue lopressor 25 BID    CKD 3  Cr 0.9, GFR>60  Monitor kidney function  Avoid nephrotoxins     HLD  Continue home statin    Acute Hypoxic Respiratory failure  On 2L NC  CTM  CXR - no acute findings    Macrocytic anemia  H/H 10.22/29.4 - at baseline   Mcv 118  No signs of bleeding  CTM    Thrombocytosis  Hx of thrombocytosis  Continue home hydroxyurea      Healthcare maintenance   -primary care provider is Alfie Oconnell MD     Diet:  No diet orders on file  VTE Risk: heparin   -   VTE Risk Mitigation (From admission, onward)      None          -   No current facility-administered medications for this encounter.     Christina Callahan MD PGY1     [facility] Hospitalist Pager numbers:   [facility] Hospitalist Medicine Team A (Guerline/Skyler):          [phone]  [facility] Hospitalist Medicine Team B (Nathanael/Neto):        [phone]            [1]   Social History  Tobacco Use    Smoking status: Former     Current packs/day: 0.00     Types: Cigarettes     Quit date: 1982     Years since quittin.5    Smokeless tobacco: Never    Tobacco comments:     pt was social smoker   Substance Use Topics    Alcohol use: No     Alcohol/week: 0.0 standard drinks of alcohol    Drug use: No

## 2025-07-05 NOTE — PHARMACY MED REC
"    [facility]           Pharmacy  Admission Medication History     The home medication history was taken by Shannon Jeffers.      Medication history obtained from Medications listed below were obtained from: [email or web address] software- [id number] and Medical records.    Based on information gathered for medication list, you may go to "Admission" then "Reconcile Home Medications" tabs to review and/or act upon those items.     The home medication list has been updated by the Pharmacy department.   Please read ALL comments highlighted in yellow.   Please address this information as you see fit.    Feel free to contact us if you have any questions or require assistance.        No current facility-administered medications on file prior to encounter.     Current Outpatient Medications on File Prior to Encounter   Medication Sig Dispense Refill    apixaban (ELIQUIS) 2.5 mg Tab Take 1 tablet (2.5 mg total) by mouth 2 (two) times daily. 180 tablet 3    atorvastatin (LIPITOR) 10 MG tablet ONE TABLET BY MOUTH ONCE DAILY DX: HYPERLIPIDEMIA (Patient taking differently: Take 10 mg by mouth once daily.) 90 tablet 11    hydroxyurea (HYDREA) 500 mg Cap Take 2 capsules (1,000 mg total) by mouth 2 (two) times daily EVERY MORNING and EVENING.. (Patient taking differently: Take 1,000 mg by mouth 2 (two) times daily 2 capsules BID.) 120 capsule 11    latanoprost 0.005 % ophthalmic solution INSTILL 1 DROP IN BOTH EYES EVERY EVENING 2.5 mL 5       Please address this information as you see fit.  Feel free to contact us if you have any questions or require assistance.    Shannon Jeffers  [phone]              .          "

## 2025-07-05 NOTE — PROGRESS NOTES
I saw and evaluated the patient. I have reviewed and agree with the trainee's findings, including all diagnostic interpretations, and plans as written. This is a separate attestation to a trainee note written by the ICU trainee today.  As the teaching physician, I was present for and have confirmed the key portions of the service performed by the trainee today.  In addition to the trainee's note, I add the followinyo woman with Chronic AF, CKD3, HTN, HLD, and dementia who lives at at NH who presents to [facility] for lethargy and somnolence, found to be in AF with RVR. Encephalopathic on exam. On dilt gtt. Discussed with Cardiology; amenable to transitioning to amiodarone gtt. Admitted to ICU    Problems  AF with RVR  Hypotension  Elevated Troponin  CKD3  Dementia  Thrombocytosis     Plan:  - Stopped dilt gtt. Converted out of afib with RVR. Cardiology evaluated and favoring metoprolol PO BID now. If she goes back into RVR, wanting to use digoxin. Will have her admitted for closer monitoring in the ICU due to labile BP and concerns of uncontrolled RVR  - Continue anticoagulation  - Start NE for vasopressor support; may need to consider cardioversion if she becomes unstable  - Replete electrolytes; K>4, Mg>2  - Trend troponin & EKG q6    Routine MICU Care Items  Feeding:  PO Diet  Analgesia: No current pain  Sedation: Not Intubated  Thromboembolism Prophylaxis:  Full Anticoagulation  Head of Bed at 30 degrees: Yes  Ulcer Prophylaxis: None Indicated (On tube feeding or taking food my mouth)  Glycemic Control: Goal glucose < 180mg/dL  Spontaneous Awakening and Breathing Trial:  Not Intubated  Bowel Regimen: None Required    Invasive Device Removal:   [place]  [x]Not Present  []Present, Remove Today and begin Nursing Monitoring for Retention  []Present, Indicated   Indication:   []Recent Pelvic Surgery   []Stage IV Sacral Ulcer and cannot be managed with non-invasive urine collection   []Hourly Titration of  Continuous Infusions according to Urine Output   []Hospice or end-of-life care   []Requested by Urology    Central Line  [x]Not Present  []Present, Remove Today  []Present, Indicated    Arterial Line  [x]Not Present  []Present, Remove Today  []Present, Indicated    Drug de-escalation: Antibiotics: Continue     Therapy  []PT  []OT  []SLP    Vasopressors/Inotropes  [x]None  []Norepinephrine  []Vasopressin  []Phenylephrine  []Dobutamine  []Milrinone  []Other    Code Status: DNR/DNI  Disposition: ICU     50 minutes of critical care time was spent in the critical care management of the patient.  This included management of organ failures related to critical illness, titration of continuous infusions, management of mechanical ventilation, review of pertinent labs and imaging studies, discussion of the patient with consulting services, and discussion of the patients condition with the patient and family.     This included:  [x] Management of organ failures related to critical illness  [x] Frequent personal assessment of the patient  [x] Titration of continuous infusions  [] Management of mechanical ventilation  [x] Review of pertinent labs and imaging studies   [x] Frequency of monitoring requiring an ICU level of care  [x] Discussion of the patient with consulting services  [x] Discussion of the patient's condition with the patient  [x] Discussion of the patient's condition with the family     Chauncey Oro MD  [facility] Attending

## 2025-07-05 NOTE — ED NOTES
Pt HR now sustatining 150s-170s, Dr. Peralta informed via phone, states to increase Diltiazem to 10mg/hr.

## 2025-07-06 LAB
ABSOLUTE EOSINOPHIL (OHS): 0 K/UL
ABSOLUTE MONOCYTE (OHS): 1.02 K/UL (ref 0.3–1)
ABSOLUTE NEUTROPHIL COUNT (OHS): 8.25 K/UL (ref 1.8–7.7)
ALBUMIN SERPL BCP-MCNC: 2.8 G/DL (ref 3.5–5.2)
ALP SERPL-CCNC: 41 UNIT/L (ref 40–150)
ALT SERPL W/O P-5'-P-CCNC: 24 UNIT/L (ref 10–44)
ANION GAP (OHS): 7 MMOL/L (ref 8–16)
AST SERPL-CCNC: 54 UNIT/L (ref 11–45)
B PERT DNA NPH QL NAA+PROBE: NOT DETECTED
BASOPHILS # BLD AUTO: 0.01 K/UL
BASOPHILS NFR BLD AUTO: 0.1 %
BILIRUB SERPL-MCNC: 0.4 MG/DL (ref 0.1–1)
BUN SERPL-MCNC: 37 MG/DL (ref 8–23)
C PNEUM DNA LOWER RESP QL NAA+NON-PROBE: NOT DETECTED
CALCIUM SERPL-MCNC: 8.3 MG/DL (ref 8.7–10.5)
CHLORIDE SERPL-SCNC: 112 MMOL/L (ref 95–110)
CO2 SERPL-SCNC: 20 MMOL/L (ref 23–29)
CREAT SERPL-MCNC: 0.8 MG/DL (ref 0.5–1.4)
ERYTHROCYTE [DISTWIDTH] IN BLOOD BY AUTOMATED COUNT: 16.8 % (ref 11.5–14.5)
FLUAV RNA NPH QL NAA+NON-PROBE: NOT DETECTED
FLUBV RNA NPH QL NAA+NON-PROBE: NOT DETECTED
GFR SERPLBLD CREATININE-BSD FMLA CKD-EPI: >60 ML/MIN/1.73/M2
GLUCOSE SERPL-MCNC: 98 MG/DL (ref 70–110)
HADV DNA NPH QL NAA+NON-PROBE: NOT DETECTED
HCOV 229E RNA NPH QL NAA+NON-PROBE: NOT DETECTED
HCOV HKU1 RNA NPH QL NAA+NON-PROBE: NOT DETECTED
HCOV NL63 RNA NPH QL NAA+NON-PROBE: NOT DETECTED
HCOV OC43 RNA NPH QL NAA+NON-PROBE: NOT DETECTED
HCT VFR BLD AUTO: 25.8 % (ref 37–48.5)
HGB BLD-MCNC: 8.6 GM/DL (ref 12–16)
HMPV RNA LOWER RESP QL NAA+NON-PROBE: NOT DETECTED
HMPV RNA NPH QL NAA+NON-PROBE: DETECTED
HPIV1 RNA NPH QL NAA+NON-PROBE: NOT DETECTED
HPIV2 RNA NPH QL NAA+NON-PROBE: NOT DETECTED
HPIV3 RNA NPH QL NAA+NON-PROBE: NOT DETECTED
HPIV4 RNA NPH QL NAA+NON-PROBE: NOT DETECTED
IMM GRANULOCYTES # BLD AUTO: 0.11 K/UL (ref 0–0.04)
IMM GRANULOCYTES NFR BLD AUTO: 1 % (ref 0–0.5)
LACTATE SERPL-SCNC: 0.8 MMOL/L (ref 0.5–2.2)
LYMPHOCYTES # BLD AUTO: 1.65 K/UL (ref 1–4.8)
MAGNESIUM SERPL-MCNC: 2.4 MG/DL (ref 1.6–2.6)
MCH RBC QN AUTO: 39.8 PG (ref 27–31)
MCHC RBC AUTO-ENTMCNC: 33.3 G/DL (ref 32–36)
MCV RBC AUTO: 119 FL (ref 82–98)
NUCLEATED RBC (/100WBC) (OHS): 0 /100 WBC
PHOSPHATE SERPL-MCNC: 2.3 MG/DL (ref 2.7–4.5)
PLATELET # BLD AUTO: 765 K/UL (ref 150–450)
PMV BLD AUTO: 10.4 FL (ref 9.2–12.9)
POTASSIUM SERPL-SCNC: 4 MMOL/L (ref 3.5–5.1)
PROT SERPL-MCNC: 5.5 GM/DL (ref 6–8.4)
RBC # BLD AUTO: 2.16 M/UL (ref 4–5.4)
RELATIVE EOSINOPHIL (OHS): 0 %
RELATIVE LYMPHOCYTE (OHS): 14.9 % (ref 18–48)
RELATIVE MONOCYTE (OHS): 9.2 % (ref 4–15)
RELATIVE NEUTROPHIL (OHS): 74.8 % (ref 38–73)
RSV RNA NPH QL NAA+NON-PROBE: NOT DETECTED
RSV RNA NPH QL NAA+NON-PROBE: NOT DETECTED
RV+EV RNA NPH QL NAA+NON-PROBE: NOT DETECTED
SARS-COV-2 RDRP RESP QL NAA+PROBE: NEGATIVE
SARS-COV-2 RNA RESP QL NAA+PROBE: NOT DETECTED
SODIUM SERPL-SCNC: 139 MMOL/L (ref 136–145)
SPECIMEN SOURCE: ABNORMAL
WBC # BLD AUTO: 11.04 K/UL (ref 3.9–12.7)

## 2025-07-06 PROCEDURE — 83605 ASSAY OF LACTIC ACID: CPT | Performed by: INTERNAL MEDICINE

## 2025-07-06 PROCEDURE — 25000003 PHARM REV CODE 250

## 2025-07-06 PROCEDURE — 63700000 PHARM REV CODE 250 ALT 637 W/O HCPCS

## 2025-07-06 PROCEDURE — 63600175 PHARM REV CODE 636 W HCPCS

## 2025-07-06 PROCEDURE — 51798 US URINE CAPACITY MEASURE: CPT

## 2025-07-06 PROCEDURE — U0002 COVID-19 LAB TEST NON-CDC: HCPCS

## 2025-07-06 PROCEDURE — 25000003 PHARM REV CODE 250: Performed by: INTERNAL MEDICINE

## 2025-07-06 PROCEDURE — 85025 COMPLETE CBC W/AUTO DIFF WBC: CPT

## 2025-07-06 PROCEDURE — 83735 ASSAY OF MAGNESIUM: CPT

## 2025-07-06 PROCEDURE — 99900035 HC TECH TIME PER 15 MIN (STAT)

## 2025-07-06 PROCEDURE — 84100 ASSAY OF PHOSPHORUS: CPT

## 2025-07-06 PROCEDURE — 80053 COMPREHEN METABOLIC PANEL: CPT

## 2025-07-06 PROCEDURE — 11000001 HC ACUTE MED/SURG PRIVATE ROOM

## 2025-07-06 PROCEDURE — 94761 N-INVAS EAR/PLS OXIMETRY MLT: CPT

## 2025-07-06 PROCEDURE — 0202U NFCT DS 22 TRGT SARS-COV-2: CPT

## 2025-07-06 PROCEDURE — 99233 SBSQ HOSP IP/OBS HIGH 50: CPT | Mod: ,,, | Performed by: STUDENT IN AN ORGANIZED HEALTH CARE EDUCATION/TRAINING PROGRAM

## 2025-07-06 RX ORDER — MUPIROCIN 20 MG/G
OINTMENT TOPICAL 2 TIMES DAILY
Status: DISCONTINUED | OUTPATIENT
Start: 2025-07-06 | End: 2025-07-08 | Stop reason: HOSPADM

## 2025-07-06 RX ORDER — SODIUM,POTASSIUM PHOSPHATES 280-250MG
2 POWDER IN PACKET (EA) ORAL ONCE
Status: COMPLETED | OUTPATIENT
Start: 2025-07-06 | End: 2025-07-06

## 2025-07-06 RX ORDER — PREDNISONE 20 MG/1
40 TABLET ORAL DAILY
Status: DISCONTINUED | OUTPATIENT
Start: 2025-07-06 | End: 2025-07-06

## 2025-07-06 RX ADMIN — HYDROXYUREA 500 MG: 500 CAPSULE ORAL at 08:07

## 2025-07-06 RX ADMIN — ATORVASTATIN CALCIUM 10 MG: 10 TABLET, FILM COATED ORAL at 08:07

## 2025-07-06 RX ADMIN — HYDROXYUREA 500 MG: 500 CAPSULE ORAL at 09:07

## 2025-07-06 RX ADMIN — CEFTRIAXONE SODIUM 1 G: 1 INJECTION, POWDER, FOR SOLUTION INTRAMUSCULAR; INTRAVENOUS at 08:07

## 2025-07-06 RX ADMIN — MUPIROCIN: 20 OINTMENT TOPICAL at 08:07

## 2025-07-06 RX ADMIN — POTASSIUM & SODIUM PHOSPHATES POWDER PACK 280-160-250 MG 2 PACKET: 280-160-250 PACK at 05:07

## 2025-07-06 RX ADMIN — Medication 125 MG: at 11:07

## 2025-07-06 RX ADMIN — APIXABAN 2.5 MG: 2.5 TABLET, FILM COATED ORAL at 08:07

## 2025-07-06 RX ADMIN — METOPROLOL TARTRATE 25 MG: 25 TABLET, FILM COATED ORAL at 08:07

## 2025-07-06 RX ADMIN — AZITHROMYCIN DIHYDRATE 500 MG: 250 TABLET ORAL at 08:07

## 2025-07-06 NOTE — EICU
Intervention Initiated From:  [facility] / EICU    Dilip intervened regarding:  Rounding (Video assessment)    Virtual ICU Quality Rounds    Admit Date: 7/5/2025  Hospital Day: 1    ICU Day: 14h    24H Vital Sign Range:  Temp:  [96.2 °F (35.7 °C)-98.6 °F (37 °C)]   Pulse:  []   Resp:  [8-34]   BP: (107-167)/()   SpO2:  [84 %-100 %]     VICU Surveillance Screening      LDA reconciliation : Yes

## 2025-07-06 NOTE — NURSING
Skin intact with blanchable redness to tanmay heels and buttock. Heels elevated and waffle mattress applied.

## 2025-07-06 NOTE — EICU
"EICU Note    89 y/o female with a PMH of CKD stage 3, atrial fib ( on Eliquis), CHF, HLD, HTN and dementia presents with altered mental status. In the ED the patient was found to be in Afib with RVR. Started on Cardizem infusion.    Currently;    BP (!) 149/66   Pulse 103   Temp 97.5 °F (36.4 °C) (Oral)   Resp 16   Ht 5' 5" (1.651 m)   Wt 52.2 kg (115 lb)   SpO2 95%   BMI 19.14 kg/m²     Labs:    CBC: WBC 13.96/ Hgb 10.2/ Hct 29.4/ plts 775 K    BMP: Na 140/ K 3.8/ Cl 114/ CO2 17/ BUN 50/ Cr 0.9/ glucose 118    Ammonia: 31  PCT: 0.43  Troponin I : 0.109  VBG: pH 7.408    UA: 3+ Leucocyte Esterase, 9+ RBC, WBC 34, Bacteria many    CT Head w/o contrast: Final reading  Impression:  Allowing for artifact from motion no acute intracranial findings specifically without evidence for acute intracranial hemorrhage or evidence for large territory recent infarction  Continued advanced cerebral volume loss with patchy and confluent decreased attenuation supratentorial white matter which may be sequela of chronic ischemic change.  Underlying neuro degenerative process to be considered.  Clinical correlation and further evaluation as warranted.    Chest xray: Final reading  Impression:  1. Chronic appearing interstitial findings, no large focal consolidation    EKG: My reading  Atrial Fib with a ventricular rate of 103 BPM. T waves inverted II, v5-v6    Impression;    -Afib with RVR  -Dementia  -Elevated troponin likely demand ischemia  -UTI  -Thrombocytosis    Plan; Continue present management of metoprolol PO and digoxin as per cardiology. Cardizem infusion stopped as patient has converted out of Afib.  Continue IV rocephin. Suggest discontinuing azithromycin  "

## 2025-07-06 NOTE — CONSULTS
[facility] Consult Note     Attending Physician: Blaire Cunha MD  Cardiology Attending Physician: Juan Luis Glover MD  Date of Admit: 7/5/2025  Reason for Consult: Afib with RVR      History of Present Illness:       Cat Garcia is a pleasant 89 y.o. female with PMHx of chronic Afib, CKD, HTN, HLD, and dementia presented with lethargy/somnolence and found to be in a Afib with RVR. Hx per charted. Was given AV abad blocker with labile BP.       Resides in NH     History obtained by patient interview and supplemented by nursing documentation and chart review.   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   SurgHx:  has a past surgical history that includes Breast surgery (2011).   FamHx: family history includes Heart disease in her father.   SocialHx:  reports that she quit smoking about 43 years ago. Her smoking use included cigarettes. She has never used smokeless tobacco. She reports that she does not drink alcohol and does not use drugs.  Home Meds:  Current Outpatient Medications   Medication Instructions    apixaban (ELIQUIS) 2.5 mg, Oral, 2 times daily    atorvastatin (LIPITOR) 10 MG tablet ONE TABLET BY MOUTH ONCE DAILY DX: HYPERLIPIDEMIA    hydroxyurea (HYDREA) 500 mg Cap 2 CAPSULES (1000MG) BY MOUTH EVERY EVENING DX: THROMBOCYTOSIS    hydroxyurea (HYDREA) 1,000 mg, Oral, 2 times daily, EVERY MORNING and EVENING.    latanoprost 0.005 % ophthalmic solution 1 drop, Both Eyes, Nightly    zinc oxide (BOUDREAUXS BUTT PASTE) 40 % Oint 1 Application, Topical (Top), Daily, Skyler's Butt Paste maximum strength     Review of Systems: Comprehensive ROS was performed and is negative unless  "otherwise noted in HPI.     Objective:   Last 24 Hour Vital Signs:  BP  Min: 111/52  Max: 150/67  Temp  Av.8 °F (36.6 °C)  Min: 97.5 °F (36.4 °C)  Max: 98 °F (36.7 °C)  Pulse  Av.6  Min: 72  Max: 170  Resp  Av.6  Min: 8  Max: 34  SpO2  Av.7 %  Min: 89 %  Max: 100 %  Height  Av' 5" (165.1 cm)  Min: 5' 5" (165.1 cm)  Max: 5' 5" (165.1 cm)  Weight  Av.3 kg (117 lb 9.2 oz)  Min: 52.2 kg (115 lb)  Max: 54.5 kg (120 lb 2.4 oz)  I/O last 3 completed shifts:  In: 1499 [IV Piggyback:1499]  Out: -   Physical Examination: limited  Constitutional: NAD  HEENT: MMM  Cardiovascular: IRIR  Pulmonary: normal respiratory effort, CTAB, no crackles, wheezes  Abdominal: S/NT/ND  Musculoskeletal: No lower extremity edema noted  Neurological: oriented to self  Skin: W/D/I    Laboratory:  Personally reviewed    Other Results:  TTE:  Results for orders placed during the hospital encounter of 25    Echo    Interpretation Summary    Left Ventricle: The left ventricle is normal in size. There is concentric hypertrophy. Unable to assess wall motion. There is normal systolic function with a visually estimated ejection fraction of 55 - 60%.    Right Ventricle: Right ventricle was not well visualized due to poor acoustic window.    Left Atrium: Left atrium was not well visualized.    Aortic Valve: There is mild aortic valve sclerosis.    Overall the study is inadequate.  Patient refused complete study    Stress Testing:   No results found for this or any previous visit.     Coronary Angiogram:  No results found for this or any previous visit.      -Reviewing Medical records & lab results  -Independently reviewing and interpreting (if not documented by myself) EKGs, echocardiograms, catherizations   -Obtaining a history, performing a relevant exam, counseling/educating the patient/family  -Documenting clinical information in the EMR including ordering of tests  -Care coordination and communicating with other " health care providers           Assessment/Plan:     Active Hospital Problems    Diagnosis    *Sepsis    UTI (urinary tract infection)    SOB (shortness of breath)    Lethargy    Dementia    Elevated troponin    Macrocytic anemia    Thrombocytosis    Mixed Alzheimer's and vascular dementia    Debility    Generalized weakness    Hypotension    Atrial fibrillation, chronic    Atrial fibrillation with RVR     Cat Garcia is an 89 year old female with PMH of atrial fibrillation (on eliquis), CKD3, HTN, HLD, and dementia who presents to the ED for altered mental status and found to be in afib with RVR. Resides in NH. Patient is only oriented to herself.     Afib with RVR s/p AVNB with labile BP. Discontinued dilt gtt and give first dose of lopressor 25 mg TID - now. Uptitrate BB as tolerated. Hold amiodarone given possible allergy. Digoxin if goes back into RVR. Continue OAC.  Replete electrolytes; K>4, Mg>2. Trend troponin & EKG q6      Thank you for the opportunity to care for this patient. Please don't hesitate to reach out with any questions/concerns,    Juan Luis Glover MD  Interventional Cardiology  [facility]

## 2025-07-06 NOTE — EICU
"Intervention Initiated From:  [facility] / EICU    Dilip intervened regarding:  Rounding (Video assessment)    Virtual ICU Admission    Admit Date: 2025  LOS: 0  Code Status: DNR   : 1935  Bed: K570/K570 A:     Diagnosis: Sepsis    Patient  has a past medical history of Atrial fibrillation, Breast cancer, Chronic bronchitis, Chronic combined systolic and diastolic CHF, NYHA class 3, CKD (chronic kidney disease), stage III, Dementia, Dyslipidemia, Essential hypertension, Squamous cell carcinoma, and Vascular parkinsonism.    Last VS: BP (!) 149/66   Pulse 103   Temp 97.5 °F (36.4 °C) (Oral)   Resp 16   Ht 5' 5" (1.651 m)   Wt 52.2 kg (115 lb)   SpO2 95%   BMI 19.14 kg/m²       VICU Review    VICU nurse assessment :  [place] completed, LDA documentation reconciliation completed, and VTE prophylaxis review      Nursing orders placed : IP EMMETT Peripheral IV Access         "

## 2025-07-06 NOTE — NURSING TRANSFER
Nursing Transfer Note      7/6/2025   1650 PM    Nurse giving handoff: EMILIA Flores      Nurse receiving handoff: EMILIA Chahal    Transfer To: [id number]    Transfer via bed    Transfer with cardiac monitoring    Transported by EMILIA Flores    Transfer Vital Signs:  Blood Pressure: 132/62  Heart Rate: 94  O2:100% on room air  Temperature: 97.6F  Respirations:15    Order for Tele Monitor? Yes      Patient belongings transferred with patient: Yes (clothing)    Chart sent with patient: Yes    Notified: son (Abdon Ruiz)    Upon arrival to floor: Fela NIETO at bedside. Cardiac monitor applied, patient oriented to room, call bell in reach, and bed in lowest position.

## 2025-07-06 NOTE — PROGRESS NOTES
"[facility] Progress Note     Attending Physician: Blaire Cunha MD  Cardiology Attending Physician: Juan Luis Glover MD  Date of Admit: 2025      Subjective/Interval History:      NAEO. HR controlled.      Objective:     Last 24 Hour Vital Signs:  BP  Min: 101/68  Max: 163/93  Temp  Av.8 °F (36.6 °C)  Min: 96.2 °F (35.7 °C)  Max: 98.6 °F (37 °C)  Pulse  Av.6  Min: 68  Max: 145  Resp  Av.9  Min: 10  Max: 32  SpO2  Av.5 %  Min: 84 %  Max: 100 %  Height  Av' 5" (165.1 cm)  Min: 5' 5" (165.1 cm)  Max: 5' 5" (165.1 cm)  Weight  Av.5 kg (120 lb 2.4 oz)  Min: 54.5 kg (120 lb 2.4 oz)  Max: 54.5 kg (120 lb 2.4 oz)  I/O last 3 completed shifts:  In: 2506.8 [I.V.:54; IV Piggyback:2452.8]  Out: 300 [Urine:300]    Physical Examination:  Physical Examination: limited  Constitutional: NAD  HEENT: MMM  Cardiovascular: IRIR  Pulmonary: normal respiratory effort, CTAB, no crackles, wheezes  Abdominal: S/NT/ND  Musculoskeletal: No lower extremity edema noted  Neurological: oriented to self  Skin: W/D/I    Laboratory/Radiographic/Cardiac Data:  Personally Reviewed      Assessment/Plan:     Patient Active Problem List    Diagnosis Date Noted    Sepsis 2025    UTI (urinary tract infection) 2025    SOB (shortness of breath) 2025    Lethargy 2025    Dementia 2025    Elevated troponin 2025    Bradycardia 2025    Macrocytic anemia 2025    E. coli UTI (urinary tract infection) 2025    DNR (do not resuscitate) 2021    At high risk for falls 2019    Essential thrombocytosis 10/09/2018    Aortic atherosclerosis 2017    Thrombocytosis 2017    Mixed Alzheimer's and vascular dementia 2016    Vascular parkinsonism 2016    Debility 09/15/2015    Long term current use of anticoagulant therapy 2013    Hypotension 2013    Generalized weakness 2013    Dilated cardiomyopathy 2013    Chronic " combined systolic and diastolic CHF, NYHA class 3 12/18/2013    Atrial fibrillation with RVR 12/15/2013    Dyslipidemia 12/15/2013    Essential hypertension 12/15/2013    Atrial fibrillation, chronic 12/15/2013    CKD (chronic kidney disease), stage III 12/15/2013        Afib with RVR s/p AVNB with labile BP. Discontinued dilt gtt. Uptitrate BB as tolerated and prior to discharge transition to Toprol. Digoxin if goes back into RVR given labile BP. Continue OAC.  Replete electrolytes; K>4, Mg>2.    Juan Luis Glover M.D.  Interventional Cardiology   [facility]    -Medical records & lab results  -Independently reviewing and interpreting (if not documented by myself) EKGs, echocardiograms, catherizations   -Obtaining a history, performing a relevant exam, counseling/educating the patient/family  -Documenting clinical information in the EMR including ordering of tests  -Care coordination and communicating with other health care professionals

## 2025-07-06 NOTE — PROGRESS NOTES
"[facility] Progress Note    Subjective:      Did fairly well overnight, went back into the -110s when stimulated this AM, but had been rate controlled all night.     Objective:     Last 24 Hour Vital Signs:  BP  Min: 107/59  Max: 167/92  Temp  Av.7 °F (36.5 °C)  Min: 96.2 °F (35.7 °C)  Max: 98.6 °F (37 °C)  Pulse  Av.5  Min: 68  Max: 170  Resp  Av.9  Min: 8  Max: 34  SpO2  Av.6 %  Min: 84 %  Max: 100 %  Height  Av' 5" (165.1 cm)  Min: 5' 5" (165.1 cm)  Max: 5' 5" (165.1 cm)  Weight  Av.3 kg (117 lb 9.2 oz)  Min: 52.2 kg (115 lb)  Max: 54.5 kg (120 lb 2.4 oz)  I/O last 3 completed shifts:  In: 2506.8 [I.V.:54; IV Piggyback:2452.8]  Out: 300 [Urine:300]    Physical Examination:  Gen: AOx1, NAD, comfortable appearing  HEENT: NCAT, PERRL, EOMI, MMM, JVP ~5cm, no thyromegaly, lymphadenopathy appreciated  CV: Tachycardic, irregularly irregular rhythm, S1/S2 appreciated, no murmur gallop or rubs  Resp: RUL rhonchi, no increased WOB, no crackles, rhonchi appreciated  Abdomen: Soft, NT, ND, +BS  Ext: 2+ distal pulses, no edema appreciated  Skin: Warm, dry, no rashes appreciated      Laboratory:  Trended Lab Data:  Recent Labs     25  1043 25  1320 25  0337   WBC 13.96*  --  11.04   HGB 10.2*  --  8.6*   HCT 29.4*  --  25.8*   *  --  765*   NA  --  140 139   K  --  3.8 4.0   CL  --  114* 112*   CO2  --  17* 20*   BUN  --  50* 37*   CREATININE  --  0.9 0.8   GLU  --  118* 98   BILITOT  --  0.4 0.4   AST  --  58* 54*   ALT  --  23 24   ALKPHOS  --  45 41   CALCIUM  --  8.5* 8.3*   ALBUMIN  --  3.0* 2.8*   PROT  --  6.3 5.5*   MG  --  2.1 2.4   PHOS  --   --  2.3*       Cardiac:   Recent Labs   Lab 25  1320 25  1658   TROPONINI 0.109* 0.098*   BNP  --  279*       Urinalysis:   Lab Results   Component Value Date    LABURIN ESCHERICHIA COLI  >100,000 cfu/ml   (A) 2025    COLORU Yellow 2025    SPECGRAV 1.030 2025 "    NITRITE Negative 07/05/2025    KETONESU Trace (A) 03/11/2025    UROBILINOGEN Negative 07/05/2025       Microbiology:  Microbiology Results (last 7 days)       Procedure Component Value Units Date/Time    Blood culture x two cultures. Draw prior to antibiotics. [[id number]]  (Normal) Collected: 07/05/25 1208    Order Status: Completed Specimen: Blood from Peripheral, Hand, Right Updated: 07/06/25 0002     Blood Culture No Growth After 6 Hours    Blood culture x two cultures. Draw prior to antibiotics. [[id number]]  (Normal) Collected: 07/05/25 1320    Order Status: Completed Specimen: Blood from Peripheral, Antecubital, Right Updated: 07/06/25 0002     Blood Culture No Growth After 6 Hours    Urine Culture High Risk ($$) [[id number]] Collected: 07/05/25 1208    Order Status: Sent Specimen: Urine, Clean Catch Updated: 07/05/25 1221            Radiology:  CT Head Without Contrast         Allowing for artifact from motion no acute intracranial findings specifically without evidence for acute intracranial hemorrhage or evidence for large territory recent infarction     Continued advanced cerebral volume loss with patchy and confluent decreased attenuation supratentorial white matter which may be sequela of chronic ischemic change.  Underlying neuro degenerative process to be considered.       I have personally reviewed the above labs and imaging.    Current Medications:     Infusions:       Scheduled:   apixaban  2.5 mg Oral BID    atorvastatin  10 mg Oral Daily    azithromycin  500 mg Oral Daily    cefTRIAXone (Rocephin) IV (PEDS and ADULTS)  1 g Intravenous Q24H    hydroxyurea  500 mg Oral BID    metoprolol tartrate  25 mg Oral BID    mupirocin   Nasal BID        PRN:    Current Facility-Administered Medications:     acetaminophen, 1,000 mg, Oral, Q6H PRN    albuterol-ipratropium, 3 mL, Nebulization, Q6H PRN    dextrose 50%, 12.5 g, Intravenous, PRN    dextrose 50%, 25 g, Intravenous, PRN    glucagon (human  recombinant), 1 mg, Intramuscular, PRN    glucose, 16 g, Oral, PRN    glucose, 24 g, Oral, PRN    naloxone, 0.02 mg, Intravenous, PRN    sodium chloride 0.9%, 10 mL, Intravenous, Q12H PRN    Assessment:     89yo woman with Chronic AF, CKD3, HTN, HLD, and dementia who lives at at NH who presents to [facility] for lethargy and somnolence, found to be in AF with RVR. Encephalopathic on exam. On dilt gtt. Discussed with Cardiology; on metoprolol and if needed will start digoxin. Remained fairly rate controlled overnight, okay to step down today     Plan:     Cardiovascular/Hemodynamics:  AF with RVR  - initially started on dilt and dilt gtt. Transitioned to metoprolol after she broke from HR 150s to 80s.  - Continue metoprolol BID and start digoxin per Cardiology if she becomes uncontrolled again  - Continue eliquis for AC    Neuro:  Dementia  - at baseline, RASS goal 0    Dispo: Okay to step down from ICU today    35 minutes of critical care time was spent in the critical care management of the patient.  This included management of organ failures related to critical illness, titration of continuous infusions, management of mechanical ventilation, review of pertinent labs and imaging studies, discussion of the patient with consulting services, and discussion of the patients condition with the patient and family.     Chauncey Oro MD  [facility]

## 2025-07-06 NOTE — NURSING
Dr. Peralta notified of elevated HR. HR ranging from 110-140s, not sustaining HR > 130. When resting, HR returns to 100-110s. BP remains stable.

## 2025-07-06 NOTE — NURSING
Pt to floor from ICU. Oriented to self only. Tray set up provided. Pt feeding herself w/o difficulty. VSS. Waffle mattress placed on bed.

## 2025-07-06 NOTE — PLAN OF CARE
Pt. Had uneventful night, no acute complaints. Pt. Has been in A-fib for the entirety of my shift with HR of 80's-120's. On 2L NC with sats > 94%. BP WNL. No gtts. 200 mL urine overnight, bladder scan showed 225 mL. Bed in lowest position, side rails raised x2, bed alarm set, call bell within reach.     Problem: Sepsis/Septic Shock  Goal: Optimal Coping  Outcome: Progressing  Goal: Absence of Bleeding  Outcome: Progressing  Goal: Blood Glucose Level Within Targeted Range  Outcome: Progressing  Goal: Absence of Infection Signs and Symptoms  Outcome: Progressing  Goal: Optimal Nutrition Intake  Outcome: Progressing     Problem: Fall Injury Risk  Goal: Absence of Fall and Fall-Related Injury  Outcome: Progressing     Problem: Adult Inpatient Plan of Care  Goal: Plan of Care Review  Outcome: Progressing  Goal: Patient-Specific Goal (Individualized)  Outcome: Progressing  Goal: Absence of Hospital-Acquired Illness or Injury  Outcome: Progressing  Goal: Optimal Comfort and Wellbeing  Outcome: Progressing  Goal: Readiness for Transition of Care  Outcome: Progressing

## 2025-07-06 NOTE — PT/OT/SLP PROGRESS
Occupational Therapy      Patient Name:  Cat Garcia   MRN:  [id number]    1153 - Patient not seen today secondary to Unarousable (OT/PT attempted to awaken pt, but pt remained asleep). Will follow-up at next available visit.    Trinh White, OT  7/6/2025

## 2025-07-06 NOTE — CONSULTS
Consult Note  [facility]    Attending: Chauncey Oro MD  Fellow: Hola Lees  Admit Date: 7/5/2025  Today's Date: 07/05/2025  Reason for Consult:  Afib with RVR    SUBJECTIVE:     HPI:    Cat Garcia is an 89 year old female with PMH of atrial fibrillation (on eliquis), CKD3, HTN, HLD, and dementia who presents to the ED for altered mental status and found to be in afib with RVR. Resides in NH. Patient is only oriented to herself. Denies any active chest pain, shortness of breath, abdominal pain, nausea, diarrhea, cough, congestion, fevers.     Received 1L LR bolus. 10 mg Dilt IV x 1 then 15 mg x 1 in ED. Started on dilt infusion.     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    ROS  Pertinent ROS in HPI    OBJECTIVE:     Vital Signs Trends/Hx Reviewed  Vitals:    07/05/25 1735 07/05/25 1736 07/05/25 1741 07/05/25 1820   BP:   112/62 114/65   Pulse: (!) 162 (!) 159 (!) 144 95   Resp: 20 16 20 14   Temp:       TempSrc:       SpO2:    (!) 93%   Weight:       Height:           Physical Exam:  General: NAD, cooperative & interactive.  HEENT: AT/NC, EOMI, oral and nasal mucosa moist.   Neck: Supple without JVD or palpable LAD.   Cardiac: tachycardic with  irregular rhythm, with no MRG with brisk cap refill and symmetric pulses in distal  extremities.  Respiratory: Normal inspection. Symmetric chest rise. Normal palpation and percussion. No increased work of breathing noted. Rhonchi to right mid lung field.  Abdomen: Soft, NT/ND.  Extremities: No edema.   Neuro: Grossly intact to brief exam. Oriented x1 to self with appropriate mood/affect to situation.       Laboratory:  Recent Labs   Lab 07/05/25  1204   PH 7.408   PCO2 39.1   PO2 23.7*   HCO3 24.7   POCSATURATED 35.4*     Recent Labs   Lab 07/05/25  1043   WBC 13.96*   RBC 2.50*   HGB 10.2*   HCT 29.4*   *   *   MCH 40.8*   MCHC 34.7     Recent Labs   Lab 07/05/25  1320      K 3.8   *   CO2 17*   BUN 50*   CREATININE 0.9   CALCIUM 8.5*   MG 2.1       Microbiology Data:   Microbiology Results (last 7 days)       Procedure Component Value Units Date/Time    Blood culture x two cultures. Draw prior to antibiotics. [[id number]] Collected: 07/05/25 1320    Order Status: Resulted Specimen: Blood from Peripheral, Antecubital, Right Updated: 07/05/25 1324    Urine Culture High Risk ($$) [[id number]] Collected: 07/05/25 1208    Order Status: Sent Specimen: Urine, Clean Catch Updated: 07/05/25 1221    Blood culture x two cultures. Draw prior to antibiotics. [[id number]] Collected: 07/05/25 1208    Order Status: Resulted Specimen: Blood from Peripheral, Hand, Right Updated: 07/05/25 1213             Chest Imaging:   No new imaging.     Infusions:        Scheduled Medications:    apixaban  2.5 mg Oral BID    [START ON 7/6/2025] atorvastatin  10 mg Oral Daily    azithromycin  500 mg Oral Daily    hydroxyurea  500 mg Oral BID    lactated ringers  1,000 mL Intravenous Once    magnesium sulfate 2 g IVPB  2 g Intravenous Once    potassium bicarbonate  20 mEq Oral Once       PRN Medications:     Current Facility-Administered Medications:     acetaminophen, 1,000 mg, Oral, Q6H PRN    albuterol-ipratropium, 3 mL, Nebulization, Q6H PRN    dextrose 50%, 12.5 g, Intravenous, PRN    dextrose  50%, 25 g, Intravenous, PRN    glucagon (human recombinant), 1 mg, Intramuscular, PRN    glucose, 16 g, Oral, PRN    glucose, 24 g, Oral, PRN    naloxone, 0.02 mg, Intravenous, PRN    sodium chloride 0.9%, 10 mL, Intravenous, Q12H PRN    Assessment & Plan:     Cat Garcia is an 89 year old female with PMH of atrial fibrillation (on eliquis), CKD3, HTN, HLD, and dementia who presents to the ED for altered mental status and found to be in afib with RVR. Received 1L LR bolus, 10 mg IV dilt, 15 mg IV dilt, and then started on dilt infusion in ED. Trops initially mildly elevated and trended down with non-ischemic EKG. Appears thin, frail, and possibly hypovolemic. UA is infectious appearing and unable to reliably obtain history if patient is symptomatic. S/p 1g rocephin in ED.    ASSESSMENT & RECOMMENDATIONS     CNS/Neuro  Dementia  -Oriented x 1 to self. Unclear if this is baseline, but appears to be so based on physical exam from most recent palliative clinic appointment.  -Continue to monitor  -CTH negative for acute intracranial process    Cardiovascular  Atrial Fibrillation with RVR  -Etiology possibly hypovolemia? Sepsis workup initiated and given Rocephin.  -s/p 1L LR, 10 mg IV diltiazem and 15 mg IV diltiazem by ED with no conversion and started on diltiazem infusion  -Gave additional 15 mg IV diltiazem push with rate control, but did become hypotensive briefly  -Additional 1L LR bolus ordered  -EKG non-ischemic. Trop elevated to 0.109 and down trended. TSH within normal limits. UDS negative.  -Cardiology following and recommending discontinuing diltiazem drip and not starting amiodarone at this time. Recommend starting 25 mg Lopressor PO TID and digoxin if goes back into RVR  -Continue 2.5 mg Eliquis BID    HLD  -Continue home statin    Respiratory  Acute Hypoxic Respiratory Failure  -on 2L NC  -Continue to monitor  -CXR without any acute intracranial process    GI/Metabolic  GI Ppx: None  Diet: Soft and bite  sized  No acute issues    Renal  Macrocytic Anemia  -Hb 10.2/29.4 which is stable from priors  -No signs of bleeding  -Continue home eliquis 2.5 mg BID    Heme  DVT Ppx: Heparin    Endo:   Strict Glucose control with goal 140-180      ID  Sepsis?  -Tachycardic, leukocytosis,  -UA with 3+ leuks and 34 WBC with many bacteria  -Received one dose of Rocephin in ED  -Unable to reliably assess if patient is having frequency, dysuria.  -Fu Bcx, Urine cx    Feeding: soft diet  Analgesia: PRN tylenol  Sedation: none  Thrombosis: heparin TID  Head of bed: elevated  Ulcer Prophylaxis : none  Glycemic Control: at goal  SBT: n/a  Bowel regimen: none  Indwelling catheter removal: none  De-escalation of Antibiotics: aaliyah Lees MD  [facility] HO-IV           [1]   Social History  Tobacco Use    Smoking status: Former     Current packs/day: 0.00     Types: Cigarettes     Quit date: 1982     Years since quittin.5    Smokeless tobacco: Never    Tobacco comments:     pt was social smoker   Substance Use Topics    Alcohol use: No     Alcohol/week: 0.0 standard drinks of alcohol    Drug use: No

## 2025-07-06 NOTE — PROGRESS NOTES
"[facility] Medicine Progress Note    Primary Team: [facility] Hospitalist Team A  Attending Physician: Blaire Cunha MD  Resident: Dr. Peralta  Intern: Dr. Callahan    Admission Date:  7/5/2025  Hospital Day: Hospital Day: 2    Chief Complaint:    Chief Complaint   Patient presents with    Fatigue     EMS activated for lethargy, generalized weakness. Found patient to be in Afib w/RVR, . Lopressor 5 mg IVP given with decreased rate to 80s. On arrival, continues lethargic. Patient is on memory care unit and unable to contribute to history.       Subjective/Interval History       NAEON. Unchanged from admission     Review of Systems:  All other systems reviewed and negative.     Objective     Physical Examination:  /61 (BP Location: Right arm, Patient Position: Lying)   Pulse 84   Temp 98.1 °F (36.7 °C) (Axillary)   Resp 16   Ht 5' 5" (1.651 m)   Wt 54.5 kg (120 lb 2.4 oz)   SpO2 100%   BMI 19.99 kg/m²    General: NAD, well-developed, non-toxic, resting comfortably in bed  HEENT: AT/NC. Non-icteric sclerae, appropriate eye tracking. MMM.  CV/Chest: RRR, no obvious murmurs appreciated. No pain on chest wall palpation.  Resp: Stable on RA. CTAB on anterior lung fields. Symmetric chest rise, normal work of breathing, no accessory muscle use or conversational dyspnea.  GI: soft, non-tender, non-distended, bowel sounds active  Skin: Warm, dry. No rash.  MSK: moves all 4 extremities spontaneously, no gross deformities, no BLE edema  Pulses: strong peripheral pulses  Neuro: AAOx3 to self, time, and place. No gross FND noted. 5/5 strength in BUE/BLE. Following commands.  Psych: Normal mood and affect, holding linear conversations    Intake/Output:    Intake/Output Summary (Last 24 hours) at 7/6/2025 0820  Last data filed at 7/6/2025 0700  Gross per 24 hour   Intake 2506.77 ml   Output 300 ml   Net 2206.77 ml     Net IO Since Admission: 2,206.77 mL [07/06/25 0820]    Laboratory:  Recent Labs   Lab 07/05/25  1043 " "07/05/25  1320 07/05/25  1658 07/06/25  0337   WBC 13.96*  --   --  11.04   HGB 10.2*  --   --  8.6*   *  --   --  765*   *  --   --  119*   NA  --  140  --  139   K  --  3.8  --  4.0   CL  --  114*  --  112*   CO2  --  17*  --  20*   BUN  --  50*  --  37*   CREATININE  --  0.9  --  0.8   GLU  --  118*  --  98   CALCIUM  --  8.5*  --  8.3*   PROT  --  6.3  --  5.5*   ALBUMIN  --  3.0*  --  2.8*   PHOS  --   --   --  2.3*   MG  --  2.1  --  2.4   AST  --  58*  --  54*   ALT  --  23  --  24   ALKPHOS  --  45  --  41   TROPONINI  --  0.109* 0.098*  --    BNP  --   --  279*  --      No results for input(s): "APTT", "INR" in the last 168 hours.    Invalid input(s): "APT"  Lab Results   Component Value Date    COLORU Yellow 07/05/2025    APPEARANCEUA Hazy (A) 07/05/2025    SPECGRAV 1.030 07/05/2025    PHUR 6.0 07/05/2025    PROTEINUA 1+ (A) 07/05/2025    KETONESU Trace (A) 03/11/2025    LEUKOCYTESUR 3+ (A) 07/05/2025    NITRITE Negative 07/05/2025    UROBILINOGEN Negative 07/05/2025          Current Medications:     Infusions:       Scheduled:   apixaban  2.5 mg Oral BID    atorvastatin  10 mg Oral Daily    azithromycin  500 mg Oral Daily    cefTRIAXone (Rocephin) IV (PEDS and ADULTS)  1 g Intravenous Q24H    hydroxyurea  500 mg Oral BID    metoprolol tartrate  25 mg Oral BID    mupirocin   Nasal BID        PRN:    Current Facility-Administered Medications:     acetaminophen, 1,000 mg, Oral, Q6H PRN    albuterol-ipratropium, 3 mL, Nebulization, Q6H PRN    dextrose 50%, 12.5 g, Intravenous, PRN    dextrose 50%, 25 g, Intravenous, PRN    glucagon (human recombinant), 1 mg, Intramuscular, PRN    glucose, 16 g, Oral, PRN    glucose, 24 g, Oral, PRN    naloxone, 0.02 mg, Intravenous, PRN    sodium chloride 0.9%, 10 mL, Intravenous, Q12H PRN    Antibiotics and Day Number of Therapy:  Antibiotics (From admission, onward)      Start     Stop Route Frequency Ordered    07/06/25 0900  cefTRIAXone injection 1 g         " 07/08/25 0859 IV Every 24 hours (non-standard times) 07/05/25 2001 07/06/25 0900  mupirocin 2 % ointment         07/11/25 0859 Nasl 2 times daily 07/06/25 0455    07/05/25 1800  azithromycin tablet 500 mg         07/08/25 0859 Oral Daily 07/05/25 1337              Assessment/Plan:     Cat Garcia is a 89 y.o. White female with past medical history of afib with RVR (on eliquis), CKD 3, Dementia, HLD, HTN, thrombocytosis, and dementia who presented at [facility] on 7/5/2025 for acute encephalopathy from NH.  Patient is admitted to [facility] for AMS workup. Received 1L LR bolus, 10mg Dilt IV x 1 then 15mg X 1 in ED. Started on diltiazem infusion.     Acute on chronic encephalopathy  History of dementia  Sepsis  Per NH, not at baseline - increase in somnolence, weakness  Tachycardic, leukocytosis, lactacte 2.4, procal 0.43  CT head negative for acute findings  Chest x ray - no focal consolidations  UA- wbc and leukocyte +  Urine culture - pending   Blood culture - NO growth prelim  Covid and flu -Negative   Given rocephin once  Plan:  Continue azithromycin     Afib with RVR  TTE from 8/2024: Grade II Diastolic dysfunction, mildly dilated LA, EF 55-60%  S/p IL LR, 10mg IV dilt x 15mg IV dilt, did become hypotensive briefly   EKG: afib with RVR  Trop .109 and downtrended  Bnp 276  TSH WNL  UDS negative  Cardiology following and recommending discontinuing diltiazam drop and not starting amiodarone  Recommend lopressor 25mg PO TID and digoxin If she goes back into RVR  Continue eliquis 2.5 BID  Continue lopressor 25 BID     CKD 3  Cr 0.9, GFR>60  Monitor kidney function  Avoid nephrotoxins      HLD  Continue home statin     Acute Hypoxic Respiratory failure  On 2L NC  CTM  CXR - no acute findings     Macrocytic anemia  H/H 10.22/29.4 - at baseline   Mcv 118  No signs of bleeding  CTM     Thrombocytosis  Hx of thrombocytosis  Continue home hydroxyurea         Diet:  Diet Soft & Bite Sized (IDDSI Level 6)  VTE  PPx: heparin  Code Status:  DNR    Christina Callahan MD PGY1     [facility] Medicine Hospitalist Pager numbers:   [facility] Hospitalist Medicine Team A (Guerline/Skyler):          [phone]  [facility] Hospitalist Medicine Team B (Nathanael/Neto):        [phone]

## 2025-07-06 NOTE — PT/OT/SLP PROGRESS
Physical Therapy      Patient Name:  Cat Garcia   MRN:  [id number]    Patient not seen today secondary to Patient fatigue. Pt able to open eyes briefly when pillow repositioned for comfort but unable to remain awake; pt not safe for OOB mobility at this time. Nursing notified. Will follow-up as able.    7/6/25- (11:53-11:57)

## 2025-07-07 LAB
ABSOLUTE EOSINOPHIL (OHS): 0.03 K/UL
ABSOLUTE MONOCYTE (OHS): 0.9 K/UL (ref 0.3–1)
ABSOLUTE NEUTROPHIL COUNT (OHS): 6.56 K/UL (ref 1.8–7.7)
ALBUMIN SERPL BCP-MCNC: 2.8 G/DL (ref 3.5–5.2)
ALP SERPL-CCNC: 44 UNIT/L (ref 40–150)
ALT SERPL W/O P-5'-P-CCNC: 27 UNIT/L (ref 10–44)
ANION GAP (OHS): 8 MMOL/L (ref 8–16)
AST SERPL-CCNC: 54 UNIT/L (ref 11–45)
BACTERIA UR CULT: ABNORMAL
BASOPHILS # BLD AUTO: 0.02 K/UL
BASOPHILS NFR BLD AUTO: 0.2 %
BILIRUB SERPL-MCNC: 0.6 MG/DL (ref 0.1–1)
BUN SERPL-MCNC: 24 MG/DL (ref 8–23)
CALCIUM SERPL-MCNC: 8.4 MG/DL (ref 8.7–10.5)
CHLORIDE SERPL-SCNC: 109 MMOL/L (ref 95–110)
CK SERPL-CCNC: 359 U/L (ref 20–180)
CO2 SERPL-SCNC: 22 MMOL/L (ref 23–29)
CREAT SERPL-MCNC: 0.7 MG/DL (ref 0.5–1.4)
ERYTHROCYTE [DISTWIDTH] IN BLOOD BY AUTOMATED COUNT: 16.2 % (ref 11.5–14.5)
GFR SERPLBLD CREATININE-BSD FMLA CKD-EPI: >60 ML/MIN/1.73/M2
GLUCOSE SERPL-MCNC: 93 MG/DL (ref 70–110)
HCT VFR BLD AUTO: 28.2 % (ref 37–48.5)
HGB BLD-MCNC: 9.2 GM/DL (ref 12–16)
IMM GRANULOCYTES # BLD AUTO: 0.11 K/UL (ref 0–0.04)
IMM GRANULOCYTES NFR BLD AUTO: 1.1 % (ref 0–0.5)
LYMPHOCYTES # BLD AUTO: 2.1 K/UL (ref 1–4.8)
MAGNESIUM SERPL-MCNC: 2.1 MG/DL (ref 1.6–2.6)
MCH RBC QN AUTO: 39.5 PG (ref 27–31)
MCHC RBC AUTO-ENTMCNC: 32.6 G/DL (ref 32–36)
MCV RBC AUTO: 121 FL (ref 82–98)
NUCLEATED RBC (/100WBC) (OHS): 0 /100 WBC
OHS QRS DURATION: 78 MS
OHS QRS DURATION: 82 MS
OHS QRS DURATION: 82 MS
OHS QTC CALCULATION: 395 MS
OHS QTC CALCULATION: 399 MS
OHS QTC CALCULATION: 466 MS
PHOSPHATE SERPL-MCNC: 2.9 MG/DL (ref 2.7–4.5)
PLATELET # BLD AUTO: 814 K/UL (ref 150–450)
PMV BLD AUTO: 10.3 FL (ref 9.2–12.9)
POTASSIUM SERPL-SCNC: 4.1 MMOL/L (ref 3.5–5.1)
PROT SERPL-MCNC: 5.9 GM/DL (ref 6–8.4)
RBC # BLD AUTO: 2.33 M/UL (ref 4–5.4)
RELATIVE EOSINOPHIL (OHS): 0.3 %
RELATIVE LYMPHOCYTE (OHS): 21.6 % (ref 18–48)
RELATIVE MONOCYTE (OHS): 9.3 % (ref 4–15)
RELATIVE NEUTROPHIL (OHS): 67.5 % (ref 38–73)
SODIUM SERPL-SCNC: 139 MMOL/L (ref 136–145)
WBC # BLD AUTO: 9.72 K/UL (ref 3.9–12.7)

## 2025-07-07 PROCEDURE — 97162 PT EVAL MOD COMPLEX 30 MIN: CPT

## 2025-07-07 PROCEDURE — 97166 OT EVAL MOD COMPLEX 45 MIN: CPT

## 2025-07-07 PROCEDURE — 36415 COLL VENOUS BLD VENIPUNCTURE: CPT

## 2025-07-07 PROCEDURE — 21400001 HC TELEMETRY ROOM

## 2025-07-07 PROCEDURE — 25000003 PHARM REV CODE 250

## 2025-07-07 PROCEDURE — 84100 ASSAY OF PHOSPHORUS: CPT

## 2025-07-07 PROCEDURE — 82550 ASSAY OF CK (CPK): CPT

## 2025-07-07 PROCEDURE — 93010 ELECTROCARDIOGRAM REPORT: CPT | Mod: ,,, | Performed by: STUDENT IN AN ORGANIZED HEALTH CARE EDUCATION/TRAINING PROGRAM

## 2025-07-07 PROCEDURE — 85025 COMPLETE CBC W/AUTO DIFF WBC: CPT

## 2025-07-07 PROCEDURE — 97530 THERAPEUTIC ACTIVITIES: CPT

## 2025-07-07 PROCEDURE — 63600175 PHARM REV CODE 636 W HCPCS

## 2025-07-07 PROCEDURE — 93005 ELECTROCARDIOGRAM TRACING: CPT

## 2025-07-07 PROCEDURE — 27000207 HC ISOLATION

## 2025-07-07 PROCEDURE — 94761 N-INVAS EAR/PLS OXIMETRY MLT: CPT

## 2025-07-07 PROCEDURE — 99900035 HC TECH TIME PER 15 MIN (STAT)

## 2025-07-07 PROCEDURE — 63700000 PHARM REV CODE 250 ALT 637 W/O HCPCS

## 2025-07-07 PROCEDURE — 82040 ASSAY OF SERUM ALBUMIN: CPT

## 2025-07-07 PROCEDURE — 83735 ASSAY OF MAGNESIUM: CPT

## 2025-07-07 RX ORDER — METOPROLOL TARTRATE 25 MG/1
25 TABLET, FILM COATED ORAL ONCE
Status: COMPLETED | OUTPATIENT
Start: 2025-07-07 | End: 2025-07-07

## 2025-07-07 RX ORDER — METOPROLOL TARTRATE 1 MG/ML
2.5 INJECTION, SOLUTION INTRAVENOUS ONCE
Status: DISCONTINUED | OUTPATIENT
Start: 2025-07-07 | End: 2025-07-07

## 2025-07-07 RX ORDER — METOPROLOL SUCCINATE 50 MG/1
100 TABLET, EXTENDED RELEASE ORAL DAILY
Status: DISCONTINUED | OUTPATIENT
Start: 2025-07-08 | End: 2025-07-08

## 2025-07-07 RX ORDER — METOPROLOL TARTRATE 25 MG/1
25 TABLET, FILM COATED ORAL 2 TIMES DAILY
Status: COMPLETED | OUTPATIENT
Start: 2025-07-07 | End: 2025-07-07

## 2025-07-07 RX ORDER — METOPROLOL TARTRATE 1 MG/ML
5 INJECTION, SOLUTION INTRAVENOUS EVERY 5 MIN PRN
Status: DISCONTINUED | OUTPATIENT
Start: 2025-07-07 | End: 2025-07-07

## 2025-07-07 RX ADMIN — AZITHROMYCIN DIHYDRATE 500 MG: 250 TABLET ORAL at 08:07

## 2025-07-07 RX ADMIN — HYDROXYUREA 500 MG: 500 CAPSULE ORAL at 08:07

## 2025-07-07 RX ADMIN — APIXABAN 2.5 MG: 2.5 TABLET, FILM COATED ORAL at 08:07

## 2025-07-07 RX ADMIN — Medication 125 MG: at 06:07

## 2025-07-07 RX ADMIN — METOPROLOL TARTRATE 25 MG: 25 TABLET, FILM COATED ORAL at 09:07

## 2025-07-07 RX ADMIN — MUPIROCIN: 20 OINTMENT TOPICAL at 08:07

## 2025-07-07 RX ADMIN — CEFTRIAXONE SODIUM 1 G: 1 INJECTION, POWDER, FOR SOLUTION INTRAMUSCULAR; INTRAVENOUS at 08:07

## 2025-07-07 RX ADMIN — METOPROLOL TARTRATE 25 MG: 25 TABLET, FILM COATED ORAL at 11:07

## 2025-07-07 RX ADMIN — METOPROLOL TARTRATE 25 MG: 25 TABLET, FILM COATED ORAL at 08:07

## 2025-07-07 RX ADMIN — ATORVASTATIN CALCIUM 10 MG: 10 TABLET, FILM COATED ORAL at 08:07

## 2025-07-07 RX ADMIN — SODIUM CHLORIDE, POTASSIUM CHLORIDE, SODIUM LACTATE AND CALCIUM CHLORIDE 1000 ML: 600; 310; 30; 20 INJECTION, SOLUTION INTRAVENOUS at 09:07

## 2025-07-07 NOTE — PLAN OF CARE
Problem: Adult Inpatient Plan of Care  Goal: Patient-Specific Goal (Individualized)  Outcome: Progressing  Goal: Absence of Hospital-Acquired Illness or Injury  Outcome: Progressing  Goal: Optimal Comfort and Wellbeing  Outcome: Progressing  Goal: Readiness for Transition of Care  Outcome: Progressing     Problem: Sepsis/Septic Shock  Goal: Optimal Coping  Outcome: Progressing  Goal: Absence of Bleeding  Outcome: Progressing  Goal: Blood Glucose Level Within Targeted Range  Outcome: Progressing  Goal: Absence of Infection Signs and Symptoms  Outcome: Progressing  Goal: Optimal Nutrition Intake  Outcome: Progressing     Problem: Skin Injury Risk Increased  Goal: Skin Health and Integrity  Outcome: Progressing     Problem: Fall Injury Risk  Goal: Absence of Fall and Fall-Related Injury  Outcome: Progressing

## 2025-07-07 NOTE — PLAN OF CARE
Problem: Physical Therapy  Goal: Physical Therapy Goal  Description: Goals to be met by: 25     Patient will increase functional independence with mobility by performin. Supine to sit with Moderate Assistance  2. Sit to supine with Moderate Assistance  3. Sit to stand transfer with Moderate Assistance with RW.   4. Bed to chair transfer with Maximum Assistance using Rolling Walker    Outcome: Progressing     PT/OT co-evaluation completed due to anticipated complexity of pt's presentation. Per chart review and pt report: pt is a resident at [facility] and pt reports walking with no AD and assist with bathing/dressing. Pt at this time requires MAX Ax2 with bed mobility and transfers to standing with use of RW. Therapy will continue to progress pt as able.   *per secure chat with CM: pt receives therapy there 3x/wk and has the following DME: rw, wc, and shower chair.

## 2025-07-07 NOTE — PLAN OF CARE
Pt on RA. Pt does not require prn therapy at this time. Pt with no apparent respiratory distress noted. Will continue to monitor.

## 2025-07-07 NOTE — CARE UPDATE
Per consult note by Dr. Dean 7/5/2025 89 year old female with PMH of atrial fibrillation (on eliquis), CKD3, HTN, HLD, and dementia who presents to the ED for altered mental status and found to be in afib with RVR. Resides in NH. Patient is only oriented to herself.      Afib with RVR s/p AVNB with labile BP. Discontinued dilt gtt and give first dose of lopressor 25 mg TID - now. Uptitrate BB as tolerated. Hold amiodarone given possible allergy. Digoxin if goes back into RVR. Continue OAC.  Replete electrolytes; K>4, Mg>2. Trend troponin & EKG q6    Per progress note by Dr. Dean 7/6/2025 Afib with RVR s/p AVNB with labile BP. Discontinued dilt gtt. Uptitrate BB as tolerated and prior to discharge transition to Toprol. Digoxin if goes back into RVR given labile BP. Continue OAC.  Replete electrolytes; K>4, Mg>2.    7/7/2025 Chart reviewed patient not seen. HR 90s-110s per Epic with periods up to 120s-140s on telemetry non sustained. Echo in March 2025 normal LVEF mild AS limited study due to refusal of patient for fully study. CBC with H&H 9.2&28.2 BMP with K+ 4.1 ON Metoprolol 25mg po BID with plans for transition to Toprol XL. Previously recommend Digoxin if recurrent RVR but given no sustained RVR will hold off on Digoxin especially due to advanced age. Plan for transition to Toprol XL 100mg po daily tomorrow however would recommend 50mg daily given equivalency to Metoprolol Tartrate. Continue to monitor on telemetry while admitted

## 2025-07-07 NOTE — PT/OT/SLP EVAL
Occupational Therapy   Evaluation    Name: Cat Garcia  MRN: [id number]  Admitting Diagnosis: Sepsis  Recent Surgery: * No surgery found *      Recommendations:     Discharge Recommendations:  (TBD)  Discharge Equipment Recommendations:  to be determined by next level of care  Barriers to discharge:  None    Assessment:     Cat Garcia is a 89 y.o. female with a medical diagnosis of Sepsis.  She presents with the following performance deficits affecting function: weakness, impaired endurance, impaired sensation, impaired self care skills, impaired functional mobility, gait instability, impaired balance, impaired cognition, decreased coordination, decreased upper extremity function, decreased lower extremity function, decreased safety awareness, pain, abnormal tone, decreased ROM, impaired coordination, impaired skin, impaired fine motor, impaired joint extensibility.      Pt was agreeable to and participated in OT/PT evaluation.  Pt is poor historian, but per report, pt lives at [facility] and was walking with PT using a RW.  Pt completed her evaluation and noted to require min - total A with ADLS and max A x2 with func mobility.  Pt noted with extreme forward flexion of neck with R lateral flexion that is semi-flexible.  Goals established to assist pt with returning to [facility] regarding ADLs and func mobility.  Pt will benefit from skilled OT services in order to increase her level of safety and independence with ADLs and mobility.      Rehab Prognosis: Good; patient would benefit from acute skilled OT services to address these deficits and reach maximum level of function.       Plan:     Patient to be seen 3 x/week to address the above listed problems via self-care/home management, therapeutic activities, therapeutic groups  Plan of Care Expires: 08/06/25  Plan of Care Reviewed with: patient    Subjective     Chief Complaint: pt is tired and requesting getting back in bed during session - c/o  pain in L shoulder with PROM past 70*  Patient/Family Comments/goals: none given    Occupational Profile:  Pt is poor historian - information obtained from chart review and pt interview - additional information obtained from case management following call with son  Living Environment: pt is resident at [facility] care [facility]  Previous level of function: pt was seeing OT/PT 3x week for therapy - needed A with bathing and dressing  Equipment Used at Home: walker, rolling, wheelchair, shower chair  Assistance upon Discharge: assistance at facility    Pain/Comfort:  Pain Rating 1: 0/10  Location - Side 1: Left  Location 1: shoulder (with flexion past 70*)  Pain Addressed 1: Reposition, Distraction, Cessation of Activity  Pain Rating Post-Intervention 1: 0/10    Patients cultural, spiritual, Alevism conflicts given the current situation: no    Objective:     Communicated with: nurse prior to session.  Patient found HOB elevated with PureWick, bed alarm, telemetry upon OT entry to room.    General Precautions: Standard, fall  Orthopedic Precautions: N/A  Braces: N/A  Respiratory Status: Room air    Occupational Performance:    Bed Mobility:    Patient completed Scooting/Bridging with max A x2  Patient completed Supine to Sit with max A x2  Patient completed Sit to Supine with max A x2    Functional Mobility/Transfers:  Patient completed Sit <> Stand Transfer with max A x2  with  and without RW       Activities of Daily Living:  Grooming: moderate assistance to wash face with wash cloth when seated EOB - needed A to wash eyes / forehead area  Lower Body Dressing: total assistance with socks    Cognitive/Visual Perceptual:  Cognitive/Psychosocial Skills:     -       Oriented to: Person   -       Follows Commands/attention:Easily distracted and very tired  -       Memory: impaired  -       Safety awareness/insight to disability: impaired   -       Mood/Affect/Coping skills/emotional control:  Lethargic    Physical Exam:  Balance: -       Poor sitting/standing balance  Postural examination/scapula alignment:    -       Rounded shoulders  -       Forward head  -       Posterior pelvic tilt  -       Kyphosis (mild thoracic curve)  -       Significant forward flexion to neck with difficulty lifting against gravity - slight left rotation/flexion  Skin integrity: Visible skin intact  Edema:  Mild LEs  Upper Extremity Range of Motion:   LUE = WFL for ADLS;  RUE = WFL except for shoulder (~80*)  Upper Extremity Strength:  Able to move B UEs against gravity but not against resistance   Strength:  Fair    AMPAC 6 Click ADL:  AMPAC Total Score: 14    Treatment & Education:  Pt completed ADLs and func mobility activities for tx session as noted above  Pt educated on role of OT and POC      Patient left HOB elevated with all lines intact    GOALS:   Multidisciplinary Problems       Occupational Therapy Goals          Problem: Occupational Therapy    Goal Priority Disciplines Outcome Interventions   Occupational Therapy Goal     OT, PT/OT Progressing    Description: Goals to be met by: 08/06/25     Patient will increase functional independence with ADLs by performing:    UE Dressing with Set-up Assistance.  Grooming while seated at sink with Set-up Assistance.  Toileting from bedside commode with Moderate Assistance for hygiene and clothing management.   Toilet transfer to bedside commode with Moderate Assistance.  Upper extremity exercise program x10 reps per handout, with assistance as needed.                         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

## 2025-07-07 NOTE — PLAN OF CARE
SOCIAL WORK DISCHARGE PLANNING ASSESSMENT     completed discharge planning assessment with pt's son Abdon ([phone]). Abdon was easily engaged and education on the role of  was provided. Abdon stated pt resides at [facility]. Abdon reported pt has the following DME: rw, wc, and shower chair. Abdon stated that pt receives PT/OT three times a week at [facility] and that an aid over there helps pt with bathing. Abdon is agreeable for pt to return to [facility] upon discharge and Abdon will provide transportation back to [facility] upon discharge. Abdon was encouraged to call with any questions or concerns. Abdon verbalized understanding.      07/07/25 1400   Discharge Assessment   Assessment Type Discharge Planning Assessment   Confirmed/corrected address, phone number and insurance Yes   Confirmed Demographics Correct on Facesheet   Source of Information family  (pt's son Abdon [phone])   Communicated MARIA DEL CARMEN with patient/caregiver Yes   People in Home facility resident   Facility Arrived From: [facility]   Do you expect to return to your current living situation? Yes   Do you have help at home or someone to help you manage your care at home? Yes   Who are your caregiver(s) and their phone number(s)? pt's son Abdon [phone]   Prior to hospitilization cognitive status: Alert/Oriented   Current cognitive status: Alert/Oriented   Walking or Climbing Stairs Difficulty yes   Walking or Climbing Stairs ambulation difficulty, requires equipment   Dressing/Bathing Difficulty yes   Dressing/Bathing bathing difficulty, requires equipment   Home Accessibility wheelchair accessible   Home Layout Able to live on 1st floor   Equipment Currently Used at Home walker, rolling;wheelchair;shower chair   Readmission within 30 days? No   Patient currently being followed by outpatient case management? No   Do you currently have service(s) that help you manage your care at home? No   Do you  take prescription medications? Yes   Do you have prescription coverage? Yes   Do you have any problems affording any of your prescribed medications? No   Is the patient taking medications as prescribed? yes   Who is going to help you get home at discharge? pt's son Abdon [phone]   How do you get to doctors appointments? health plan transportation   Are you on dialysis? No   Do you take coumadin? No   Discharge Plan A Assisted Living   DME Needed Upon Discharge    (TBD)   Discharge Plan discussed with: Adult children   Transition of Care Barriers None   Physical Activity   On average, how many days per week do you engage in moderate to strenuous exercise (like a brisk walk)? Pt Unable   On average, how many minutes do you engage in exercise at this level? Pt Unable   Financial Resource Strain   How hard is it for you to pay for the very basics like food, housing, medical care, and heating? Pt Unable   Housing Stability   In the last 12 months, was there a time when you were not able to pay the mortgage or rent on time? Pt Unable   At any time in the past 12 months, were you homeless or living in a shelter (including now)? Pt Unable   Transportation Needs   In the past 12 months, has lack of transportation kept you from medical appointments or from getting medications? Pt Unable   In the past 12 months, has lack of transportation kept you from meetings, work, or from getting things needed for daily living? Pt Unable   Food Insecurity   Within the past 12 months, you worried that your food would run out before you got the money to buy more. Pt Unable   Within the past 12 months, the food you bought just didn't last and you didn't have money to get more. Pt Unable   Stress   Do you feel stress - tense, restless, nervous, or anxious, or unable to sleep at night because your mind is troubled all the time - these days? Pt Unable   Social Isolation   How often do you feel lonely or isolated from those around you?   Patient unable to answer   Alcohol Use   Q1: How often do you have a drink containing alcohol? Pt Unable   Q2: How many drinks containing alcohol do you have on a typical day when you are drinking? Pt Unable   Q3: How often do you have six or more drinks on one occasion? Pt Unable   Utilities   In the past 12 months has the electric, gas, oil, or water company threatened to shut off services in your home? Pt Unable   Health Literacy   How often do you need to have someone help you when you read instructions, pamphlets, or other written material from your doctor or pharmacy? Patient unable to respond

## 2025-07-07 NOTE — PLAN OF CARE
Problem: Adult Inpatient Plan of Care  Goal: Plan of Care Review  Outcome: Progressing  Goal: Patient-Specific Goal (Individualized)  Outcome: Progressing  Goal: Absence of Hospital-Acquired Illness or Injury  Outcome: Progressing  Goal: Optimal Comfort and Wellbeing  Outcome: Progressing  Goal: Readiness for Transition of Care  Outcome: Progressing     Problem: Sepsis/Septic Shock  Goal: Absence of Infection Signs and Symptoms  Outcome: Progressing  Goal: Optimal Nutrition Intake  Outcome: Progressing     Problem: Skin Injury Risk Increased  Goal: Skin Health and Integrity  Outcome: Progressing     Problem: Fall Injury Risk  Goal: Absence of Fall and Fall-Related Injury  Outcome: Progressing     Problem: Infection  Goal: Absence of Infection Signs and Symptoms  Outcome: Progressing     Problem: Wound  Goal: Absence of Infection Signs and Symptoms  Outcome: Progressing  Goal: Improved Oral Intake  Outcome: Progressing  Goal: Optimal Pain Control and Function  Outcome: Progressing  Goal: Skin Health and Integrity  Outcome: Progressing  Goal: Optimal Wound Healing  Outcome: Progressing

## 2025-07-07 NOTE — PROGRESS NOTES
"[facility] Medicine Progress Note    Primary Team: [facility] Hospitalist Team A  Attending Physician: lBaire Cunha MD  Resident: Dr. Peralta  Intern: Dr. Callahan    Admission Date:  7/5/2025  Hospital Day: Hospital Day: 3    Chief Complaint:    Chief Complaint   Patient presents with    Fatigue     EMS activated for lethargy, generalized weakness. Found patient to be in Afib w/RVR, . Lopressor 5 mg IVP given with decreased rate to 80s. On arrival, continues lethargic. Patient is on memory care unit and unable to contribute to history.       Subjective/Interval History       NAEON. More alert this am, answering with short sentences and eating breakfast with help.     Review of Systems:  All other systems reviewed and negative.     Objective     Physical Examination:  /70   Pulse (!) 57   Temp 97.9 °F (36.6 °C)   Resp 12   Ht 5' 5" (1.651 m)   Wt 54.5 kg (120 lb 2.4 oz)   SpO2 96%   BMI 19.99 kg/m²    General: alert, no acute distress, non-toxic, resting comfortably in bed  HEENT:  EOMI, atraumatic, normocephalic, moist mucous membranes w/no erythema noted  Neck: Trachea midline, no masses, no lymphadenopathy  Cardiovascular: irregularly irregular, no extra heart sounds or murmurs appreciated   Chest wall: Non-tender to palpation, no gross deformities noted  Respiratory: Stable on room air, normal work of breathing, no accessory muscle use noted, wheezing on auscultation  Abdominal: Non-distended, soft,  non-tender to palpation, no guarding  Extremities: no edema  Skin: Non-diaphoretic, warm, dry.   Neurologic: alert to self,no gross FND,sensation grossly intact  Psychiatric: Normal mood and affect    Intake/Output:    Intake/Output Summary (Last 24 hours) at 7/7/2025 0724  Last data filed at 7/6/2025 1531  Gross per 24 hour   Intake 600 ml   Output 150 ml   Net 450 ml     Net IO Since Admission: 2,656.77 mL [07/07/25 0724]    Laboratory:  Recent Labs   Lab 07/05/25  1043 07/05/25  1320 " "07/05/25  1658 07/06/25  0337 07/07/25  0415   WBC 13.96*  --   --  11.04 9.72   HGB 10.2*  --   --  8.6* 9.2*   *  --   --  765* 814*   *  --   --  119* 121*   NA  --  140  --  139 139   K  --  3.8  --  4.0 4.1   CL  --  114*  --  112* 109   CO2  --  17*  --  20* 22*   BUN  --  50*  --  37* 24*   CREATININE  --  0.9  --  0.8 0.7   GLU  --  118*  --  98 93   CALCIUM  --  8.5*  --  8.3* 8.4*   PROT  --  6.3  --  5.5* 5.9*   ALBUMIN  --  3.0*  --  2.8* 2.8*   PHOS  --   --   --  2.3* 2.9   MG  --  2.1  --  2.4 2.1   AST  --  58*  --  54* 54*   ALT  --  23  --  24 27   ALKPHOS  --  45  --  41 44   TROPONINI  --  0.109* 0.098*  --   --    BNP  --   --  279*  --   --      No results for input(s): "APTT", "INR" in the last 168 hours.    Invalid input(s): "APT"  Lab Results   Component Value Date    COLORU Yellow 07/05/2025    APPEARANCEUA Hazy (A) 07/05/2025    SPECGRAV 1.030 07/05/2025    PHUR 6.0 07/05/2025    PROTEINUA 1+ (A) 07/05/2025    KETONESU Trace (A) 03/11/2025    LEUKOCYTESUR 3+ (A) 07/05/2025    NITRITE Negative 07/05/2025    UROBILINOGEN Negative 07/05/2025          Current Medications:     Infusions:       Scheduled:   apixaban  2.5 mg Oral BID    atorvastatin  10 mg Oral Daily    azithromycin  500 mg Oral Daily    cefTRIAXone (Rocephin) IV (PEDS and ADULTS)  1 g Intravenous Q24H    hydroxyurea  500 mg Oral BID    metoprolol tartrate  25 mg Oral BID    mupirocin   Nasal BID    vancomycin  125 mg Oral Q6H        PRN:    Current Facility-Administered Medications:     acetaminophen, 1,000 mg, Oral, Q6H PRN    albuterol-ipratropium, 3 mL, Nebulization, Q6H PRN    dextrose 50%, 12.5 g, Intravenous, PRN    dextrose 50%, 25 g, Intravenous, PRN    glucagon (human recombinant), 1 mg, Intramuscular, PRN    glucose, 16 g, Oral, PRN    glucose, 24 g, Oral, PRN    naloxone, 0.02 mg, Intravenous, PRN    sodium chloride 0.9%, 10 mL, Intravenous, Q12H PRN    Antibiotics and Day Number of Therapy:  Antibiotics " (From admission, onward)      Start     Stop Route Frequency Ordered    07/06/25 2300  vancomycin 125 mg/5 mL oral solution 125 mg  (C. difficile Infection (CDI) Treatment Order Panel)         07/16/25 2359 Oral Every 6 hours 07/06/25 2253    07/06/25 0900  cefTRIAXone injection 1 g         07/08/25 0859 IV Every 24 hours (non-standard times) 07/05/25 2001 07/06/25 0900  mupirocin 2 % ointment         07/11/25 0859 Nasl 2 times daily 07/06/25 0455    07/05/25 1800  azithromycin tablet 500 mg         07/08/25 0859 Oral Daily 07/05/25 1747              Assessment/Plan:     Cat Garcia is a 89 y.o. White female with past medical history of afib with RVR (on eliquis), CKD 3, Dementia, HLD, HTN, thrombocytosis, and dementia who presented at [facility] on 7/5/2025 for acute encephalopathy from NH.  Patient is admitted to [facility] for AMS workup. Received 1L LR bolus, 10mg Dilt IV x 1 then 15mg X 1 in ED. Started on diltiazem infusion.     Acute on chronic encephalopathy  History of dementia  Sepsis  Per NH, not at baseline - increase in somnolence, weakness  Tachycardic, leukocytosis, lactacte 2.4, procal 0.43  CT head negative for acute findings  Chest x ray - no focal consolidations  Multiple loose stools reported  UA- wbc and leukocyte +  Urine culture - lactobacillus 10-50K, colonizer   Blood culture - NO growth 36H  Covid and flu -Negative   Respiratory panel -metapneumovirus, continuing azithro to cover for potential concomitant CAP  C.diff panel pending given loose stool hx   Given rocephin once  Plan:  Continue azithromycin for cap coverage  Stating oral vanc for prophylactic c diff coverage   Consulting palliative   PT/OT consulted, did not see patient yesterday due to fatigue     Afib with RVR  TTE from 8/2024: Grade II Diastolic dysfunction, mildly dilated LA, EF 55-60%  S/p IL LR, 10mg IV dilt x 15mg IV dilt, did become hypotensive briefly   EKG: afib with RVR  Trop .109 and downtrended  Bnp  276   -> 359  TSH WNL  UDS negative  Plan  Cardiology following and Recommend lopressor 25mg PO BID and digoxin If she goes back into RVR  Continue eliquis 2.5 BID  Continue lopressor 25 BID  Cards recommending transitioning to troprol     CKD 3  Cr 0.9, GFR>60  Monitor kidney function  Avoid nephrotoxins     HLD  Last lipid panel 2023  Repeat lipid panel pending  Continue home statin     Acute Hypoxic Respiratory failure  On 2L NC initially, now on room air  CTM  CXR - no acute findings     Macrocytic anemia  H/H 10.22/29.4 - at baseline   Mcv 118  No signs of bleeding  Last ferritin/iron -   CTM     Thrombocytosis  Hx of thrombocytosis  Continue home hydroxyurea     Diet:  Diet Soft & Bite Sized (IDDSI Level 6)  VTE PPx: heparin  Code Status:  DNR    Christina Callahan MD PGY1     [facility] Hospitalist Pager numbers:   [facility] Hospitalist Medicine Team A (Guerline/Skyler):          [phone]  [facility] Hospitalist Medicine Team B (Nathanael/Neto):        [phone]

## 2025-07-07 NOTE — PLAN OF CARE
Problem: Occupational Therapy  Goal: Occupational Therapy Goal  Description: Goals to be met by: 08/06/25     Patient will increase functional independence with ADLs by performing:    UE Dressing with Set-up Assistance.  Grooming while seated at sink with Set-up Assistance.  Toileting from bedside commode with Moderate Assistance for hygiene and clothing management.   Toilet transfer to bedside commode with Moderate Assistance.  Upper extremity exercise program x10 reps per handout, with assistance as needed.    Outcome: Progressing     Pt was agreeable to and participated in OT/PT evaluation.  Pt is poor historian, but per report, pt lives at [facility] and was walking with PT using a RW.  Pt completed her evaluation and noted to require min - total A with ADLS and max A x2 with func mobility.  Pt noted with extreme forward flexion of neck with R lateral flexion that is semi-flexible.  Goals established to assist pt with returning to OF regarding ADLs and func mobility.  Pt will benefit from skilled OT services in order to increase her level of safety and independence with ADLs and mobility.      Trinh White, OT  7/7/2025

## 2025-07-08 PROBLEM — J12.3 HUMAN METAPNEUMOVIRUS PNEUMONIA: Status: ACTIVE | Noted: 2025-07-08

## 2025-07-08 LAB
ABSOLUTE EOSINOPHIL (OHS): 0.05 K/UL
ABSOLUTE MONOCYTE (OHS): 1.15 K/UL (ref 0.3–1)
ABSOLUTE NEUTROPHIL COUNT (OHS): 6.63 K/UL (ref 1.8–7.7)
ALBUMIN SERPL BCP-MCNC: 2.7 G/DL (ref 3.5–5.2)
ALP SERPL-CCNC: 54 UNIT/L (ref 40–150)
ALT SERPL W/O P-5'-P-CCNC: 21 UNIT/L (ref 10–44)
ANION GAP (OHS): 5 MMOL/L (ref 8–16)
AST SERPL-CCNC: 35 UNIT/L (ref 11–45)
BASOPHILS # BLD AUTO: 0.01 K/UL
BASOPHILS NFR BLD AUTO: 0.1 %
BILIRUB SERPL-MCNC: 0.6 MG/DL (ref 0.1–1)
BUN SERPL-MCNC: 17 MG/DL (ref 8–23)
CALCIUM SERPL-MCNC: 8.3 MG/DL (ref 8.7–10.5)
CHLORIDE SERPL-SCNC: 107 MMOL/L (ref 95–110)
CO2 SERPL-SCNC: 22 MMOL/L (ref 23–29)
CREAT SERPL-MCNC: 0.8 MG/DL (ref 0.5–1.4)
ERYTHROCYTE [DISTWIDTH] IN BLOOD BY AUTOMATED COUNT: 15.9 % (ref 11.5–14.5)
GFR SERPLBLD CREATININE-BSD FMLA CKD-EPI: >60 ML/MIN/1.73/M2
GLUCOSE SERPL-MCNC: 111 MG/DL (ref 70–110)
HCT VFR BLD AUTO: 26.4 % (ref 37–48.5)
HGB BLD-MCNC: 8.9 GM/DL (ref 12–16)
IMM GRANULOCYTES # BLD AUTO: 0.2 K/UL (ref 0–0.04)
IMM GRANULOCYTES NFR BLD AUTO: 2.1 % (ref 0–0.5)
LYMPHOCYTES # BLD AUTO: 1.71 K/UL (ref 1–4.8)
MAGNESIUM SERPL-MCNC: 1.8 MG/DL (ref 1.6–2.6)
MCH RBC QN AUTO: 39.6 PG (ref 27–31)
MCHC RBC AUTO-ENTMCNC: 33.7 G/DL (ref 32–36)
MCV RBC AUTO: 117 FL (ref 82–98)
NUCLEATED RBC (/100WBC) (OHS): 0 /100 WBC
PHOSPHATE SERPL-MCNC: 3 MG/DL (ref 2.7–4.5)
PLATELET # BLD AUTO: 868 K/UL (ref 150–450)
PMV BLD AUTO: 10.4 FL (ref 9.2–12.9)
POTASSIUM SERPL-SCNC: 4 MMOL/L (ref 3.5–5.1)
PROT SERPL-MCNC: 5.6 GM/DL (ref 6–8.4)
RBC # BLD AUTO: 2.25 M/UL (ref 4–5.4)
RELATIVE EOSINOPHIL (OHS): 0.5 %
RELATIVE LYMPHOCYTE (OHS): 17.5 % (ref 18–48)
RELATIVE MONOCYTE (OHS): 11.8 % (ref 4–15)
RELATIVE NEUTROPHIL (OHS): 68 % (ref 38–73)
SODIUM SERPL-SCNC: 134 MMOL/L (ref 136–145)
WBC # BLD AUTO: 9.75 K/UL (ref 3.9–12.7)

## 2025-07-08 PROCEDURE — 36415 COLL VENOUS BLD VENIPUNCTURE: CPT

## 2025-07-08 PROCEDURE — 63600175 PHARM REV CODE 636 W HCPCS

## 2025-07-08 PROCEDURE — 25000003 PHARM REV CODE 250

## 2025-07-08 PROCEDURE — 97530 THERAPEUTIC ACTIVITIES: CPT

## 2025-07-08 PROCEDURE — 85025 COMPLETE CBC W/AUTO DIFF WBC: CPT

## 2025-07-08 PROCEDURE — 84100 ASSAY OF PHOSPHORUS: CPT

## 2025-07-08 PROCEDURE — 83735 ASSAY OF MAGNESIUM: CPT

## 2025-07-08 PROCEDURE — 80053 COMPREHEN METABOLIC PANEL: CPT

## 2025-07-08 PROCEDURE — 99233 SBSQ HOSP IP/OBS HIGH 50: CPT | Mod: 25,,, | Performed by: STUDENT IN AN ORGANIZED HEALTH CARE EDUCATION/TRAINING PROGRAM

## 2025-07-08 RX ORDER — LANOLIN ALCOHOL/MO/W.PET/CERES
1000 CREAM (GRAM) TOPICAL DAILY
Qty: 30 TABLET | Refills: 0 | Status: SHIPPED | OUTPATIENT
Start: 2025-07-09

## 2025-07-08 RX ORDER — METOPROLOL SUCCINATE 50 MG/1
100 TABLET, EXTENDED RELEASE ORAL DAILY
Status: DISCONTINUED | OUTPATIENT
Start: 2025-07-08 | End: 2025-07-08 | Stop reason: HOSPADM

## 2025-07-08 RX ORDER — METOPROLOL SUCCINATE 50 MG/1
50 TABLET, EXTENDED RELEASE ORAL 2 TIMES DAILY
Qty: 60 TABLET | Refills: 2 | Status: SHIPPED | OUTPATIENT
Start: 2025-07-08 | End: 2025-07-25

## 2025-07-08 RX ORDER — MAGNESIUM SULFATE HEPTAHYDRATE 40 MG/ML
2 INJECTION, SOLUTION INTRAVENOUS ONCE
Status: COMPLETED | OUTPATIENT
Start: 2025-07-08 | End: 2025-07-08

## 2025-07-08 RX ORDER — LANOLIN ALCOHOL/MO/W.PET/CERES
1000 CREAM (GRAM) TOPICAL DAILY
Status: DISCONTINUED | OUTPATIENT
Start: 2025-07-08 | End: 2025-07-08 | Stop reason: HOSPADM

## 2025-07-08 RX ORDER — METOPROLOL SUCCINATE 50 MG/1
50 TABLET, EXTENDED RELEASE ORAL 2 TIMES DAILY
Qty: 60 TABLET | Refills: 2 | Status: SHIPPED | OUTPATIENT
Start: 2025-07-08 | End: 2025-07-08

## 2025-07-08 RX ADMIN — MUPIROCIN: 20 OINTMENT TOPICAL at 09:07

## 2025-07-08 RX ADMIN — METOPROLOL SUCCINATE 100 MG: 50 TABLET, EXTENDED RELEASE ORAL at 05:07

## 2025-07-08 RX ADMIN — CYANOCOBALAMIN TAB 1000 MCG 1000 MCG: 1000 TAB at 09:07

## 2025-07-08 RX ADMIN — ATORVASTATIN CALCIUM 10 MG: 10 TABLET, FILM COATED ORAL at 09:07

## 2025-07-08 RX ADMIN — APIXABAN 2.5 MG: 2.5 TABLET, FILM COATED ORAL at 09:07

## 2025-07-08 RX ADMIN — MAGNESIUM SULFATE HEPTAHYDRATE 2 G: 40 INJECTION, SOLUTION INTRAVENOUS at 09:07

## 2025-07-08 RX ADMIN — HYDROXYUREA 500 MG: 500 CAPSULE ORAL at 09:07

## 2025-07-08 NOTE — PT/OT/SLP PROGRESS
Physical Therapy Treatment    Patient Name:  Cat Garcia   MRN:  [id number]    Recommendations:     Discharge Recommendations:  (TBD; requires 24/7 assistance with mobility/self care)  Discharge Equipment Recommendations:  (TBD)  Barriers to discharge: requires 24/7 care/assistance    Assessment:     Cat Garcia is a 89 y.o. female admitted with a medical diagnosis of Atrial fibrillation with RVR.  She presents with the following impairments/functional limitations: weakness, impaired endurance, impaired self care skills, impaired functional mobility, gait instability, impaired balance, impaired cognition, decreased lower extremity function, decreased safety awareness, decreased ROM.    PT/OT co-session to safely progress pt's functional mobility. Pt requires MAX Ax1-2 with bed mobility and transfers to standing with use of RW. Poor forward trunk and cervical posture; limited verbalizations and very drowsy during session; able to awake/eyes open with increased verbal and tactile cues; shakes head yes to wipe face. Therapy will continue to progress pt as able.     Rehab Prognosis: Fair; patient would benefit from acute skilled PT services to address these deficits and reach maximum level of function.    Recent Surgery: * No surgery found *      Plan:     During this hospitalization, patient to be seen 3 x/week to address the identified rehab impairments via therapeutic activities, gait training, therapeutic exercises, neuromuscular re-education, wheelchair management/training and progress toward the following goals:    Plan of Care Expires:  08/07/25    Subjective     Chief Complaint: very drowsy  Patient/Family Comments/goals: not stated  Pain/Comfort:  Pain Rating 1:  (not stated)  Pain Rating Post-Intervention 1:  (not stated)      Objective:     Communicated with Nurse prior to session.  Patient found HOB elevated with blood pressure cuff, bed alarm, pressure relief boots, peripheral IV, PureWick upon PT  entry to room.     General Precautions: Standard, fall, contact, droplet  Orthopedic Precautions: N/A  Braces: N/A  Respiratory Status: Room air     Functional Mobility:  Bed Mobility:     Rolling Right: moderate assistance and of 2 persons  Scooting: maximal assistance and of 2 persons  Supine to Sit: maximal assistance and of 2 persons  Sit to Supine: maximal assistance and of 2 persons  Transfers:     Sit to Stand:  maximal assistance and of 2 persons with rolling walker; x 2 attempts; able to complete glute clearance but incomplete BLE knee extension.       AM-PAC 6 CLICK MOBILITY  Turning over in bed (including adjusting bedclothes, sheets and blankets)?: 2  Sitting down on and standing up from a chair with arms (e.g., wheelchair, bedside commode, etc.): 2  Moving from lying on back to sitting on the side of the bed?: 2  Moving to and from a bed to a chair (including a wheelchair)?: 1  Need to walk in hospital room?: 1  Climbing 3-5 steps with a railing?: 1  Basic Mobility Total Score: 9       Treatment & Education:  Pt requires MAX verbal cues to remain awake and for safe participation with mobility during session.   Patient sits EOB ~10 min w/ CGA/MIN A.  Pt assisted with wiping face in sitting EOB. As well as assistance with improving sitting posture due to noted severe forward trunk/cervical posture.   Nursing notified of pt's limited session; due to overall fatigue/weakness.     Patient left HOB elevated with all lines intact, call button in reach, bed alarm on, and Nurse notified.    GOALS:   Multidisciplinary Problems       Physical Therapy Goals          Problem: Physical Therapy    Goal Priority Disciplines Outcome Interventions   Physical Therapy Goal     PT, PT/OT Progressing    Description: Goals to be met by: 25     Patient will increase functional independence with mobility by performin. Supine to sit with Moderate Assistance  2. Sit to supine with Moderate Assistance  3. Sit to stand  transfer with Moderate Assistance with RW.   4. Bed to chair transfer with Maximum Assistance using Rolling Walker                         DME Justifications:  TBD    Time Tracking:     PT Received On: 07/08/25  PT Start Time: 1100     PT Stop Time: 1124  PT Total Time (min): 24 min With OT    Billable Minutes: Therapeutic Activity 24    Treatment Type: Treatment  PT/PTA: PT     Number of PTA visits since last PT visit: 0     07/08/2025

## 2025-07-08 NOTE — PLAN OF CARE
Problem: Adult Inpatient Plan of Care  Goal: Plan of Care Review  Outcome: Adequate for Care Transition  Goal: Patient-Specific Goal (Individualized)  Outcome: Adequate for Care Transition  Goal: Absence of Hospital-Acquired Illness or Injury  Outcome: Adequate for Care Transition  Goal: Optimal Comfort and Wellbeing  Outcome: Adequate for Care Transition  Goal: Readiness for Transition of Care  Outcome: Adequate for Care Transition     Problem: Sepsis/Septic Shock  Goal: Optimal Coping  Outcome: Adequate for Care Transition  Goal: Absence of Bleeding  Outcome: Adequate for Care Transition  Goal: Blood Glucose Level Within Targeted Range  Outcome: Adequate for Care Transition  Goal: Absence of Infection Signs and Symptoms  Outcome: Adequate for Care Transition  Goal: Optimal Nutrition Intake  Outcome: Adequate for Care Transition     Problem: Skin Injury Risk Increased  Goal: Skin Health and Integrity  Outcome: Adequate for Care Transition     Problem: Fall Injury Risk  Goal: Absence of Fall and Fall-Related Injury  Outcome: Adequate for Care Transition     Problem: Infection  Goal: Absence of Infection Signs and Symptoms  Outcome: Adequate for Care Transition     Problem: Wound  Goal: Optimal Coping  Outcome: Adequate for Care Transition  Goal: Optimal Functional Ability  Outcome: Adequate for Care Transition  Goal: Absence of Infection Signs and Symptoms  Outcome: Adequate for Care Transition  Goal: Improved Oral Intake  Outcome: Adequate for Care Transition  Goal: Optimal Pain Control and Function  Outcome: Adequate for Care Transition  Goal: Skin Health and Integrity  Outcome: Adequate for Care Transition  Goal: Optimal Wound Healing  Outcome: Adequate for Care Transition

## 2025-07-08 NOTE — PROGRESS NOTES
AVS virtually reviewed with Cat Garcia and her son, [place] Abdon Ruiz, at the bedside in its entirety with emphasis on diet, medications, follow-up appointments and reasons to return to the ED. Patient also encouraged to utilize their patient portal. Ease and convenience of use reiterated. Education complete and patient voiced understanding. All questions answered. Discharge teaching complete.

## 2025-07-08 NOTE — NURSING
AVS reviewed with son by virtual nurse. Verbalized understanding of upcoming appointments and home medications. IV's and Cardiac monitoring removed. Prescriptions delivered at  bedside. W/c transport to escort patient and belongings to main lobby.

## 2025-07-08 NOTE — CONSULTS
[facility] Surg  Wound Care    Patient Name:  Cat Garcia   MRN:  [id number]  Date: 2025  Diagnosis: Sepsis    History:     Past Medical History:   Diagnosis Date    Atrial fibrillation     with rapid ventricular rate    Breast cancer     XRT    Chronic bronchitis     Chronic combined systolic and diastolic CHF, NYHA class 3 2013    CKD (chronic kidney disease), stage III     Dementia     Dyslipidemia 12/15/2013    Essential hypertension 12/15/2013    Squamous cell carcinoma     Vascular parkinsonism 2016       Social History[1]    Precautions:     Allergies as of 2025    (No Known Allergies)       [facility] Assessment Details/Treatment     Nurse reports blanchable redness to bilateral buttocks related to incontinence and blanchable redness to bilateral heels. Appreciate nurse photos in media. Recommend pressure injury prevention interventions - waffle overlay and heel boots and triad ointment BID to buttocks.  Notified Dr. Callahan.      2025         [1]   Social History  Socioeconomic History    Marital status:    Tobacco Use    Smoking status: Former     Current packs/day: 0.00     Types: Cigarettes     Quit date: 1982     Years since quittin.5    Smokeless tobacco: Never    Tobacco comments:     pt was social smoker   Substance and Sexual Activity    Alcohol use: No     Alcohol/week: 0.0 standard drinks of alcohol    Drug use: No    Sexual activity: Never   Social History Narrative    Lives alone. husb  2006.    4 kids.    1 store home. No falls     Social Drivers of Health     Financial Resource Strain: Patient Unable To Answer (2025)    Overall Financial Resource Strain (CARDIA)     Difficulty of Paying Living Expenses: Patient unable to answer   Food Insecurity: Patient Unable To Answer (2025)    Hunger Vital Sign     Worried About Running Out of Food in the Last Year: Patient unable to answer     Ran Out of Food in the Last Year: Patient unable to answer    Transportation Needs: Patient Unable To Answer (7/7/2025)    PRAPARE - Transportation     Lack of Transportation (Medical): Patient unable to answer     Lack of Transportation (Non-Medical): Patient unable to answer   Physical Activity: Patient Unable To Answer (7/7/2025)    Exercise Vital Sign     Days of Exercise per Week: Patient unable to answer     Minutes of Exercise per Session: Patient unable to answer   Stress: Patient Unable To Answer (7/7/2025)    [employer] [place] [employer] - Occupational Stress Questionnaire     Feeling of Stress : Patient unable to answer   Housing Stability: Patient Unable To Answer (7/7/2025)    Housing Stability Vital Sign     Unable to Pay for Housing in the Last Year: Patient unable to answer     Homeless in the Last Year: Patient unable to answer

## 2025-07-08 NOTE — PT/OT/SLP PROGRESS
Occupational Therapy   Treatment    Name: Cat Garcia  MRN: [id number]  Admitting Diagnosis:  Atrial fibrillation with RVR       Recommendations:     Discharge Recommendations:  (TBD, but will continue to require assistance at discharge.)  Discharge Equipment Recommendations:  other (see comments) (TBD)  Barriers to discharge:  Other (Comment) (lethargy, poor strength, poor endurance)    Assessment:     Cat Garcia is a 89 y.o. female with a medical diagnosis of Atrial fibrillation with RVR.  She presents with The primary encounter diagnosis was Atrial fibrillation with RVR. Diagnoses of Fatigue, Acute cystitis without hematuria, Sepsis, due to unspecified organism, unspecified whether acute organ dysfunction present, Sepsis, Chest pain, Atrial fibrillation, Hypotension, unspecified hypotension type, Dementia, unspecified dementia severity, unspecified dementia type, unspecified whether behavioral, psychotic, or mood disturbance or anxiety, Stage 3 chronic kidney disease, unspecified whether stage 3a or 3b CKD, Elevated troponin, Sepsis with encephalopathy without septic shock, due to unspecified organism, and Essential thrombocytosis were also pertinent to this visit. Performance deficits affecting function are weakness, impaired endurance, impaired cognition, decreased coordination, impaired coordination, impaired self care skills, decreased upper extremity function, impaired functional mobility, decreased lower extremity function, gait instability, decreased safety awareness, impaired balance.     Rehab Prognosis:  Fair; patient would benefit from acute skilled OT services to address these deficits and reach maximum level of function.       Plan:     Patient to be seen 3 x/week to address the above listed problems via self-care/home management, therapeutic exercises, therapeutic activities  Plan of Care Expires: 08/08/25  Plan of Care Reviewed with: patient    Subjective     Chief Complaint: Patient did  not state. Patient does not speak throughout session.   Patient/Family Comments/goals: Did not state.   Pain/Comfort:  Pain Rating 1:  (unable to state when prompted. Does not appear in pain.)  Pain Rating 2:  (unable to state when prompted. Does not appear in pain.)    Objective:     Communicated with: nsg prior to session.  Patient found right sidelying with blood pressure cuff, bed alarm, pressure relief boots, peripheral IV, PureWick, telemetry upon OT entry to room.    General Precautions: Standard, fall, contact, droplet    Orthopedic Precautions:N/A  Braces: N/A  Respiratory Status: Room air     Occupational Performance:     Bed Mobility:    Patient completed Rolling/Turning to Right with moderate assistance and 2 persons  Patient completed Scooting/Bridging with total assistance and 2 persons  Patient completed Supine to Sit with maximal assistance and 2 persons  Patient completed Sit to Supine with maximal assistance and 2 persons     Functional Mobility/Transfers:  Patient completed 2 Sit <> Stand Transfers with maximal assistance and of 2 persons  with  rolling walker. Pt requires verbal cues for proper hand placement and sequencing. Pt noted w/ forward flexed posture and flexed hips/knees. Pt unable to fully stand upright, and able to stand <30 seconds w/ both trials.       Activities of Daily Living:  Toileting: jose manuel        [facility] 6 Click ADL: 9    Treatment & Education:  Patient educated on purpose/role of OT.   Patient w/ limited participation this date 2/2 lethargy. Patient w/ eyes closed, and minimally opens them to verbal and tactile stimuli up OT entry.   Patient w/ poor command following and unable to answer yes/no questions or orientation questions when prompted.   Patient sits EOB ~10 min w/ CGA. Patient demonstrates heavy cervical flexion when sitting EOB and in stance. Pt able to lift her head briefly, but quickly brings it back down. OT w/ attempts to perform cervical stretching, but  patient resists.   Recommending patient return to [facility] w/ 24/7 care and supervision.     Patient left HOB elevated with all lines intact, call button in reach, bed alarm on, and nsg notified    GOALS:   Multidisciplinary Problems       Occupational Therapy Goals          Problem: Occupational Therapy    Goal Priority Disciplines Outcome Interventions   Occupational Therapy Goal     OT, PT/OT Progressing    Description: Goals to be met by: 08/06/25     Patient will increase functional independence with ADLs by performing:    UE Dressing with Set-up Assistance.  Grooming while seated at sink with Set-up Assistance.  Toileting from bedside commode with Moderate Assistance for hygiene and clothing management.   Toilet transfer to bedside commode with Moderate Assistance.  Upper extremity exercise program x10 reps per handout, with assistance as needed.                       Time Tracking:     OT Date of Treatment: 07/08/25  OT Start Time: 1100  OT Stop Time: 1124  OT Total Time (min): 24 min    Billable Minutes:Therapeutic Activity 24    OT/ISAC: OT          7/8/2025

## 2025-07-08 NOTE — PLAN OF CARE
D/c orders noted, no DME, no HH.     SW spoke with pt's son Abdon to discuss d/c plans. Abdon is agreeable for pt to return to [facility] upon discharge. Abdon will provide transportation back to [facility] upon discharge.     Pt is cleared to go from CM standpoint.     Future Appointments   Date Time Provider Department Center   7/16/2025 12:00 AM CV REMOTE DEVICE CHECK, [facility] CARDIO [place] [facility]   7/29/2025 11:00 AM Alfie Oconnell MD [facility]         07/08/25 1323   Final Note   Assessment Type Final Discharge Note   Anticipated Discharge Disposition Home  (Pt resides at [facility].)   What phone number can be called within the next 1-3 days to see how you are doing after discharge? [phone]   Post-Acute Status   Discharge Delays None known at this time

## 2025-07-08 NOTE — PROGRESS NOTES
[facility] Progress Note     Attending Physician: No att. providers found  Cardiology Attending Physician: Juan Luis Glover MD  Date of Admit: 2025      Subjective/Interval History:      NAEO. HR controlled.      Objective:     Last 24 Hour Vital Signs:  BP  Min: 106/66  Max: 133/80  Temp  Av.2 °F (36.8 °C)  Min: 97.9 °F (36.6 °C)  Max: 98.7 °F (37.1 °C)  Pulse  Av.4  Min: 81  Max: 164  Resp  Av.2  Min: 18  Max: 20  SpO2  Av.7 %  Min: 88 %  Max: 100 %  I/O last 3 completed shifts:  In: 780 [P.O.:780]  Out: 1100 [Urine:1100]    Physical Examination:  Physical Examination: limited  Constitutional: NAD  HEENT: MMM  Cardiovascular: IRIR  Pulmonary: normal respiratory effort, CTAB, no crackles, wheezes  Abdominal: S/NT/ND  Musculoskeletal: No lower extremity edema noted  Neurological: oriented to self  Skin: W/D/I    Laboratory/Radiographic/Cardiac Data:  Personally Reviewed      Assessment/Plan:     Patient Active Problem List    Diagnosis Date Noted    Human metapneumovirus pneumonia 2025    Sepsis 2025    UTI (urinary tract infection) 2025    SOB (shortness of breath) 2025    Lethargy 2025    Dementia 2025    Elevated troponin 2025    Bradycardia 2025    Macrocytic anemia 2025    E. coli UTI (urinary tract infection) 2025    DNR (do not resuscitate) 2021    At high risk for falls 2019    Essential thrombocytosis 10/09/2018    Aortic atherosclerosis 2017    Thrombocytosis 2017    Mixed Alzheimer's and vascular dementia 2016    Vascular parkinsonism 2016    Debility 09/15/2015    Long term current use of anticoagulant therapy 2013    Hypotension 2013    Generalized weakness 2013    Dilated cardiomyopathy 2013    Chronic combined systolic and diastolic CHF, NYHA class 3 2013    Atrial fibrillation with RVR 12/15/2013    Dyslipidemia 12/15/2013    Essential  hypertension 12/15/2013    Atrial fibrillation, chronic 12/15/2013    CKD (chronic kidney disease), stage III 12/15/2013        Afib with RVR s/p AVNB with labile BP. Continue BB.  Continue OAC.  Replete electrolytes; K>4, Mg>2.    Juan Luis Glover M.D.  Interventional Cardiology   [facility]    -Medical records & lab results  -Independently reviewing and interpreting (if not documented by myself) EKGs, echocardiograms, catherizations   -Obtaining a history, performing a relevant exam, counseling/educating the patient/family  -Documenting clinical information in the EMR including ordering of tests  -Care coordination and communicating with other health care professionals

## 2025-07-08 NOTE — PLAN OF CARE
Problem: Physical Therapy  Goal: Physical Therapy Goal  Description: Goals to be met by: 25     Patient will increase functional independence with mobility by performin. Supine to sit with Moderate Assistance  2. Sit to supine with Moderate Assistance  3. Sit to stand transfer with Moderate Assistance with RW.   4. Bed to chair transfer with Maximum Assistance using Rolling Walker    Outcome: Progressing     PT/OT co-session to safely progress pt's functional mobility. Pt requires MAX Ax1-2 with bed mobility and transfers to standing with use of RW. Poor forward trunk and cervical posture; limited verbalizations and very drowsy during session; able to awake/eyes open with increased verbal and tactile cues; shakes head yes to wipe face. Therapy will continue to progress pt as able.

## 2025-07-08 NOTE — NURSING
Assumed care of patient from EMILIA Manzano, patient is AAOX1, only to self, room air, vitals WNL, no current c/o pain, bedrest, frequent cough, seems to be non productive, meds crushed with chocolate pudding, incontinent of bowel and bladder, external purewick in place, feed assist, all safety precautions are in place, call bell and tray table are within reach of the patient and no additional questions or concerns from the patient at this time.

## 2025-07-08 NOTE — PROGRESS NOTES
"[facility] Medicine Progress Note    Primary Team: \[facility] [place] [facility]\"" Hospitalist Team A  Attending Physician: Blaire Cunha MD  Resident: Dr. Peralta  Intern: Dr. Callahan    Admission Date:  7/5/2025  Hospital Day: Hospital Day: 4    Chief Complaint:    Chief Complaint   Patient presents with    Fatigue     EMS activated for lethargy, generalized weakness. Found patient to be in Afib w/RVR, . Lopressor 5 mg IVP given with decreased rate to 80s. On arrival, continues lethargic. Patient is on memory care unit and unable to contribute to history.       Subjective/Interval History       NAEON. alert this am, answering with short sentences and asking for breakfast.     Review of Systems:  All other systems reviewed and negative.     Objective     Physical Examination:  /69   Pulse (!) 127   Temp 97.9 °F (36.6 °C) (Oral)   Resp 20   Ht 5' 5" (1.651 m)   Wt 54.5 kg (120 lb 2.4 oz)   SpO2 98%   BMI 19.99 kg/m²    General: alert, no acute distress, non-toxic, resting comfortably in bed  HEENT:  EOMI, atraumatic, normocephalic, moist mucous membranes w/no erythema noted  Neck: Trachea midline, no masses, no lymphadenopathy  Cardiovascular: irregularly irregular, no extra heart sounds or murmurs appreciated   Chest wall: Non-tender to palpation, no gross deformities noted  Respiratory: Stable on room air, normal work of breathing, no accessory muscle use noted, wheezing on auscultation  Abdominal: Non-distended, soft,  non-tender to palpation, no guarding  Extremities: no edema  Skin: Non-diaphoretic, warm, dry.   Neurologic: alert to self,no gross FND,sensation grossly intact  Psychiatric: Normal mood and affect    Intake/Output:    Intake/Output Summary (Last 24 hours) at 7/8/2025 0721  Last data filed at 7/8/2025 0400  Gross per 24 hour   Intake 780 ml   Output 1100 ml   Net -320 ml     Net IO Since Admission: 2,336.77 mL [07/08/25 0721]    Laboratory:  Recent Labs   Lab 07/05/25  1043 07/05/25  1320 07/05/25  1658 " "07/06/25  0337 07/07/25  0415 07/08/25  0519   WBC 13.96*  --   --  11.04 9.72 9.75   HGB 10.2*  --   --  8.6* 9.2* 8.9*   *  --   --  765* 814* 868*   *  --   --  119* 121* 117*   NA  --  140  --  139 139 134*   K  --  3.8  --  4.0 4.1 4.0   CL  --  114*  --  112* 109 107   CO2  --  17*  --  20* 22* 22*   BUN  --  50*  --  37* 24* 17   CREATININE  --  0.9  --  0.8 0.7 0.8   GLU  --  118*  --  98 93 111*   CALCIUM  --  8.5*  --  8.3* 8.4* 8.3*   PROT  --  6.3  --  5.5* 5.9* 5.6*   ALBUMIN  --  3.0*  --  2.8* 2.8* 2.7*   PHOS  --   --   --  2.3* 2.9 3.0   MG  --  2.1  --  2.4 2.1 1.8   AST  --  58*  --  54* 54* 35   ALT  --  23  --  24 27 21   ALKPHOS  --  45  --  41 44 54   TROPONINI  --  0.109* 0.098*  --   --   --    BNP  --   --  279*  --   --   --      No results for input(s): "APTT", "INR" in the last 168 hours.    Invalid input(s): "APT"  Lab Results   Component Value Date    COLORU Yellow 07/05/2025    APPEARANCEUA Hazy (A) 07/05/2025    SPECGRAV 1.030 07/05/2025    PHUR 6.0 07/05/2025    PROTEINUA 1+ (A) 07/05/2025    KETONESU Trace (A) 03/11/2025    LEUKOCYTESUR 3+ (A) 07/05/2025    NITRITE Negative 07/05/2025    UROBILINOGEN Negative 07/05/2025          Current Medications:     Infusions:       Scheduled:   apixaban  2.5 mg Oral BID    atorvastatin  10 mg Oral Daily    hydroxyurea  500 mg Oral BID    metoprolol succinate  100 mg Oral Daily    mupirocin   Nasal BID        PRN:    Current Facility-Administered Medications:     acetaminophen, 1,000 mg, Oral, Q6H PRN    albuterol-ipratropium, 3 mL, Nebulization, Q6H PRN    dextrose 50%, 12.5 g, Intravenous, PRN    dextrose 50%, 25 g, Intravenous, PRN    glucagon (human recombinant), 1 mg, Intramuscular, PRN    glucose, 16 g, Oral, PRN    glucose, 24 g, Oral, PRN    naloxone, 0.02 mg, Intravenous, PRN    sodium chloride 0.9%, 10 mL, Intravenous, Q12H PRN    Antibiotics and Day Number of Therapy:  Antibiotics (From admission, onward)      Start     " Stop Route Frequency Ordered    07/06/25 0900  mupirocin 2 % ointment         07/11/25 0859 Nasl 2 times daily 07/06/25 8195              Assessment/Plan:     Cat Garcia is a 89 y.o. White female with past medical history of afib with RVR (on eliquis), CKD 3, Dementia, HLD, HTN, thrombocytosis, and dementia who presented at [facility] on 7/5/2025 for acute encephalopathy from NH.  Patient is admitted to [facility] for AMS workup. Received 1L LR bolus, 10mg Dilt IV x 1 then 15mg X 1 in ED. Started on diltiazem infusion.     Acute on chronic encephalopathy  History of dementia  Sepsis  Per NH, not at baseline - increase in somnolence, weakness  Tachycardic, leukocytosis, lactacte 2.4, procal 0.43  CT head negative for acute findings  Chest x ray - no focal consolidations  Multiple loose stools reported  UA- wbc and leukocyte +  Urine culture - lactobacillus 10-50K, colonizer   Blood culture - NO growth 36H  Covid and flu -Negative   Respiratory panel -metapneumovirus, continuing azithro to cover for potential concomitant CAP  Given rocephin once  Plan:  Completed azithromycin for cap coverage  Stating oral vanc for prophylactic c diff coverage, stopped bc diarrhea resolved.  Consulting palliative   PT/OT consulted, did not see patient yesterday due to fatigue     Afib with RVR  TTE from 8/2024: Grade II Diastolic dysfunction, mildly dilated LA, EF 55-60%  S/p IL LR, 10mg IV dilt x 15mg IV dilt, did become hypotensive briefly   EKG: afib with RVR  Trop .109 and downtrended  Bnp 276   -> 359  TSH WNL  UDS negative  Plan  Cardiology following and Recommend lopressor 25mg PO BID and digoxin If she goes back into RVR  Continue eliquis 2.5 BID  stopped lopressor 25 BID  Transitioned to toprol 100, per cards recs.     CKD 3  Cr 0.9, GFR>60  Monitor kidney function  Avoid nephrotoxins     HLD  Last lipid panel 2023  Continue home statin     Acute Hypoxic Respiratory failure  On 2L NC initially, now on  RA  CTM  CXR - no acute findings     Macrocytic anemia  H/H 10.22/29.4 - at baseline   Mcv 118  No signs of bleeding  Last ferritin/iron 3 mo ago - ferritin 104/ iron 31/tibc 252  Folate 16.5  B12 - 332  Started b12 supplementation     Thrombocytosis  Hx of thrombocytosis  Continue home hydroxyurea     Diet:  Diet Soft & Bite Sized (IDDSI Level 6)  VTE PPx: heparin  Code Status:  DNR    Christina Callahan MD PGY1     \[facility] [place] [facility]\"" Medicine Hospitalist Pager numbers:   U Hospitalist Medicine Team A (Guerline/Skyler):          [phone]  \[facility] [place] [facility]\"" Hospitalist Medicine Team B (Nathanael/Neto):        [phone]

## 2025-07-08 NOTE — DISCHARGE INSTRUCTIONS
Our goal at [facility] is to always give you outstanding care and exceptional service. You may receive a survey from [employer] by mail, text or e-mail in the next 24-48 hours asking about the care you received with us. The survey should only take 5-10 minutes to complete and is very important to us.     Your feedback provides us with a way to recognize our staff who work tirelessly to provide the best care! Also, your responses help us learn how to improve when your experience was below our aspiration of excellence. We are always looking for ways to improve your stay. We WILL use your feedback to continue making improvements to help us provide the highest quality care. We keep your personal information and feedback confidential. We appreciate your time completing this survey and can't wait to hear from you!!!    We look forward to your continued care with us! Thanks so much for choosing [facility] for your healthcare needs!

## 2025-07-08 NOTE — PLAN OF CARE
Problem: Adult Inpatient Plan of Care  Goal: Plan of Care Review  Outcome: Progressing  Goal: Patient-Specific Goal (Individualized)  Outcome: Progressing  Goal: Absence of Hospital-Acquired Illness or Injury  Outcome: Progressing  Goal: Optimal Comfort and Wellbeing  Outcome: Progressing  Goal: Readiness for Transition of Care  Outcome: Progressing     Problem: Sepsis/Septic Shock  Goal: Optimal Coping  Outcome: Progressing  Goal: Absence of Bleeding  Outcome: Progressing  Goal: Blood Glucose Level Within Targeted Range  Outcome: Progressing  Goal: Absence of Infection Signs and Symptoms  Outcome: Progressing  Goal: Optimal Nutrition Intake  Outcome: Progressing     Problem: Skin Injury Risk Increased  Goal: Skin Health and Integrity  Outcome: Progressing     Problem: Fall Injury Risk  Goal: Absence of Fall and Fall-Related Injury  Outcome: Progressing     Problem: Infection  Goal: Absence of Infection Signs and Symptoms  Outcome: Progressing     Problem: Wound  Goal: Optimal Coping  Outcome: Progressing  Goal: Optimal Functional Ability  Outcome: Progressing  Goal: Absence of Infection Signs and Symptoms  Outcome: Progressing  Goal: Improved Oral Intake  Outcome: Progressing  Goal: Optimal Pain Control and Function  Outcome: Progressing  Goal: Skin Health and Integrity  Outcome: Progressing  Goal: Optimal Wound Healing  Outcome: Progressing

## 2025-07-09 ENCOUNTER — TELEPHONE (OUTPATIENT)
Dept: CARDIOLOGY | Facility: CLINIC | Age: 89
End: 2025-07-09
Payer: MEDICARE

## 2025-07-09 VITALS
RESPIRATION RATE: 20 BRPM | WEIGHT: 120.13 LBS | HEIGHT: 65 IN | TEMPERATURE: 98 F | HEART RATE: 105 BPM | BODY MASS INDEX: 20.01 KG/M2 | SYSTOLIC BLOOD PRESSURE: 132 MMHG | DIASTOLIC BLOOD PRESSURE: 66 MMHG | OXYGEN SATURATION: 97 %

## 2025-07-09 NOTE — DISCHARGE SUMMARY
[facility] Hospital Medicine Discharge Summary    Primary Team: [facility] Hospitalist Team A  Attending Physician: No att. providers found  Resident: Dr. Peralta  Intern: Dr. Callahan    Date of Admit: 7/5/2025  Date of Discharge: 7/8/2025    Discharge to: [facility]  Condition: Poorly Stable    Discharge Diagnoses     Problem List[1]    Consultants and Procedures     Consultants:  Cardiology  Wound Care  Pulm and Critical Care     Procedures:   none    Imaging:  EKG x3  CT Head without contrast  Chest xray     Brief History of Present Illness      Cat Garcia is a 89 y.o. White female with past medical history of afib with RVR (on eliquis), CKD 3, Dementia, HLD, HTN, thrombocytosis, and dementia who presented at [facility] on 7/5/2025 for acute encephalopathy from NH. [facility] is [facility] for [facility]MS workup. Received 1L LR bolus, 10mg Dilt IV x 1 then 15mg X 1 in ED. Started on diltiazem infusion.  On the day of discharge, patient was afebrile with stable vital signs and will be discharged in stable condition with discharged back to [facility].    For the full HPI please refer to the History & Physical from this admission.    Hospital Course By Problem with Pertinent Findings     Acute on chronic encephalopathy  History of dementia  Sepsis  Per NH, not at baseline on admission - presented with increase in somnolence, weakness, decreased appetitive -returned back to baseline   Tachycardic, leukocytosis, lactacte 2.4, procal 0.43  CT head negative for acute findings  Chest x ray - no focal consolidations  Multiple loose stools reported, given vancomycin for prophylactic c diff coverage but was stopped with resolution of diarrhea  UA- wbc and leukocyte +  Urine culture - lactobacillus 10-50K, colonizer   Blood culture - had no growth after 72 hours  Covid and flu -Negative   Respiratory panel -metapneumovirus, continued azithro to cover for potential concomitant CAP  Given rocephin once  Completed  "azithromycin for cap coverage, will not need antibiotics OP  Updated her OP palliative team that she was inpatient  PT/OT consulted, recommended 24/7 assistance with mobility and self care, will be discharging back to NH     Afib with RVR  TTE from 8/2024: Grade II Diastolic dysfunction, mildly dilated LA, EF 55-60%  S/p IL LR, 10mg IV dilt x 15mg IV dilt, did become hypotensive briefly   EKG: afib with RVR  Trop .109 and downtrended  Bnp 276   -> 359  TSH WNL  UDS negative  Cardiology was consulted inpatient and Recommended lopressor 25mg PO BID and digoxin If she went back into RVR  Continued eliquis 2.5 BID  stopped lopressor 25 BID  Transitioned to toprol 100  Follow with OP Cardiology     CKD 3  Cr 0.9, GFR>60  Monitored kidney function  Avoided nephrotoxins      HLD  Last lipid panel 2023  Continued home statin  Follow up with OP PCP     Acute Hypoxic Respiratory failure  Was on 2L NC initially, transitioned to RA  CXR - no acute findings     Macrocytic anemia  H/H 10.22/29.4 - at baseline   Mcv 118  No signs of bleeding  Last ferritin/iron 3 mo ago - ferritin 104/ iron 31/tibc 252  Folate 16.5  B12 - 332  Started b12 supplementation     Thrombocytosis  Hx of thrombocytosis  Continued home hydroxyurea      Discharge vital signs       /66   Pulse 105   Temp 98.2 °F (36.8 °C) (Oral)   Resp 20   Ht 5' 5" (1.651 m)   Wt 54.5 kg (120 lb 2.4 oz)   SpO2 97%   BMI 19.99 kg/m²      Discharge Medications        Medication List        START taking these medications      cyanocobalamin 1000 MCG tablet  Commonly known as: VITAMIN B-12  Take 1 tablet (1,000 mcg total) by mouth once daily.  Start taking on: July 9, 2025     metoprolol succinate 50 MG 24 hr tablet  Commonly known as: TOPROL-XL  Take 1 tablet (50 mg total) by mouth 2 (two) times daily. Please HOLD on taking tablet if heart rate <60 or systolic blood pressure <100            CHANGE how you take these medications      atorvastatin 10 MG " tablet  Commonly known as: LIPITOR  ONE TABLET BY MOUTH ONCE DAILY DX: HYPERLIPIDEMIA  What changed: See the new instructions.            CONTINUE taking these medications      apixaban 2.5 mg Tab  Commonly known as: ELIQUIS  Take 1 tablet (2.5 mg total) by mouth 2 (two) times daily.     * hydroxyurea 500 mg Cap  Commonly known as: HYDREA  2 CAPSULES (1000MG) BY MOUTH EVERY EVENING DX: THROMBOCYTOSIS     * hydroxyurea 500 mg Cap  Commonly known as: HYDREA  Take 2 capsules (1,000 mg total) by mouth 2 (two) times daily EVERY MORNING and EVENING..     latanoprost 0.005 % ophthalmic solution  INSTILL 1 DROP IN BOTH EYES EVERY EVENING     zinc oxide 40 % Oint  Commonly known as: BOUDREAUXS BUTT PASTE  Apply 1 Application topically Daily. Skyler's Butt Paste maximum strength           * This list has 2 medication(s) that are the same as other medications prescribed for you. Read the directions carefully, and ask your doctor or other care provider to review them with you.                   Where to Get Your Medications        These medications were sent to [facility] [address] [address], [place] [address]      Hours: Mon-Fri, 8a-5:30p Phone: [phone]   cyanocobalamin 1000 MCG tablet  metoprolol succinate 50 MG 24 hr tablet          Discharge Information:   Diet:  Diet Dysphagia Soft    Physical Activity:  As tolerated             Instructions:  1. Take all medications as prescribed  2. Keep all follow-up appointments  3. Return to the hospital or call your primary care physicians if any worsening symptoms such as fever, chest pain, shortness of breath, return of symptoms, or any other concerns.    Follow-Up Appointments:  Internal Medicine  Cardiology      Christina Callahan MD PGY1       [1]   Patient Active Problem List  Diagnosis    Atrial fibrillation with RVR    Dyslipidemia    Essential hypertension    Atrial fibrillation, chronic    Mixed Alzheimer's and vascular dementia    CKD (chronic  kidney disease), stage III    Dilated cardiomyopathy    Chronic combined systolic and diastolic CHF, NYHA class 3    Hypotension    Generalized weakness    Long term current use of anticoagulant therapy    Debility    Vascular parkinsonism    Aortic atherosclerosis    Thrombocytosis    Essential thrombocytosis    At high risk for falls    DNR (do not resuscitate)    Macrocytic anemia    E. coli UTI (urinary tract infection)    Bradycardia    Sepsis    UTI (urinary tract infection)    SOB (shortness of breath)    Lethargy    Dementia    Elevated troponin    Human metapneumovirus pneumonia

## 2025-07-09 NOTE — TELEPHONE ENCOUNTER
Copied from CRM #[id number]. Topic: General Inquiry - Return Call  >> Jul 9, 2025 10:57 AM Tawanna wrote:  Type:  Patient Returning Call    Who Called: Pt's son  Who Left Message for Patient: Dorothy  Does the patient know what this is regarding?:  Would the patient rather a call back or a response via [facility]? Call  Best Call Back Number: [phone]  Additional Information: Pt's son returning call from office.

## 2025-07-09 NOTE — TELEPHONE ENCOUNTER
Copied from CRM #[id number]. Topic: General Inquiry - Patient Advice  >> Jul 9, 2025 10:20 AM Carol wrote:  Type: Patient Call Back    Who called: Abdon-son    What is the request in detail:pt son is calling to verify the pt appt on 7/16, pt is booked for 12am for a device check as a hospital follow up. Pt son stating the pt doesn't have a device and wants to know what this appt is for. Please call to advise    Can the clinic reply by [facility]?    Would the patient rather a call back or a response via [facility]? call    Best call back number: [phone]    Additional Information: pt son is stating if she needs an appt the pt needs to stay on the same day

## 2025-07-10 LAB
BACTERIA BLD CULT: NORMAL
BACTERIA BLD CULT: NORMAL

## 2025-07-18 ENCOUNTER — TELEPHONE (OUTPATIENT)
Dept: PALLIATIVE MEDICINE | Facility: CLINIC | Age: 89
End: 2025-07-18
Payer: MEDICARE

## 2025-07-18 NOTE — TELEPHONE ENCOUNTER
Copied from CRM #[id number]. Topic: General Inquiry - Patient Advice  >> Jul 16, 2025  1:29 PM Ben wrote:  Type: Requesting to speak with nurse    Who Called: Taniya ([facility] [place])  Regarding: pt is not eating. Please advise.   Would the patient rather a call back or a response via [facility]? Call back  Best Call Back Number:    Additional Information:        Attempted to call back number left by above note, both times person on the other line was not accepting calls. Will have LPN reach out to [facility] to see about what's going on and if they still need assistance.    Yuli Jacinto MD  Outpatient Palliative Medicine  [facility]

## 2025-07-25 ENCOUNTER — TELEPHONE (OUTPATIENT)
Dept: PALLIATIVE MEDICINE | Facility: CLINIC | Age: 89
End: 2025-07-25
Payer: MEDICARE

## 2025-07-25 ENCOUNTER — PATIENT MESSAGE (OUTPATIENT)
Dept: PALLIATIVE MEDICINE | Facility: CLINIC | Age: 89
End: 2025-07-25
Payer: MEDICARE

## 2025-07-25 DIAGNOSIS — F03.C0 SEVERE DEMENTIA WITHOUT BEHAVIORAL DISTURBANCE, PSYCHOTIC DISTURBANCE, MOOD DISTURBANCE, OR ANXIETY, UNSPECIFIED DEMENTIA TYPE: Primary | ICD-10-CM

## 2025-07-25 DIAGNOSIS — I48.20 ATRIAL FIBRILLATION, CHRONIC: Chronic | ICD-10-CM

## 2025-07-25 RX ORDER — METOPROLOL SUCCINATE 50 MG/1
50 TABLET, EXTENDED RELEASE ORAL DAILY
Qty: 30 TABLET | Refills: 2 | Status: SHIPPED | OUTPATIENT
Start: 2025-07-25 | End: 2025-10-23

## 2025-07-25 RX ORDER — OLANZAPINE 5 MG/1
5 TABLET, FILM COATED ORAL NIGHTLY
Qty: 30 TABLET | Refills: 11 | Status: SHIPPED | OUTPATIENT
Start: 2025-07-25 | End: 2026-07-25

## 2025-07-25 NOTE — TELEPHONE ENCOUNTER
Called Dr. Ruiz to discuss his concerns about his mom's recent changes after leaving the hospital on 07/08 after being treated for pneumonia and AFib with RVR.  He states that nursing staff has reached out to him to alert him that she is now no longer getting out of bed, less interactive, not wanting to eat.  She is still drinking ensures, but appetite is severely reduced.  He is asking that we recheck a urine to make sure that she does not have a residual infection, consider reducing metoprolol dose (currently on b.i.d. succinate 50 mg) to see if this could be contributing.  I think these are reasonable interventions with this significant change, we will communicate with nurse Essence at [facility].  Dr. Ruiz accepts that reducing metoprolol dose does risk returning to Afib with RVR. I also told him that I would be asking about constipation and managing accordingly as well as considering starting an appetite stimulant such as olanzapine 5 mg nightly.  He agreed to these interventions.  My next home visit availability is not for a few weeks, however we may be able to connect via virtual visit in the next few weeks depending on work schedule for Dr. Ruiz.  For now, I will put her on a home visit scheduled for August 14th.    [facility] back from [facility] Vicky Newman, they ask for wound care HH orders, we were able to discuss med changes and interventions as above. They also request that HH do a straight cath for obtaining UA, orders updated and faxed to [facility].    Yuli Jacinto MD  Outpatient Palliative Medicine  [facility]

## 2025-07-25 NOTE — TELEPHONE ENCOUNTER

## 2025-07-28 ENCOUNTER — TELEPHONE (OUTPATIENT)
Dept: INTERNAL MEDICINE | Facility: CLINIC | Age: 89
End: 2025-07-28
Payer: MEDICARE

## 2025-07-28 ENCOUNTER — TELEPHONE (OUTPATIENT)
Dept: PALLIATIVE MEDICINE | Facility: CLINIC | Age: 89
End: 2025-07-28
Payer: MEDICARE

## 2025-07-28 NOTE — TELEPHONE ENCOUNTER
Received phone call from Ms. Garcia's son Dr. Abdon Ruiz who expresses that he discussed hospice care as an option with the  at [facility].  We discussed hospice as a resource in this setting, I do believe she qualifies for these services as a patient with dementia, recent bed-bound status, sacral wound, and reduced oral intake.  We discussed that this would be an alternative to home health, but may be able to serve her needs better. Dr. Ruiz would like me to initiate hospice referral, I reached out to [facility] who will contact him for hopeful enrollment in services today.    Yuli Jacinto MD  Outpatient Palliative Medicine  [facility]

## 2025-07-28 NOTE — TELEPHONE ENCOUNTER
Copied from CRM #[id number]. Topic: General Inquiry - Return Call  >> Jul 28, 2025 10:52 AM Ioana wrote:  Type:  Patient Returning Call    Who Called:intake nurse   Who Left Message for Patient:n/a  Does the patient know what this is regarding?:referral for social work  Would the patient rather a call back or a response via [facility]? Call   Best Call Back Number:Keya Hanley   Additional Information: